# Patient Record
Sex: MALE | Race: WHITE | NOT HISPANIC OR LATINO | Employment: OTHER | URBAN - METROPOLITAN AREA
[De-identification: names, ages, dates, MRNs, and addresses within clinical notes are randomized per-mention and may not be internally consistent; named-entity substitution may affect disease eponyms.]

---

## 2017-01-01 ENCOUNTER — HOSPITAL ENCOUNTER (OUTPATIENT)
Dept: RADIOLOGY | Facility: HOSPITAL | Age: 72
Discharge: HOME | End: 2017-03-08
Attending: INTERNAL MEDICINE
Payer: MEDICARE

## 2017-01-01 ENCOUNTER — HOSPITAL ENCOUNTER (OUTPATIENT)
Facility: HOSPITAL | Age: 72
Discharge: HOME | End: 2017-10-16
Attending: EMERGENCY MEDICINE | Admitting: HOSPITALIST
Payer: MEDICARE

## 2017-01-01 ENCOUNTER — OUTPATIENT CASE MANAGEMENT (OUTPATIENT)
Dept: ADMINISTRATIVE | Facility: OTHER | Age: 72
End: 2017-01-01

## 2017-01-01 ENCOUNTER — OFFICE VISIT (OUTPATIENT)
Dept: HEMATOLOGY/ONCOLOGY | Facility: CLINIC | Age: 72
End: 2017-01-01
Payer: MEDICARE

## 2017-01-01 ENCOUNTER — TELEPHONE (OUTPATIENT)
Dept: INTERNAL MEDICINE | Facility: CLINIC | Age: 72
End: 2017-01-01

## 2017-01-01 ENCOUNTER — INFUSION (OUTPATIENT)
Dept: INFUSION THERAPY | Facility: HOSPITAL | Age: 72
End: 2017-01-01
Attending: INTERNAL MEDICINE
Payer: MEDICARE

## 2017-01-01 ENCOUNTER — TELEPHONE (OUTPATIENT)
Dept: PHARMACY | Facility: CLINIC | Age: 72
End: 2017-01-01

## 2017-01-01 ENCOUNTER — PATIENT MESSAGE (OUTPATIENT)
Dept: HEMATOLOGY/ONCOLOGY | Facility: CLINIC | Age: 72
End: 2017-01-01

## 2017-01-01 ENCOUNTER — HOSPITAL ENCOUNTER (INPATIENT)
Facility: HOSPITAL | Age: 72
LOS: 2 days | Discharge: HOME | DRG: 291 | End: 2017-10-04
Attending: EMERGENCY MEDICINE | Admitting: EMERGENCY MEDICINE
Payer: MEDICARE

## 2017-01-01 ENCOUNTER — OFFICE VISIT (OUTPATIENT)
Dept: CARDIOLOGY | Facility: CLINIC | Age: 72
End: 2017-01-01
Payer: MEDICARE

## 2017-01-01 ENCOUNTER — OFFICE VISIT (OUTPATIENT)
Dept: PRIMARY CARE CLINIC | Facility: CLINIC | Age: 72
End: 2017-01-01
Payer: MEDICARE

## 2017-01-01 ENCOUNTER — CLINICAL SUPPORT (OUTPATIENT)
Dept: CARDIOLOGY | Facility: CLINIC | Age: 72
End: 2017-01-01
Payer: MEDICARE

## 2017-01-01 ENCOUNTER — HOSPITAL ENCOUNTER (OUTPATIENT)
Dept: RADIATION THERAPY | Facility: HOSPITAL | Age: 72
Discharge: HOME | End: 2017-03-03
Attending: RADIOLOGY
Payer: MEDICARE

## 2017-01-01 ENCOUNTER — LAB VISIT (OUTPATIENT)
Dept: LAB | Facility: HOSPITAL | Age: 72
End: 2017-01-01
Attending: INTERNAL MEDICINE
Payer: MEDICARE

## 2017-01-01 ENCOUNTER — TELEPHONE (OUTPATIENT)
Dept: HEMATOLOGY/ONCOLOGY | Facility: CLINIC | Age: 72
End: 2017-01-01

## 2017-01-01 ENCOUNTER — HOSPITAL ENCOUNTER (INPATIENT)
Facility: HOSPITAL | Age: 72
LOS: 3 days | Discharge: HOME | DRG: 280 | End: 2017-04-11
Attending: EMERGENCY MEDICINE | Admitting: HOSPITALIST
Payer: MEDICARE

## 2017-01-01 ENCOUNTER — TELEPHONE (OUTPATIENT)
Dept: INFUSION THERAPY | Facility: HOSPITAL | Age: 72
End: 2017-01-01

## 2017-01-01 ENCOUNTER — OFFICE VISIT (OUTPATIENT)
Dept: INTERNAL MEDICINE | Facility: CLINIC | Age: 72
End: 2017-01-01
Payer: MEDICARE

## 2017-01-01 ENCOUNTER — PATIENT OUTREACH (OUTPATIENT)
Dept: ADMINISTRATIVE | Facility: CLINIC | Age: 72
End: 2017-01-01
Payer: MEDICARE

## 2017-01-01 ENCOUNTER — HOSPITAL ENCOUNTER (OUTPATIENT)
Dept: RADIATION THERAPY | Facility: HOSPITAL | Age: 72
Discharge: HOME | End: 2017-08-01
Attending: RADIOLOGY
Payer: MEDICARE

## 2017-01-01 ENCOUNTER — OFFICE VISIT (OUTPATIENT)
Dept: UROLOGY | Facility: CLINIC | Age: 72
End: 2017-01-01
Payer: MEDICARE

## 2017-01-01 ENCOUNTER — TELEPHONE (OUTPATIENT)
Dept: CARDIOLOGY | Facility: CLINIC | Age: 72
End: 2017-01-01

## 2017-01-01 ENCOUNTER — OFFICE VISIT (OUTPATIENT)
Dept: NEUROSURGERY | Facility: CLINIC | Age: 72
End: 2017-01-01
Payer: MEDICARE

## 2017-01-01 ENCOUNTER — HOSPITAL ENCOUNTER (OUTPATIENT)
Dept: RADIOLOGY | Facility: HOSPITAL | Age: 72
Discharge: HOME | End: 2017-12-15
Attending: INTERNAL MEDICINE
Payer: MEDICARE

## 2017-01-01 ENCOUNTER — HOSPITAL ENCOUNTER (INPATIENT)
Facility: HOSPITAL | Age: 72
LOS: 6 days | Discharge: HOME | DRG: 871 | End: 2017-04-04
Attending: EMERGENCY MEDICINE | Admitting: INTERNAL MEDICINE
Payer: MEDICARE

## 2017-01-01 ENCOUNTER — ANESTHESIA EVENT (OUTPATIENT)
Dept: MEDSURG UNIT | Facility: HOSPITAL | Age: 72
DRG: 308 | End: 2017-01-01
Payer: MEDICARE

## 2017-01-01 ENCOUNTER — TELEPHONE (OUTPATIENT)
Dept: RADIOLOGY | Facility: HOSPITAL | Age: 72
End: 2017-01-01

## 2017-01-01 ENCOUNTER — HOSPITAL ENCOUNTER (OUTPATIENT)
Dept: RADIOLOGY | Facility: HOSPITAL | Age: 72
Discharge: HOME | End: 2017-05-17
Attending: NEUROLOGICAL SURGERY
Payer: MEDICARE

## 2017-01-01 ENCOUNTER — INFUSION (OUTPATIENT)
Dept: INFUSION THERAPY | Facility: HOSPITAL | Age: 72
DRG: 872 | End: 2017-01-01
Attending: INTERNAL MEDICINE
Payer: MEDICARE

## 2017-01-01 ENCOUNTER — PATIENT MESSAGE (OUTPATIENT)
Dept: ADMINISTRATIVE | Facility: OTHER | Age: 72
End: 2017-01-01

## 2017-01-01 ENCOUNTER — TELEPHONE (OUTPATIENT)
Dept: OPTOMETRY | Facility: CLINIC | Age: 72
End: 2017-01-01

## 2017-01-01 ENCOUNTER — HOSPITAL ENCOUNTER (INPATIENT)
Facility: HOSPITAL | Age: 72
LOS: 1 days | Discharge: HOME | DRG: 872 | End: 2017-08-17
Attending: EMERGENCY MEDICINE | Admitting: INTERNAL MEDICINE
Payer: MEDICARE

## 2017-01-01 ENCOUNTER — HOSPITAL ENCOUNTER (OUTPATIENT)
Dept: RADIOLOGY | Facility: HOSPITAL | Age: 72
Discharge: HOME | End: 2017-09-19
Attending: PHYSICIAN ASSISTANT
Payer: MEDICARE

## 2017-01-01 ENCOUNTER — ANESTHESIA (OUTPATIENT)
Dept: MEDSURG UNIT | Facility: HOSPITAL | Age: 72
DRG: 308 | End: 2017-01-01
Payer: MEDICARE

## 2017-01-01 ENCOUNTER — NURSE TRIAGE (OUTPATIENT)
Dept: ADMINISTRATIVE | Facility: CLINIC | Age: 72
End: 2017-01-01

## 2017-01-01 ENCOUNTER — TELEPHONE (OUTPATIENT)
Dept: UROLOGY | Facility: CLINIC | Age: 72
End: 2017-01-01

## 2017-01-01 ENCOUNTER — OFFICE VISIT (OUTPATIENT)
Dept: CARDIOLOGY | Facility: CLINIC | Age: 72
DRG: 872 | End: 2017-01-01
Payer: MEDICARE

## 2017-01-01 ENCOUNTER — TELEPHONE (OUTPATIENT)
Dept: ADMINISTRATIVE | Facility: OTHER | Age: 72
End: 2017-01-01

## 2017-01-01 ENCOUNTER — DOCUMENTATION ONLY (OUTPATIENT)
Dept: RADIATION ONCOLOGY | Facility: CLINIC | Age: 72
End: 2017-01-01

## 2017-01-01 ENCOUNTER — INITIAL CONSULT (OUTPATIENT)
Dept: RADIATION ONCOLOGY | Facility: CLINIC | Age: 72
End: 2017-01-01
Attending: RADIOLOGY
Payer: MEDICARE

## 2017-01-01 ENCOUNTER — TELEPHONE (OUTPATIENT)
Dept: PEDIATRIC UROLOGY | Facility: CLINIC | Age: 72
End: 2017-01-01

## 2017-01-01 ENCOUNTER — HOSPITAL ENCOUNTER (OUTPATIENT)
Dept: RADIOLOGY | Facility: HOSPITAL | Age: 72
Discharge: HOME | End: 2017-02-17
Attending: INTERNAL MEDICINE
Payer: MEDICARE

## 2017-01-01 ENCOUNTER — OFFICE VISIT (OUTPATIENT)
Dept: HEMATOLOGY/ONCOLOGY | Facility: CLINIC | Age: 72
DRG: 872 | End: 2017-01-01
Payer: MEDICARE

## 2017-01-01 ENCOUNTER — HOSPITAL ENCOUNTER (EMERGENCY)
Facility: HOSPITAL | Age: 72
Discharge: HOME | End: 2017-02-13
Attending: EMERGENCY MEDICINE
Payer: MEDICARE

## 2017-01-01 ENCOUNTER — PROCEDURE VISIT (OUTPATIENT)
Dept: UROLOGY | Facility: CLINIC | Age: 72
End: 2017-01-01
Payer: MEDICARE

## 2017-01-01 ENCOUNTER — HOSPITAL ENCOUNTER (OUTPATIENT)
Dept: CARDIOLOGY | Facility: CLINIC | Age: 72
Discharge: HOME | End: 2017-09-21
Payer: MEDICARE

## 2017-01-01 ENCOUNTER — HOSPITAL ENCOUNTER (INPATIENT)
Facility: HOSPITAL | Age: 72
LOS: 6 days | DRG: 308 | End: 2017-12-25
Attending: EMERGENCY MEDICINE | Admitting: EMERGENCY MEDICINE
Payer: MEDICARE

## 2017-01-01 ENCOUNTER — HOSPITAL ENCOUNTER (OUTPATIENT)
Dept: RADIOLOGY | Facility: HOSPITAL | Age: 72
Discharge: HOME | End: 2017-07-07
Attending: INTERNAL MEDICINE
Payer: MEDICARE

## 2017-01-01 ENCOUNTER — TELEPHONE (OUTPATIENT)
Dept: RADIATION ONCOLOGY | Facility: CLINIC | Age: 72
End: 2017-01-01

## 2017-01-01 ENCOUNTER — HOSPITAL ENCOUNTER (OUTPATIENT)
Dept: RADIATION THERAPY | Facility: HOSPITAL | Age: 72
Discharge: HOME | End: 2017-07-21
Attending: RADIOLOGY
Payer: MEDICARE

## 2017-01-01 ENCOUNTER — TELEPHONE (OUTPATIENT)
Dept: NEUROSURGERY | Facility: CLINIC | Age: 72
End: 2017-01-01

## 2017-01-01 ENCOUNTER — HOSPITAL ENCOUNTER (OUTPATIENT)
Dept: RADIOLOGY | Facility: CLINIC | Age: 72
Discharge: HOME | End: 2017-03-16
Attending: INTERNAL MEDICINE
Payer: MEDICARE

## 2017-01-01 ENCOUNTER — OFFICE VISIT (OUTPATIENT)
Dept: RADIATION ONCOLOGY | Facility: CLINIC | Age: 72
End: 2017-01-01
Payer: MEDICARE

## 2017-01-01 ENCOUNTER — TELEPHONE (OUTPATIENT)
Dept: CARDIOLOGY | Facility: HOSPITAL | Age: 72
End: 2017-01-01

## 2017-01-01 VITALS
DIASTOLIC BLOOD PRESSURE: 60 MMHG | BODY MASS INDEX: 25.2 KG/M2 | SYSTOLIC BLOOD PRESSURE: 112 MMHG | HEIGHT: 70 IN | WEIGHT: 176 LBS

## 2017-01-01 VITALS
BODY MASS INDEX: 24.77 KG/M2 | HEIGHT: 70 IN | SYSTOLIC BLOOD PRESSURE: 98 MMHG | HEART RATE: 70 BPM | DIASTOLIC BLOOD PRESSURE: 62 MMHG | SYSTOLIC BLOOD PRESSURE: 112 MMHG | WEIGHT: 173 LBS | HEART RATE: 75 BPM | WEIGHT: 171.25 LBS | DIASTOLIC BLOOD PRESSURE: 62 MMHG | HEIGHT: 70 IN | RESPIRATION RATE: 16 BRPM | TEMPERATURE: 99 F | BODY MASS INDEX: 24.52 KG/M2

## 2017-01-01 VITALS
TEMPERATURE: 99 F | HEART RATE: 75 BPM | WEIGHT: 180.75 LBS | DIASTOLIC BLOOD PRESSURE: 70 MMHG | HEIGHT: 70 IN | BODY MASS INDEX: 25.88 KG/M2 | RESPIRATION RATE: 16 BRPM | SYSTOLIC BLOOD PRESSURE: 110 MMHG

## 2017-01-01 VITALS
RESPIRATION RATE: 16 BRPM | DIASTOLIC BLOOD PRESSURE: 62 MMHG | SYSTOLIC BLOOD PRESSURE: 98 MMHG | HEIGHT: 70 IN | HEART RATE: 78 BPM | WEIGHT: 166.25 LBS | TEMPERATURE: 98 F | BODY MASS INDEX: 23.8 KG/M2

## 2017-01-01 VITALS
SYSTOLIC BLOOD PRESSURE: 94 MMHG | BODY MASS INDEX: 23.69 KG/M2 | DIASTOLIC BLOOD PRESSURE: 52 MMHG | TEMPERATURE: 99 F | RESPIRATION RATE: 20 BRPM | WEIGHT: 165.13 LBS | HEART RATE: 71 BPM | OXYGEN SATURATION: 96 %

## 2017-01-01 VITALS
WEIGHT: 164.44 LBS | OXYGEN SATURATION: 98 % | HEART RATE: 73 BPM | TEMPERATURE: 98 F | DIASTOLIC BLOOD PRESSURE: 54 MMHG | SYSTOLIC BLOOD PRESSURE: 99 MMHG | RESPIRATION RATE: 20 BRPM | BODY MASS INDEX: 23.6 KG/M2

## 2017-01-01 VITALS
RESPIRATION RATE: 16 BRPM | DIASTOLIC BLOOD PRESSURE: 52 MMHG | SYSTOLIC BLOOD PRESSURE: 91 MMHG | HEART RATE: 79 BPM | TEMPERATURE: 98 F

## 2017-01-01 VITALS
HEART RATE: 102 BPM | WEIGHT: 154 LBS | RESPIRATION RATE: 17 BRPM | OXYGEN SATURATION: 99 % | BODY MASS INDEX: 22.81 KG/M2 | DIASTOLIC BLOOD PRESSURE: 64 MMHG | HEIGHT: 69 IN | SYSTOLIC BLOOD PRESSURE: 106 MMHG | TEMPERATURE: 98 F

## 2017-01-01 VITALS
WEIGHT: 167.56 LBS | BODY MASS INDEX: 24.04 KG/M2 | SYSTOLIC BLOOD PRESSURE: 187 MMHG | OXYGEN SATURATION: 95 % | DIASTOLIC BLOOD PRESSURE: 137 MMHG | TEMPERATURE: 98 F | HEART RATE: 98 BPM | RESPIRATION RATE: 20 BRPM

## 2017-01-01 VITALS
SYSTOLIC BLOOD PRESSURE: 93 MMHG | HEIGHT: 70 IN | TEMPERATURE: 98 F | RESPIRATION RATE: 18 BRPM | HEART RATE: 82 BPM | BODY MASS INDEX: 22.57 KG/M2 | DIASTOLIC BLOOD PRESSURE: 53 MMHG | WEIGHT: 157.63 LBS | OXYGEN SATURATION: 99 %

## 2017-01-01 VITALS
WEIGHT: 156 LBS | SYSTOLIC BLOOD PRESSURE: 85 MMHG | OXYGEN SATURATION: 97 % | DIASTOLIC BLOOD PRESSURE: 53 MMHG | TEMPERATURE: 98 F | HEART RATE: 89 BPM | RESPIRATION RATE: 17 BRPM | HEIGHT: 70 IN | BODY MASS INDEX: 22.33 KG/M2

## 2017-01-01 VITALS
WEIGHT: 180 LBS | HEART RATE: 76 BPM | BODY MASS INDEX: 25.77 KG/M2 | RESPIRATION RATE: 18 BRPM | DIASTOLIC BLOOD PRESSURE: 60 MMHG | TEMPERATURE: 98 F | SYSTOLIC BLOOD PRESSURE: 125 MMHG | OXYGEN SATURATION: 96 % | HEIGHT: 70 IN

## 2017-01-01 VITALS
OXYGEN SATURATION: 99 % | BODY MASS INDEX: 23.91 KG/M2 | TEMPERATURE: 99 F | HEART RATE: 68 BPM | RESPIRATION RATE: 20 BRPM | DIASTOLIC BLOOD PRESSURE: 54 MMHG | SYSTOLIC BLOOD PRESSURE: 99 MMHG | WEIGHT: 166.69 LBS

## 2017-01-01 VITALS
HEIGHT: 70 IN | BODY MASS INDEX: 24.34 KG/M2 | HEART RATE: 95 BPM | SYSTOLIC BLOOD PRESSURE: 102 MMHG | OXYGEN SATURATION: 98 % | DIASTOLIC BLOOD PRESSURE: 65 MMHG | WEIGHT: 170 LBS | RESPIRATION RATE: 16 BRPM | TEMPERATURE: 98 F

## 2017-01-01 VITALS
WEIGHT: 158.5 LBS | DIASTOLIC BLOOD PRESSURE: 51 MMHG | HEIGHT: 69 IN | BODY MASS INDEX: 23.47 KG/M2 | OXYGEN SATURATION: 99 % | SYSTOLIC BLOOD PRESSURE: 86 MMHG | HEART RATE: 75 BPM

## 2017-01-01 VITALS
DIASTOLIC BLOOD PRESSURE: 55 MMHG | SYSTOLIC BLOOD PRESSURE: 101 MMHG | OXYGEN SATURATION: 96 % | HEART RATE: 82 BPM | WEIGHT: 175.5 LBS | TEMPERATURE: 98 F | BODY MASS INDEX: 25.13 KG/M2 | RESPIRATION RATE: 10 BRPM | HEIGHT: 70 IN

## 2017-01-01 VITALS
HEIGHT: 70 IN | BODY MASS INDEX: 26.99 KG/M2 | WEIGHT: 171.5 LBS | DIASTOLIC BLOOD PRESSURE: 68 MMHG | SYSTOLIC BLOOD PRESSURE: 100 MMHG | HEART RATE: 113 BPM | WEIGHT: 188.5 LBS | RESPIRATION RATE: 20 BRPM | TEMPERATURE: 98 F | OXYGEN SATURATION: 76 % | SYSTOLIC BLOOD PRESSURE: 107 MMHG | RESPIRATION RATE: 15 BRPM | TEMPERATURE: 97 F | DIASTOLIC BLOOD PRESSURE: 59 MMHG | OXYGEN SATURATION: 96 % | BODY MASS INDEX: 24.61 KG/M2

## 2017-01-01 VITALS
TEMPERATURE: 97 F | HEART RATE: 83 BPM | WEIGHT: 157.19 LBS | HEIGHT: 70 IN | BODY MASS INDEX: 22.5 KG/M2 | DIASTOLIC BLOOD PRESSURE: 51 MMHG | SYSTOLIC BLOOD PRESSURE: 92 MMHG | RESPIRATION RATE: 16 BRPM

## 2017-01-01 VITALS
SYSTOLIC BLOOD PRESSURE: 112 MMHG | TEMPERATURE: 98 F | HEIGHT: 70 IN | DIASTOLIC BLOOD PRESSURE: 60 MMHG | WEIGHT: 175.94 LBS | RESPIRATION RATE: 16 BRPM | BODY MASS INDEX: 25.19 KG/M2 | HEART RATE: 65 BPM

## 2017-01-01 VITALS
OXYGEN SATURATION: 98 % | HEART RATE: 100 BPM | TEMPERATURE: 99 F | WEIGHT: 167 LBS | BODY MASS INDEX: 23.96 KG/M2 | SYSTOLIC BLOOD PRESSURE: 95 MMHG | RESPIRATION RATE: 18 BRPM | DIASTOLIC BLOOD PRESSURE: 54 MMHG

## 2017-01-01 VITALS
TEMPERATURE: 98 F | SYSTOLIC BLOOD PRESSURE: 83 MMHG | SYSTOLIC BLOOD PRESSURE: 90 MMHG | RESPIRATION RATE: 18 BRPM | RESPIRATION RATE: 16 BRPM | TEMPERATURE: 98 F | HEART RATE: 92 BPM | DIASTOLIC BLOOD PRESSURE: 53 MMHG | HEART RATE: 96 BPM | DIASTOLIC BLOOD PRESSURE: 58 MMHG

## 2017-01-01 VITALS
OXYGEN SATURATION: 96 % | DIASTOLIC BLOOD PRESSURE: 68 MMHG | TEMPERATURE: 96 F | BODY MASS INDEX: 22.57 KG/M2 | SYSTOLIC BLOOD PRESSURE: 102 MMHG | HEART RATE: 103 BPM | WEIGHT: 157.63 LBS | RESPIRATION RATE: 18 BRPM | HEIGHT: 70 IN

## 2017-01-01 VITALS
BODY MASS INDEX: 22.69 KG/M2 | WEIGHT: 158.5 LBS | HEART RATE: 71 BPM | SYSTOLIC BLOOD PRESSURE: 82 MMHG | DIASTOLIC BLOOD PRESSURE: 53 MMHG | SYSTOLIC BLOOD PRESSURE: 101 MMHG | DIASTOLIC BLOOD PRESSURE: 55 MMHG | RESPIRATION RATE: 18 BRPM | TEMPERATURE: 98 F | OXYGEN SATURATION: 97 % | TEMPERATURE: 99 F | HEART RATE: 63 BPM | HEIGHT: 70 IN

## 2017-01-01 VITALS
HEART RATE: 75 BPM | RESPIRATION RATE: 20 BRPM | TEMPERATURE: 99 F | SYSTOLIC BLOOD PRESSURE: 111 MMHG | OXYGEN SATURATION: 96 % | WEIGHT: 173.5 LBS | DIASTOLIC BLOOD PRESSURE: 59 MMHG | BODY MASS INDEX: 24.89 KG/M2

## 2017-01-01 VITALS
SYSTOLIC BLOOD PRESSURE: 70 MMHG | DIASTOLIC BLOOD PRESSURE: 51 MMHG | BODY MASS INDEX: 22.54 KG/M2 | HEART RATE: 92 BPM | WEIGHT: 157.44 LBS | HEIGHT: 70 IN

## 2017-01-01 VITALS
HEART RATE: 112 BPM | SYSTOLIC BLOOD PRESSURE: 118 MMHG | BODY MASS INDEX: 23.8 KG/M2 | OXYGEN SATURATION: 99 % | WEIGHT: 166.25 LBS | HEIGHT: 70 IN | DIASTOLIC BLOOD PRESSURE: 64 MMHG

## 2017-01-01 VITALS
BODY MASS INDEX: 22.94 KG/M2 | SYSTOLIC BLOOD PRESSURE: 94 MMHG | HEART RATE: 96 BPM | HEIGHT: 70 IN | TEMPERATURE: 98 F | RESPIRATION RATE: 15 BRPM | DIASTOLIC BLOOD PRESSURE: 72 MMHG | WEIGHT: 160.25 LBS | OXYGEN SATURATION: 98 %

## 2017-01-01 VITALS
TEMPERATURE: 98 F | SYSTOLIC BLOOD PRESSURE: 70 MMHG | HEIGHT: 70 IN | BODY MASS INDEX: 23.99 KG/M2 | HEART RATE: 47 BPM | WEIGHT: 167.56 LBS | DIASTOLIC BLOOD PRESSURE: 47 MMHG

## 2017-01-01 VITALS
BODY MASS INDEX: 24.2 KG/M2 | WEIGHT: 168.63 LBS | TEMPERATURE: 98 F | HEART RATE: 92 BPM | RESPIRATION RATE: 20 BRPM | DIASTOLIC BLOOD PRESSURE: 56 MMHG | OXYGEN SATURATION: 96 % | SYSTOLIC BLOOD PRESSURE: 93 MMHG

## 2017-01-01 VITALS
RESPIRATION RATE: 16 BRPM | HEART RATE: 94 BPM | SYSTOLIC BLOOD PRESSURE: 94 MMHG | TEMPERATURE: 98 F | DIASTOLIC BLOOD PRESSURE: 55 MMHG

## 2017-01-01 VITALS
SYSTOLIC BLOOD PRESSURE: 92 MMHG | HEIGHT: 70 IN | DIASTOLIC BLOOD PRESSURE: 59 MMHG | WEIGHT: 160.5 LBS | HEART RATE: 91 BPM | BODY MASS INDEX: 22.98 KG/M2

## 2017-01-01 VITALS
SYSTOLIC BLOOD PRESSURE: 129 MMHG | HEIGHT: 70 IN | BODY MASS INDEX: 25.44 KG/M2 | WEIGHT: 177.69 LBS | OXYGEN SATURATION: 96 % | DIASTOLIC BLOOD PRESSURE: 77 MMHG | HEART RATE: 85 BPM

## 2017-01-01 VITALS
HEIGHT: 70 IN | HEART RATE: 94 BPM | SYSTOLIC BLOOD PRESSURE: 89 MMHG | WEIGHT: 156.06 LBS | DIASTOLIC BLOOD PRESSURE: 55 MMHG | BODY MASS INDEX: 22.34 KG/M2

## 2017-01-01 VITALS
BODY MASS INDEX: 23.31 KG/M2 | HEART RATE: 95 BPM | RESPIRATION RATE: 20 BRPM | WEIGHT: 157.88 LBS | TEMPERATURE: 98 F | SYSTOLIC BLOOD PRESSURE: 89 MMHG | DIASTOLIC BLOOD PRESSURE: 58 MMHG

## 2017-01-01 VITALS
RESPIRATION RATE: 17 BRPM | DIASTOLIC BLOOD PRESSURE: 57 MMHG | TEMPERATURE: 98 F | SYSTOLIC BLOOD PRESSURE: 111 MMHG | HEART RATE: 71 BPM

## 2017-01-01 VITALS
BODY MASS INDEX: 24.91 KG/M2 | DIASTOLIC BLOOD PRESSURE: 63 MMHG | HEART RATE: 68 BPM | TEMPERATURE: 99 F | HEIGHT: 70 IN | WEIGHT: 174 LBS | SYSTOLIC BLOOD PRESSURE: 102 MMHG

## 2017-01-01 VITALS
HEART RATE: 105 BPM | DIASTOLIC BLOOD PRESSURE: 50 MMHG | OXYGEN SATURATION: 98 % | RESPIRATION RATE: 18 BRPM | TEMPERATURE: 98 F | BODY MASS INDEX: 23.82 KG/M2 | WEIGHT: 166 LBS | SYSTOLIC BLOOD PRESSURE: 95 MMHG

## 2017-01-01 VITALS
SYSTOLIC BLOOD PRESSURE: 102 MMHG | TEMPERATURE: 98 F | RESPIRATION RATE: 18 BRPM | DIASTOLIC BLOOD PRESSURE: 58 MMHG | HEART RATE: 75 BPM

## 2017-01-01 DIAGNOSIS — I50.42 CHRONIC COMBINED SYSTOLIC AND DIASTOLIC HEART FAILURE: Chronic | ICD-10-CM

## 2017-01-01 DIAGNOSIS — C79.51 METASTATIC RENAL CELL CARCINOMA TO BONE: Chronic | ICD-10-CM

## 2017-01-01 DIAGNOSIS — I25.10 CORONARY ARTERY DISEASE, ANGINA PRESENCE UNSPECIFIED, UNSPECIFIED VESSEL OR LESION TYPE, UNSPECIFIED WHETHER NATIVE OR TRANSPLANTED HEART: ICD-10-CM

## 2017-01-01 DIAGNOSIS — N39.0 ACUTE UTI: Primary | ICD-10-CM

## 2017-01-01 DIAGNOSIS — C64.9 RENAL CELL CARCINOMA, UNSPECIFIED LATERALITY: ICD-10-CM

## 2017-01-01 DIAGNOSIS — C79.31 BRAIN METASTASES: ICD-10-CM

## 2017-01-01 DIAGNOSIS — E11.9 TYPE 2 DIABETES MELLITUS WITHOUT COMPLICATION: ICD-10-CM

## 2017-01-01 DIAGNOSIS — R79.9 ABNORMAL FINDING OF BLOOD CHEMISTRY: ICD-10-CM

## 2017-01-01 DIAGNOSIS — C79.51 SPINE METASTASIS: ICD-10-CM

## 2017-01-01 DIAGNOSIS — R06.02 SOB (SHORTNESS OF BREATH): ICD-10-CM

## 2017-01-01 DIAGNOSIS — C64.1 RENAL CELL CARCINOMA OF RIGHT KIDNEY: ICD-10-CM

## 2017-01-01 DIAGNOSIS — E87.1 HYPONATREMIA: ICD-10-CM

## 2017-01-01 DIAGNOSIS — R00.0 TACHYCARDIA: ICD-10-CM

## 2017-01-01 DIAGNOSIS — Z12.5 PROSTATE CANCER SCREENING: ICD-10-CM

## 2017-01-01 DIAGNOSIS — I25.10 CORONARY ARTERY DISEASE INVOLVING NATIVE HEART WITHOUT ANGINA PECTORIS, UNSPECIFIED VESSEL OR LESION TYPE: ICD-10-CM

## 2017-01-01 DIAGNOSIS — N18.2 CHRONIC KIDNEY DISEASE, STAGE II (MILD): ICD-10-CM

## 2017-01-01 DIAGNOSIS — C79.51 METASTATIC RENAL CELL CARCINOMA TO BONE: ICD-10-CM

## 2017-01-01 DIAGNOSIS — C64.1 RENAL CELL CARCINOMA OF RIGHT KIDNEY: Primary | ICD-10-CM

## 2017-01-01 DIAGNOSIS — E83.51 HYPOCALCEMIA: ICD-10-CM

## 2017-01-01 DIAGNOSIS — I10 ESSENTIAL HYPERTENSION: Chronic | ICD-10-CM

## 2017-01-01 DIAGNOSIS — I35.0 AORTIC STENOSIS, SEVERE: Primary | ICD-10-CM

## 2017-01-01 DIAGNOSIS — E07.9 DISORDER OF THYROID: ICD-10-CM

## 2017-01-01 DIAGNOSIS — R33.9 URINARY RETENTION: Primary | ICD-10-CM

## 2017-01-01 DIAGNOSIS — C64.9 METASTATIC RENAL CELL CARCINOMA TO BONE: Chronic | ICD-10-CM

## 2017-01-01 DIAGNOSIS — R52 PAIN: ICD-10-CM

## 2017-01-01 DIAGNOSIS — E11.22 TYPE 2 DIABETES MELLITUS WITH STAGE 2 CHRONIC KIDNEY DISEASE, WITHOUT LONG-TERM CURRENT USE OF INSULIN: ICD-10-CM

## 2017-01-01 DIAGNOSIS — I10 ESSENTIAL HYPERTENSION: ICD-10-CM

## 2017-01-01 DIAGNOSIS — E11.42 TYPE 2 DIABETES MELLITUS WITH DIABETIC POLYNEUROPATHY, WITHOUT LONG-TERM CURRENT USE OF INSULIN: Chronic | ICD-10-CM

## 2017-01-01 DIAGNOSIS — C79.51 METASTATIC RENAL CELL CARCINOMA TO BONE: Primary | Chronic | ICD-10-CM

## 2017-01-01 DIAGNOSIS — M47.816 FACET ARTHRITIS OF LUMBAR REGION: ICD-10-CM

## 2017-01-01 DIAGNOSIS — C79.51 METASTATIC RENAL CELL CARCINOMA TO BONE: Primary | ICD-10-CM

## 2017-01-01 DIAGNOSIS — R11.2 NAUSEA AND VOMITING, INTRACTABILITY OF VOMITING NOT SPECIFIED, UNSPECIFIED VOMITING TYPE: ICD-10-CM

## 2017-01-01 DIAGNOSIS — C64.1 CLEAR CELL RENAL CELL CARCINOMA, RIGHT: ICD-10-CM

## 2017-01-01 DIAGNOSIS — I35.0 SEVERE AORTIC STENOSIS: Primary | ICD-10-CM

## 2017-01-01 DIAGNOSIS — I50.43 ACUTE ON CHRONIC COMBINED SYSTOLIC AND DIASTOLIC CHF, NYHA CLASS 4: ICD-10-CM

## 2017-01-01 DIAGNOSIS — G89.3 CANCER ASSOCIATED PAIN: ICD-10-CM

## 2017-01-01 DIAGNOSIS — J18.9 COMMUNITY ACQUIRED PNEUMONIA: Primary | ICD-10-CM

## 2017-01-01 DIAGNOSIS — I25.10 CORONARY ARTERY DISEASE INVOLVING NATIVE CORONARY ARTERY OF NATIVE HEART WITHOUT ANGINA PECTORIS: ICD-10-CM

## 2017-01-01 DIAGNOSIS — I70.0 AORTIC ATHEROSCLEROSIS: ICD-10-CM

## 2017-01-01 DIAGNOSIS — C64.9 METASTATIC RENAL CELL CARCINOMA TO BONE: Primary | ICD-10-CM

## 2017-01-01 DIAGNOSIS — C79.51 BONE METASTASES: ICD-10-CM

## 2017-01-01 DIAGNOSIS — I42.9 CARDIOMYOPATHY: ICD-10-CM

## 2017-01-01 DIAGNOSIS — C64.9 METASTATIC RENAL CELL CARCINOMA TO BONE: Primary | Chronic | ICD-10-CM

## 2017-01-01 DIAGNOSIS — N18.2 TYPE 2 DIABETES MELLITUS WITH STAGE 2 CHRONIC KIDNEY DISEASE, WITHOUT LONG-TERM CURRENT USE OF INSULIN: ICD-10-CM

## 2017-01-01 DIAGNOSIS — Z00.00 ANNUAL PHYSICAL EXAM: ICD-10-CM

## 2017-01-01 DIAGNOSIS — R06.01 ORTHOPNEA: ICD-10-CM

## 2017-01-01 DIAGNOSIS — R65.20 SEPSIS WITH ACUTE ORGAN DYSFUNCTION: ICD-10-CM

## 2017-01-01 DIAGNOSIS — I50.22 CHRONIC SYSTOLIC HEART FAILURE: ICD-10-CM

## 2017-01-01 DIAGNOSIS — I48.92 ATRIAL FLUTTER: ICD-10-CM

## 2017-01-01 DIAGNOSIS — I10 ESSENTIAL HYPERTENSION: Primary | ICD-10-CM

## 2017-01-01 DIAGNOSIS — I50.42 CHRONIC COMBINED SYSTOLIC AND DIASTOLIC HEART FAILURE: ICD-10-CM

## 2017-01-01 DIAGNOSIS — I50.43 ACUTE ON CHRONIC COMBINED SYSTOLIC AND DIASTOLIC CONGESTIVE HEART FAILURE, NYHA CLASS 4: Primary | ICD-10-CM

## 2017-01-01 DIAGNOSIS — I35.0 SEVERE AORTIC STENOSIS: ICD-10-CM

## 2017-01-01 DIAGNOSIS — C64.1 RENAL CARCINOMA, RIGHT: Primary | ICD-10-CM

## 2017-01-01 DIAGNOSIS — I50.32 CHRONIC DIASTOLIC HEART FAILURE: ICD-10-CM

## 2017-01-01 DIAGNOSIS — R90.89 OTHER ABNORMAL FINDINGS ON DIAGNOSTIC IMAGING OF CENTRAL NERVOUS SYSTEM: ICD-10-CM

## 2017-01-01 DIAGNOSIS — E09.9 DRUG-INDUCED DIABETES MELLITUS: Chronic | ICD-10-CM

## 2017-01-01 DIAGNOSIS — M54.16 LUMBAR RADICULOPATHY: ICD-10-CM

## 2017-01-01 DIAGNOSIS — Z46.6 ENCOUNTER FOR FOLEY CATHETER REMOVAL: ICD-10-CM

## 2017-01-01 DIAGNOSIS — I49.9 IRREGULAR HEART BEAT: ICD-10-CM

## 2017-01-01 DIAGNOSIS — E78.5 HYPERLIPIDEMIA: ICD-10-CM

## 2017-01-01 DIAGNOSIS — E11.42 TYPE 2 DIABETES MELLITUS WITH DIABETIC POLYNEUROPATHY, WITHOUT LONG-TERM CURRENT USE OF INSULIN: ICD-10-CM

## 2017-01-01 DIAGNOSIS — I25.5 CARDIOMYOPATHY, ISCHEMIC: ICD-10-CM

## 2017-01-01 DIAGNOSIS — C64.9 RENAL CELL CARCINOMA, UNSPECIFIED LATERALITY: Primary | ICD-10-CM

## 2017-01-01 DIAGNOSIS — M54.50 LUMBAR SPINE PAIN: ICD-10-CM

## 2017-01-01 DIAGNOSIS — I10 ESSENTIAL (PRIMARY) HYPERTENSION: ICD-10-CM

## 2017-01-01 DIAGNOSIS — C41.4: Primary | Chronic | ICD-10-CM

## 2017-01-01 DIAGNOSIS — A41.9 SEPSIS, DUE TO UNSPECIFIED ORGANISM: Primary | ICD-10-CM

## 2017-01-01 DIAGNOSIS — R79.89 ELEVATED BRAIN NATRIURETIC PEPTIDE (BNP) LEVEL: ICD-10-CM

## 2017-01-01 DIAGNOSIS — R33.9 URINARY RETENTION: ICD-10-CM

## 2017-01-01 DIAGNOSIS — E13.65 OTHER SPECIFIED DIABETES MELLITUS WITH HYPERGLYCEMIA: ICD-10-CM

## 2017-01-01 DIAGNOSIS — G62.9 NEUROPATHY: ICD-10-CM

## 2017-01-01 DIAGNOSIS — R09.02 HYPOXIA: Primary | ICD-10-CM

## 2017-01-01 DIAGNOSIS — N40.1 BENIGN NON-NODULAR PROSTATIC HYPERPLASIA WITH LOWER URINARY TRACT SYMPTOMS: ICD-10-CM

## 2017-01-01 DIAGNOSIS — Z91.09 ENVIRONMENTAL ALLERGIES: ICD-10-CM

## 2017-01-01 DIAGNOSIS — R00.2 PALPITATIONS: ICD-10-CM

## 2017-01-01 DIAGNOSIS — R05.9 COUGH: ICD-10-CM

## 2017-01-01 DIAGNOSIS — E03.9 HYPOTHYROIDISM, UNSPECIFIED TYPE: Primary | ICD-10-CM

## 2017-01-01 DIAGNOSIS — I35.0 AORTIC VALVE STENOSIS, ETIOLOGY OF CARDIAC VALVE DISEASE UNSPECIFIED: ICD-10-CM
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
## 2017-01-01 DIAGNOSIS — R79.89 ELEVATED TROPONIN I LEVEL: ICD-10-CM

## 2017-01-01 DIAGNOSIS — M54.9 BACK PAIN, UNSPECIFIED BACK LOCATION, UNSPECIFIED BACK PAIN LATERALITY, UNSPECIFIED CHRONICITY: ICD-10-CM

## 2017-01-01 DIAGNOSIS — E87.6 HYPOKALEMIA: ICD-10-CM

## 2017-01-01 DIAGNOSIS — R07.9 CHEST PAIN: ICD-10-CM

## 2017-01-01 DIAGNOSIS — I50.43 ACUTE ON CHRONIC COMBINED SYSTOLIC AND DIASTOLIC CONGESTIVE HEART FAILURE, NYHA CLASS 4: ICD-10-CM

## 2017-01-01 DIAGNOSIS — J90 PLEURAL EFFUSION: ICD-10-CM

## 2017-01-01 DIAGNOSIS — R06.02 SHORTNESS OF BREATH: ICD-10-CM

## 2017-01-01 DIAGNOSIS — R91.8 PULMONARY INFILTRATES ON CXR: ICD-10-CM

## 2017-01-01 DIAGNOSIS — I48.91 A-FIB: ICD-10-CM

## 2017-01-01 DIAGNOSIS — Z00.00 ANNUAL PHYSICAL EXAM: Primary | ICD-10-CM

## 2017-01-01 DIAGNOSIS — N39.0 ACUTE UTI: ICD-10-CM

## 2017-01-01 DIAGNOSIS — C64.9 METASTATIC RENAL CELL CARCINOMA TO BONE: ICD-10-CM

## 2017-01-01 DIAGNOSIS — R63.4 ABNORMAL WEIGHT LOSS: ICD-10-CM

## 2017-01-01 DIAGNOSIS — I48.91 NEW ONSET A-FIB: ICD-10-CM

## 2017-01-01 DIAGNOSIS — M54.30 SCIATIC LEG PAIN: Primary | ICD-10-CM

## 2017-01-01 DIAGNOSIS — Z97.8 FOLEY CATHETER IN PLACE: ICD-10-CM

## 2017-01-01 DIAGNOSIS — R50.9 FEVER: ICD-10-CM

## 2017-01-01 DIAGNOSIS — J96.01 ACUTE RESPIRATORY FAILURE WITH HYPOXIA: ICD-10-CM

## 2017-01-01 DIAGNOSIS — C64.9 SECONDARY RENAL CELL CARCINOMA OF BRAIN: ICD-10-CM

## 2017-01-01 DIAGNOSIS — E61.1 IRON DEFICIENCY: Primary | ICD-10-CM

## 2017-01-01 DIAGNOSIS — I48.92 ATRIAL FLUTTER WITH RAPID VENTRICULAR RESPONSE: Primary | ICD-10-CM

## 2017-01-01 DIAGNOSIS — I95.9 HYPOTENSION, UNSPECIFIED HYPOTENSION TYPE: ICD-10-CM

## 2017-01-01 DIAGNOSIS — M89.9 DISORDER OF BONE: ICD-10-CM

## 2017-01-01 DIAGNOSIS — A41.9 SEPSIS WITH ACUTE ORGAN DYSFUNCTION: ICD-10-CM

## 2017-01-01 DIAGNOSIS — I70.0 AORTIC ATHEROSCLEROSIS: Primary | ICD-10-CM

## 2017-01-01 DIAGNOSIS — R33.9 CHRONIC RETENTION OF URINE: ICD-10-CM

## 2017-01-01 DIAGNOSIS — G47.00 INSOMNIA, UNSPECIFIED TYPE: ICD-10-CM

## 2017-01-01 DIAGNOSIS — C41.4: Chronic | ICD-10-CM

## 2017-01-01 DIAGNOSIS — I50.43 ACUTE ON CHRONIC COMBINED SYSTOLIC AND DIASTOLIC CONGESTIVE HEART FAILURE: ICD-10-CM

## 2017-01-01 DIAGNOSIS — I50.43 ACUTE ON CHRONIC COMBINED SYSTOLIC AND DIASTOLIC CHF, NYHA CLASS 4: Primary | ICD-10-CM

## 2017-01-01 DIAGNOSIS — I35.0 NONRHEUMATIC AORTIC VALVE STENOSIS: ICD-10-CM

## 2017-01-01 DIAGNOSIS — I50.9 ACUTE ON CHRONIC CONGESTIVE HEART FAILURE, UNSPECIFIED CONGESTIVE HEART FAILURE TYPE: Primary | ICD-10-CM

## 2017-01-01 DIAGNOSIS — C79.31 SECONDARY RENAL CELL CARCINOMA OF BRAIN: ICD-10-CM

## 2017-01-01 DIAGNOSIS — D72.829 LEUKOCYTOSIS, UNSPECIFIED TYPE: ICD-10-CM

## 2017-01-01 DIAGNOSIS — E78.5 HYPERLIPIDEMIA, UNSPECIFIED HYPERLIPIDEMIA TYPE: ICD-10-CM

## 2017-01-01 DIAGNOSIS — M54.50 LUMBAR SPINE PAIN: Primary | ICD-10-CM

## 2017-01-01 DIAGNOSIS — J18.9 PNEUMONIA OF RIGHT LOWER LOBE DUE TO INFECTIOUS ORGANISM: ICD-10-CM

## 2017-01-01 DIAGNOSIS — R57.0 CARDIOGENIC SHOCK: ICD-10-CM

## 2017-01-01 DIAGNOSIS — M54.30 SCIATIC LEG PAIN: ICD-10-CM

## 2017-01-01 DIAGNOSIS — C64.9 MALIGNANT NEOPLASM OF KIDNEY, UNSPECIFIED LATERALITY: ICD-10-CM

## 2017-01-01 DIAGNOSIS — E07.9 DISORDER OF THYROID: Primary | ICD-10-CM

## 2017-01-01 DIAGNOSIS — R52 PAIN: Primary | ICD-10-CM

## 2017-01-01 DIAGNOSIS — I21.4 NSTEMI, INITIAL EPISODE OF CARE: ICD-10-CM

## 2017-01-01 DIAGNOSIS — J96.90 RESPIRATORY FAILURE: ICD-10-CM

## 2017-01-01 LAB
ALBUMIN SERPL BCP-MCNC: 2 G/DL
ALBUMIN SERPL BCP-MCNC: 2.1 G/DL
ALBUMIN SERPL BCP-MCNC: 2.1 G/DL
ALBUMIN SERPL BCP-MCNC: 2.2 G/DL
ALBUMIN SERPL BCP-MCNC: 2.2 G/DL
ALBUMIN SERPL BCP-MCNC: 2.3 G/DL
ALBUMIN SERPL BCP-MCNC: 2.4 G/DL
ALBUMIN SERPL BCP-MCNC: 2.5 G/DL
ALBUMIN SERPL BCP-MCNC: 2.6 G/DL
ALBUMIN SERPL BCP-MCNC: 2.7 G/DL
ALBUMIN SERPL BCP-MCNC: 2.9 G/DL
ALBUMIN SERPL BCP-MCNC: 3.1 G/DL
ALBUMIN SERPL BCP-MCNC: 3.2 G/DL
ALBUMIN SERPL BCP-MCNC: 3.6 G/DL
ALLENS TEST: ABNORMAL
ALP SERPL-CCNC: 100 U/L
ALP SERPL-CCNC: 103 U/L
ALP SERPL-CCNC: 103 U/L
ALP SERPL-CCNC: 106 U/L
ALP SERPL-CCNC: 110 U/L
ALP SERPL-CCNC: 113 U/L
ALP SERPL-CCNC: 117 U/L
ALP SERPL-CCNC: 118 U/L
ALP SERPL-CCNC: 119 U/L
ALP SERPL-CCNC: 120 U/L
ALP SERPL-CCNC: 120 U/L
ALP SERPL-CCNC: 123 U/L
ALP SERPL-CCNC: 126 U/L
ALP SERPL-CCNC: 156 U/L
ALP SERPL-CCNC: 67 U/L
ALP SERPL-CCNC: 69 U/L
ALP SERPL-CCNC: 72 U/L
ALP SERPL-CCNC: 73 U/L
ALP SERPL-CCNC: 74 U/L
ALP SERPL-CCNC: 76 U/L
ALP SERPL-CCNC: 77 U/L
ALP SERPL-CCNC: 83 U/L
ALP SERPL-CCNC: 86 U/L
ALP SERPL-CCNC: 87 U/L
ALP SERPL-CCNC: 88 U/L
ALP SERPL-CCNC: 89 U/L
ALP SERPL-CCNC: 95 U/L
ALP SERPL-CCNC: 95 U/L
ALP SERPL-CCNC: 96 U/L
ALT SERPL W/O P-5'-P-CCNC: 109 U/L
ALT SERPL W/O P-5'-P-CCNC: 11 U/L
ALT SERPL W/O P-5'-P-CCNC: 12 U/L
ALT SERPL W/O P-5'-P-CCNC: 141 U/L
ALT SERPL W/O P-5'-P-CCNC: 18 U/L
ALT SERPL W/O P-5'-P-CCNC: 19 U/L
ALT SERPL W/O P-5'-P-CCNC: 21 U/L
ALT SERPL W/O P-5'-P-CCNC: 21 U/L
ALT SERPL W/O P-5'-P-CCNC: 23 U/L
ALT SERPL W/O P-5'-P-CCNC: 24 U/L
ALT SERPL W/O P-5'-P-CCNC: 25 U/L
ALT SERPL W/O P-5'-P-CCNC: 26 U/L
ALT SERPL W/O P-5'-P-CCNC: 29 U/L
ALT SERPL W/O P-5'-P-CCNC: 307 U/L
ALT SERPL W/O P-5'-P-CCNC: 329 U/L
ALT SERPL W/O P-5'-P-CCNC: 329 U/L
ALT SERPL W/O P-5'-P-CCNC: 37 U/L
ALT SERPL W/O P-5'-P-CCNC: 408 U/L
ALT SERPL W/O P-5'-P-CCNC: 409 U/L
ALT SERPL W/O P-5'-P-CCNC: 424 U/L
ALT SERPL W/O P-5'-P-CCNC: 44 U/L
ALT SERPL W/O P-5'-P-CCNC: 443 U/L
ALT SERPL W/O P-5'-P-CCNC: 46 U/L
ALT SERPL W/O P-5'-P-CCNC: 462 U/L
ALT SERPL W/O P-5'-P-CCNC: 478 U/L
ALT SERPL W/O P-5'-P-CCNC: 479 U/L
ALT SERPL W/O P-5'-P-CCNC: 59 U/L
ALT SERPL W/O P-5'-P-CCNC: 70 U/L
ALT SERPL W/O P-5'-P-CCNC: 72 U/L
ALT SERPL W/O P-5'-P-CCNC: 72 U/L
ALT SERPL W/O P-5'-P-CCNC: 76 U/L
ALT SERPL W/O P-5'-P-CCNC: 94 U/L
ALT SERPL W/O P-5'-P-CCNC: 94 U/L
ANION GAP SERPL CALC-SCNC: 10 MMOL/L
ANION GAP SERPL CALC-SCNC: 11 MMOL/L
ANION GAP SERPL CALC-SCNC: 12 MMOL/L
ANION GAP SERPL CALC-SCNC: 13 MMOL/L
ANION GAP SERPL CALC-SCNC: 15 MMOL/L
ANION GAP SERPL CALC-SCNC: 16 MMOL/L
ANION GAP SERPL CALC-SCNC: 17 MMOL/L
ANION GAP SERPL CALC-SCNC: 17 MMOL/L
ANION GAP SERPL CALC-SCNC: 18 MMOL/L
ANION GAP SERPL CALC-SCNC: 19 MMOL/L
ANION GAP SERPL CALC-SCNC: 19 MMOL/L
ANION GAP SERPL CALC-SCNC: 20 MMOL/L
ANION GAP SERPL CALC-SCNC: 24 MMOL/L
ANION GAP SERPL CALC-SCNC: 24 MMOL/L
ANION GAP SERPL CALC-SCNC: 25 MMOL/L
ANION GAP SERPL CALC-SCNC: 25 MMOL/L
ANION GAP SERPL CALC-SCNC: 26 MMOL/L
ANION GAP SERPL CALC-SCNC: 4 MMOL/L
ANION GAP SERPL CALC-SCNC: 6 MMOL/L
ANION GAP SERPL CALC-SCNC: 7 MMOL/L
ANION GAP SERPL CALC-SCNC: 7 MMOL/L
ANION GAP SERPL CALC-SCNC: 8 MMOL/L
ANION GAP SERPL CALC-SCNC: 9 MMOL/L
ANISOCYTOSIS BLD QL SMEAR: ABNORMAL
ANISOCYTOSIS BLD QL SMEAR: SLIGHT
AORTIC VALVE REGURGITATION: ABNORMAL
AORTIC VALVE STENOSIS: ABNORMAL
APTT BLDCRRT: 49.5 SEC
AST SERPL-CCNC: 13 U/L
AST SERPL-CCNC: 17 U/L
AST SERPL-CCNC: 20 U/L
AST SERPL-CCNC: 21 U/L
AST SERPL-CCNC: 23 U/L
AST SERPL-CCNC: 23 U/L
AST SERPL-CCNC: 24 U/L
AST SERPL-CCNC: 242 U/L
AST SERPL-CCNC: 25 U/L
AST SERPL-CCNC: 28 U/L
AST SERPL-CCNC: 28 U/L
AST SERPL-CCNC: 36 U/L
AST SERPL-CCNC: 37 U/L
AST SERPL-CCNC: 38 U/L
AST SERPL-CCNC: 404 U/L
AST SERPL-CCNC: 416 U/L
AST SERPL-CCNC: 42 U/L
AST SERPL-CCNC: 425 U/L
AST SERPL-CCNC: 43 U/L
AST SERPL-CCNC: 48 U/L
AST SERPL-CCNC: 482 U/L
AST SERPL-CCNC: 51 U/L
AST SERPL-CCNC: 57 U/L
AST SERPL-CCNC: 58 U/L
AST SERPL-CCNC: 59 U/L
AST SERPL-CCNC: 591 U/L
AST SERPL-CCNC: 608 U/L
AST SERPL-CCNC: 611 U/L
AST SERPL-CCNC: 62 U/L
AST SERPL-CCNC: 649 U/L
AST SERPL-CCNC: 649 U/L
AST SERPL-CCNC: 798 U/L
AST SERPL-CCNC: 97 U/L
BACTERIA #/AREA URNS AUTO: ABNORMAL /HPF
BACTERIA #/AREA URNS AUTO: ABNORMAL /HPF
BACTERIA #/AREA URNS AUTO: NORMAL /HPF
BACTERIA BLD CULT: NORMAL
BACTERIA UR CULT: NO GROWTH
BACTERIA UR CULT: NORMAL
BASOPHILS # BLD AUTO: 0.01 K/UL
BASOPHILS # BLD AUTO: 0.02 K/UL
BASOPHILS # BLD AUTO: 0.03 K/UL
BASOPHILS # BLD AUTO: 0.04 K/UL
BASOPHILS # BLD AUTO: 0.04 K/UL
BASOPHILS NFR BLD: 0.1 %
BASOPHILS NFR BLD: 0.2 %
BASOPHILS NFR BLD: 0.3 %
BASOPHILS NFR BLD: 0.4 %
BASOPHILS NFR BLD: 0.5 %
BASOPHILS NFR BLD: 0.6 %
BILIRUB DIRECT SERPL-MCNC: 1.8 MG/DL
BILIRUB SERPL-MCNC: 0.4 MG/DL
BILIRUB SERPL-MCNC: 0.5 MG/DL
BILIRUB SERPL-MCNC: 0.6 MG/DL
BILIRUB SERPL-MCNC: 0.7 MG/DL
BILIRUB SERPL-MCNC: 0.7 MG/DL
BILIRUB SERPL-MCNC: 0.8 MG/DL
BILIRUB SERPL-MCNC: 0.8 MG/DL
BILIRUB SERPL-MCNC: 0.9 MG/DL
BILIRUB SERPL-MCNC: 0.9 MG/DL
BILIRUB SERPL-MCNC: 1 MG/DL
BILIRUB SERPL-MCNC: 1.1 MG/DL
BILIRUB SERPL-MCNC: 1.2 MG/DL
BILIRUB SERPL-MCNC: 1.5 MG/DL
BILIRUB SERPL-MCNC: 1.5 MG/DL
BILIRUB SERPL-MCNC: 1.7 MG/DL
BILIRUB SERPL-MCNC: 2.4 MG/DL
BILIRUB SERPL-MCNC: 2.4 MG/DL
BILIRUB SERPL-MCNC: 2.6 MG/DL
BILIRUB SERPL-MCNC: 2.7 MG/DL
BILIRUB SERPL-MCNC: 2.7 MG/DL
BILIRUB SERPL-MCNC: 3.2 MG/DL
BILIRUB SERPL-MCNC: 3.3 MG/DL
BILIRUB SERPL-MCNC: 4.5 MG/DL
BILIRUB SERPL-MCNC: 6.8 MG/DL
BILIRUB UR QL STRIP: NEGATIVE
BNP SERPL-MCNC: 1068 PG/ML
BNP SERPL-MCNC: 2382 PG/ML
BNP SERPL-MCNC: 2455 PG/ML
BNP SERPL-MCNC: 3504 PG/ML
BNP SERPL-MCNC: 474 PG/ML
BUN SERPL-MCNC: 13 MG/DL
BUN SERPL-MCNC: 14 MG/DL
BUN SERPL-MCNC: 14 MG/DL
BUN SERPL-MCNC: 15 MG/DL
BUN SERPL-MCNC: 16 MG/DL
BUN SERPL-MCNC: 17 MG/DL
BUN SERPL-MCNC: 17 MG/DL
BUN SERPL-MCNC: 19 MG/DL
BUN SERPL-MCNC: 20 MG/DL
BUN SERPL-MCNC: 21 MG/DL
BUN SERPL-MCNC: 21 MG/DL
BUN SERPL-MCNC: 22 MG/DL
BUN SERPL-MCNC: 23 MG/DL
BUN SERPL-MCNC: 26 MG/DL
BUN SERPL-MCNC: 32 MG/DL
BUN SERPL-MCNC: 32 MG/DL
BUN SERPL-MCNC: 36 MG/DL
BUN SERPL-MCNC: 41 MG/DL (ref 6–30)
BUN SERPL-MCNC: 48 MG/DL
BUN SERPL-MCNC: 50 MG/DL
BUN SERPL-MCNC: 55 MG/DL
BUN SERPL-MCNC: 59 MG/DL
BUN SERPL-MCNC: 68 MG/DL
BUN SERPL-MCNC: 73 MG/DL
BUN SERPL-MCNC: 74 MG/DL
BUN SERPL-MCNC: 77 MG/DL
BUN SERPL-MCNC: 78 MG/DL
BUN SERPL-MCNC: 81 MG/DL
BUN SERPL-MCNC: 82 MG/DL
BUN SERPL-MCNC: 83 MG/DL
BUN SERPL-MCNC: 83 MG/DL
BUN SERPL-MCNC: 86 MG/DL
BUN SERPL-MCNC: 86 MG/DL
BUN SERPL-MCNC: 87 MG/DL
BUN SERPL-MCNC: 97 MG/DL
BUN SERPL-MCNC: 97 MG/DL
BURR CELLS BLD QL SMEAR: ABNORMAL
BURR CELLS BLD QL SMEAR: ABNORMAL
C DIFF GDH STL QL: NEGATIVE
C DIFF TOX A+B STL QL IA: NEGATIVE
CALCIUM SERPL-MCNC: 10 MG/DL
CALCIUM SERPL-MCNC: 10.3 MG/DL
CALCIUM SERPL-MCNC: 11.1 MG/DL
CALCIUM SERPL-MCNC: 7 MG/DL
CALCIUM SERPL-MCNC: 7 MG/DL
CALCIUM SERPL-MCNC: 7.4 MG/DL
CALCIUM SERPL-MCNC: 7.5 MG/DL
CALCIUM SERPL-MCNC: 7.6 MG/DL
CALCIUM SERPL-MCNC: 7.8 MG/DL
CALCIUM SERPL-MCNC: 7.8 MG/DL
CALCIUM SERPL-MCNC: 7.9 MG/DL
CALCIUM SERPL-MCNC: 7.9 MG/DL
CALCIUM SERPL-MCNC: 8.1 MG/DL
CALCIUM SERPL-MCNC: 8.1 MG/DL
CALCIUM SERPL-MCNC: 8.2 MG/DL
CALCIUM SERPL-MCNC: 8.2 MG/DL
CALCIUM SERPL-MCNC: 8.3 MG/DL
CALCIUM SERPL-MCNC: 8.4 MG/DL
CALCIUM SERPL-MCNC: 8.5 MG/DL
CALCIUM SERPL-MCNC: 8.7 MG/DL
CALCIUM SERPL-MCNC: 8.8 MG/DL
CALCIUM SERPL-MCNC: 8.8 MG/DL
CALCIUM SERPL-MCNC: 8.9 MG/DL
CALCIUM SERPL-MCNC: 9 MG/DL
CALCIUM SERPL-MCNC: 9 MG/DL
CALCIUM SERPL-MCNC: 9.1 MG/DL
CALCIUM SERPL-MCNC: 9.3 MG/DL
CALCIUM SERPL-MCNC: 9.4 MG/DL
CALCIUM SERPL-MCNC: 9.5 MG/DL
CALCIUM SERPL-MCNC: 9.5 MG/DL
CALCIUM SERPL-MCNC: 9.7 MG/DL
CHLORIDE SERPL-SCNC: 100 MMOL/L
CHLORIDE SERPL-SCNC: 100 MMOL/L
CHLORIDE SERPL-SCNC: 101 MMOL/L
CHLORIDE SERPL-SCNC: 102 MMOL/L
CHLORIDE SERPL-SCNC: 103 MMOL/L
CHLORIDE SERPL-SCNC: 104 MMOL/L
CHLORIDE SERPL-SCNC: 104 MMOL/L
CHLORIDE SERPL-SCNC: 105 MMOL/L
CHLORIDE SERPL-SCNC: 107 MMOL/L
CHLORIDE SERPL-SCNC: 108 MMOL/L
CHLORIDE SERPL-SCNC: 110 MMOL/L
CHLORIDE SERPL-SCNC: 77 MMOL/L
CHLORIDE SERPL-SCNC: 77 MMOL/L
CHLORIDE SERPL-SCNC: 80 MMOL/L
CHLORIDE SERPL-SCNC: 80 MMOL/L
CHLORIDE SERPL-SCNC: 81 MMOL/L
CHLORIDE SERPL-SCNC: 82 MMOL/L
CHLORIDE SERPL-SCNC: 83 MMOL/L
CHLORIDE SERPL-SCNC: 85 MMOL/L
CHLORIDE SERPL-SCNC: 87 MMOL/L
CHLORIDE SERPL-SCNC: 87 MMOL/L
CHLORIDE SERPL-SCNC: 88 MMOL/L
CHLORIDE SERPL-SCNC: 89 MMOL/L
CHLORIDE SERPL-SCNC: 89 MMOL/L
CHLORIDE SERPL-SCNC: 90 MMOL/L
CHLORIDE SERPL-SCNC: 92 MMOL/L
CHLORIDE SERPL-SCNC: 94 MMOL/L
CHLORIDE SERPL-SCNC: 95 MMOL/L (ref 95–110)
CHLORIDE SERPL-SCNC: 96 MMOL/L
CHLORIDE SERPL-SCNC: 97 MMOL/L
CHLORIDE SERPL-SCNC: 97 MMOL/L
CHLORIDE SERPL-SCNC: 98 MMOL/L
CHLORIDE SERPL-SCNC: 99 MMOL/L
CHOLEST SERPL-MCNC: 103 MG/DL
CHOLEST/HDLC SERPL: 2.6 {RATIO}
CHOLEST/HDLC SERPL: 3.5 {RATIO}
CK MB SERPL-MCNC: 2.7 NG/ML
CK MB SERPL-RTO: 3.8 %
CK SERPL-CCNC: 71 U/L
CLARITY UR REFRACT.AUTO: ABNORMAL
CLARITY UR REFRACT.AUTO: CLEAR
CO2 SERPL-SCNC: 14 MMOL/L
CO2 SERPL-SCNC: 16 MMOL/L
CO2 SERPL-SCNC: 17 MMOL/L
CO2 SERPL-SCNC: 19 MMOL/L
CO2 SERPL-SCNC: 20 MMOL/L
CO2 SERPL-SCNC: 21 MMOL/L
CO2 SERPL-SCNC: 21 MMOL/L
CO2 SERPL-SCNC: 22 MMOL/L
CO2 SERPL-SCNC: 23 MMOL/L
CO2 SERPL-SCNC: 24 MMOL/L
CO2 SERPL-SCNC: 25 MMOL/L
CO2 SERPL-SCNC: 26 MMOL/L
CO2 SERPL-SCNC: 27 MMOL/L
CO2 SERPL-SCNC: 29 MMOL/L
CO2 SERPL-SCNC: 31 MMOL/L
CO2 SERPL-SCNC: 41 MMOL/L
CO2 SERPL-SCNC: 41 MMOL/L
COLOR UR AUTO: ABNORMAL
COLOR UR AUTO: ABNORMAL
COLOR UR AUTO: YELLOW
COMPLEXED PSA SERPL-MCNC: 2.5 NG/ML
CORONARY STENOSIS: ABNORMAL
CREAT SERPL-MCNC: 0.7 MG/DL
CREAT SERPL-MCNC: 0.8 MG/DL
CREAT SERPL-MCNC: 0.9 MG/DL
CREAT SERPL-MCNC: 1 MG/DL
CREAT SERPL-MCNC: 1.1 MG/DL
CREAT SERPL-MCNC: 1.2 MG/DL
CREAT SERPL-MCNC: 1.3 MG/DL
CREAT SERPL-MCNC: 1.4 MG/DL
CREAT SERPL-MCNC: 1.5 MG/DL
CREAT SERPL-MCNC: 1.6 MG/DL (ref 0.5–1.4)
CREAT SERPL-MCNC: 1.7 MG/DL
CREAT SERPL-MCNC: 2.9 MG/DL
CREAT SERPL-MCNC: 3 MG/DL
CREAT SERPL-MCNC: 3.1 MG/DL
CREAT SERPL-MCNC: 3.2 MG/DL
CREAT SERPL-MCNC: 3.2 MG/DL
CREAT SERPL-MCNC: 3.3 MG/DL
CREAT SERPL-MCNC: 3.4 MG/DL
CREAT SERPL-MCNC: 3.8 MG/DL
CREAT SERPL-MCNC: 3.8 MG/DL
CREAT SERPL-MCNC: 4.9 MG/DL
CREAT SERPL-MCNC: 4.9 MG/DL
CREAT UR-MCNC: 20 MG/DL
CREAT UR-MCNC: 82 MG/DL
DELSYS: ABNORMAL
DIASTOLIC DYSFUNCTION: YES
DIFFERENTIAL METHOD: ABNORMAL
ENTEROVIRUS: NOT DETECTED
EOSINOPHIL # BLD AUTO: 0 K/UL
EOSINOPHIL # BLD AUTO: 0.1 K/UL
EOSINOPHIL # BLD AUTO: 0.2 K/UL
EOSINOPHIL NFR BLD: 0 %
EOSINOPHIL NFR BLD: 0.1 %
EOSINOPHIL NFR BLD: 0.2 %
EOSINOPHIL NFR BLD: 0.3 %
EOSINOPHIL NFR BLD: 0.4 %
EOSINOPHIL NFR BLD: 0.5 %
EOSINOPHIL NFR BLD: 0.5 %
EOSINOPHIL NFR BLD: 0.6 %
EOSINOPHIL NFR BLD: 0.7 %
EOSINOPHIL NFR BLD: 0.8 %
EOSINOPHIL NFR BLD: 0.9 %
EOSINOPHIL NFR BLD: 0.9 %
EOSINOPHIL NFR BLD: 1 %
EOSINOPHIL NFR BLD: 1 %
EOSINOPHIL NFR BLD: 1.2 %
EOSINOPHIL NFR BLD: 1.3 %
EOSINOPHIL NFR BLD: 1.7 %
EOSINOPHIL NFR BLD: 1.8 %
EOSINOPHIL NFR BLD: 1.8 %
EOSINOPHIL NFR BLD: 2 %
EP: 5
ERYTHROCYTE [DISTWIDTH] IN BLOOD BY AUTOMATED COUNT: 13.8 %
ERYTHROCYTE [DISTWIDTH] IN BLOOD BY AUTOMATED COUNT: 15.9 %
ERYTHROCYTE [DISTWIDTH] IN BLOOD BY AUTOMATED COUNT: 16 %
ERYTHROCYTE [DISTWIDTH] IN BLOOD BY AUTOMATED COUNT: 16.1 %
ERYTHROCYTE [DISTWIDTH] IN BLOOD BY AUTOMATED COUNT: 16.2 %
ERYTHROCYTE [DISTWIDTH] IN BLOOD BY AUTOMATED COUNT: 16.3 %
ERYTHROCYTE [DISTWIDTH] IN BLOOD BY AUTOMATED COUNT: 16.5 %
ERYTHROCYTE [DISTWIDTH] IN BLOOD BY AUTOMATED COUNT: 16.6 %
ERYTHROCYTE [DISTWIDTH] IN BLOOD BY AUTOMATED COUNT: 19.5 %
ERYTHROCYTE [DISTWIDTH] IN BLOOD BY AUTOMATED COUNT: 20 %
ERYTHROCYTE [DISTWIDTH] IN BLOOD BY AUTOMATED COUNT: 20.1 %
ERYTHROCYTE [DISTWIDTH] IN BLOOD BY AUTOMATED COUNT: 20.4 %
ERYTHROCYTE [DISTWIDTH] IN BLOOD BY AUTOMATED COUNT: 20.5 %
ERYTHROCYTE [DISTWIDTH] IN BLOOD BY AUTOMATED COUNT: 20.6 %
ERYTHROCYTE [DISTWIDTH] IN BLOOD BY AUTOMATED COUNT: 20.6 %
ERYTHROCYTE [DISTWIDTH] IN BLOOD BY AUTOMATED COUNT: 21 %
ERYTHROCYTE [DISTWIDTH] IN BLOOD BY AUTOMATED COUNT: 21.2 %
ERYTHROCYTE [DISTWIDTH] IN BLOOD BY AUTOMATED COUNT: 21.7 %
ERYTHROCYTE [DISTWIDTH] IN BLOOD BY AUTOMATED COUNT: 21.8 %
ERYTHROCYTE [DISTWIDTH] IN BLOOD BY AUTOMATED COUNT: 21.9 %
ERYTHROCYTE [DISTWIDTH] IN BLOOD BY AUTOMATED COUNT: 22.2 %
ERYTHROCYTE [DISTWIDTH] IN BLOOD BY AUTOMATED COUNT: 22.3 %
ERYTHROCYTE [DISTWIDTH] IN BLOOD BY AUTOMATED COUNT: 23.7 %
ERYTHROCYTE [DISTWIDTH] IN BLOOD BY AUTOMATED COUNT: 23.9 %
ERYTHROCYTE [DISTWIDTH] IN BLOOD BY AUTOMATED COUNT: 24.1 %
ERYTHROCYTE [DISTWIDTH] IN BLOOD BY AUTOMATED COUNT: 25.1 %
ERYTHROCYTE [SEDIMENTATION RATE] IN BLOOD BY WESTERGREN METHOD: 24 MM/H
EST. GFR  (AFRICAN AMERICAN): 12.7 ML/MIN/1.73 M^2
EST. GFR  (AFRICAN AMERICAN): 12.7 ML/MIN/1.73 M^2
EST. GFR  (AFRICAN AMERICAN): 17.2 ML/MIN/1.73 M^2
EST. GFR  (AFRICAN AMERICAN): 17.2 ML/MIN/1.73 M^2
EST. GFR  (AFRICAN AMERICAN): 19.7 ML/MIN/1.73 M^2
EST. GFR  (AFRICAN AMERICAN): 20.4 ML/MIN/1.73 M^2
EST. GFR  (AFRICAN AMERICAN): 21.2 ML/MIN/1.73 M^2
EST. GFR  (AFRICAN AMERICAN): 21.2 ML/MIN/1.73 M^2
EST. GFR  (AFRICAN AMERICAN): 22 ML/MIN/1.73 M^2
EST. GFR  (AFRICAN AMERICAN): 22.9 ML/MIN/1.73 M^2
EST. GFR  (AFRICAN AMERICAN): 23.9 ML/MIN/1.73 M^2
EST. GFR  (AFRICAN AMERICAN): 45.6 ML/MIN/1.73 M^2
EST. GFR  (AFRICAN AMERICAN): 53 ML/MIN/1.73 M^2
EST. GFR  (AFRICAN AMERICAN): 57.6 ML/MIN/1.73 M^2
EST. GFR  (AFRICAN AMERICAN): >60 ML/MIN/1.73 M^2
EST. GFR  (NON AFRICAN AMERICAN): 11 ML/MIN/1.73 M^2
EST. GFR  (NON AFRICAN AMERICAN): 11 ML/MIN/1.73 M^2
EST. GFR  (NON AFRICAN AMERICAN): 14.9 ML/MIN/1.73 M^2
EST. GFR  (NON AFRICAN AMERICAN): 14.9 ML/MIN/1.73 M^2
EST. GFR  (NON AFRICAN AMERICAN): 17 ML/MIN/1.73 M^2
EST. GFR  (NON AFRICAN AMERICAN): 17.7 ML/MIN/1.73 M^2
EST. GFR  (NON AFRICAN AMERICAN): 18.3 ML/MIN/1.73 M^2
EST. GFR  (NON AFRICAN AMERICAN): 18.3 ML/MIN/1.73 M^2
EST. GFR  (NON AFRICAN AMERICAN): 19.1 ML/MIN/1.73 M^2
EST. GFR  (NON AFRICAN AMERICAN): 19.8 ML/MIN/1.73 M^2
EST. GFR  (NON AFRICAN AMERICAN): 20.7 ML/MIN/1.73 M^2
EST. GFR  (NON AFRICAN AMERICAN): 39.4 ML/MIN/1.73 M^2
EST. GFR  (NON AFRICAN AMERICAN): 45.9 ML/MIN/1.73 M^2
EST. GFR  (NON AFRICAN AMERICAN): 49.8 ML/MIN/1.73 M^2
EST. GFR  (NON AFRICAN AMERICAN): 54.5 ML/MIN/1.73 M^2
EST. GFR  (NON AFRICAN AMERICAN): >60 ML/MIN/1.73 M^2
ESTIMATED AVG GLUCOSE: 111 MG/DL
ESTIMATED AVG GLUCOSE: 137 MG/DL
ESTIMATED PA SYSTOLIC PRESSURE: 43.05
ESTIMATED PA SYSTOLIC PRESSURE: 60.13
ESTIMATED PA SYSTOLIC PRESSURE: 69.17
FACT X PPP CHRO-ACNC: 0.13 IU/ML
FACT X PPP CHRO-ACNC: 0.25 IU/ML
FACT X PPP CHRO-ACNC: 0.27 IU/ML
FACT X PPP CHRO-ACNC: 0.3 IU/ML
FACT X PPP CHRO-ACNC: 0.3 IU/ML
FACT X PPP CHRO-ACNC: 0.31 IU/ML
FACT X PPP CHRO-ACNC: 0.4 IU/ML
FACT X PPP CHRO-ACNC: 0.46 IU/ML
FACT X PPP CHRO-ACNC: 0.55 IU/ML
FACT X PPP CHRO-ACNC: 0.62 IU/ML
FACT X PPP CHRO-ACNC: 0.63 IU/ML
FACT X PPP CHRO-ACNC: 0.64 IU/ML
FACT X PPP CHRO-ACNC: 0.69 IU/ML
FACT X PPP CHRO-ACNC: 0.76 IU/ML
FACT X PPP CHRO-ACNC: 0.89 IU/ML
FACT X PPP CHRO-ACNC: 0.9 IU/ML
FACT X PPP CHRO-ACNC: 0.98 IU/ML
FACT X PPP CHRO-ACNC: <0.1 IU/ML
FIO2: 100
FLOW: 2
FLOW: 2
FLOW: 3
GLOBAL PERICARDIAL EFFUSION: ABNORMAL
GLUCOSE SERPL-MCNC: 101 MG/DL
GLUCOSE SERPL-MCNC: 102 MG/DL
GLUCOSE SERPL-MCNC: 103 MG/DL
GLUCOSE SERPL-MCNC: 105 MG/DL
GLUCOSE SERPL-MCNC: 106 MG/DL
GLUCOSE SERPL-MCNC: 107 MG/DL
GLUCOSE SERPL-MCNC: 113 MG/DL
GLUCOSE SERPL-MCNC: 117 MG/DL
GLUCOSE SERPL-MCNC: 121 MG/DL
GLUCOSE SERPL-MCNC: 122 MG/DL
GLUCOSE SERPL-MCNC: 135 MG/DL
GLUCOSE SERPL-MCNC: 138 MG/DL
GLUCOSE SERPL-MCNC: 150 MG/DL
GLUCOSE SERPL-MCNC: 150 MG/DL
GLUCOSE SERPL-MCNC: 156 MG/DL
GLUCOSE SERPL-MCNC: 165 MG/DL
GLUCOSE SERPL-MCNC: 168 MG/DL
GLUCOSE SERPL-MCNC: 178 MG/DL
GLUCOSE SERPL-MCNC: 183 MG/DL
GLUCOSE SERPL-MCNC: 183 MG/DL
GLUCOSE SERPL-MCNC: 203 MG/DL
GLUCOSE SERPL-MCNC: 203 MG/DL
GLUCOSE SERPL-MCNC: 205 MG/DL
GLUCOSE SERPL-MCNC: 212 MG/DL
GLUCOSE SERPL-MCNC: 221 MG/DL
GLUCOSE SERPL-MCNC: 221 MG/DL
GLUCOSE SERPL-MCNC: 322 MG/DL
GLUCOSE SERPL-MCNC: 51 MG/DL
GLUCOSE SERPL-MCNC: 78 MG/DL
GLUCOSE SERPL-MCNC: 81 MG/DL
GLUCOSE SERPL-MCNC: 82 MG/DL
GLUCOSE SERPL-MCNC: 83 MG/DL
GLUCOSE SERPL-MCNC: 85 MG/DL
GLUCOSE SERPL-MCNC: 85 MG/DL
GLUCOSE SERPL-MCNC: 86 MG/DL
GLUCOSE SERPL-MCNC: 89 MG/DL
GLUCOSE SERPL-MCNC: 90 MG/DL
GLUCOSE SERPL-MCNC: 93 MG/DL
GLUCOSE SERPL-MCNC: 94 MG/DL
GLUCOSE SERPL-MCNC: 94 MG/DL (ref 70–110)
GLUCOSE SERPL-MCNC: 98 MG/DL
GLUCOSE UR QL STRIP: NEGATIVE
HBA1C MFR BLD HPLC: 5.5 %
HBA1C MFR BLD HPLC: 6.4 %
HCO3 UR-SCNC: 17.1 MMOL/L (ref 24–28)
HCO3 UR-SCNC: 19.6 MMOL/L (ref 24–28)
HCO3 UR-SCNC: 20.3 MMOL/L (ref 24–28)
HCO3 UR-SCNC: 21.2 MMOL/L (ref 24–28)
HCO3 UR-SCNC: 23.4 MMOL/L (ref 24–28)
HCO3 UR-SCNC: 26.6 MMOL/L (ref 24–28)
HCO3 UR-SCNC: 26.6 MMOL/L (ref 24–28)
HCO3 UR-SCNC: 27.2 MMOL/L (ref 24–28)
HCO3 UR-SCNC: 30.4 MMOL/L (ref 24–28)
HCT VFR BLD AUTO: 24.2 %
HCT VFR BLD AUTO: 27.1 %
HCT VFR BLD AUTO: 29.6 %
HCT VFR BLD AUTO: 29.7 %
HCT VFR BLD AUTO: 29.9 %
HCT VFR BLD AUTO: 30.2 %
HCT VFR BLD AUTO: 30.3 %
HCT VFR BLD AUTO: 30.9 %
HCT VFR BLD AUTO: 31.1 %
HCT VFR BLD AUTO: 31.2 %
HCT VFR BLD AUTO: 31.3 %
HCT VFR BLD AUTO: 31.5 %
HCT VFR BLD AUTO: 31.7 %
HCT VFR BLD AUTO: 31.7 %
HCT VFR BLD AUTO: 31.9 %
HCT VFR BLD AUTO: 31.9 %
HCT VFR BLD AUTO: 32 %
HCT VFR BLD AUTO: 32.3 %
HCT VFR BLD AUTO: 32.8 %
HCT VFR BLD AUTO: 32.9 %
HCT VFR BLD AUTO: 33.5 %
HCT VFR BLD AUTO: 34.2 %
HCT VFR BLD AUTO: 34.5 %
HCT VFR BLD AUTO: 34.6 %
HCT VFR BLD AUTO: 35.3 %
HCT VFR BLD AUTO: 35.9 %
HCT VFR BLD AUTO: 36.4 %
HCT VFR BLD AUTO: 37.2 %
HCT VFR BLD AUTO: 37.6 %
HCT VFR BLD AUTO: 37.8 %
HCT VFR BLD AUTO: 38.5 %
HCT VFR BLD AUTO: 40.1 %
HCT VFR BLD AUTO: 41 %
HCT VFR BLD AUTO: 42.2 %
HCT VFR BLD CALC: 34 %PCV (ref 36–54)
HCT VFR BLD CALC: 35 %PCV (ref 36–54)
HCT VFR BLD CALC: 40 %PCV (ref 36–54)
HDL/CHOLESTEROL RATIO: 28.8 %
HDLC SERPL-MCNC: 125 MG/DL
HDLC SERPL-MCNC: 36 MG/DL
HDLC SERPL-MCNC: 40 MG/DL
HDLC SERPL: 38.8 %
HGB BLD-MCNC: 10 G/DL
HGB BLD-MCNC: 10.1 G/DL
HGB BLD-MCNC: 10.2 G/DL
HGB BLD-MCNC: 10.3 G/DL
HGB BLD-MCNC: 10.4 G/DL
HGB BLD-MCNC: 10.5 G/DL
HGB BLD-MCNC: 10.5 G/DL
HGB BLD-MCNC: 10.7 G/DL
HGB BLD-MCNC: 11 G/DL
HGB BLD-MCNC: 11.2 G/DL
HGB BLD-MCNC: 11.3 G/DL
HGB BLD-MCNC: 11.3 G/DL
HGB BLD-MCNC: 11.5 G/DL
HGB BLD-MCNC: 11.7 G/DL
HGB BLD-MCNC: 11.7 G/DL
HGB BLD-MCNC: 11.8 G/DL
HGB BLD-MCNC: 11.8 G/DL
HGB BLD-MCNC: 12 G/DL
HGB BLD-MCNC: 12.1 G/DL
HGB BLD-MCNC: 12.7 G/DL
HGB BLD-MCNC: 12.7 G/DL
HGB BLD-MCNC: 13.1 G/DL
HGB BLD-MCNC: 13.2 G/DL
HGB BLD-MCNC: 13.6 G/DL
HGB BLD-MCNC: 13.8 G/DL
HGB BLD-MCNC: 7.6 G/DL
HGB BLD-MCNC: 8.2 G/DL
HGB BLD-MCNC: 9.4 G/DL
HGB BLD-MCNC: 9.6 G/DL
HGB BLD-MCNC: 9.6 G/DL
HGB BLD-MCNC: 9.7 G/DL
HGB BLD-MCNC: 9.7 G/DL
HGB BLD-MCNC: 9.8 G/DL
HGB UR QL STRIP: ABNORMAL
HGB UR QL STRIP: NEGATIVE
HUMAN BOCAVIRUS: NOT DETECTED
HUMAN CORONAVIRUS, COMMON COLD VIRUS: NOT DETECTED
HYALINE CASTS UR QL AUTO: 0 /LPF
HYALINE CASTS UR QL AUTO: 20 /LPF
HYALINE CASTS UR QL AUTO: 4 /LPF
HYPOCHROMIA BLD QL SMEAR: ABNORMAL
HYPOCHROMIA BLD QL SMEAR: ABNORMAL
IMM GRANULOCYTES # BLD AUTO: 0.02 K/UL
IMM GRANULOCYTES # BLD AUTO: 0.04 K/UL
IMM GRANULOCYTES # BLD AUTO: 0.05 K/UL
IMM GRANULOCYTES # BLD AUTO: 0.05 K/UL
IMM GRANULOCYTES # BLD AUTO: 0.06 K/UL
IMM GRANULOCYTES # BLD AUTO: 0.06 K/UL
IMM GRANULOCYTES # BLD AUTO: 0.07 K/UL
IMM GRANULOCYTES # BLD AUTO: 0.07 K/UL
IMM GRANULOCYTES # BLD AUTO: 0.08 K/UL
IMM GRANULOCYTES # BLD AUTO: 0.11 K/UL
IMM GRANULOCYTES # BLD AUTO: 0.11 K/UL
IMM GRANULOCYTES # BLD AUTO: 0.16 K/UL
IMM GRANULOCYTES NFR BLD AUTO: 0.3 %
IMM GRANULOCYTES NFR BLD AUTO: 0.4 %
IMM GRANULOCYTES NFR BLD AUTO: 0.5 %
IMM GRANULOCYTES NFR BLD AUTO: 0.6 %
IMM GRANULOCYTES NFR BLD AUTO: 0.7 %
IMM GRANULOCYTES NFR BLD AUTO: 0.8 %
INFLUENZA A - H1N1-09: NOT DETECTED
INR PPP: 1.2
INR PPP: 1.3
INR PPP: 1.4
IP: 10
KETONES UR QL STRIP: NEGATIVE
LACTATE SERPL-SCNC: 1.2 MMOL/L
LACTATE SERPL-SCNC: 1.4 MMOL/L
LACTATE SERPL-SCNC: 1.5 MMOL/L
LACTATE SERPL-SCNC: 2.4 MMOL/L
LACTATE SERPL-SCNC: 2.5 MMOL/L
LACTATE SERPL-SCNC: 3.8 MMOL/L
LACTATE SERPL-SCNC: 4.7 MMOL/L
LACTATE SERPL-SCNC: 4.7 MMOL/L
LACTATE SERPL-SCNC: 8 MMOL/L
LACTATE SERPL-SCNC: 9.8 MMOL/L
LACTATE SERPL-SCNC: 9.8 MMOL/L
LACTATE SERPL-SCNC: 9.9 MMOL/L
LDLC SERPL CALC-MCNC: 51.2 MG/DL
LDLC SERPL CALC-MCNC: 69.8 MG/DL
LEUKOCYTE ESTERASE UR QL STRIP: NEGATIVE
LYMPHOCYTES # BLD AUTO: 0.3 K/UL
LYMPHOCYTES # BLD AUTO: 0.3 K/UL
LYMPHOCYTES # BLD AUTO: 0.4 K/UL
LYMPHOCYTES # BLD AUTO: 0.5 K/UL
LYMPHOCYTES # BLD AUTO: 0.6 K/UL
LYMPHOCYTES # BLD AUTO: 0.7 K/UL
LYMPHOCYTES # BLD AUTO: 0.8 K/UL
LYMPHOCYTES # BLD AUTO: 0.8 K/UL
LYMPHOCYTES # BLD AUTO: 0.9 K/UL
LYMPHOCYTES # BLD AUTO: 0.9 K/UL
LYMPHOCYTES # BLD AUTO: 1 K/UL
LYMPHOCYTES # BLD AUTO: 1 K/UL
LYMPHOCYTES # BLD AUTO: 1.1 K/UL
LYMPHOCYTES # BLD AUTO: 1.2 K/UL
LYMPHOCYTES # BLD AUTO: 1.3 K/UL
LYMPHOCYTES # BLD AUTO: 1.5 K/UL
LYMPHOCYTES NFR BLD: 1.4 %
LYMPHOCYTES NFR BLD: 10.4 %
LYMPHOCYTES NFR BLD: 10.7 %
LYMPHOCYTES NFR BLD: 10.9 %
LYMPHOCYTES NFR BLD: 11.6 %
LYMPHOCYTES NFR BLD: 12 %
LYMPHOCYTES NFR BLD: 12.3 %
LYMPHOCYTES NFR BLD: 12.7 %
LYMPHOCYTES NFR BLD: 13.4 %
LYMPHOCYTES NFR BLD: 13.8 %
LYMPHOCYTES NFR BLD: 13.8 %
LYMPHOCYTES NFR BLD: 14 %
LYMPHOCYTES NFR BLD: 15.3 %
LYMPHOCYTES NFR BLD: 16.2 %
LYMPHOCYTES NFR BLD: 18.9 %
LYMPHOCYTES NFR BLD: 2.1 %
LYMPHOCYTES NFR BLD: 2.2 %
LYMPHOCYTES NFR BLD: 2.6 %
LYMPHOCYTES NFR BLD: 20.1 %
LYMPHOCYTES NFR BLD: 3 %
LYMPHOCYTES NFR BLD: 3.2 %
LYMPHOCYTES NFR BLD: 3.2 %
LYMPHOCYTES NFR BLD: 3.4 %
LYMPHOCYTES NFR BLD: 3.4 %
LYMPHOCYTES NFR BLD: 3.8 %
LYMPHOCYTES NFR BLD: 4.3 %
LYMPHOCYTES NFR BLD: 4.5 %
LYMPHOCYTES NFR BLD: 4.6 %
LYMPHOCYTES NFR BLD: 4.8 %
LYMPHOCYTES NFR BLD: 5.5 %
LYMPHOCYTES NFR BLD: 5.5 %
LYMPHOCYTES NFR BLD: 6.8 %
LYMPHOCYTES NFR BLD: 8.1 %
LYMPHOCYTES NFR BLD: 8.3 %
LYMPHOCYTES NFR BLD: 9.1 %
MAGNESIUM SERPL-MCNC: 1.6 MG/DL
MAGNESIUM SERPL-MCNC: 1.9 MG/DL
MAGNESIUM SERPL-MCNC: 1.9 MG/DL
MAGNESIUM SERPL-MCNC: 2 MG/DL
MAGNESIUM SERPL-MCNC: 2 MG/DL
MAGNESIUM SERPL-MCNC: 2.1 MG/DL
MAGNESIUM SERPL-MCNC: 2.2 MG/DL
MAGNESIUM SERPL-MCNC: 2.3 MG/DL
MAGNESIUM SERPL-MCNC: 2.5 MG/DL
MAGNESIUM SERPL-MCNC: 2.7 MG/DL
MAGNESIUM SERPL-MCNC: 2.7 MG/DL
MCH RBC QN AUTO: 24.8 PG
MCH RBC QN AUTO: 24.9 PG
MCH RBC QN AUTO: 25.7 PG
MCH RBC QN AUTO: 27.8 PG
MCH RBC QN AUTO: 28.3 PG
MCH RBC QN AUTO: 28.4 PG
MCH RBC QN AUTO: 28.5 PG
MCH RBC QN AUTO: 28.8 PG
MCH RBC QN AUTO: 29 PG
MCH RBC QN AUTO: 29.1 PG
MCH RBC QN AUTO: 29.3 PG
MCH RBC QN AUTO: 29.4 PG
MCH RBC QN AUTO: 29.5 PG
MCH RBC QN AUTO: 29.5 PG
MCH RBC QN AUTO: 29.7 PG
MCH RBC QN AUTO: 30 PG
MCH RBC QN AUTO: 30 PG
MCH RBC QN AUTO: 30.2 PG
MCH RBC QN AUTO: 30.3 PG
MCH RBC QN AUTO: 30.3 PG
MCH RBC QN AUTO: 30.5 PG
MCH RBC QN AUTO: 30.6 PG
MCH RBC QN AUTO: 30.7 PG
MCH RBC QN AUTO: 30.8 PG
MCH RBC QN AUTO: 31 PG
MCH RBC QN AUTO: 31.1 PG
MCH RBC QN AUTO: 31.1 PG
MCH RBC QN AUTO: 32.2 PG
MCHC RBC AUTO-ENTMCNC: 30.3 G/DL
MCHC RBC AUTO-ENTMCNC: 30.5 %
MCHC RBC AUTO-ENTMCNC: 31 G/DL
MCHC RBC AUTO-ENTMCNC: 31 G/DL
MCHC RBC AUTO-ENTMCNC: 31.3 %
MCHC RBC AUTO-ENTMCNC: 31.4 G/DL
MCHC RBC AUTO-ENTMCNC: 31.4 G/DL
MCHC RBC AUTO-ENTMCNC: 31.5 G/DL
MCHC RBC AUTO-ENTMCNC: 31.7 %
MCHC RBC AUTO-ENTMCNC: 31.7 G/DL
MCHC RBC AUTO-ENTMCNC: 32 %
MCHC RBC AUTO-ENTMCNC: 32.1 G/DL
MCHC RBC AUTO-ENTMCNC: 32.2 %
MCHC RBC AUTO-ENTMCNC: 32.2 G/DL
MCHC RBC AUTO-ENTMCNC: 32.3 %
MCHC RBC AUTO-ENTMCNC: 32.4 %
MCHC RBC AUTO-ENTMCNC: 32.6 %
MCHC RBC AUTO-ENTMCNC: 32.8 %
MCHC RBC AUTO-ENTMCNC: 32.9 G/DL
MCHC RBC AUTO-ENTMCNC: 33.1 %
MCHC RBC AUTO-ENTMCNC: 33.1 %
MCHC RBC AUTO-ENTMCNC: 33.3 %
MCHC RBC AUTO-ENTMCNC: 33.4 %
MCHC RBC AUTO-ENTMCNC: 33.4 G/DL
MCHC RBC AUTO-ENTMCNC: 33.7 %
MCHC RBC AUTO-ENTMCNC: 33.7 %
MCHC RBC AUTO-ENTMCNC: 34 G/DL
MCHC RBC AUTO-ENTMCNC: 34.1 G/DL
MCHC RBC AUTO-ENTMCNC: 34.8 %
MCHC RBC AUTO-ENTMCNC: 35 G/DL
MCHC RBC AUTO-ENTMCNC: 35.1 G/DL
MCHC RBC AUTO-ENTMCNC: 35.3 G/DL
MCHC RBC AUTO-ENTMCNC: 35.4 G/DL
MCV RBC AUTO: 79 FL
MCV RBC AUTO: 80 FL
MCV RBC AUTO: 82 FL
MCV RBC AUTO: 82 FL
MCV RBC AUTO: 83 FL
MCV RBC AUTO: 86 FL
MCV RBC AUTO: 87 FL
MCV RBC AUTO: 88 FL
MCV RBC AUTO: 89 FL
MCV RBC AUTO: 90 FL
MCV RBC AUTO: 90 FL
MCV RBC AUTO: 91 FL
MCV RBC AUTO: 92 FL
MCV RBC AUTO: 93 FL
MCV RBC AUTO: 93 FL
MCV RBC AUTO: 94 FL
MCV RBC AUTO: 96 FL
MICROALBUMIN UR DL<=1MG/L-MCNC: 48 UG/ML
MICROALBUMIN/CREATININE RATIO: 58.5 UG/MG
MICROSCOPIC COMMENT: ABNORMAL
MICROSCOPIC COMMENT: ABNORMAL
MICROSCOPIC COMMENT: NORMAL
MICROSCOPIC COMMENT: NORMAL
MIN VOL: 12
MITRAL VALVE MOBILITY: NORMAL
MITRAL VALVE REGURGITATION: ABNORMAL
MODE: ABNORMAL
MONOCYTES # BLD AUTO: 0.5 K/UL
MONOCYTES # BLD AUTO: 0.6 K/UL
MONOCYTES # BLD AUTO: 0.6 K/UL
MONOCYTES # BLD AUTO: 0.7 K/UL
MONOCYTES # BLD AUTO: 0.8 K/UL
MONOCYTES # BLD AUTO: 0.9 K/UL
MONOCYTES # BLD AUTO: 1 K/UL
MONOCYTES # BLD AUTO: 1.1 K/UL
MONOCYTES # BLD AUTO: 1.1 K/UL
MONOCYTES # BLD AUTO: 1.2 K/UL
MONOCYTES # BLD AUTO: 1.3 K/UL
MONOCYTES NFR BLD: 10.1 %
MONOCYTES NFR BLD: 10.3 %
MONOCYTES NFR BLD: 10.4 %
MONOCYTES NFR BLD: 10.6 %
MONOCYTES NFR BLD: 11.4 %
MONOCYTES NFR BLD: 11.6 %
MONOCYTES NFR BLD: 11.6 %
MONOCYTES NFR BLD: 12.1 %
MONOCYTES NFR BLD: 12.3 %
MONOCYTES NFR BLD: 12.3 %
MONOCYTES NFR BLD: 13.2 %
MONOCYTES NFR BLD: 4.3 %
MONOCYTES NFR BLD: 4.9 %
MONOCYTES NFR BLD: 5.2 %
MONOCYTES NFR BLD: 5.3 %
MONOCYTES NFR BLD: 5.5 %
MONOCYTES NFR BLD: 5.9 %
MONOCYTES NFR BLD: 6 %
MONOCYTES NFR BLD: 6.4 %
MONOCYTES NFR BLD: 6.5 %
MONOCYTES NFR BLD: 6.5 %
MONOCYTES NFR BLD: 6.7 %
MONOCYTES NFR BLD: 6.8 %
MONOCYTES NFR BLD: 7.7 %
MONOCYTES NFR BLD: 7.8 %
MONOCYTES NFR BLD: 7.9 %
MONOCYTES NFR BLD: 8.1 %
MONOCYTES NFR BLD: 8.4 %
MONOCYTES NFR BLD: 8.5 %
MONOCYTES NFR BLD: 8.8 %
MONOCYTES NFR BLD: 8.9 %
MONOCYTES NFR BLD: 9.2 %
MONOCYTES NFR BLD: 9.4 %
MONOCYTES NFR BLD: 9.7 %
MONOCYTES NFR BLD: 9.7 %
NEUTROPHILS # BLD AUTO: 10 K/UL
NEUTROPHILS # BLD AUTO: 10.3 K/UL
NEUTROPHILS # BLD AUTO: 10.3 K/UL
NEUTROPHILS # BLD AUTO: 10.8 K/UL
NEUTROPHILS # BLD AUTO: 11.4 K/UL
NEUTROPHILS # BLD AUTO: 11.7 K/UL
NEUTROPHILS # BLD AUTO: 12.1 K/UL
NEUTROPHILS # BLD AUTO: 12.1 K/UL
NEUTROPHILS # BLD AUTO: 12.5 K/UL
NEUTROPHILS # BLD AUTO: 12.6 K/UL
NEUTROPHILS # BLD AUTO: 13.3 K/UL
NEUTROPHILS # BLD AUTO: 13.7 K/UL
NEUTROPHILS # BLD AUTO: 14 K/UL
NEUTROPHILS # BLD AUTO: 16.5 K/UL
NEUTROPHILS # BLD AUTO: 22.8 K/UL
NEUTROPHILS # BLD AUTO: 3.6 K/UL
NEUTROPHILS # BLD AUTO: 4.2 K/UL
NEUTROPHILS # BLD AUTO: 4.4 K/UL
NEUTROPHILS # BLD AUTO: 4.7 K/UL
NEUTROPHILS # BLD AUTO: 4.9 K/UL
NEUTROPHILS # BLD AUTO: 4.9 K/UL
NEUTROPHILS # BLD AUTO: 5.1 K/UL
NEUTROPHILS # BLD AUTO: 5.1 K/UL
NEUTROPHILS # BLD AUTO: 5.2 K/UL
NEUTROPHILS # BLD AUTO: 5.6 K/UL
NEUTROPHILS # BLD AUTO: 5.6 K/UL
NEUTROPHILS # BLD AUTO: 5.8 K/UL
NEUTROPHILS # BLD AUTO: 6.3 K/UL
NEUTROPHILS # BLD AUTO: 6.3 K/UL
NEUTROPHILS # BLD AUTO: 6.9 K/UL
NEUTROPHILS # BLD AUTO: 6.9 K/UL
NEUTROPHILS # BLD AUTO: 7 K/UL
NEUTROPHILS # BLD AUTO: 7.3 K/UL
NEUTROPHILS # BLD AUTO: 8.1 K/UL
NEUTROPHILS # BLD AUTO: 8.4 K/UL
NEUTROPHILS # BLD AUTO: 9.4 K/UL
NEUTROPHILS NFR BLD: 66.8 %
NEUTROPHILS NFR BLD: 68.8 %
NEUTROPHILS NFR BLD: 70.7 %
NEUTROPHILS NFR BLD: 72.3 %
NEUTROPHILS NFR BLD: 72.4 %
NEUTROPHILS NFR BLD: 73.7 %
NEUTROPHILS NFR BLD: 73.9 %
NEUTROPHILS NFR BLD: 74.2 %
NEUTROPHILS NFR BLD: 74.5 %
NEUTROPHILS NFR BLD: 75.9 %
NEUTROPHILS NFR BLD: 76.4 %
NEUTROPHILS NFR BLD: 78.4 %
NEUTROPHILS NFR BLD: 78.6 %
NEUTROPHILS NFR BLD: 78.7 %
NEUTROPHILS NFR BLD: 79 %
NEUTROPHILS NFR BLD: 79.8 %
NEUTROPHILS NFR BLD: 80.7 %
NEUTROPHILS NFR BLD: 84 %
NEUTROPHILS NFR BLD: 84.3 %
NEUTROPHILS NFR BLD: 84.3 %
NEUTROPHILS NFR BLD: 84.5 %
NEUTROPHILS NFR BLD: 84.6 %
NEUTROPHILS NFR BLD: 86.4 %
NEUTROPHILS NFR BLD: 87.4 %
NEUTROPHILS NFR BLD: 87.5 %
NEUTROPHILS NFR BLD: 88.5 %
NEUTROPHILS NFR BLD: 88.6 %
NEUTROPHILS NFR BLD: 89.5 %
NEUTROPHILS NFR BLD: 89.6 %
NEUTROPHILS NFR BLD: 89.7 %
NEUTROPHILS NFR BLD: 90.6 %
NEUTROPHILS NFR BLD: 91.7 %
NEUTROPHILS NFR BLD: 91.9 %
NEUTROPHILS NFR BLD: 92.3 %
NEUTROPHILS NFR BLD: 93.5 %
NITRITE UR QL STRIP: NEGATIVE
NONHDLC SERPL-MCNC: 63 MG/DL
NONHDLC SERPL-MCNC: 89 MG/DL
NRBC BLD-RTO: 0 /100 WBC
OVALOCYTES BLD QL SMEAR: ABNORMAL
PARAINFLUENZA: NOT DETECTED
PCO2 BLDA: 29.8 MMHG (ref 35–45)
PCO2 BLDA: 31.2 MMHG (ref 35–45)
PCO2 BLDA: 37.4 MMHG (ref 35–45)
PCO2 BLDA: 40.8 MMHG (ref 35–45)
PCO2 BLDA: 41.8 MMHG (ref 35–45)
PCO2 BLDA: 43.6 MMHG (ref 35–45)
PCO2 BLDA: 44.1 MMHG (ref 35–45)
PCO2 BLDA: 45.7 MMHG (ref 35–45)
PCO2 BLDA: 50.3 MMHG (ref 35–45)
PH SMN: 7.29 [PH] (ref 7.35–7.45)
PH SMN: 7.31 [PH] (ref 7.35–7.45)
PH SMN: 7.34 [PH] (ref 7.35–7.45)
PH SMN: 7.35 [PH] (ref 7.35–7.45)
PH SMN: 7.37 [PH] (ref 7.35–7.45)
PH SMN: 7.39 [PH] (ref 7.35–7.45)
PH SMN: 7.4 [PH] (ref 7.35–7.45)
PH SMN: 7.44 [PH] (ref 7.35–7.45)
PH SMN: 7.45 [PH] (ref 7.35–7.45)
PH UR STRIP: 5 [PH] (ref 5–8)
PH UR STRIP: 5 [PH] (ref 5–8)
PH UR STRIP: 6 [PH] (ref 5–8)
PHOSPHATE SERPL-MCNC: 2.2 MG/DL
PHOSPHATE SERPL-MCNC: 2.4 MG/DL
PHOSPHATE SERPL-MCNC: 2.7 MG/DL
PHOSPHATE SERPL-MCNC: 2.9 MG/DL
PHOSPHATE SERPL-MCNC: 3.3 MG/DL
PHOSPHATE SERPL-MCNC: 3.6 MG/DL
PHOSPHATE SERPL-MCNC: 3.7 MG/DL
PHOSPHATE SERPL-MCNC: 3.8 MG/DL
PHOSPHATE SERPL-MCNC: 3.9 MG/DL
PHOSPHATE SERPL-MCNC: 4 MG/DL
PHOSPHATE SERPL-MCNC: 4.2 MG/DL
PHOSPHATE SERPL-MCNC: 4.3 MG/DL
PHOSPHATE SERPL-MCNC: 5.1 MG/DL
PHOSPHATE SERPL-MCNC: 6 MG/DL
PHOSPHATE SERPL-MCNC: 6.2 MG/DL
PHOSPHATE SERPL-MCNC: 6.9 MG/DL
PHOSPHATE SERPL-MCNC: 7 MG/DL
PHOSPHATE SERPL-MCNC: 7.5 MG/DL
PHOSPHATE SERPL-MCNC: 7.9 MG/DL
PLATELET # BLD AUTO: 114 K/UL
PLATELET # BLD AUTO: 126 K/UL
PLATELET # BLD AUTO: 133 K/UL
PLATELET # BLD AUTO: 136 K/UL
PLATELET # BLD AUTO: 140 K/UL
PLATELET # BLD AUTO: 145 K/UL
PLATELET # BLD AUTO: 149 K/UL
PLATELET # BLD AUTO: 155 K/UL
PLATELET # BLD AUTO: 161 K/UL
PLATELET # BLD AUTO: 162 K/UL
PLATELET # BLD AUTO: 165 K/UL
PLATELET # BLD AUTO: 173 K/UL
PLATELET # BLD AUTO: 178 K/UL
PLATELET # BLD AUTO: 183 K/UL
PLATELET # BLD AUTO: 190 K/UL
PLATELET # BLD AUTO: 191 K/UL
PLATELET # BLD AUTO: 191 K/UL
PLATELET # BLD AUTO: 204 K/UL
PLATELET # BLD AUTO: 214 K/UL
PLATELET # BLD AUTO: 218 K/UL
PLATELET # BLD AUTO: 224 K/UL
PLATELET # BLD AUTO: 227 K/UL
PLATELET # BLD AUTO: 227 K/UL
PLATELET # BLD AUTO: 231 K/UL
PLATELET # BLD AUTO: 240 K/UL
PLATELET # BLD AUTO: 241 K/UL
PLATELET # BLD AUTO: 255 K/UL
PLATELET # BLD AUTO: 257 K/UL
PLATELET # BLD AUTO: 261 K/UL
PLATELET # BLD AUTO: 272 K/UL
PLATELET # BLD AUTO: 298 K/UL
PLATELET # BLD AUTO: 305 K/UL
PLATELET # BLD AUTO: 313 K/UL
PLATELET # BLD AUTO: 333 K/UL
PLATELET BLD QL SMEAR: ABNORMAL
PLATELET BLD QL SMEAR: ABNORMAL
PMV BLD AUTO: 10 FL
PMV BLD AUTO: 10 FL
PMV BLD AUTO: 10.1 FL
PMV BLD AUTO: 10.2 FL
PMV BLD AUTO: 10.2 FL
PMV BLD AUTO: 10.3 FL
PMV BLD AUTO: 10.4 FL
PMV BLD AUTO: 10.6 FL
PMV BLD AUTO: 10.6 FL
PMV BLD AUTO: 10.7 FL
PMV BLD AUTO: 10.8 FL
PMV BLD AUTO: 11.1 FL
PMV BLD AUTO: 11.1 FL
PMV BLD AUTO: 11.6 FL
PMV BLD AUTO: 8.9 FL
PMV BLD AUTO: 9.2 FL
PMV BLD AUTO: 9.3 FL
PMV BLD AUTO: 9.4 FL
PMV BLD AUTO: 9.4 FL
PMV BLD AUTO: 9.5 FL
PMV BLD AUTO: 9.5 FL
PMV BLD AUTO: 9.6 FL
PMV BLD AUTO: 9.7 FL
PMV BLD AUTO: 9.8 FL
PMV BLD AUTO: 9.9 FL
PO2 BLDA: 129 MMHG (ref 80–100)
PO2 BLDA: 134 MMHG (ref 80–100)
PO2 BLDA: 22 MMHG (ref 40–60)
PO2 BLDA: 25 MMHG (ref 40–60)
PO2 BLDA: 28 MMHG (ref 40–60)
PO2 BLDA: 34 MMHG (ref 40–60)
PO2 BLDA: 37 MMHG (ref 40–60)
PO2 BLDA: 38 MMHG (ref 40–60)
PO2 BLDA: 95 MMHG (ref 80–100)
POC ACTIVATED CLOTTING TIME K: 162 SEC (ref 74–137)
POC BE: -1 MMOL/L
POC BE: -4 MMOL/L
POC BE: -5 MMOL/L
POC BE: -7 MMOL/L
POC BE: -9 MMOL/L
POC BE: 1 MMOL/L
POC BE: 1 MMOL/L
POC BE: 2 MMOL/L
POC BE: 6 MMOL/L
POC IONIZED CALCIUM: 0.98 MMOL/L (ref 1.06–1.42)
POC IONIZED CALCIUM: 1.06 MMOL/L (ref 1.06–1.42)
POC IONIZED CALCIUM: 1.09 MMOL/L (ref 1.06–1.42)
POC SATURATED O2: 36 % (ref 95–100)
POC SATURATED O2: 47 % (ref 95–100)
POC SATURATED O2: 48 % (ref 95–100)
POC SATURATED O2: 60 % (ref 95–100)
POC SATURATED O2: 66 % (ref 95–100)
POC SATURATED O2: 70 % (ref 95–100)
POC SATURATED O2: 97 % (ref 95–100)
POC SATURATED O2: 99 % (ref 95–100)
POC SATURATED O2: 99 % (ref 95–100)
POC TCO2 (MEASURED): 27 MMOL/L (ref 23–29)
POC TCO2: 18 MMOL/L (ref 23–27)
POC TCO2: 21 MMOL/L (ref 23–27)
POC TCO2: 21 MMOL/L (ref 24–29)
POC TCO2: 22 MMOL/L (ref 24–29)
POC TCO2: 25 MMOL/L (ref 23–27)
POC TCO2: 28 MMOL/L (ref 24–29)
POC TCO2: 28 MMOL/L (ref 24–29)
POC TCO2: 29 MMOL/L (ref 24–29)
POC TCO2: 32 MMOL/L (ref 24–29)
POCT GLUCOSE: 100 MG/DL (ref 70–110)
POCT GLUCOSE: 101 MG/DL (ref 70–110)
POCT GLUCOSE: 103 MG/DL (ref 70–110)
POCT GLUCOSE: 107 MG/DL (ref 70–110)
POCT GLUCOSE: 107 MG/DL (ref 70–110)
POCT GLUCOSE: 108 MG/DL (ref 70–110)
POCT GLUCOSE: 108 MG/DL (ref 70–110)
POCT GLUCOSE: 111 MG/DL (ref 70–110)
POCT GLUCOSE: 114 MG/DL (ref 70–110)
POCT GLUCOSE: 115 MG/DL (ref 70–110)
POCT GLUCOSE: 116 MG/DL (ref 70–110)
POCT GLUCOSE: 116 MG/DL (ref 70–110)
POCT GLUCOSE: 117 MG/DL (ref 70–110)
POCT GLUCOSE: 119 MG/DL (ref 70–110)
POCT GLUCOSE: 123 MG/DL (ref 70–110)
POCT GLUCOSE: 129 MG/DL (ref 70–110)
POCT GLUCOSE: 132 MG/DL (ref 70–110)
POCT GLUCOSE: 141 MG/DL (ref 70–110)
POCT GLUCOSE: 141 MG/DL (ref 70–110)
POCT GLUCOSE: 146 MG/DL (ref 70–110)
POCT GLUCOSE: 161 MG/DL (ref 70–110)
POCT GLUCOSE: 161 MG/DL (ref 70–110)
POCT GLUCOSE: 168 MG/DL (ref 70–110)
POCT GLUCOSE: 173 MG/DL (ref 70–110)
POCT GLUCOSE: 180 MG/DL (ref 70–110)
POCT GLUCOSE: 183 MG/DL (ref 70–110)
POCT GLUCOSE: 185 MG/DL (ref 70–110)
POCT GLUCOSE: 187 MG/DL (ref 70–110)
POCT GLUCOSE: 193 MG/DL (ref 70–110)
POCT GLUCOSE: 198 MG/DL (ref 70–110)
POCT GLUCOSE: 203 MG/DL (ref 70–110)
POCT GLUCOSE: 208 MG/DL (ref 70–110)
POCT GLUCOSE: 214 MG/DL (ref 70–110)
POCT GLUCOSE: 218 MG/DL (ref 70–110)
POCT GLUCOSE: 236 MG/DL (ref 70–110)
POCT GLUCOSE: 42 MG/DL (ref 70–110)
POCT GLUCOSE: 77 MG/DL (ref 70–110)
POCT GLUCOSE: 78 MG/DL (ref 70–110)
POCT GLUCOSE: 79 MG/DL (ref 70–110)
POCT GLUCOSE: 86 MG/DL (ref 70–110)
POCT GLUCOSE: 92 MG/DL (ref 70–110)
POCT GLUCOSE: 94 MG/DL (ref 70–110)
POCT GLUCOSE: 94 MG/DL (ref 70–110)
POCT GLUCOSE: 95 MG/DL (ref 70–110)
POCT GLUCOSE: 98 MG/DL (ref 70–110)
POCT GLUCOSE: 99 MG/DL (ref 70–110)
POCT GLUCOSE: 99 MG/DL (ref 70–110)
POIKILOCYTOSIS BLD QL SMEAR: SLIGHT
POIKILOCYTOSIS BLD QL SMEAR: SLIGHT
POLYCHROMASIA BLD QL SMEAR: ABNORMAL
POTASSIUM BLD-SCNC: 2 MMOL/L (ref 3.5–5.1)
POTASSIUM BLD-SCNC: 4.1 MMOL/L (ref 3.5–5.1)
POTASSIUM BLD-SCNC: 4.8 MMOL/L (ref 3.5–5.1)
POTASSIUM SERPL-SCNC: 2.2 MMOL/L
POTASSIUM SERPL-SCNC: 2.3 MMOL/L
POTASSIUM SERPL-SCNC: 2.8 MMOL/L
POTASSIUM SERPL-SCNC: 2.9 MMOL/L
POTASSIUM SERPL-SCNC: 3.1 MMOL/L
POTASSIUM SERPL-SCNC: 3.1 MMOL/L
POTASSIUM SERPL-SCNC: 3.4 MMOL/L
POTASSIUM SERPL-SCNC: 3.4 MMOL/L
POTASSIUM SERPL-SCNC: 3.5 MMOL/L
POTASSIUM SERPL-SCNC: 3.7 MMOL/L
POTASSIUM SERPL-SCNC: 3.8 MMOL/L
POTASSIUM SERPL-SCNC: 3.9 MMOL/L
POTASSIUM SERPL-SCNC: 4 MMOL/L
POTASSIUM SERPL-SCNC: 4 MMOL/L
POTASSIUM SERPL-SCNC: 4.1 MMOL/L
POTASSIUM SERPL-SCNC: 4.2 MMOL/L
POTASSIUM SERPL-SCNC: 4.3 MMOL/L
POTASSIUM SERPL-SCNC: 4.4 MMOL/L
POTASSIUM SERPL-SCNC: 4.5 MMOL/L
POTASSIUM SERPL-SCNC: 4.5 MMOL/L
POTASSIUM SERPL-SCNC: 4.6 MMOL/L
POTASSIUM SERPL-SCNC: 4.8 MMOL/L
POTASSIUM SERPL-SCNC: 5 MMOL/L
POTASSIUM SERPL-SCNC: 5 MMOL/L
POTASSIUM SERPL-SCNC: 5.1 MMOL/L
POTASSIUM SERPL-SCNC: 5.2 MMOL/L
POTASSIUM SERPL-SCNC: 5.4 MMOL/L
POTASSIUM SERPL-SCNC: 5.5 MMOL/L
POTASSIUM SERPL-SCNC: 5.5 MMOL/L
POTASSIUM SERPL-SCNC: 5.7 MMOL/L
PROCALCITONIN SERPL IA-MCNC: 0.06 NG/ML
PROCALCITONIN SERPL IA-MCNC: 0.39 NG/ML
PROT SERPL-MCNC: 5.3 G/DL
PROT SERPL-MCNC: 5.6 G/DL
PROT SERPL-MCNC: 5.7 G/DL
PROT SERPL-MCNC: 5.8 G/DL
PROT SERPL-MCNC: 5.9 G/DL
PROT SERPL-MCNC: 6 G/DL
PROT SERPL-MCNC: 6.1 G/DL
PROT SERPL-MCNC: 6.1 G/DL
PROT SERPL-MCNC: 6.2 G/DL
PROT SERPL-MCNC: 6.2 G/DL
PROT SERPL-MCNC: 6.5 G/DL
PROT SERPL-MCNC: 6.7 G/DL
PROT SERPL-MCNC: 6.9 G/DL
PROT SERPL-MCNC: 7 G/DL
PROT SERPL-MCNC: 7.1 G/DL
PROT SERPL-MCNC: 7.2 G/DL
PROT SERPL-MCNC: 7.4 G/DL
PROT SERPL-MCNC: 7.6 G/DL
PROT SERPL-MCNC: 7.7 G/DL
PROT SERPL-MCNC: 8 G/DL
PROT UR QL STRIP: ABNORMAL
PROT UR QL STRIP: ABNORMAL
PROT UR QL STRIP: NEGATIVE
PROTHROMBIN TIME: 12.4 SEC
PROTHROMBIN TIME: 13.1 SEC
PROTHROMBIN TIME: 14.5 SEC
RBC # BLD AUTO: 3.05 M/UL
RBC # BLD AUTO: 3.26 M/UL
RBC # BLD AUTO: 3.3 M/UL
RBC # BLD AUTO: 3.3 M/UL
RBC # BLD AUTO: 3.33 M/UL
RBC # BLD AUTO: 3.34 M/UL
RBC # BLD AUTO: 3.42 M/UL
RBC # BLD AUTO: 3.43 M/UL
RBC # BLD AUTO: 3.49 M/UL
RBC # BLD AUTO: 3.5 M/UL
RBC # BLD AUTO: 3.59 M/UL
RBC # BLD AUTO: 3.63 M/UL
RBC # BLD AUTO: 3.64 M/UL
RBC # BLD AUTO: 3.65 M/UL
RBC # BLD AUTO: 3.65 M/UL
RBC # BLD AUTO: 3.67 M/UL
RBC # BLD AUTO: 3.7 M/UL
RBC # BLD AUTO: 3.71 M/UL
RBC # BLD AUTO: 3.75 M/UL
RBC # BLD AUTO: 3.78 M/UL
RBC # BLD AUTO: 3.8 M/UL
RBC # BLD AUTO: 3.86 M/UL
RBC # BLD AUTO: 3.89 M/UL
RBC # BLD AUTO: 3.9 M/UL
RBC # BLD AUTO: 3.91 M/UL
RBC # BLD AUTO: 4.07 M/UL
RBC # BLD AUTO: 4.14 M/UL
RBC # BLD AUTO: 4.17 M/UL
RBC # BLD AUTO: 4.2 M/UL
RBC # BLD AUTO: 4.21 M/UL
RBC # BLD AUTO: 4.23 M/UL
RBC # BLD AUTO: 4.28 M/UL
RBC # BLD AUTO: 4.31 M/UL
RBC # BLD AUTO: 4.5 M/UL
RBC #/AREA URNS AUTO: 0 /HPF (ref 0–4)
RBC #/AREA URNS AUTO: 0 /HPF (ref 0–4)
RBC #/AREA URNS AUTO: 1 /HPF (ref 0–4)
RBC #/AREA URNS AUTO: 17 /HPF (ref 0–4)
RETIRED EF AND QEF - SEE NOTES: 15 (ref 55–65)
RETIRED EF AND QEF - SEE NOTES: 20 (ref 55–65)
RETIRED EF AND QEF - SEE NOTES: 40 (ref 55–65)
RETIRED EF AND QEF - SEE NOTES: 40 (ref 55–65)
RVP - ADENOVIRUS: NOT DETECTED
RVP - HUMAN METAPNEUMOVIRUS (HMPV): NOT DETECTED
RVP - INFLUENZA A: NOT DETECTED
RVP - INFLUENZA B: NOT DETECTED
RVP - RESPIRATORY SYNCTIAL VIRUS (RSV) A: NOT DETECTED
RVP - RESPIRATORY VIRAL PANEL, SOURCE: NORMAL
RVP - RHINOVIRUS: NOT DETECTED
SAMPLE: ABNORMAL
SITE: ABNORMAL
SODIUM BLD-SCNC: 128 MMOL/L (ref 136–145)
SODIUM BLD-SCNC: 131 MMOL/L (ref 136–145)
SODIUM BLD-SCNC: 138 MMOL/L (ref 136–145)
SODIUM SERPL-SCNC: 115 MMOL/L
SODIUM SERPL-SCNC: 115 MMOL/L
SODIUM SERPL-SCNC: 119 MMOL/L
SODIUM SERPL-SCNC: 119 MMOL/L
SODIUM SERPL-SCNC: 121 MMOL/L
SODIUM SERPL-SCNC: 122 MMOL/L
SODIUM SERPL-SCNC: 125 MMOL/L
SODIUM SERPL-SCNC: 128 MMOL/L
SODIUM SERPL-SCNC: 130 MMOL/L
SODIUM SERPL-SCNC: 131 MMOL/L
SODIUM SERPL-SCNC: 132 MMOL/L
SODIUM SERPL-SCNC: 133 MMOL/L
SODIUM SERPL-SCNC: 133 MMOL/L
SODIUM SERPL-SCNC: 134 MMOL/L
SODIUM SERPL-SCNC: 135 MMOL/L
SODIUM SERPL-SCNC: 136 MMOL/L
SODIUM SERPL-SCNC: 137 MMOL/L
SODIUM SERPL-SCNC: 138 MMOL/L
SODIUM SERPL-SCNC: 140 MMOL/L
SODIUM SERPL-SCNC: 141 MMOL/L
SODIUM SERPL-SCNC: 141 MMOL/L
SODIUM UR-SCNC: 59 MMOL/L
SP GR UR STRIP: 1 (ref 1–1.03)
SP GR UR STRIP: 1 (ref 1–1.03)
SP GR UR STRIP: 1.01 (ref 1–1.03)
SP GR UR STRIP: 1.02 (ref 1–1.03)
SP02: 100
SPONT RATE: 24
SQUAMOUS #/AREA URNS AUTO: 0 /HPF
SQUAMOUS #/AREA URNS AUTO: 0 /HPF
T4 FREE SERPL-MCNC: 0.8 NG/DL
T4 FREE SERPL-MCNC: 1.14 NG/DL
TRICUSPID VALVE REGURGITATION: ABNORMAL
TRIGL SERPL-MCNC: 59 MG/DL
TRIGL SERPL-MCNC: 96 MG/DL
TROPONIN I SERPL DL<=0.01 NG/ML-MCNC: 0.04 NG/ML
TROPONIN I SERPL DL<=0.01 NG/ML-MCNC: 0.05 NG/ML
TROPONIN I SERPL DL<=0.01 NG/ML-MCNC: 0.06 NG/ML
TROPONIN I SERPL DL<=0.01 NG/ML-MCNC: 0.07 NG/ML
TROPONIN I SERPL DL<=0.01 NG/ML-MCNC: 0.07 NG/ML
TROPONIN I SERPL DL<=0.01 NG/ML-MCNC: 0.13 NG/ML
TROPONIN I SERPL DL<=0.01 NG/ML-MCNC: 0.32 NG/ML
TROPONIN I SERPL DL<=0.01 NG/ML-MCNC: 0.35 NG/ML
TROPONIN I SERPL DL<=0.01 NG/ML-MCNC: 0.35 NG/ML
TROPONIN I SERPL DL<=0.01 NG/ML-MCNC: 0.37 NG/ML
TROPONIN I SERPL DL<=0.01 NG/ML-MCNC: 0.37 NG/ML
TROPONIN I SERPL DL<=0.01 NG/ML-MCNC: 0.39 NG/ML
TROPONIN I SERPL DL<=0.01 NG/ML-MCNC: 0.4 NG/ML
TROPONIN I SERPL DL<=0.01 NG/ML-MCNC: 0.48 NG/ML
TROPONIN I SERPL DL<=0.01 NG/ML-MCNC: 0.51 NG/ML
TROPONIN I SERPL DL<=0.01 NG/ML-MCNC: 0.53 NG/ML
TROPONIN I SERPL DL<=0.01 NG/ML-MCNC: 0.54 NG/ML
TROPONIN I SERPL DL<=0.01 NG/ML-MCNC: 0.61 NG/ML
TROPONIN I SERPL DL<=0.01 NG/ML-MCNC: 0.64 NG/ML
TROPONIN I SERPL DL<=0.01 NG/ML-MCNC: 1.25 NG/ML
TROPONIN I SERPL DL<=0.01 NG/ML-MCNC: 1.42 NG/ML
TROPONIN I SERPL DL<=0.01 NG/ML-MCNC: 1.49 NG/ML
TROPONIN I SERPL DL<=0.01 NG/ML-MCNC: 1.5 NG/ML
TROPONIN I SERPL DL<=0.01 NG/ML-MCNC: 1.62 NG/ML
TROPONIN I SERPL DL<=0.01 NG/ML-MCNC: 1.65 NG/ML
TROPONIN I SERPL DL<=0.01 NG/ML-MCNC: 1.66 NG/ML
TROPONIN I SERPL DL<=0.01 NG/ML-MCNC: 1.71 NG/ML
TROPONIN I SERPL DL<=0.01 NG/ML-MCNC: 2.42 NG/ML
TSH SERPL DL<=0.005 MIU/L-ACNC: 4.46 UIU/ML
TSH SERPL DL<=0.005 MIU/L-ACNC: 4.62 UIU/ML
URN SPEC COLLECT METH UR: ABNORMAL
URN SPEC COLLECT METH UR: NORMAL
URN SPEC COLLECT METH UR: NORMAL
UROBILINOGEN UR STRIP-ACNC: 2 EU/DL
UROBILINOGEN UR STRIP-ACNC: NEGATIVE EU/DL
UUN UR-MCNC: 218 MG/DL
WBC # BLD AUTO: 10.35 K/UL
WBC # BLD AUTO: 10.79 K/UL
WBC # BLD AUTO: 11.85 K/UL
WBC # BLD AUTO: 12.26 K/UL
WBC # BLD AUTO: 12.26 K/UL
WBC # BLD AUTO: 12.68 K/UL
WBC # BLD AUTO: 12.78 K/UL
WBC # BLD AUTO: 13.19 K/UL
WBC # BLD AUTO: 13.36 K/UL
WBC # BLD AUTO: 13.66 K/UL
WBC # BLD AUTO: 13.89 K/UL
WBC # BLD AUTO: 14.12 K/UL
WBC # BLD AUTO: 14.81 K/UL
WBC # BLD AUTO: 15.2 K/UL
WBC # BLD AUTO: 15.25 K/UL
WBC # BLD AUTO: 17.98 K/UL
WBC # BLD AUTO: 24.31 K/UL
WBC # BLD AUTO: 5.33 K/UL
WBC # BLD AUTO: 5.88 K/UL
WBC # BLD AUTO: 6.07 K/UL
WBC # BLD AUTO: 6.28 K/UL
WBC # BLD AUTO: 6.33 K/UL
WBC # BLD AUTO: 6.7 K/UL
WBC # BLD AUTO: 6.72 K/UL
WBC # BLD AUTO: 6.9 K/UL
WBC # BLD AUTO: 7.14 K/UL
WBC # BLD AUTO: 7.41 K/UL
WBC # BLD AUTO: 7.49 K/UL
WBC # BLD AUTO: 7.54 K/UL
WBC # BLD AUTO: 7.84 K/UL
WBC # BLD AUTO: 7.94 K/UL
WBC # BLD AUTO: 8.86 K/UL
WBC # BLD AUTO: 9 K/UL
WBC # BLD AUTO: 9.28 K/UL
WBC # BLD AUTO: 9.41 K/UL
WBC # BLD AUTO: 9.67 K/UL
WBC #/AREA URNS AUTO: 1 /HPF (ref 0–5)
WBC #/AREA URNS AUTO: 10 /HPF (ref 0–5)
WBC #/AREA URNS AUTO: 2 /HPF (ref 0–5)
WBC #/AREA URNS AUTO: 2 /HPF (ref 0–5)

## 2017-01-01 PROCEDURE — 93005 ELECTROCARDIOGRAM TRACING: CPT

## 2017-01-01 PROCEDURE — 99284 EMERGENCY DEPT VISIT MOD MDM: CPT | Mod: 25

## 2017-01-01 PROCEDURE — 36415 COLL VENOUS BLD VENIPUNCTURE: CPT

## 2017-01-01 PROCEDURE — 99999 PR PBB SHADOW E&M-EST. PATIENT-LVL III: CPT | Mod: PBBFAC,,, | Performed by: NEUROLOGICAL SURGERY

## 2017-01-01 PROCEDURE — 25500020 PHARM REV CODE 255: Performed by: EMERGENCY MEDICINE

## 2017-01-01 PROCEDURE — 80048 BASIC METABOLIC PNL TOTAL CA: CPT

## 2017-01-01 PROCEDURE — G6002 STEREOSCOPIC X-RAY GUIDANCE: HCPCS | Mod: 26,,, | Performed by: RADIOLOGY

## 2017-01-01 PROCEDURE — 82803 BLOOD GASES ANY COMBINATION: CPT

## 2017-01-01 PROCEDURE — 63600175 PHARM REV CODE 636 W HCPCS: Performed by: STUDENT IN AN ORGANIZED HEALTH CARE EDUCATION/TRAINING PROGRAM

## 2017-01-01 PROCEDURE — 96366 THER/PROPH/DIAG IV INF ADDON: CPT

## 2017-01-01 PROCEDURE — 77386 HC IMRT, COMPLEX: CPT | Performed by: RADIOLOGY

## 2017-01-01 PROCEDURE — 85520 HEPARIN ASSAY: CPT

## 2017-01-01 PROCEDURE — 27000221 HC OXYGEN, UP TO 24 HOURS

## 2017-01-01 PROCEDURE — 96365 THER/PROPH/DIAG IV INF INIT: CPT

## 2017-01-01 PROCEDURE — 77338 DESIGN MLC DEVICE FOR IMRT: CPT | Mod: TC | Performed by: RADIOLOGY

## 2017-01-01 PROCEDURE — 25000003 PHARM REV CODE 250: Performed by: INTERNAL MEDICINE

## 2017-01-01 PROCEDURE — 82553 CREATINE MB FRACTION: CPT

## 2017-01-01 PROCEDURE — 1160F RVW MEDS BY RX/DR IN RCRD: CPT | Mod: S$GLB,,, | Performed by: RADIOLOGY

## 2017-01-01 PROCEDURE — G0378 HOSPITAL OBSERVATION PER HR: HCPCS

## 2017-01-01 PROCEDURE — 99214 OFFICE O/P EST MOD 30 MIN: CPT | Mod: S$GLB,,, | Performed by: PHYSICIAN ASSISTANT

## 2017-01-01 PROCEDURE — 51702 INSERT TEMP BLADDER CATH: CPT

## 2017-01-01 PROCEDURE — 63600175 PHARM REV CODE 636 W HCPCS: Performed by: INTERNAL MEDICINE

## 2017-01-01 PROCEDURE — 77372 SRS LINEAR BASED: CPT | Performed by: RADIOLOGY

## 2017-01-01 PROCEDURE — 1157F ADVNC CARE PLAN IN RCRD: CPT | Mod: S$GLB,,, | Performed by: RADIOLOGY

## 2017-01-01 PROCEDURE — 3078F DIAST BP <80 MM HG: CPT | Mod: S$GLB,,, | Performed by: INTERNAL MEDICINE

## 2017-01-01 PROCEDURE — 25000003 PHARM REV CODE 250: Performed by: STUDENT IN AN ORGANIZED HEALTH CARE EDUCATION/TRAINING PROGRAM

## 2017-01-01 PROCEDURE — 27100171 HC OXYGEN HIGH FLOW UP TO 24 HOURS

## 2017-01-01 PROCEDURE — 84484 ASSAY OF TROPONIN QUANT: CPT | Mod: 91

## 2017-01-01 PROCEDURE — 1126F AMNT PAIN NOTED NONE PRSNT: CPT | Mod: S$GLB,,, | Performed by: PHYSICIAN ASSISTANT

## 2017-01-01 PROCEDURE — 84100 ASSAY OF PHOSPHORUS: CPT

## 2017-01-01 PROCEDURE — 3078F DIAST BP <80 MM HG: CPT | Mod: S$GLB,,, | Performed by: NURSE PRACTITIONER

## 2017-01-01 PROCEDURE — 80053 COMPREHEN METABOLIC PANEL: CPT

## 2017-01-01 PROCEDURE — 90471 IMMUNIZATION ADMIN: CPT | Performed by: INTERNAL MEDICINE

## 2017-01-01 PROCEDURE — 25000003 PHARM REV CODE 250: Performed by: EMERGENCY MEDICINE

## 2017-01-01 PROCEDURE — 3074F SYST BP LT 130 MM HG: CPT | Mod: S$GLB,,, | Performed by: INTERNAL MEDICINE

## 2017-01-01 PROCEDURE — 3078F DIAST BP <80 MM HG: CPT | Mod: S$GLB,,, | Performed by: NEUROLOGICAL SURGERY

## 2017-01-01 PROCEDURE — 99291 CRITICAL CARE FIRST HOUR: CPT | Mod: GC,,, | Performed by: INTERNAL MEDICINE

## 2017-01-01 PROCEDURE — 36600 WITHDRAWAL OF ARTERIAL BLOOD: CPT

## 2017-01-01 PROCEDURE — 37000008 HC ANESTHESIA 1ST 15 MINUTES: Performed by: INTERNAL MEDICINE

## 2017-01-01 PROCEDURE — 84300 ASSAY OF URINE SODIUM: CPT

## 2017-01-01 PROCEDURE — 99231 SBSQ HOSP IP/OBS SF/LOW 25: CPT | Mod: ,,, | Performed by: INTERNAL MEDICINE

## 2017-01-01 PROCEDURE — 94761 N-INVAS EAR/PLS OXIMETRY MLT: CPT

## 2017-01-01 PROCEDURE — 85025 COMPLETE CBC W/AUTO DIFF WBC: CPT

## 2017-01-01 PROCEDURE — 99499 UNLISTED E&M SERVICE: CPT | Mod: S$PBB,,, | Performed by: INTERNAL MEDICINE

## 2017-01-01 PROCEDURE — 83605 ASSAY OF LACTIC ACID: CPT | Mod: 91

## 2017-01-01 PROCEDURE — 80053 COMPREHEN METABOLIC PANEL: CPT | Mod: 91

## 2017-01-01 PROCEDURE — 20600001 HC STEP DOWN PRIVATE ROOM

## 2017-01-01 PROCEDURE — 87186 SC STD MICRODIL/AGAR DIL: CPT

## 2017-01-01 PROCEDURE — 1159F MED LIST DOCD IN RCRD: CPT | Mod: S$GLB,,, | Performed by: INTERNAL MEDICINE

## 2017-01-01 PROCEDURE — 77338 DESIGN MLC DEVICE FOR IMRT: CPT | Mod: 26,,, | Performed by: RADIOLOGY

## 2017-01-01 PROCEDURE — 99999 PR PBB SHADOW E&M-EST. PATIENT-LVL I: CPT | Mod: PBBFAC,,, | Performed by: NEUROLOGICAL SURGERY

## 2017-01-01 PROCEDURE — 77300 RADIATION THERAPY DOSE PLAN: CPT | Mod: 26,,, | Performed by: RADIOLOGY

## 2017-01-01 PROCEDURE — 25000003 PHARM REV CODE 250: Performed by: HOSPITALIST

## 2017-01-01 PROCEDURE — 99214 OFFICE O/P EST MOD 30 MIN: CPT | Mod: S$GLB,,, | Performed by: INTERNAL MEDICINE

## 2017-01-01 PROCEDURE — 77370 RADIATION PHYSICS CONSULT: CPT

## 2017-01-01 PROCEDURE — 83880 ASSAY OF NATRIURETIC PEPTIDE: CPT

## 2017-01-01 PROCEDURE — 83735 ASSAY OF MAGNESIUM: CPT

## 2017-01-01 PROCEDURE — 99285 EMERGENCY DEPT VISIT HI MDM: CPT | Mod: ,,,

## 2017-01-01 PROCEDURE — 93306 TTE W/DOPPLER COMPLETE: CPT | Mod: 26,,, | Performed by: INTERNAL MEDICINE

## 2017-01-01 PROCEDURE — 99499 UNLISTED E&M SERVICE: CPT | Mod: S$GLB,,, | Performed by: INTERNAL MEDICINE

## 2017-01-01 PROCEDURE — 25500020 PHARM REV CODE 255: Performed by: INTERNAL MEDICINE

## 2017-01-01 PROCEDURE — 99222 1ST HOSP IP/OBS MODERATE 55: CPT | Mod: GC,,, | Performed by: INTERNAL MEDICINE

## 2017-01-01 PROCEDURE — 97165 OT EVAL LOW COMPLEX 30 MIN: CPT

## 2017-01-01 PROCEDURE — 63600175 PHARM REV CODE 636 W HCPCS: Performed by: HOSPITALIST

## 2017-01-01 PROCEDURE — 85730 THROMBOPLASTIN TIME PARTIAL: CPT

## 2017-01-01 PROCEDURE — 74177 CT ABD & PELVIS W/CONTRAST: CPT | Mod: 26,,, | Performed by: RADIOLOGY

## 2017-01-01 PROCEDURE — 71260 CT THORAX DX C+: CPT | Mod: 26,,, | Performed by: RADIOLOGY

## 2017-01-01 PROCEDURE — 25000003 PHARM REV CODE 250

## 2017-01-01 PROCEDURE — 27000190 HC CPAP FULL FACE MASK W/VALVE

## 2017-01-01 PROCEDURE — 1160F RVW MEDS BY RX/DR IN RCRD: CPT | Mod: S$GLB,,, | Performed by: INTERNAL MEDICINE

## 2017-01-01 PROCEDURE — 97161 PT EVAL LOW COMPLEX 20 MIN: CPT

## 2017-01-01 PROCEDURE — 51700 IRRIGATION OF BLADDER: CPT | Mod: S$GLB,,, | Performed by: NURSE PRACTITIONER

## 2017-01-01 PROCEDURE — 84439 ASSAY OF FREE THYROXINE: CPT

## 2017-01-01 PROCEDURE — 96367 TX/PROPH/DG ADDL SEQ IV INF: CPT

## 2017-01-01 PROCEDURE — 84540 ASSAY OF URINE/UREA-N: CPT

## 2017-01-01 PROCEDURE — 99152 MOD SED SAME PHYS/QHP 5/>YRS: CPT | Mod: ,,, | Performed by: INTERNAL MEDICINE

## 2017-01-01 PROCEDURE — 1159F MED LIST DOCD IN RCRD: CPT | Mod: S$GLB,,, | Performed by: RADIOLOGY

## 2017-01-01 PROCEDURE — 99282 EMERGENCY DEPT VISIT SF MDM: CPT | Mod: ,,, | Performed by: INTERNAL MEDICINE

## 2017-01-01 PROCEDURE — 99233 SBSQ HOSP IP/OBS HIGH 50: CPT | Mod: GC,,, | Performed by: INTERNAL MEDICINE

## 2017-01-01 PROCEDURE — 99999 PR PBB SHADOW E&M-EST. PATIENT-LVL III: CPT | Mod: PBBFAC,,, | Performed by: INTERNAL MEDICINE

## 2017-01-01 PROCEDURE — 82962 GLUCOSE BLOOD TEST: CPT

## 2017-01-01 PROCEDURE — 81001 URINALYSIS AUTO W/SCOPE: CPT

## 2017-01-01 PROCEDURE — 93010 ELECTROCARDIOGRAM REPORT: CPT | Mod: ,,, | Performed by: INTERNAL MEDICINE

## 2017-01-01 PROCEDURE — 99999 PR PBB SHADOW E&M-EST. PATIENT-LVL III: CPT | Mod: PBBFAC,,,

## 2017-01-01 PROCEDURE — 99223 1ST HOSP IP/OBS HIGH 75: CPT | Mod: ,,, | Performed by: INTERNAL MEDICINE

## 2017-01-01 PROCEDURE — 51741 ELECTRO-UROFLOWMETRY FIRST: CPT | Mod: 51,S$GLB,, | Performed by: UROLOGY

## 2017-01-01 PROCEDURE — 1157F ADVNC CARE PLAN IN RCRD: CPT | Mod: S$GLB,,, | Performed by: NURSE PRACTITIONER

## 2017-01-01 PROCEDURE — 11000001 HC ACUTE MED/SURG PRIVATE ROOM

## 2017-01-01 PROCEDURE — 92960 CARDIOVERSION ELECTRIC EXT: CPT | Mod: ,,, | Performed by: INTERNAL MEDICINE

## 2017-01-01 PROCEDURE — 99239 HOSP IP/OBS DSCHRG MGMT >30: CPT | Mod: ,,, | Performed by: HOSPITALIST

## 2017-01-01 PROCEDURE — 87086 URINE CULTURE/COLONY COUNT: CPT

## 2017-01-01 PROCEDURE — 25000003 PHARM REV CODE 250: Performed by: NURSE ANESTHETIST, CERTIFIED REGISTERED

## 2017-01-01 PROCEDURE — 77263 THER RADIOLOGY TX PLNG CPLX: CPT | Mod: ,,, | Performed by: RADIOLOGY

## 2017-01-01 PROCEDURE — 63600175 PHARM REV CODE 636 W HCPCS

## 2017-01-01 PROCEDURE — 3008F BODY MASS INDEX DOCD: CPT | Mod: S$GLB,,, | Performed by: PHYSICIAN ASSISTANT

## 2017-01-01 PROCEDURE — 99285 EMERGENCY DEPT VISIT HI MDM: CPT | Mod: 25

## 2017-01-01 PROCEDURE — 85520 HEPARIN ASSAY: CPT | Mod: 91

## 2017-01-01 PROCEDURE — 84145 PROCALCITONIN (PCT): CPT

## 2017-01-01 PROCEDURE — 77336 RADIATION PHYSICS CONSULT: CPT | Performed by: RADIOLOGY

## 2017-01-01 PROCEDURE — 77290 THER RAD SIMULAJ FIELD CPLX: CPT | Mod: 26,,, | Performed by: RADIOLOGY

## 2017-01-01 PROCEDURE — 77387 GUIDANCE FOR RADJ TX DLVR: CPT | Mod: TC | Performed by: RADIOLOGY

## 2017-01-01 PROCEDURE — 1159F MED LIST DOCD IN RCRD: CPT | Mod: S$GLB,,, | Performed by: PHYSICIAN ASSISTANT

## 2017-01-01 PROCEDURE — 82330 ASSAY OF CALCIUM: CPT

## 2017-01-01 PROCEDURE — 99223 1ST HOSP IP/OBS HIGH 75: CPT | Mod: ,,, | Performed by: HOSPITALIST

## 2017-01-01 PROCEDURE — 99499 UNLISTED E&M SERVICE: CPT | Mod: S$GLB,,, | Performed by: RADIOLOGY

## 2017-01-01 PROCEDURE — 77301 RADIOTHERAPY DOSE PLAN IMRT: CPT | Mod: TC | Performed by: RADIOLOGY

## 2017-01-01 PROCEDURE — 99232 SBSQ HOSP IP/OBS MODERATE 35: CPT | Mod: GC,,, | Performed by: INTERNAL MEDICINE

## 2017-01-01 PROCEDURE — 3074F SYST BP LT 130 MM HG: CPT | Mod: S$GLB,,, | Performed by: PHYSICIAN ASSISTANT

## 2017-01-01 PROCEDURE — 99499 UNLISTED E&M SERVICE: CPT | Mod: S$GLB,,, | Performed by: PHYSICIAN ASSISTANT

## 2017-01-01 PROCEDURE — 1125F AMNT PAIN NOTED PAIN PRSNT: CPT | Mod: S$GLB,,, | Performed by: INTERNAL MEDICINE

## 2017-01-01 PROCEDURE — 99999 PR PBB SHADOW E&M-EST. PATIENT-LVL V: CPT | Mod: PBBFAC,,, | Performed by: INTERNAL MEDICINE

## 2017-01-01 PROCEDURE — 81003 URINALYSIS AUTO W/O SCOPE: CPT

## 2017-01-01 PROCEDURE — 77402 RADIATION TX DELIVERY LVL 1: CPT | Performed by: RADIOLOGY

## 2017-01-01 PROCEDURE — 99204 OFFICE O/P NEW MOD 45 MIN: CPT | Mod: S$GLB,,, | Performed by: NEUROLOGICAL SURGERY

## 2017-01-01 PROCEDURE — 1125F AMNT PAIN NOTED PAIN PRSNT: CPT | Mod: S$GLB,,, | Performed by: NEUROLOGICAL SURGERY

## 2017-01-01 PROCEDURE — 99232 SBSQ HOSP IP/OBS MODERATE 35: CPT | Mod: ,,, | Performed by: HOSPITALIST

## 2017-01-01 PROCEDURE — 4A023N7 MEASUREMENT OF CARDIAC SAMPLING AND PRESSURE, LEFT HEART, PERCUTANEOUS APPROACH: ICD-10-PCS | Performed by: INTERNAL MEDICINE

## 2017-01-01 PROCEDURE — 99999 PR PBB SHADOW E&M-EST. PATIENT-LVL IV: CPT | Mod: PBBFAC,,, | Performed by: PHYSICIAN ASSISTANT

## 2017-01-01 PROCEDURE — 99214 OFFICE O/P EST MOD 30 MIN: CPT | Mod: 25,S$GLB,, | Performed by: INTERNAL MEDICINE

## 2017-01-01 PROCEDURE — 99223 1ST HOSP IP/OBS HIGH 75: CPT | Mod: AI,GC,, | Performed by: HOSPITALIST

## 2017-01-01 PROCEDURE — 99999 PR PBB SHADOW E&M-EST. PATIENT-LVL IV: CPT | Mod: PBBFAC,,, | Performed by: INTERNAL MEDICINE

## 2017-01-01 PROCEDURE — 3074F SYST BP LT 130 MM HG: CPT | Mod: S$GLB,,, | Performed by: NEUROLOGICAL SURGERY

## 2017-01-01 PROCEDURE — 77301 RADIOTHERAPY DOSE PLAN IMRT: CPT | Mod: 26,,, | Performed by: RADIOLOGY

## 2017-01-01 PROCEDURE — C1894 INTRO/SHEATH, NON-LASER: HCPCS

## 2017-01-01 PROCEDURE — S0028 INJECTION, FAMOTIDINE, 20 MG: HCPCS | Performed by: STUDENT IN AN ORGANIZED HEALTH CARE EDUCATION/TRAINING PROGRAM

## 2017-01-01 PROCEDURE — 20000000 HC ICU ROOM

## 2017-01-01 PROCEDURE — 3074F SYST BP LT 130 MM HG: CPT | Mod: GC,S$GLB,, | Performed by: STUDENT IN AN ORGANIZED HEALTH CARE EDUCATION/TRAINING PROGRAM

## 2017-01-01 PROCEDURE — 63600175 PHARM REV CODE 636 W HCPCS: Performed by: PHYSICIAN ASSISTANT

## 2017-01-01 PROCEDURE — 1159F MED LIST DOCD IN RCRD: CPT | Mod: GC,S$GLB,, | Performed by: STUDENT IN AN ORGANIZED HEALTH CARE EDUCATION/TRAINING PROGRAM

## 2017-01-01 PROCEDURE — B2111ZZ FLUOROSCOPY OF MULTIPLE CORONARY ARTERIES USING LOW OSMOLAR CONTRAST: ICD-10-PCS | Performed by: INTERNAL MEDICINE

## 2017-01-01 PROCEDURE — 93306 TTE W/DOPPLER COMPLETE: CPT

## 2017-01-01 PROCEDURE — 3044F HG A1C LEVEL LT 7.0%: CPT | Mod: S$GLB,,, | Performed by: INTERNAL MEDICINE

## 2017-01-01 PROCEDURE — 97166 OT EVAL MOD COMPLEX 45 MIN: CPT

## 2017-01-01 PROCEDURE — 77295 3-D RADIOTHERAPY PLAN: CPT | Mod: TC | Performed by: RADIOLOGY

## 2017-01-01 PROCEDURE — 85610 PROTHROMBIN TIME: CPT

## 2017-01-01 PROCEDURE — 84295 ASSAY OF SERUM SODIUM: CPT

## 2017-01-01 PROCEDURE — D9220A PRA ANESTHESIA: Mod: ANES,,, | Performed by: ANESTHESIOLOGY

## 2017-01-01 PROCEDURE — 1157F ADVNC CARE PLAN IN RCRD: CPT | Mod: GC,S$GLB,, | Performed by: INTERNAL MEDICINE

## 2017-01-01 PROCEDURE — 1126F AMNT PAIN NOTED NONE PRSNT: CPT | Mod: S$GLB,,, | Performed by: INTERNAL MEDICINE

## 2017-01-01 PROCEDURE — 99284 EMERGENCY DEPT VISIT MOD MDM: CPT | Mod: ,,, | Performed by: EMERGENCY MEDICINE

## 2017-01-01 PROCEDURE — 99215 OFFICE O/P EST HI 40 MIN: CPT | Mod: S$GLB,,, | Performed by: INTERNAL MEDICINE

## 2017-01-01 PROCEDURE — 93010 ELECTROCARDIOGRAM REPORT: CPT | Mod: 76,,, | Performed by: INTERNAL MEDICINE

## 2017-01-01 PROCEDURE — 99213 OFFICE O/P EST LOW 20 MIN: CPT | Mod: S$GLB,,, | Performed by: PHYSICIAN ASSISTANT

## 2017-01-01 PROCEDURE — 1157F ADVNC CARE PLAN IN RCRD: CPT | Mod: S$GLB,,, | Performed by: NEUROLOGICAL SURGERY

## 2017-01-01 PROCEDURE — 96374 THER/PROPH/DIAG INJ IV PUSH: CPT

## 2017-01-01 PROCEDURE — 99499 UNLISTED E&M SERVICE: CPT | Mod: S$PBB,,, | Performed by: NEUROLOGICAL SURGERY

## 2017-01-01 PROCEDURE — 99233 SBSQ HOSP IP/OBS HIGH 50: CPT | Mod: ,,, | Performed by: HOSPITALIST

## 2017-01-01 PROCEDURE — 51784 ANAL/URINARY MUSCLE STUDY: CPT | Mod: 51,S$GLB,, | Performed by: UROLOGY

## 2017-01-01 PROCEDURE — 77014 HC CT GUIDANCE RADIATION THERAPY FLDS PLACEMENT: CPT | Mod: TC | Performed by: RADIOLOGY

## 2017-01-01 PROCEDURE — 77300 RADIATION THERAPY DOSE PLAN: CPT | Mod: TC | Performed by: RADIOLOGY

## 2017-01-01 PROCEDURE — G8978 MOBILITY CURRENT STATUS: HCPCS | Mod: CH

## 2017-01-01 PROCEDURE — G8979 MOBILITY GOAL STATUS: HCPCS | Mod: CH

## 2017-01-01 PROCEDURE — 99999 PR PBB SHADOW E&M-EST. PATIENT-LVL III: CPT | Mod: PBBFAC,,, | Performed by: RADIOLOGY

## 2017-01-01 PROCEDURE — 1126F AMNT PAIN NOTED NONE PRSNT: CPT | Mod: GC,S$GLB,, | Performed by: STUDENT IN AN ORGANIZED HEALTH CARE EDUCATION/TRAINING PROGRAM

## 2017-01-01 PROCEDURE — 77290 THER RAD SIMULAJ FIELD CPLX: CPT | Mod: TC | Performed by: RADIOLOGY

## 2017-01-01 PROCEDURE — 84132 ASSAY OF SERUM POTASSIUM: CPT

## 2017-01-01 PROCEDURE — 3008F BODY MASS INDEX DOCD: CPT | Mod: S$GLB,,, | Performed by: INTERNAL MEDICINE

## 2017-01-01 PROCEDURE — 99397 PER PM REEVAL EST PAT 65+ YR: CPT | Mod: 25,S$GLB,, | Performed by: INTERNAL MEDICINE

## 2017-01-01 PROCEDURE — G8989 SELF CARE D/C STATUS: HCPCS | Mod: CH

## 2017-01-01 PROCEDURE — 99285 EMERGENCY DEPT VISIT HI MDM: CPT | Mod: ,,, | Performed by: EMERGENCY MEDICINE

## 2017-01-01 PROCEDURE — 27100006 CARDIOVERSION (DCCV)

## 2017-01-01 PROCEDURE — 84484 ASSAY OF TROPONIN QUANT: CPT

## 2017-01-01 PROCEDURE — 77417 THER RADIOLOGY PORT IMAGE(S): CPT | Performed by: RADIOLOGY

## 2017-01-01 PROCEDURE — 99499 UNLISTED E&M SERVICE: CPT | Mod: S$GLB,,, | Performed by: NURSE PRACTITIONER

## 2017-01-01 PROCEDURE — 87040 BLOOD CULTURE FOR BACTERIA: CPT | Mod: 59

## 2017-01-01 PROCEDURE — S0030 INJECTION, METRONIDAZOLE: HCPCS | Performed by: STUDENT IN AN ORGANIZED HEALTH CARE EDUCATION/TRAINING PROGRAM

## 2017-01-01 PROCEDURE — 99223 1ST HOSP IP/OBS HIGH 75: CPT | Mod: GC,,, | Performed by: INTERNAL MEDICINE

## 2017-01-01 PROCEDURE — 02HV33Z INSERTION OF INFUSION DEVICE INTO SUPERIOR VENA CAVA, PERCUTANEOUS APPROACH: ICD-10-PCS | Performed by: INTERNAL MEDICINE

## 2017-01-01 PROCEDURE — 99291 CRITICAL CARE FIRST HOUR: CPT | Mod: ,,, | Performed by: EMERGENCY MEDICINE

## 2017-01-01 PROCEDURE — G0008 ADMIN INFLUENZA VIRUS VAC: HCPCS | Performed by: INTERNAL MEDICINE

## 2017-01-01 PROCEDURE — 99999 PR PBB SHADOW E&M-EST. PATIENT-LVL V: CPT | Mod: PBBFAC,GC,, | Performed by: STUDENT IN AN ORGANIZED HEALTH CARE EDUCATION/TRAINING PROGRAM

## 2017-01-01 PROCEDURE — 93454 CORONARY ARTERY ANGIO S&I: CPT | Mod: 26,,, | Performed by: INTERNAL MEDICINE

## 2017-01-01 PROCEDURE — 99217 PR OBSERVATION CARE DISCHARGE: CPT | Mod: GC,,, | Performed by: INTERNAL MEDICINE

## 2017-01-01 PROCEDURE — 93224 XTRNL ECG REC UP TO 48 HRS: CPT | Mod: S$GLB,,, | Performed by: INTERNAL MEDICINE

## 2017-01-01 PROCEDURE — 99900035 HC TECH TIME PER 15 MIN (STAT)

## 2017-01-01 PROCEDURE — 99233 SBSQ HOSP IP/OBS HIGH 50: CPT | Mod: ,,, | Performed by: INTERNAL MEDICINE

## 2017-01-01 PROCEDURE — 77334 RADIATION TREATMENT AID(S): CPT | Mod: 26,,, | Performed by: RADIOLOGY

## 2017-01-01 PROCEDURE — 63600175 PHARM REV CODE 636 W HCPCS: Performed by: EMERGENCY MEDICINE

## 2017-01-01 PROCEDURE — G8988 SELF CARE GOAL STATUS: HCPCS | Mod: CH

## 2017-01-01 PROCEDURE — 99285 EMERGENCY DEPT VISIT HI MDM: CPT

## 2017-01-01 PROCEDURE — 1126F AMNT PAIN NOTED NONE PRSNT: CPT | Mod: S$GLB,,, | Performed by: NURSE PRACTITIONER

## 2017-01-01 PROCEDURE — 80061 LIPID PANEL: CPT

## 2017-01-01 PROCEDURE — 3078F DIAST BP <80 MM HG: CPT | Mod: S$GLB,,, | Performed by: PHYSICIAN ASSISTANT

## 2017-01-01 PROCEDURE — 1160F RVW MEDS BY RX/DR IN RCRD: CPT | Mod: GC,S$GLB,, | Performed by: INTERNAL MEDICINE

## 2017-01-01 PROCEDURE — 90714 TD VACC NO PRESV 7 YRS+ IM: CPT | Mod: S$GLB,,, | Performed by: INTERNAL MEDICINE

## 2017-01-01 PROCEDURE — 74178 CT ABD&PLV WO CNTR FLWD CNTR: CPT | Mod: TC

## 2017-01-01 PROCEDURE — 4010F ACE/ARB THERAPY RXD/TAKEN: CPT | Mod: S$GLB,,, | Performed by: INTERNAL MEDICINE

## 2017-01-01 PROCEDURE — 82570 ASSAY OF URINE CREATININE: CPT

## 2017-01-01 PROCEDURE — 71035 XR CHEST LATERAL DECUBITUS BILAT: CPT | Mod: 50,TC

## 2017-01-01 PROCEDURE — 96413 CHEMO IV INFUSION 1 HR: CPT

## 2017-01-01 PROCEDURE — A9552 F18 FDG: HCPCS

## 2017-01-01 PROCEDURE — 51728 CYSTOMETROGRAM W/VP: CPT | Mod: S$GLB,,, | Performed by: UROLOGY

## 2017-01-01 PROCEDURE — 1157F ADVNC CARE PLAN IN RCRD: CPT | Mod: S$GLB,,, | Performed by: INTERNAL MEDICINE

## 2017-01-01 PROCEDURE — 1159F MED LIST DOCD IN RCRD: CPT | Mod: GC,S$GLB,, | Performed by: INTERNAL MEDICINE

## 2017-01-01 PROCEDURE — 84443 ASSAY THYROID STIM HORMONE: CPT

## 2017-01-01 PROCEDURE — G8980 MOBILITY D/C STATUS: HCPCS | Mod: CH

## 2017-01-01 PROCEDURE — A4216 STERILE WATER/SALINE, 10 ML: HCPCS | Performed by: INTERNAL MEDICINE

## 2017-01-01 PROCEDURE — 51701 INSERT BLADDER CATHETER: CPT

## 2017-01-01 PROCEDURE — 85025 COMPLETE CBC W/AUTO DIFF WBC: CPT | Mod: 91

## 2017-01-01 PROCEDURE — 97116 GAIT TRAINING THERAPY: CPT

## 2017-01-01 PROCEDURE — 77334 RADIATION TREATMENT AID(S): CPT | Mod: TC | Performed by: RADIOLOGY

## 2017-01-01 PROCEDURE — A9585 GADOBUTROL INJECTION: HCPCS | Performed by: INTERNAL MEDICINE

## 2017-01-01 PROCEDURE — 83605 ASSAY OF LACTIC ACID: CPT

## 2017-01-01 PROCEDURE — 85014 HEMATOCRIT: CPT

## 2017-01-01 PROCEDURE — 87088 URINE BACTERIA CULTURE: CPT

## 2017-01-01 PROCEDURE — 77334 RADIATION TREATMENT AID(S): CPT | Mod: 26,59,, | Performed by: RADIOLOGY

## 2017-01-01 PROCEDURE — 1159F MED LIST DOCD IN RCRD: CPT | Mod: S$GLB,,, | Performed by: NURSE PRACTITIONER

## 2017-01-01 PROCEDURE — 1125F AMNT PAIN NOTED PAIN PRSNT: CPT | Mod: S$GLB,,, | Performed by: RADIOLOGY

## 2017-01-01 PROCEDURE — 99213 OFFICE O/P EST LOW 20 MIN: CPT | Mod: S$GLB,,, | Performed by: INTERNAL MEDICINE

## 2017-01-01 PROCEDURE — 94660 CPAP INITIATION&MGMT: CPT

## 2017-01-01 PROCEDURE — 87449 NOS EACH ORGANISM AG IA: CPT

## 2017-01-01 PROCEDURE — 3074F SYST BP LT 130 MM HG: CPT | Mod: GC,S$GLB,, | Performed by: INTERNAL MEDICINE

## 2017-01-01 PROCEDURE — 83036 HEMOGLOBIN GLYCOSYLATED A1C: CPT

## 2017-01-01 PROCEDURE — 78815 PET IMAGE W/CT SKULL-THIGH: CPT | Mod: 26,PI,, | Performed by: RADIOLOGY

## 2017-01-01 PROCEDURE — 3078F DIAST BP <80 MM HG: CPT | Mod: GC,S$GLB,, | Performed by: INTERNAL MEDICINE

## 2017-01-01 PROCEDURE — G8979 MOBILITY GOAL STATUS: HCPCS | Mod: CJ

## 2017-01-01 PROCEDURE — 84100 ASSAY OF PHOSPHORUS: CPT | Mod: 91

## 2017-01-01 PROCEDURE — 99213 OFFICE O/P EST LOW 20 MIN: CPT | Mod: GC,S$GLB,, | Performed by: INTERNAL MEDICINE

## 2017-01-01 PROCEDURE — 94760 N-INVAS EAR/PLS OXIMETRY 1: CPT

## 2017-01-01 PROCEDURE — 51797 INTRAABDOMINAL PRESSURE TEST: CPT | Mod: S$GLB,,, | Performed by: UROLOGY

## 2017-01-01 PROCEDURE — 96375 TX/PRO/DX INJ NEW DRUG ADDON: CPT

## 2017-01-01 PROCEDURE — 70553 MRI BRAIN STEM W/O & W/DYE: CPT | Mod: 26,,, | Performed by: RADIOLOGY

## 2017-01-01 PROCEDURE — 37799 UNLISTED PX VASCULAR SURGERY: CPT

## 2017-01-01 PROCEDURE — 71260 CT THORAX DX C+: CPT | Mod: TC

## 2017-01-01 PROCEDURE — C1760 CLOSURE DEV, VASC: HCPCS

## 2017-01-01 PROCEDURE — 99499 UNLISTED E&M SERVICE: CPT | Mod: S$GLB,,, | Performed by: NEUROLOGICAL SURGERY

## 2017-01-01 PROCEDURE — A9585 GADOBUTROL INJECTION: HCPCS | Performed by: EMERGENCY MEDICINE

## 2017-01-01 PROCEDURE — 25000003 PHARM REV CODE 250: Performed by: PHYSICIAN ASSISTANT

## 2017-01-01 PROCEDURE — 99999 PR PBB SHADOW E&M-EST. PATIENT-LVL IV: CPT | Mod: PBBFAC,,, | Performed by: NURSE PRACTITIONER

## 2017-01-01 PROCEDURE — 87632 RESP VIRUS 6-11 TARGETS: CPT

## 2017-01-01 PROCEDURE — G8978 MOBILITY CURRENT STATUS: HCPCS | Mod: CN

## 2017-01-01 PROCEDURE — 93306 TTE W/DOPPLER COMPLETE: CPT | Mod: S$GLB,,, | Performed by: INTERNAL MEDICINE

## 2017-01-01 PROCEDURE — 5A2204Z RESTORATION OF CARDIAC RHYTHM, SINGLE: ICD-10-PCS | Performed by: INTERNAL MEDICINE

## 2017-01-01 PROCEDURE — 99213 OFFICE O/P EST LOW 20 MIN: CPT | Mod: 25,S$GLB,, | Performed by: NURSE PRACTITIONER

## 2017-01-01 PROCEDURE — 3008F BODY MASS INDEX DOCD: CPT | Mod: S$GLB,,, | Performed by: NURSE PRACTITIONER

## 2017-01-01 PROCEDURE — 37000009 HC ANESTHESIA EA ADD 15 MINS: Performed by: INTERNAL MEDICINE

## 2017-01-01 PROCEDURE — 1159F MED LIST DOCD IN RCRD: CPT | Mod: S$GLB,,, | Performed by: NEUROLOGICAL SURGERY

## 2017-01-01 PROCEDURE — G8987 SELF CARE CURRENT STATUS: HCPCS | Mod: CH

## 2017-01-01 PROCEDURE — 77080 DXA BONE DENSITY AXIAL: CPT | Mod: 26,,, | Performed by: INTERNAL MEDICINE

## 2017-01-01 PROCEDURE — 3008F BODY MASS INDEX DOCD: CPT | Mod: GC,S$GLB,, | Performed by: STUDENT IN AN ORGANIZED HEALTH CARE EDUCATION/TRAINING PROGRAM

## 2017-01-01 PROCEDURE — 71035 XR CHEST LATERAL DECUBITUS BILAT: CPT | Mod: 26,,, | Performed by: RADIOLOGY

## 2017-01-01 PROCEDURE — 83735 ASSAY OF MAGNESIUM: CPT | Mod: 91

## 2017-01-01 PROCEDURE — 77295 3-D RADIOTHERAPY PLAN: CPT | Mod: 26,,, | Performed by: RADIOLOGY

## 2017-01-01 PROCEDURE — 3078F DIAST BP <80 MM HG: CPT | Mod: GC,S$GLB,, | Performed by: STUDENT IN AN ORGANIZED HEALTH CARE EDUCATION/TRAINING PROGRAM

## 2017-01-01 PROCEDURE — 1160F RVW MEDS BY RX/DR IN RCRD: CPT | Mod: S$GLB,,, | Performed by: NEUROLOGICAL SURGERY

## 2017-01-01 PROCEDURE — 99213 OFFICE O/P EST LOW 20 MIN: CPT | Mod: S$GLB,,, | Performed by: NURSE PRACTITIONER

## 2017-01-01 PROCEDURE — 99232 SBSQ HOSP IP/OBS MODERATE 35: CPT | Mod: ,,, | Performed by: INTERNAL MEDICINE

## 2017-01-01 PROCEDURE — P9612 CATHETERIZE FOR URINE SPEC: HCPCS

## 2017-01-01 PROCEDURE — 74178 CT ABD&PLV WO CNTR FLWD CNTR: CPT | Mod: 26,,, | Performed by: RADIOLOGY

## 2017-01-01 PROCEDURE — 70553 MRI BRAIN STEM W/O & W/DYE: CPT | Mod: TC

## 2017-01-01 PROCEDURE — 99214 OFFICE O/P EST MOD 30 MIN: CPT | Mod: GC,S$GLB,, | Performed by: STUDENT IN AN ORGANIZED HEALTH CARE EDUCATION/TRAINING PROGRAM

## 2017-01-01 PROCEDURE — 80076 HEPATIC FUNCTION PANEL: CPT

## 2017-01-01 PROCEDURE — S0030 INJECTION, METRONIDAZOLE: HCPCS | Performed by: EMERGENCY MEDICINE

## 2017-01-01 PROCEDURE — 99291 CRITICAL CARE FIRST HOUR: CPT

## 2017-01-01 PROCEDURE — 3074F SYST BP LT 130 MM HG: CPT | Mod: S$GLB,,, | Performed by: RADIOLOGY

## 2017-01-01 PROCEDURE — 87077 CULTURE AEROBIC IDENTIFY: CPT

## 2017-01-01 PROCEDURE — 52000 CYSTOURETHROSCOPY: CPT | Mod: 59,S$GLB,, | Performed by: UROLOGY

## 2017-01-01 PROCEDURE — 90662 IIV NO PRSV INCREASED AG IM: CPT | Performed by: INTERNAL MEDICINE

## 2017-01-01 PROCEDURE — D9220A PRA ANESTHESIA: Mod: CRNA,,, | Performed by: NURSE ANESTHETIST, CERTIFIED REGISTERED

## 2017-01-01 PROCEDURE — 72197 MRI PELVIS W/O & W/DYE: CPT | Mod: TC

## 2017-01-01 PROCEDURE — 3074F SYST BP LT 130 MM HG: CPT | Mod: S$GLB,,, | Performed by: NURSE PRACTITIONER

## 2017-01-01 PROCEDURE — 1157F ADVNC CARE PLAN IN RCRD: CPT | Mod: 8P,S$GLB,, | Performed by: INTERNAL MEDICINE

## 2017-01-01 PROCEDURE — 99222 1ST HOSP IP/OBS MODERATE 55: CPT | Mod: ,,, | Performed by: HOSPITALIST

## 2017-01-01 PROCEDURE — 99214 OFFICE O/P EST MOD 30 MIN: CPT | Mod: S$GLB,,, | Performed by: NEUROLOGICAL SURGERY

## 2017-01-01 PROCEDURE — 3044F HG A1C LEVEL LT 7.0%: CPT | Mod: S$GLB,,, | Performed by: NURSE PRACTITIONER

## 2017-01-01 PROCEDURE — 3078F DIAST BP <80 MM HG: CPT | Mod: S$GLB,,, | Performed by: RADIOLOGY

## 2017-01-01 PROCEDURE — 3060F POS MICROALBUMINURIA REV: CPT | Mod: S$GLB,,, | Performed by: INTERNAL MEDICINE

## 2017-01-01 PROCEDURE — 99999 PR PBB SHADOW E&M-EST. PATIENT-LVL IV: CPT | Mod: PBBFAC,GC,,

## 2017-01-01 PROCEDURE — 72197 MRI PELVIS W/O & W/DYE: CPT | Mod: 26,,, | Performed by: RADIOLOGY

## 2017-01-01 PROCEDURE — 1126F AMNT PAIN NOTED NONE PRSNT: CPT | Mod: GC,S$GLB,, | Performed by: INTERNAL MEDICINE

## 2017-01-01 PROCEDURE — 99999 PR PBB SHADOW E&M-EST. PATIENT-LVL III: CPT | Mod: PBBFAC,,, | Performed by: PHYSICIAN ASSISTANT

## 2017-01-01 PROCEDURE — 63600175 PHARM REV CODE 636 W HCPCS: Performed by: NURSE ANESTHETIST, CERTIFIED REGISTERED

## 2017-01-01 PROCEDURE — 99220 PR INITIAL OBSERVATION CARE,LEVL III: CPT | Mod: GC,,, | Performed by: INTERNAL MEDICINE

## 2017-01-01 PROCEDURE — 99499 UNLISTED E&M SERVICE: CPT | Mod: S$GLB,,, | Performed by: UROLOGY

## 2017-01-01 PROCEDURE — 2022F DILAT RTA XM EVC RTNOPTHY: CPT | Mod: S$GLB,,, | Performed by: INTERNAL MEDICINE

## 2017-01-01 PROCEDURE — 90471 IMMUNIZATION ADMIN: CPT | Mod: S$GLB,,, | Performed by: INTERNAL MEDICINE

## 2017-01-01 PROCEDURE — 80048 BASIC METABOLIC PNL TOTAL CA: CPT | Mod: 91

## 2017-01-01 PROCEDURE — 96360 HYDRATION IV INFUSION INIT: CPT

## 2017-01-01 PROCEDURE — 99213 OFFICE O/P EST LOW 20 MIN: CPT | Mod: S$GLB,,, | Performed by: RADIOLOGY

## 2017-01-01 PROCEDURE — 99999 PR PBB SHADOW E&M-EST. PATIENT-LVL V: CPT | Mod: PBBFAC,,, | Performed by: NURSE PRACTITIONER

## 2017-01-01 PROCEDURE — 85027 COMPLETE CBC AUTOMATED: CPT

## 2017-01-01 PROCEDURE — 36415 COLL VENOUS BLD VENIPUNCTURE: CPT | Mod: PO

## 2017-01-01 PROCEDURE — 36620 INSERTION CATHETER ARTERY: CPT

## 2017-01-01 RX ORDER — GADOBUTROL 604.72 MG/ML
8 INJECTION INTRAVENOUS
Status: COMPLETED | OUTPATIENT
Start: 2017-01-01 | End: 2017-01-01

## 2017-01-01 RX ORDER — AZITHROMYCIN 250 MG/1
500 TABLET, FILM COATED ORAL DAILY
Status: COMPLETED | OUTPATIENT
Start: 2017-01-01 | End: 2017-01-01

## 2017-01-01 RX ORDER — METOPROLOL TARTRATE 25 MG/1
25 TABLET, FILM COATED ORAL 2 TIMES DAILY
Status: DISCONTINUED | OUTPATIENT
Start: 2017-01-01 | End: 2017-01-01

## 2017-01-01 RX ORDER — GABAPENTIN 300 MG/1
CAPSULE ORAL
Qty: 180 CAPSULE | Refills: 2 | Status: SHIPPED | OUTPATIENT
Start: 2017-01-01 | End: 2017-01-01

## 2017-01-01 RX ORDER — ONDANSETRON 2 MG/ML
4 INJECTION INTRAMUSCULAR; INTRAVENOUS ONCE
Status: COMPLETED | OUTPATIENT
Start: 2017-01-01 | End: 2017-01-01

## 2017-01-01 RX ORDER — POTASSIUM CHLORIDE 20 MEQ/1
40 TABLET, EXTENDED RELEASE ORAL ONCE
Status: COMPLETED | OUTPATIENT
Start: 2017-01-01 | End: 2017-01-01

## 2017-01-01 RX ORDER — TRAMADOL HYDROCHLORIDE 50 MG/1
100 TABLET ORAL EVERY 6 HOURS PRN
Status: DISCONTINUED | OUTPATIENT
Start: 2017-01-01 | End: 2017-01-01 | Stop reason: HOSPADM

## 2017-01-01 RX ORDER — LISINOPRIL 2.5 MG/1
2.5 TABLET ORAL DAILY
Status: DISCONTINUED | OUTPATIENT
Start: 2017-01-01 | End: 2017-01-01 | Stop reason: HOSPADM

## 2017-01-01 RX ORDER — NITROGLYCERIN 0.4 MG/1
TABLET SUBLINGUAL
Status: COMPLETED
Start: 2017-01-01 | End: 2017-01-01

## 2017-01-01 RX ORDER — GLUCAGON 1 MG
1 KIT INJECTION
Status: DISCONTINUED | OUTPATIENT
Start: 2017-01-01 | End: 2017-01-01 | Stop reason: HOSPADM

## 2017-01-01 RX ORDER — INSULIN ASPART 100 [IU]/ML
0-5 INJECTION, SOLUTION INTRAVENOUS; SUBCUTANEOUS
Status: DISCONTINUED | OUTPATIENT
Start: 2017-01-01 | End: 2017-01-01 | Stop reason: HOSPADM

## 2017-01-01 RX ORDER — TAMSULOSIN HYDROCHLORIDE 0.4 MG/1
CAPSULE ORAL
COMMUNITY
Start: 2017-01-01 | End: 2017-01-01 | Stop reason: CLARIF

## 2017-01-01 RX ORDER — SODIUM CHLORIDE 9 MG/ML
500 INJECTION, SOLUTION INTRAVENOUS
Status: COMPLETED | OUTPATIENT
Start: 2017-01-01 | End: 2017-01-01

## 2017-01-01 RX ORDER — MORPHINE SULFATE 15 MG/1
15 TABLET, FILM COATED, EXTENDED RELEASE ORAL EVERY 12 HOURS
Status: DISCONTINUED | OUTPATIENT
Start: 2017-01-01 | End: 2017-01-01 | Stop reason: HOSPADM

## 2017-01-01 RX ORDER — CLOPIDOGREL BISULFATE 75 MG/1
75 TABLET ORAL DAILY
Status: DISCONTINUED | OUTPATIENT
Start: 2017-01-01 | End: 2017-01-01 | Stop reason: HOSPADM

## 2017-01-01 RX ORDER — DULOXETIN HYDROCHLORIDE 30 MG/1
30 CAPSULE, DELAYED RELEASE ORAL DAILY
Qty: 30 CAPSULE | Refills: 0 | Status: ON HOLD | OUTPATIENT
Start: 2017-01-01 | End: 2017-01-01 | Stop reason: HOSPADM

## 2017-01-01 RX ORDER — AMOXICILLIN AND CLAVULANATE POTASSIUM 875; 125 MG/1; MG/1
1 TABLET, FILM COATED ORAL EVERY 12 HOURS
Status: DISCONTINUED | OUTPATIENT
Start: 2017-01-01 | End: 2017-01-01 | Stop reason: HOSPADM

## 2017-01-01 RX ORDER — MORPHINE SULFATE 15 MG/1
15 TABLET, FILM COATED, EXTENDED RELEASE ORAL EVERY 8 HOURS PRN
Status: DISCONTINUED | OUTPATIENT
Start: 2017-01-01 | End: 2017-01-01

## 2017-01-01 RX ORDER — NITROGLYCERIN 0.4 MG/1
0.4 TABLET SUBLINGUAL EVERY 5 MIN PRN
Status: DISCONTINUED | OUTPATIENT
Start: 2017-01-01 | End: 2017-01-01 | Stop reason: HOSPADM

## 2017-01-01 RX ORDER — HEPARIN 100 UNIT/ML
500 SYRINGE INTRAVENOUS
Status: CANCELLED | OUTPATIENT
Start: 2017-01-01

## 2017-01-01 RX ORDER — ZOLPIDEM TARTRATE 5 MG/1
5 TABLET ORAL NIGHTLY PRN
Status: DISCONTINUED | OUTPATIENT
Start: 2017-01-01 | End: 2017-01-01 | Stop reason: HOSPADM

## 2017-01-01 RX ORDER — AZITHROMYCIN 250 MG/1
500 TABLET, FILM COATED ORAL DAILY
Status: DISCONTINUED | OUTPATIENT
Start: 2017-01-01 | End: 2017-01-01

## 2017-01-01 RX ORDER — POTASSIUM CHLORIDE 20 MEQ/1
20 TABLET, EXTENDED RELEASE ORAL ONCE
Status: COMPLETED | OUTPATIENT
Start: 2017-01-01 | End: 2017-01-01

## 2017-01-01 RX ORDER — IBUPROFEN 200 MG
16 TABLET ORAL
Status: DISCONTINUED | OUTPATIENT
Start: 2017-01-01 | End: 2017-01-01 | Stop reason: HOSPADM

## 2017-01-01 RX ORDER — METOPROLOL SUCCINATE 25 MG/1
25 TABLET, EXTENDED RELEASE ORAL DAILY
Status: DISCONTINUED | OUTPATIENT
Start: 2017-01-01 | End: 2017-01-01

## 2017-01-01 RX ORDER — ONDANSETRON 8 MG/1
8 TABLET, ORALLY DISINTEGRATING ORAL EVERY 8 HOURS PRN
Status: DISCONTINUED | OUTPATIENT
Start: 2017-01-01 | End: 2017-01-01 | Stop reason: HOSPADM

## 2017-01-01 RX ORDER — METOPROLOL SUCCINATE 50 MG/1
50 TABLET, EXTENDED RELEASE ORAL DAILY
Status: DISCONTINUED | OUTPATIENT
Start: 2017-01-01 | End: 2017-01-01 | Stop reason: HOSPADM

## 2017-01-01 RX ORDER — ETOMIDATE 2 MG/ML
INJECTION INTRAVENOUS
Status: DISCONTINUED | OUTPATIENT
Start: 2017-01-01 | End: 2017-01-01

## 2017-01-01 RX ORDER — ACETAMINOPHEN 325 MG/1
650 TABLET ORAL EVERY 4 HOURS PRN
Status: DISCONTINUED | OUTPATIENT
Start: 2017-01-01 | End: 2017-01-01 | Stop reason: HOSPADM

## 2017-01-01 RX ORDER — SODIUM CHLORIDE 0.9 % (FLUSH) 0.9 %
3 SYRINGE (ML) INJECTION EVERY 8 HOURS
Status: DISCONTINUED | OUTPATIENT
Start: 2017-01-01 | End: 2017-01-01 | Stop reason: HOSPADM

## 2017-01-01 RX ORDER — SODIUM CHLORIDE 0.9 % (FLUSH) 0.9 %
10 SYRINGE (ML) INJECTION
Status: CANCELLED | OUTPATIENT
Start: 2017-01-01

## 2017-01-01 RX ORDER — TAMSULOSIN HYDROCHLORIDE 0.4 MG/1
0.4 CAPSULE ORAL DAILY
Qty: 30 CAPSULE | Refills: 11 | Status: SHIPPED | OUTPATIENT
Start: 2017-01-01 | End: 2017-01-01

## 2017-01-01 RX ORDER — ASPIRIN 81 MG/1
81 TABLET ORAL DAILY
Status: DISCONTINUED | OUTPATIENT
Start: 2017-01-01 | End: 2017-01-01 | Stop reason: HOSPADM

## 2017-01-01 RX ORDER — SODIUM CHLORIDE 0.9 % (FLUSH) 0.9 %
10 SYRINGE (ML) INJECTION
Status: DISCONTINUED | OUTPATIENT
Start: 2017-01-01 | End: 2017-01-01 | Stop reason: HOSPADM

## 2017-01-01 RX ORDER — FUROSEMIDE 10 MG/ML
80 INJECTION INTRAMUSCULAR; INTRAVENOUS
Status: COMPLETED | OUTPATIENT
Start: 2017-01-01 | End: 2017-01-01

## 2017-01-01 RX ORDER — GABAPENTIN 300 MG/1
600 CAPSULE ORAL 2 TIMES DAILY
Status: DISCONTINUED | OUTPATIENT
Start: 2017-01-01 | End: 2017-01-01

## 2017-01-01 RX ORDER — MORPHINE SULFATE 2 MG/ML
1 INJECTION, SOLUTION INTRAMUSCULAR; INTRAVENOUS ONCE
Status: COMPLETED | OUTPATIENT
Start: 2017-01-01 | End: 2017-01-01

## 2017-01-01 RX ORDER — POTASSIUM CHLORIDE 20 MEQ/1
40 TABLET, EXTENDED RELEASE ORAL EVERY 4 HOURS
Status: COMPLETED | OUTPATIENT
Start: 2017-01-01 | End: 2017-01-01

## 2017-01-01 RX ORDER — LISINOPRIL 5 MG/1
5 TABLET ORAL DAILY
Status: DISCONTINUED | OUTPATIENT
Start: 2017-01-01 | End: 2017-01-01 | Stop reason: HOSPADM

## 2017-01-01 RX ORDER — METOLAZONE 5 MG/1
5 TABLET ORAL DAILY
Qty: 90 TABLET | Refills: 2 | Status: SHIPPED | OUTPATIENT
Start: 2017-01-01 | End: 2017-01-01 | Stop reason: SDUPTHER

## 2017-01-01 RX ORDER — HYDROCODONE BITARTRATE AND ACETAMINOPHEN 10; 325 MG/1; MG/1
1 TABLET ORAL
Status: COMPLETED | OUTPATIENT
Start: 2017-01-01 | End: 2017-01-01

## 2017-01-01 RX ORDER — GABAPENTIN 300 MG/1
300 CAPSULE ORAL 3 TIMES DAILY
Status: DISCONTINUED | OUTPATIENT
Start: 2017-01-01 | End: 2017-01-01 | Stop reason: HOSPADM

## 2017-01-01 RX ORDER — FUROSEMIDE 10 MG/ML
80 INJECTION INTRAMUSCULAR; INTRAVENOUS ONCE
Status: COMPLETED | OUTPATIENT
Start: 2017-01-01 | End: 2017-01-01

## 2017-01-01 RX ORDER — AMOXICILLIN AND CLAVULANATE POTASSIUM 250; 62.5 MG/5ML; MG/5ML
500 POWDER, FOR SUSPENSION ORAL EVERY 8 HOURS
Status: DISCONTINUED | OUTPATIENT
Start: 2017-01-01 | End: 2017-01-01

## 2017-01-01 RX ORDER — CEFEPIME HYDROCHLORIDE 2 G/50ML
2 INJECTION, SOLUTION INTRAVENOUS
Status: DISCONTINUED | OUTPATIENT
Start: 2017-01-01 | End: 2017-01-01

## 2017-01-01 RX ORDER — FUROSEMIDE 40 MG/1
40 TABLET ORAL DAILY
COMMUNITY
End: 2017-01-01 | Stop reason: SDUPTHER

## 2017-01-01 RX ORDER — ONDANSETRON 4 MG/1
4 TABLET, ORALLY DISINTEGRATING ORAL
Qty: 30 TABLET | Refills: 1 | Status: ON HOLD | OUTPATIENT
Start: 2017-01-01 | End: 2017-01-01 | Stop reason: HOSPADM

## 2017-01-01 RX ORDER — LISINOPRIL 2.5 MG/1
2.5 TABLET ORAL NIGHTLY
Qty: 90 TABLET | Refills: 3 | Status: SHIPPED | OUTPATIENT
Start: 2017-01-01 | End: 2017-01-01

## 2017-01-01 RX ORDER — FUROSEMIDE 80 MG/1
80 TABLET ORAL DAILY
Qty: 30 TABLET | Refills: 0 | Status: SHIPPED | OUTPATIENT
Start: 2017-01-01 | End: 2017-01-01

## 2017-01-01 RX ORDER — ONDANSETRON 2 MG/ML
4 INJECTION INTRAMUSCULAR; INTRAVENOUS EVERY 8 HOURS PRN
Status: DISCONTINUED | OUTPATIENT
Start: 2017-01-01 | End: 2017-01-01 | Stop reason: HOSPADM

## 2017-01-01 RX ORDER — LIDOCAINE HYDROCHLORIDE 20 MG/ML
INJECTION, SOLUTION INFILTRATION; PERINEURAL
Status: COMPLETED
Start: 2017-01-01 | End: 2017-01-01

## 2017-01-01 RX ORDER — SODIUM CHLORIDE 9 MG/ML
1000 INJECTION, SOLUTION INTRAVENOUS
Status: COMPLETED | OUTPATIENT
Start: 2017-01-01 | End: 2017-01-01

## 2017-01-01 RX ORDER — LISINOPRIL 5 MG/1
5 TABLET ORAL DAILY
Qty: 90 TABLET | Refills: 3 | Status: SHIPPED | OUTPATIENT
Start: 2017-01-01 | End: 2017-01-01

## 2017-01-01 RX ORDER — PHENYLEPHRINE HYDROCHLORIDE 10 MG/ML
INJECTION INTRAVENOUS
Status: DISCONTINUED | OUTPATIENT
Start: 2017-01-01 | End: 2017-01-01

## 2017-01-01 RX ORDER — BETHANECHOL CHLORIDE 25 MG/1
50 TABLET ORAL 3 TIMES DAILY
Status: DISCONTINUED | OUTPATIENT
Start: 2017-01-01 | End: 2017-01-01 | Stop reason: HOSPADM

## 2017-01-01 RX ORDER — METHOCARBAMOL 500 MG/1
500 TABLET, FILM COATED ORAL NIGHTLY PRN
Qty: 30 TABLET | Refills: 0 | Status: SHIPPED | OUTPATIENT
Start: 2017-01-01 | End: 2017-01-01

## 2017-01-01 RX ORDER — METRONIDAZOLE 500 MG/100ML
500 INJECTION, SOLUTION INTRAVENOUS
Status: DISCONTINUED | OUTPATIENT
Start: 2017-01-01 | End: 2017-01-01

## 2017-01-01 RX ORDER — POTASSIUM CHLORIDE 29.8 MG/ML
40 INJECTION INTRAVENOUS EVERY 4 HOURS
Status: COMPLETED | OUTPATIENT
Start: 2017-01-01 | End: 2017-01-01

## 2017-01-01 RX ORDER — FUROSEMIDE 10 MG/ML
40 INJECTION INTRAMUSCULAR; INTRAVENOUS ONCE
Status: CANCELLED | OUTPATIENT
Start: 2017-01-01 | End: 2017-01-01

## 2017-01-01 RX ORDER — LORAZEPAM 1 MG/1
1 TABLET ORAL EVERY 30 MIN PRN
Status: DISCONTINUED | OUTPATIENT
Start: 2017-01-01 | End: 2017-01-01 | Stop reason: HOSPADM

## 2017-01-01 RX ORDER — METOPROLOL SUCCINATE 25 MG/1
25 TABLET, EXTENDED RELEASE ORAL DAILY
Status: DISCONTINUED | OUTPATIENT
Start: 2017-01-01 | End: 2017-01-01 | Stop reason: HOSPADM

## 2017-01-01 RX ORDER — BETHANECHOL CHLORIDE 50 MG/1
50 TABLET ORAL 3 TIMES DAILY
Qty: 90 TABLET | Refills: 11 | Status: SHIPPED | OUTPATIENT
Start: 2017-01-01 | End: 2017-01-01

## 2017-01-01 RX ORDER — ATORVASTATIN CALCIUM 40 MG/1
TABLET, FILM COATED ORAL
Qty: 90 TABLET | Refills: 3 | Status: ON HOLD | OUTPATIENT
Start: 2017-01-01 | End: 2017-01-01 | Stop reason: HOSPADM

## 2017-01-01 RX ORDER — AMOXICILLIN AND CLAVULANATE POTASSIUM 875; 125 MG/1; MG/1
1 TABLET, FILM COATED ORAL EVERY 12 HOURS
Qty: 14 TABLET | Refills: 0 | Status: SHIPPED | OUTPATIENT
Start: 2017-01-01 | End: 2017-01-01

## 2017-01-01 RX ORDER — LISINOPRIL 2.5 MG/1
2.5 TABLET ORAL DAILY
Status: DISCONTINUED | OUTPATIENT
Start: 2017-01-01 | End: 2017-01-01

## 2017-01-01 RX ORDER — NOREPINEPHRINE BITARTRATE/D5W 4MG/250ML
PLASTIC BAG, INJECTION (ML) INTRAVENOUS
Status: DISPENSED
Start: 2017-01-01 | End: 2017-01-01

## 2017-01-01 RX ORDER — ASPIRIN 325 MG
325 TABLET ORAL
Status: COMPLETED | OUTPATIENT
Start: 2017-01-01 | End: 2017-01-01

## 2017-01-01 RX ORDER — ATORVASTATIN CALCIUM 20 MG/1
80 TABLET, FILM COATED ORAL DAILY
Status: DISCONTINUED | OUTPATIENT
Start: 2017-01-01 | End: 2017-01-01 | Stop reason: HOSPADM

## 2017-01-01 RX ORDER — METOLAZONE 5 MG/1
5 TABLET ORAL DAILY
Qty: 90 TABLET | Refills: 2 | Status: SHIPPED | OUTPATIENT
Start: 2017-01-01 | End: 2017-01-01 | Stop reason: SINTOL

## 2017-01-01 RX ORDER — PREDNISONE 5 MG/1
5 TABLET ORAL DAILY
Qty: 10 TABLET | Refills: 0 | Status: SHIPPED | OUTPATIENT
Start: 2017-01-01 | End: 2017-01-01

## 2017-01-01 RX ORDER — RAMELTEON 8 MG/1
8 TABLET ORAL ONCE
Status: COMPLETED | OUTPATIENT
Start: 2017-01-01 | End: 2017-01-01

## 2017-01-01 RX ORDER — PREDNISONE 5 MG/1
5 TABLET ORAL DAILY
Qty: 10 TABLET | Refills: 0 | Status: SHIPPED | OUTPATIENT
Start: 2017-01-01 | End: 2017-01-01 | Stop reason: SDUPTHER

## 2017-01-01 RX ORDER — HEPARIN 100 UNIT/ML
500 SYRINGE INTRAVENOUS
Status: DISCONTINUED | OUTPATIENT
Start: 2017-01-01 | End: 2017-01-01 | Stop reason: HOSPADM

## 2017-01-01 RX ORDER — ONDANSETRON 4 MG/1
4 TABLET, ORALLY DISINTEGRATING ORAL EVERY 6 HOURS PRN
Status: DISCONTINUED | OUTPATIENT
Start: 2017-01-01 | End: 2017-01-01 | Stop reason: HOSPADM

## 2017-01-01 RX ORDER — MORPHINE SULFATE 15 MG/1
15 TABLET, FILM COATED, EXTENDED RELEASE ORAL EVERY 8 HOURS PRN
Qty: 90 TABLET | Refills: 0 | Status: SHIPPED | OUTPATIENT
Start: 2017-01-01 | End: 2017-01-01

## 2017-01-01 RX ORDER — DULOXETIN HYDROCHLORIDE 30 MG/1
30 CAPSULE, DELAYED RELEASE ORAL DAILY
Status: DISCONTINUED | OUTPATIENT
Start: 2017-01-01 | End: 2017-01-01 | Stop reason: HOSPADM

## 2017-01-01 RX ORDER — POTASSIUM CHLORIDE 20 MEQ/1
40 TABLET, EXTENDED RELEASE ORAL 2 TIMES DAILY
Status: DISCONTINUED | OUTPATIENT
Start: 2017-01-01 | End: 2017-01-01

## 2017-01-01 RX ORDER — HYDROCODONE BITARTRATE AND ACETAMINOPHEN 5; 325 MG/1; MG/1
1 TABLET ORAL EVERY 4 HOURS PRN
Status: DISCONTINUED | OUTPATIENT
Start: 2017-01-01 | End: 2017-01-01

## 2017-01-01 RX ORDER — TAMSULOSIN HYDROCHLORIDE 0.4 MG/1
0.4 CAPSULE ORAL DAILY
Status: DISCONTINUED | OUTPATIENT
Start: 2017-01-01 | End: 2017-01-01 | Stop reason: HOSPADM

## 2017-01-01 RX ORDER — POLYETHYLENE GLYCOL 3350 17 G/17G
17 POWDER, FOR SOLUTION ORAL DAILY
Status: DISCONTINUED | OUTPATIENT
Start: 2017-01-01 | End: 2017-01-01 | Stop reason: HOSPADM

## 2017-01-01 RX ORDER — NITROGLYCERIN 0.4 MG/1
0.4 TABLET SUBLINGUAL EVERY 5 MIN PRN
Qty: 25 TABLET | Refills: 4 | Status: SHIPPED | OUTPATIENT
Start: 2017-01-01 | End: 2017-01-01 | Stop reason: SDUPTHER

## 2017-01-01 RX ORDER — TORSEMIDE 20 MG/1
40 TABLET ORAL 2 TIMES DAILY
Status: DISCONTINUED | OUTPATIENT
Start: 2017-01-01 | End: 2017-01-01 | Stop reason: HOSPADM

## 2017-01-01 RX ORDER — FAMOTIDINE 10 MG/ML
20 INJECTION INTRAVENOUS DAILY
Status: DISCONTINUED | OUTPATIENT
Start: 2017-01-01 | End: 2017-01-01 | Stop reason: HOSPADM

## 2017-01-01 RX ORDER — ONDANSETRON 4 MG/1
4 TABLET, ORALLY DISINTEGRATING ORAL EVERY 8 HOURS PRN
Status: DISCONTINUED | OUTPATIENT
Start: 2017-01-01 | End: 2017-01-01 | Stop reason: HOSPADM

## 2017-01-01 RX ORDER — FUROSEMIDE 10 MG/ML
80 INJECTION INTRAMUSCULAR; INTRAVENOUS 2 TIMES DAILY
Status: DISCONTINUED | OUTPATIENT
Start: 2017-01-01 | End: 2017-01-01 | Stop reason: HOSPADM

## 2017-01-01 RX ORDER — NITROGLYCERIN 0.4 MG/1
0.4 TABLET SUBLINGUAL EVERY 5 MIN PRN
Qty: 25 TABLET | Refills: 4 | Status: ON HOLD | OUTPATIENT
Start: 2017-01-01 | End: 2017-01-01 | Stop reason: HOSPADM

## 2017-01-01 RX ORDER — ENOXAPARIN SODIUM 100 MG/ML
80 INJECTION SUBCUTANEOUS
Status: COMPLETED | OUTPATIENT
Start: 2017-01-01 | End: 2017-01-01

## 2017-01-01 RX ORDER — GADOBUTROL 604.72 MG/ML
9 INJECTION INTRAVENOUS
Status: COMPLETED | OUTPATIENT
Start: 2017-01-01 | End: 2017-01-01

## 2017-01-01 RX ORDER — FUROSEMIDE 10 MG/ML
80 INJECTION INTRAMUSCULAR; INTRAVENOUS 2 TIMES DAILY
Status: DISCONTINUED | OUTPATIENT
Start: 2017-01-01 | End: 2017-01-01

## 2017-01-01 RX ORDER — ATORVASTATIN CALCIUM 20 MG/1
40 TABLET, FILM COATED ORAL DAILY
Status: DISCONTINUED | OUTPATIENT
Start: 2017-01-01 | End: 2017-01-01

## 2017-01-01 RX ORDER — SODIUM CHLORIDE 9 MG/ML
INJECTION, SOLUTION INTRAVENOUS CONTINUOUS
Status: DISCONTINUED | OUTPATIENT
Start: 2017-01-01 | End: 2017-01-01 | Stop reason: HOSPADM

## 2017-01-01 RX ORDER — AMOXICILLIN AND CLAVULANATE POTASSIUM 875; 125 MG/1; MG/1
1 TABLET, FILM COATED ORAL EVERY 24 HOURS
Status: DISCONTINUED | OUTPATIENT
Start: 2017-01-01 | End: 2017-01-01

## 2017-01-01 RX ORDER — METOPROLOL SUCCINATE 25 MG/1
25 TABLET, EXTENDED RELEASE ORAL DAILY
Qty: 90 TABLET | Refills: 3 | Status: ON HOLD | OUTPATIENT
Start: 2017-01-01 | End: 2017-01-01 | Stop reason: HOSPADM

## 2017-01-01 RX ORDER — OXYCODONE AND ACETAMINOPHEN 10; 325 MG/1; MG/1
1 TABLET ORAL EVERY 4 HOURS PRN
Qty: 40 TABLET | Refills: 0 | Status: SHIPPED | OUTPATIENT
Start: 2017-01-01 | End: 2017-01-01

## 2017-01-01 RX ORDER — CEFEPIME HYDROCHLORIDE 2 G/50ML
2 INJECTION, SOLUTION INTRAVENOUS
Status: COMPLETED | OUTPATIENT
Start: 2017-01-01 | End: 2017-01-01

## 2017-01-01 RX ORDER — DOBUTAMINE HYDROCHLORIDE 400 MG/100ML
5 INJECTION INTRAVENOUS CONTINUOUS
Status: DISCONTINUED | OUTPATIENT
Start: 2017-01-01 | End: 2017-01-01 | Stop reason: HOSPADM

## 2017-01-01 RX ORDER — FUROSEMIDE 80 MG/1
80 TABLET ORAL DAILY
Qty: 90 TABLET | Refills: 0 | Status: ON HOLD | OUTPATIENT
Start: 2017-01-01 | End: 2017-01-01 | Stop reason: HOSPADM

## 2017-01-01 RX ORDER — METFORMIN HYDROCHLORIDE 500 MG/1
500 TABLET ORAL DAILY PRN
Qty: 90 TABLET | Refills: 5 | Status: SHIPPED | OUTPATIENT
Start: 2017-01-01 | End: 2017-01-01

## 2017-01-01 RX ORDER — FUROSEMIDE 10 MG/ML
40 INJECTION INTRAMUSCULAR; INTRAVENOUS ONCE
Status: COMPLETED | OUTPATIENT
Start: 2017-01-01 | End: 2017-01-01

## 2017-01-01 RX ORDER — METRONIDAZOLE 500 MG/100ML
500 INJECTION, SOLUTION INTRAVENOUS
Status: COMPLETED | OUTPATIENT
Start: 2017-01-01 | End: 2017-01-01

## 2017-01-01 RX ORDER — POTASSIUM CHLORIDE 20 MEQ/1
20 TABLET, EXTENDED RELEASE ORAL DAILY
Qty: 90 TABLET | Refills: 4 | Status: SHIPPED | OUTPATIENT
Start: 2017-01-01 | End: 2017-01-01 | Stop reason: SDUPTHER

## 2017-01-01 RX ORDER — BENZONATATE 100 MG/1
100 CAPSULE ORAL 3 TIMES DAILY PRN
Qty: 30 CAPSULE | Refills: 0 | Status: SHIPPED | OUTPATIENT
Start: 2017-01-01 | End: 2017-01-01 | Stop reason: SDUPTHER

## 2017-01-01 RX ORDER — AZELASTINE 1 MG/ML
1 SPRAY, METERED NASAL 2 TIMES DAILY
Qty: 30 ML | Refills: 0 | Status: SHIPPED | OUTPATIENT
Start: 2017-01-01 | End: 2017-01-01

## 2017-01-01 RX ORDER — ONDANSETRON 4 MG/1
4 TABLET, ORALLY DISINTEGRATING ORAL
Qty: 30 TABLET | Refills: 1 | Status: SHIPPED | OUTPATIENT
Start: 2017-01-01 | End: 2017-01-01 | Stop reason: SDUPTHER

## 2017-01-01 RX ORDER — AMOXICILLIN AND CLAVULANATE POTASSIUM 500; 125 MG/1; MG/1
1 TABLET, FILM COATED ORAL 3 TIMES DAILY
Status: DISCONTINUED | OUTPATIENT
Start: 2017-01-01 | End: 2017-01-01

## 2017-01-01 RX ORDER — HEPARIN SODIUM 5000 [USP'U]/ML
5000 INJECTION, SOLUTION INTRAVENOUS; SUBCUTANEOUS EVERY 8 HOURS
Status: DISCONTINUED | OUTPATIENT
Start: 2017-01-01 | End: 2017-01-01

## 2017-01-01 RX ORDER — LIDOCAINE HYDROCHLORIDE AND EPINEPHRINE 20; 10 MG/ML; UG/ML
1 INJECTION, SOLUTION INFILTRATION; PERINEURAL ONCE
Status: DISCONTINUED | OUTPATIENT
Start: 2017-01-01 | End: 2017-01-01 | Stop reason: HOSPADM

## 2017-01-01 RX ORDER — DIGOXIN 125 MCG
0.5 TABLET ORAL ONCE
Status: COMPLETED | OUTPATIENT
Start: 2017-01-01 | End: 2017-01-01

## 2017-01-01 RX ORDER — ENOXAPARIN SODIUM 100 MG/ML
40 INJECTION SUBCUTANEOUS EVERY 24 HOURS
Status: DISCONTINUED | OUTPATIENT
Start: 2017-01-01 | End: 2017-01-01

## 2017-01-01 RX ORDER — FUROSEMIDE 10 MG/ML
40 INJECTION INTRAMUSCULAR; INTRAVENOUS
Status: DISCONTINUED | OUTPATIENT
Start: 2017-01-01 | End: 2017-01-01

## 2017-01-01 RX ORDER — ATORVASTATIN CALCIUM 20 MG/1
40 TABLET, FILM COATED ORAL DAILY
Status: DISCONTINUED | OUTPATIENT
Start: 2017-01-01 | End: 2017-01-01 | Stop reason: HOSPADM

## 2017-01-01 RX ORDER — POTASSIUM CHLORIDE 7.45 MG/ML
10 INJECTION INTRAVENOUS
Status: COMPLETED | OUTPATIENT
Start: 2017-01-01 | End: 2017-01-01

## 2017-01-01 RX ORDER — IBUPROFEN 200 MG
24 TABLET ORAL
Status: DISCONTINUED | OUTPATIENT
Start: 2017-01-01 | End: 2017-01-01 | Stop reason: HOSPADM

## 2017-01-01 RX ORDER — MIDAZOLAM HYDROCHLORIDE 1 MG/ML
INJECTION, SOLUTION INTRAMUSCULAR; INTRAVENOUS
Status: DISCONTINUED | OUTPATIENT
Start: 2017-01-01 | End: 2017-01-01

## 2017-01-01 RX ORDER — CLOPIDOGREL BISULFATE 75 MG/1
TABLET ORAL
Qty: 90 TABLET | Refills: 3 | Status: ON HOLD | OUTPATIENT
Start: 2017-01-01 | End: 2017-01-01 | Stop reason: HOSPADM

## 2017-01-01 RX ORDER — LIDOCAINE HYDROCHLORIDE 20 MG/ML
INJECTION, SOLUTION INFILTRATION; PERINEURAL
Status: DISPENSED
Start: 2017-01-01 | End: 2017-01-01

## 2017-01-01 RX ORDER — CEFTRIAXONE 1 G/1
1 INJECTION, POWDER, FOR SOLUTION INTRAMUSCULAR; INTRAVENOUS
Status: DISCONTINUED | OUTPATIENT
Start: 2017-01-01 | End: 2017-01-01

## 2017-01-01 RX ORDER — POTASSIUM CHLORIDE 20 MEQ/1
20 TABLET, EXTENDED RELEASE ORAL 2 TIMES DAILY
Qty: 120 TABLET | Refills: 4 | Status: SHIPPED | OUTPATIENT
Start: 2017-01-01 | End: 2017-01-01 | Stop reason: SDUPTHER

## 2017-01-01 RX ORDER — METOPROLOL TARTRATE 1 MG/ML
5 INJECTION, SOLUTION INTRAVENOUS ONCE
Status: COMPLETED | OUTPATIENT
Start: 2017-01-01 | End: 2017-01-01

## 2017-01-01 RX ORDER — CIPROFLOXACIN 500 MG/1
500 TABLET ORAL 2 TIMES DAILY
Qty: 14 TABLET | Refills: 0 | Status: ON HOLD | OUTPATIENT
Start: 2017-01-01 | End: 2017-01-01

## 2017-01-01 RX ORDER — OXYCODONE HYDROCHLORIDE 5 MG/1
10 TABLET ORAL EVERY 4 HOURS PRN
Status: DISCONTINUED | OUTPATIENT
Start: 2017-01-01 | End: 2017-01-01 | Stop reason: HOSPADM

## 2017-01-01 RX ORDER — TAMSULOSIN HYDROCHLORIDE 0.4 MG/1
0.4 CAPSULE ORAL DAILY
Status: DISCONTINUED | OUTPATIENT
Start: 2017-01-01 | End: 2017-01-01

## 2017-01-01 RX ORDER — GABAPENTIN 300 MG/1
600 CAPSULE ORAL 2 TIMES DAILY
Status: ON HOLD | COMMUNITY
Start: 2017-01-01 | End: 2017-01-01 | Stop reason: HOSPADM

## 2017-01-01 RX ORDER — RAMELTEON 8 MG/1
8 TABLET ORAL NIGHTLY PRN
Status: DISCONTINUED | OUTPATIENT
Start: 2017-01-01 | End: 2017-01-01 | Stop reason: HOSPADM

## 2017-01-01 RX ORDER — TORSEMIDE 20 MG/1
40 TABLET ORAL 2 TIMES DAILY
Qty: 120 TABLET | Refills: 1 | Status: SHIPPED | OUTPATIENT
Start: 2017-01-01 | End: 2017-01-01 | Stop reason: SDUPTHER

## 2017-01-01 RX ORDER — ENOXAPARIN SODIUM 100 MG/ML
40 INJECTION SUBCUTANEOUS EVERY 24 HOURS
Status: DISCONTINUED | OUTPATIENT
Start: 2017-01-01 | End: 2017-01-01 | Stop reason: HOSPADM

## 2017-01-01 RX ORDER — LIDOCAINE HYDROCHLORIDE 20 MG/ML
JELLY TOPICAL ONCE
Status: CANCELLED | OUTPATIENT
Start: 2017-01-01 | End: 2017-01-01

## 2017-01-01 RX ORDER — CETIRIZINE HYDROCHLORIDE 10 MG/1
10 TABLET ORAL NIGHTLY
Status: DISCONTINUED | OUTPATIENT
Start: 2017-01-01 | End: 2017-01-01 | Stop reason: HOSPADM

## 2017-01-01 RX ORDER — FUROSEMIDE 40 MG/1
40 TABLET ORAL DAILY
Qty: 90 TABLET | Refills: 3 | Status: ON HOLD | OUTPATIENT
Start: 2017-01-01 | End: 2017-01-01

## 2017-01-01 RX ORDER — GABAPENTIN 300 MG/1
600 CAPSULE ORAL 3 TIMES DAILY
Qty: 540 CAPSULE | Refills: 3 | Status: SHIPPED | OUTPATIENT
Start: 2017-01-01 | End: 2017-01-01

## 2017-01-01 RX ORDER — TORSEMIDE 20 MG/1
40 TABLET ORAL 2 TIMES DAILY
Qty: 360 TABLET | Refills: 3 | Status: ON HOLD | OUTPATIENT
Start: 2017-01-01 | End: 2017-01-01 | Stop reason: HOSPADM

## 2017-01-01 RX ORDER — METOPROLOL SUCCINATE 50 MG/1
50 TABLET, EXTENDED RELEASE ORAL DAILY
Qty: 90 TABLET | Refills: 3 | Status: SHIPPED | OUTPATIENT
Start: 2017-01-01 | End: 2017-01-01 | Stop reason: SDUPTHER

## 2017-01-01 RX ORDER — VANCOMYCIN HCL IN 5 % DEXTROSE 1G/250ML
15 PLASTIC BAG, INJECTION (ML) INTRAVENOUS
Status: DISCONTINUED | OUTPATIENT
Start: 2017-01-01 | End: 2017-01-01

## 2017-01-01 RX ORDER — MORPHINE SULFATE 4 MG/ML
4 INJECTION, SOLUTION INTRAMUSCULAR; INTRAVENOUS EVERY 4 HOURS PRN
Status: DISCONTINUED | OUTPATIENT
Start: 2017-01-01 | End: 2017-01-01

## 2017-01-01 RX ORDER — HEPARIN SODIUM,PORCINE/D5W 25000/250
17 INTRAVENOUS SOLUTION INTRAVENOUS CONTINUOUS
Status: DISCONTINUED | OUTPATIENT
Start: 2017-01-01 | End: 2017-01-01 | Stop reason: HOSPADM

## 2017-01-01 RX ORDER — MORPHINE SULFATE 2 MG/ML
2 INJECTION, SOLUTION INTRAMUSCULAR; INTRAVENOUS ONCE
Status: DISCONTINUED | OUTPATIENT
Start: 2017-01-01 | End: 2017-01-01

## 2017-01-01 RX ORDER — BETHANECHOL CHLORIDE 25 MG/1
25 TABLET ORAL 3 TIMES DAILY
Qty: 100 TABLET | Refills: 6 | Status: ON HOLD | OUTPATIENT
Start: 2017-01-01 | End: 2017-01-01

## 2017-01-01 RX ORDER — MORPHINE SULFATE 2 MG/ML
2 INJECTION, SOLUTION INTRAMUSCULAR; INTRAVENOUS EVERY 4 HOURS PRN
Status: DISCONTINUED | OUTPATIENT
Start: 2017-01-01 | End: 2017-01-01 | Stop reason: HOSPADM

## 2017-01-01 RX ORDER — OXYCODONE HYDROCHLORIDE 5 MG/1
5 TABLET ORAL EVERY 4 HOURS PRN
Status: DISCONTINUED | OUTPATIENT
Start: 2017-01-01 | End: 2017-01-01 | Stop reason: HOSPADM

## 2017-01-01 RX ORDER — LEVOFLOXACIN 500 MG/1
500 TABLET, FILM COATED ORAL DAILY
Refills: 0 | Status: ON HOLD | COMMUNITY
Start: 2016-12-05 | End: 2017-01-01

## 2017-01-01 RX ORDER — DIGOXIN 125 MCG
0.25 TABLET ORAL EVERY 8 HOURS
Status: COMPLETED | OUTPATIENT
Start: 2017-01-01 | End: 2017-01-01

## 2017-01-01 RX ORDER — PANTOPRAZOLE SODIUM 40 MG/1
40 TABLET, DELAYED RELEASE ORAL DAILY
Status: DISCONTINUED | OUTPATIENT
Start: 2017-01-01 | End: 2017-01-01

## 2017-01-01 RX ORDER — ALBUTEROL SULFATE 0.83 MG/ML
10 SOLUTION RESPIRATORY (INHALATION) ONCE
Status: DISCONTINUED | OUTPATIENT
Start: 2017-01-01 | End: 2017-01-01

## 2017-01-01 RX ORDER — ZOLPIDEM TARTRATE 5 MG/1
TABLET ORAL
Qty: 30 TABLET | Refills: 3 | Status: ON HOLD | OUTPATIENT
Start: 2017-01-01 | End: 2017-01-01 | Stop reason: HOSPADM

## 2017-01-01 RX ORDER — METOLAZONE 5 MG/1
5 TABLET ORAL
Qty: 12 TABLET | Refills: 2 | Status: SHIPPED | OUTPATIENT
Start: 2017-01-01 | End: 2017-01-01 | Stop reason: SDUPTHER

## 2017-01-01 RX ORDER — GABAPENTIN 300 MG/1
600 CAPSULE ORAL 3 TIMES DAILY
Status: DISCONTINUED | OUTPATIENT
Start: 2017-01-01 | End: 2017-01-01 | Stop reason: HOSPADM

## 2017-01-01 RX ORDER — FUROSEMIDE 40 MG/1
40 TABLET ORAL DAILY
Status: DISCONTINUED | OUTPATIENT
Start: 2017-01-01 | End: 2017-01-01 | Stop reason: HOSPADM

## 2017-01-01 RX ORDER — METOPROLOL SUCCINATE 25 MG/1
25 TABLET, EXTENDED RELEASE ORAL DAILY
Qty: 30 TABLET | Refills: 1 | Status: ON HOLD | OUTPATIENT
Start: 2017-01-01 | End: 2017-01-01 | Stop reason: HOSPADM

## 2017-01-01 RX ORDER — METOLAZONE 5 MG/1
5 TABLET ORAL
Status: DISCONTINUED | OUTPATIENT
Start: 2017-01-01 | End: 2017-01-01 | Stop reason: HOSPADM

## 2017-01-01 RX ORDER — TRAMADOL HYDROCHLORIDE 50 MG/1
100 TABLET ORAL EVERY 6 HOURS PRN
Qty: 60 TABLET | Refills: 1 | Status: SHIPPED | OUTPATIENT
Start: 2017-01-01 | End: 2017-01-01 | Stop reason: HOSPADM

## 2017-01-01 RX ORDER — CEFUROXIME AXETIL 250 MG/1
250 TABLET ORAL 2 TIMES DAILY WITH MEALS
Refills: 0 | Status: ON HOLD | COMMUNITY
Start: 2017-01-01 | End: 2017-01-01 | Stop reason: HOSPADM

## 2017-01-01 RX ORDER — POTASSIUM CHLORIDE 20 MEQ/1
TABLET, EXTENDED RELEASE ORAL
Qty: 120 TABLET | Refills: 4 | Status: ON HOLD | OUTPATIENT
Start: 2017-01-01 | End: 2017-01-01 | Stop reason: HOSPADM

## 2017-01-01 RX ORDER — POTASSIUM CHLORIDE 20 MEQ/1
60 TABLET, EXTENDED RELEASE ORAL ONCE
Status: COMPLETED | OUTPATIENT
Start: 2017-01-01 | End: 2017-01-01

## 2017-01-01 RX ORDER — FUROSEMIDE 10 MG/ML
40 INJECTION INTRAMUSCULAR; INTRAVENOUS
Status: COMPLETED | OUTPATIENT
Start: 2017-01-01 | End: 2017-01-01

## 2017-01-01 RX ORDER — SODIUM CHLORIDE 9 MG/ML
1.5 INJECTION, SOLUTION INTRAVENOUS CONTINUOUS
Status: ACTIVE | OUTPATIENT
Start: 2017-01-01 | End: 2017-01-01

## 2017-01-01 RX ORDER — AMOXICILLIN 250 MG
1 CAPSULE ORAL DAILY PRN
Status: DISCONTINUED | OUTPATIENT
Start: 2017-01-01 | End: 2017-01-01 | Stop reason: HOSPADM

## 2017-01-01 RX ORDER — LEVOFLOXACIN 750 MG/1
750 TABLET ORAL DAILY
Qty: 5 TABLET | Refills: 0 | Status: ON HOLD | OUTPATIENT
Start: 2017-01-01 | End: 2017-01-01 | Stop reason: HOSPADM

## 2017-01-01 RX ORDER — PROMETHAZINE HYDROCHLORIDE AND CODEINE PHOSPHATE 6.25; 1 MG/5ML; MG/5ML
5 SOLUTION ORAL EVERY 4 HOURS PRN
Qty: 473 ML | Refills: 3 | Status: SHIPPED | OUTPATIENT
Start: 2017-01-01 | End: 2017-01-01

## 2017-01-01 RX ORDER — POTASSIUM CHLORIDE 7.45 MG/ML
10 INJECTION INTRAVENOUS
Status: DISPENSED | OUTPATIENT
Start: 2017-01-01 | End: 2017-01-01

## 2017-01-01 RX ORDER — LISINOPRIL 10 MG/1
10 TABLET ORAL DAILY
Status: DISCONTINUED | OUTPATIENT
Start: 2017-01-01 | End: 2017-01-01

## 2017-01-01 RX ORDER — AZELASTINE 1 MG/ML
1 SPRAY, METERED NASAL 2 TIMES DAILY
Status: DISCONTINUED | OUTPATIENT
Start: 2017-01-01 | End: 2017-01-01 | Stop reason: HOSPADM

## 2017-01-01 RX ORDER — DULOXETIN HYDROCHLORIDE 30 MG/1
30 CAPSULE, DELAYED RELEASE ORAL DAILY
Qty: 90 CAPSULE | Refills: 3 | Status: SHIPPED | OUTPATIENT
Start: 2017-01-01 | End: 2017-01-01 | Stop reason: SDUPTHER

## 2017-01-01 RX ORDER — POTASSIUM CHLORIDE 14.9 MG/ML
20 INJECTION INTRAVENOUS ONCE
Status: COMPLETED | OUTPATIENT
Start: 2017-01-01 | End: 2017-01-01

## 2017-01-01 RX ORDER — HEPARIN SODIUM,PORCINE/D5W 25000/250
17 INTRAVENOUS SOLUTION INTRAVENOUS CONTINUOUS
Status: DISCONTINUED | OUTPATIENT
Start: 2017-01-01 | End: 2017-01-01

## 2017-01-01 RX ORDER — CETIRIZINE HYDROCHLORIDE 5 MG/1
10 TABLET ORAL NIGHTLY
Status: DISCONTINUED | OUTPATIENT
Start: 2017-01-01 | End: 2017-01-01 | Stop reason: HOSPADM

## 2017-01-01 RX ORDER — CEFEPIME HYDROCHLORIDE 1 G/50ML
1 INJECTION, SOLUTION INTRAVENOUS
Status: DISCONTINUED | OUTPATIENT
Start: 2017-01-01 | End: 2017-01-01

## 2017-01-01 RX ORDER — LISINOPRIL 2.5 MG/1
2.5 TABLET ORAL DAILY
Qty: 30 TABLET | Refills: 0 | Status: SHIPPED | OUTPATIENT
Start: 2017-01-01 | End: 2017-01-01

## 2017-01-01 RX ORDER — FUROSEMIDE 10 MG/ML
20 INJECTION INTRAMUSCULAR; INTRAVENOUS
Status: COMPLETED | OUTPATIENT
Start: 2017-01-01 | End: 2017-01-01

## 2017-01-01 RX ORDER — GABAPENTIN 100 MG/1
200 CAPSULE ORAL 2 TIMES DAILY
Status: DISCONTINUED | OUTPATIENT
Start: 2017-01-01 | End: 2017-01-01 | Stop reason: HOSPADM

## 2017-01-01 RX ORDER — AMOXICILLIN 250 MG
1 CAPSULE ORAL 2 TIMES DAILY PRN
Status: DISCONTINUED | OUTPATIENT
Start: 2017-01-01 | End: 2017-01-01 | Stop reason: HOSPADM

## 2017-01-01 RX ORDER — SULFAMETHOXAZOLE AND TRIMETHOPRIM 800; 160 MG/1; MG/1
1 TABLET ORAL 2 TIMES DAILY
Qty: 14 TABLET | Refills: 0 | Status: ON HOLD | OUTPATIENT
Start: 2017-01-01 | End: 2017-01-01 | Stop reason: HOSPADM

## 2017-01-01 RX ORDER — METFORMIN HYDROCHLORIDE 500 MG/1
TABLET ORAL
Qty: 60 TABLET | Refills: 5 | Status: SHIPPED | OUTPATIENT
Start: 2017-01-01 | End: 2017-01-01 | Stop reason: SDUPTHER

## 2017-01-01 RX ORDER — ATORVASTATIN CALCIUM 20 MG/1
40 TABLET, FILM COATED ORAL ONCE
Status: COMPLETED | OUTPATIENT
Start: 2017-01-01 | End: 2017-01-01

## 2017-01-01 RX ORDER — PREDNISONE 50 MG/1
50 TABLET ORAL DAILY
Qty: 5 TABLET | Refills: 0 | Status: SHIPPED | OUTPATIENT
Start: 2017-01-01 | End: 2017-01-01

## 2017-01-01 RX ORDER — BENZONATATE 100 MG/1
100 CAPSULE ORAL 3 TIMES DAILY PRN
Qty: 30 CAPSULE | Refills: 0 | Status: SHIPPED | OUTPATIENT
Start: 2017-01-01 | End: 2017-01-01

## 2017-01-01 RX ORDER — ATORVASTATIN CALCIUM 20 MG/1
40 TABLET, FILM COATED ORAL NIGHTLY
Status: DISCONTINUED | OUTPATIENT
Start: 2017-01-01 | End: 2017-01-01 | Stop reason: HOSPADM

## 2017-01-01 RX ORDER — VASOPRESSIN 20 [USP'U]/ML
INJECTION, SOLUTION INTRAMUSCULAR; SUBCUTANEOUS
Status: COMPLETED
Start: 2017-01-01 | End: 2017-01-01

## 2017-01-01 RX ORDER — POTASSIUM CHLORIDE 20 MEQ/1
40 TABLET, EXTENDED RELEASE ORAL
Status: COMPLETED | OUTPATIENT
Start: 2017-01-01 | End: 2017-01-01

## 2017-01-01 RX ORDER — BROMPHENIRAMINE MALEATE, PSEUDOEPHEDRINE HYDROCHLORIDE, AND DEXTROMETHORPHAN HYDROBROMIDE 2; 30; 10 MG/5ML; MG/5ML; MG/5ML
SYRUP ORAL
Status: ON HOLD | COMMUNITY
Start: 2017-01-01 | End: 2017-01-01

## 2017-01-01 RX ORDER — CIPROFLOXACIN 250 MG/1
500 TABLET, FILM COATED ORAL ONCE
Status: CANCELLED | OUTPATIENT
Start: 2017-01-01 | End: 2017-01-01

## 2017-01-01 RX ORDER — MORPHINE SULFATE 2 MG/ML
2 INJECTION, SOLUTION INTRAMUSCULAR; INTRAVENOUS 2 TIMES DAILY PRN
Status: DISCONTINUED | OUTPATIENT
Start: 2017-01-01 | End: 2017-01-01

## 2017-01-01 RX ORDER — GLYCOPYRROLATE 1 MG/5ML
1 SOLUTION ORAL 3 TIMES DAILY PRN
Status: DISCONTINUED | OUTPATIENT
Start: 2017-01-01 | End: 2017-01-01 | Stop reason: HOSPADM

## 2017-01-01 RX ORDER — ONDANSETRON 2 MG/ML
4 INJECTION INTRAMUSCULAR; INTRAVENOUS EVERY 12 HOURS PRN
Status: DISCONTINUED | OUTPATIENT
Start: 2017-01-01 | End: 2017-01-01 | Stop reason: HOSPADM

## 2017-01-01 RX ORDER — MINERAL OIL
ENEMA (ML) RECTAL
Qty: 90 TABLET | Refills: 3 | Status: ON HOLD | OUTPATIENT
Start: 2017-01-01 | End: 2017-01-01 | Stop reason: HOSPADM

## 2017-01-01 RX ORDER — NOREPINEPHRINE BITARTRATE/D5W 4MG/250ML
0.02 PLASTIC BAG, INJECTION (ML) INTRAVENOUS CONTINUOUS
Status: DISCONTINUED | OUTPATIENT
Start: 2017-01-01 | End: 2017-01-01

## 2017-01-01 RX ADMIN — VANCOMYCIN HYDROCHLORIDE 1250 MG: 1 INJECTION, POWDER, LYOPHILIZED, FOR SOLUTION INTRAVENOUS at 03:03

## 2017-01-01 RX ADMIN — INSULIN ASPART 2 UNITS: 100 INJECTION, SOLUTION INTRAVENOUS; SUBCUTANEOUS at 05:12

## 2017-01-01 RX ADMIN — SODIUM CHLORIDE, PRESERVATIVE FREE 3 ML: 5 INJECTION INTRAVENOUS at 10:03

## 2017-01-01 RX ADMIN — AMIODARONE HYDROCHLORIDE 0.5 MG/MIN: 1.8 INJECTION, SOLUTION INTRAVENOUS at 10:12

## 2017-01-01 RX ADMIN — METOPROLOL SUCCINATE 25 MG: 25 TABLET, EXTENDED RELEASE ORAL at 09:04

## 2017-01-01 RX ADMIN — AMOXICILLIN AND CLAVULANATE POTASSIUM 500 MG: 500; 125 TABLET, FILM COATED ORAL at 02:04

## 2017-01-01 RX ADMIN — IOHEXOL 75 ML: 350 INJECTION, SOLUTION INTRAVENOUS at 11:05

## 2017-01-01 RX ADMIN — BETHANECHOL CHLORIDE 50 MG: 25 TABLET ORAL at 02:10

## 2017-01-01 RX ADMIN — FUROSEMIDE 80 MG: 10 INJECTION, SOLUTION INTRAVENOUS at 03:10

## 2017-01-01 RX ADMIN — GABAPENTIN 600 MG: 300 CAPSULE ORAL at 11:04

## 2017-01-01 RX ADMIN — CLOPIDOGREL 75 MG: 75 TABLET, FILM COATED ORAL at 09:03

## 2017-01-01 RX ADMIN — INSULIN ASPART 2 UNITS: 100 INJECTION, SOLUTION INTRAVENOUS; SUBCUTANEOUS at 07:12

## 2017-01-01 RX ADMIN — HEPARIN SODIUM 5000 UNITS: 5000 INJECTION, SOLUTION INTRAVENOUS; SUBCUTANEOUS at 06:03

## 2017-01-01 RX ADMIN — INSULIN ASPART 1 UNITS: 100 INJECTION, SOLUTION INTRAVENOUS; SUBCUTANEOUS at 11:12

## 2017-01-01 RX ADMIN — ATORVASTATIN CALCIUM 80 MG: 20 TABLET, FILM COATED ORAL at 09:04

## 2017-01-01 RX ADMIN — OXYCODONE HYDROCHLORIDE 10 MG: 5 TABLET ORAL at 12:04

## 2017-01-01 RX ADMIN — MORPHINE SULFATE 2 MG: 2 INJECTION, SOLUTION INTRAMUSCULAR; INTRAVENOUS at 04:12

## 2017-01-01 RX ADMIN — FUROSEMIDE 80 MG: 10 INJECTION, SOLUTION INTRAVENOUS at 11:10

## 2017-01-01 RX ADMIN — INSULIN ASPART 2 UNITS: 100 INJECTION, SOLUTION INTRAVENOUS; SUBCUTANEOUS at 11:12

## 2017-01-01 RX ADMIN — VANCOMYCIN HYDROCHLORIDE 1000 MG: 1 INJECTION, POWDER, LYOPHILIZED, FOR SOLUTION INTRAVENOUS at 05:08

## 2017-01-01 RX ADMIN — GABAPENTIN 300 MG: 300 CAPSULE ORAL at 09:10

## 2017-01-01 RX ADMIN — CEFTRIAXONE SODIUM 1 G: 1 INJECTION, POWDER, FOR SOLUTION INTRAMUSCULAR; INTRAVENOUS at 07:12

## 2017-01-01 RX ADMIN — VASOPRESSIN 0.04 UNITS/MIN: 20 INJECTION INTRAVENOUS at 09:12

## 2017-01-01 RX ADMIN — HEPARIN SODIUM AND DEXTROSE 224.87 UNITS/KG/HR: 10000; 5 INJECTION INTRAVENOUS at 06:04

## 2017-01-01 RX ADMIN — POTASSIUM CHLORIDE 40 MEQ: 1500 TABLET, EXTENDED RELEASE ORAL at 09:10

## 2017-01-01 RX ADMIN — NITROGLYCERIN 0.4 MG: 0.4 TABLET SUBLINGUAL at 11:12

## 2017-01-01 RX ADMIN — GABAPENTIN 600 MG: 300 CAPSULE ORAL at 02:03

## 2017-01-01 RX ADMIN — CLOPIDOGREL 75 MG: 75 TABLET, FILM COATED ORAL at 08:10

## 2017-01-01 RX ADMIN — GABAPENTIN 600 MG: 300 CAPSULE ORAL at 09:03

## 2017-01-01 RX ADMIN — SODIUM CHLORIDE: 0.9 INJECTION, SOLUTION INTRAVENOUS at 01:08

## 2017-01-01 RX ADMIN — SODIUM CHLORIDE 4 MG: 9 INJECTION, SOLUTION INTRAVENOUS at 02:04

## 2017-01-01 RX ADMIN — ASPIRIN 81 MG: 81 TABLET, COATED ORAL at 08:04

## 2017-01-01 RX ADMIN — TAMSULOSIN HYDROCHLORIDE 0.4 MG: 0.4 CAPSULE ORAL at 09:04

## 2017-01-01 RX ADMIN — MIDAZOLAM 2 MG: 1 INJECTION INTRAMUSCULAR; INTRAVENOUS at 03:12

## 2017-01-01 RX ADMIN — SODIUM CHLORIDE: 9 INJECTION, SOLUTION INTRAVENOUS at 03:09

## 2017-01-01 RX ADMIN — METOPROLOL SUCCINATE 25 MG: 25 TABLET, EXTENDED RELEASE ORAL at 08:04

## 2017-01-01 RX ADMIN — INFLUENZA A VIRUSA/MICHIGAN/45/2015 X-275 (H1N1) ANTIGEN (FORMALDEHYDE INACTIVATED), INFLUENZA A VIRUS A/HONG KONG/4801/2014 X-263B (H3N2) ANTIGEN (FORMALDEHYDE INACTIVATED), AND INFLUENZA B VIRUS B/BRISBANE/60/2008 ANTIGEN (FORMALDEHYDE INACTIVATED) 0.5 ML: 60; 60; 60 INJECTION, SUSPENSION INTRAMUSCULAR at 04:10

## 2017-01-01 RX ADMIN — LISINOPRIL 2.5 MG: 2.5 TABLET ORAL at 09:10

## 2017-01-01 RX ADMIN — VASOPRESSIN 0.04 UNITS/MIN: 20 INJECTION INTRAVENOUS at 08:12

## 2017-01-01 RX ADMIN — CEFTRIAXONE SODIUM 1 G: 1 INJECTION, POWDER, FOR SOLUTION INTRAMUSCULAR; INTRAVENOUS at 05:12

## 2017-01-01 RX ADMIN — SODIUM CHLORIDE, PRESERVATIVE FREE 3 ML: 5 INJECTION INTRAVENOUS at 09:04

## 2017-01-01 RX ADMIN — METOPROLOL SUCCINATE 25 MG: 25 TABLET, EXTENDED RELEASE ORAL at 03:12

## 2017-01-01 RX ADMIN — MORPHINE SULFATE 15 MG: 15 TABLET, EXTENDED RELEASE ORAL at 08:04

## 2017-01-01 RX ADMIN — HYDROCODONE BITARTRATE AND ACETAMINOPHEN 1 TABLET: 10; 325 TABLET ORAL at 01:02

## 2017-01-01 RX ADMIN — CLOPIDOGREL 75 MG: 75 TABLET, FILM COATED ORAL at 03:04

## 2017-01-01 RX ADMIN — SODIUM CHLORIDE 240 MG: 9 INJECTION, SOLUTION INTRAVENOUS at 04:09

## 2017-01-01 RX ADMIN — FUROSEMIDE 80 MG: 10 INJECTION, SOLUTION INTRAVENOUS at 06:10

## 2017-01-01 RX ADMIN — HEPARIN SODIUM AND DEXTROSE 17 UNITS/KG/HR: 10000; 5 INJECTION INTRAVENOUS at 12:12

## 2017-01-01 RX ADMIN — AZITHROMYCIN MONOHYDRATE 500 MG: 500 INJECTION, POWDER, LYOPHILIZED, FOR SOLUTION INTRAVENOUS at 03:03

## 2017-01-01 RX ADMIN — Medication 3 ML: at 11:04

## 2017-01-01 RX ADMIN — AMOXICILLIN AND CLAVULANATE POTASSIUM 500 MG: 500; 125 TABLET, FILM COATED ORAL at 05:04

## 2017-01-01 RX ADMIN — AMOXICILLIN AND CLAVULANATE POTASSIUM 1 TABLET: 875; 125 TABLET, FILM COATED ORAL at 11:08

## 2017-01-01 RX ADMIN — EPINEPHRINE 0.04 MCG/KG/MIN: 1 INJECTION PARENTERAL at 09:12

## 2017-01-01 RX ADMIN — ZOLPIDEM TARTRATE 5 MG: 5 TABLET, FILM COATED ORAL at 12:04

## 2017-01-01 RX ADMIN — ONDANSETRON 4 MG: 2 INJECTION INTRAMUSCULAR; INTRAVENOUS at 09:12

## 2017-01-01 RX ADMIN — AMIODARONE HYDROCHLORIDE 150 MG: 1.5 INJECTION, SOLUTION INTRAVENOUS at 12:12

## 2017-01-01 RX ADMIN — OXYCODONE HYDROCHLORIDE 10 MG: 5 TABLET ORAL at 02:04

## 2017-01-01 RX ADMIN — CEFEPIME HYDROCHLORIDE 2 G: 2 INJECTION, SOLUTION INTRAVENOUS at 02:08

## 2017-01-01 RX ADMIN — ASPIRIN 81 MG: 81 TABLET, COATED ORAL at 06:12

## 2017-01-01 RX ADMIN — OXYCODONE HYDROCHLORIDE 5 MG: 5 TABLET ORAL at 05:04

## 2017-01-01 RX ADMIN — ASPIRIN 81 MG: 81 TABLET, COATED ORAL at 09:10

## 2017-01-01 RX ADMIN — Medication 0.04 MCG/KG/MIN: at 11:12

## 2017-01-01 RX ADMIN — ONDANSETRON 4 MG: 2 INJECTION INTRAMUSCULAR; INTRAVENOUS at 11:12

## 2017-01-01 RX ADMIN — DOBUTAMINE IN DEXTROSE 5 MCG/KG/MIN: 400 INJECTION, SOLUTION INTRAVENOUS at 06:12

## 2017-01-01 RX ADMIN — ACETAMINOPHEN 650 MG: 325 TABLET ORAL at 05:04

## 2017-01-01 RX ADMIN — GABAPENTIN 600 MG: 300 CAPSULE ORAL at 06:03

## 2017-01-01 RX ADMIN — METOPROLOL SUCCINATE 25 MG: 25 TABLET, EXTENDED RELEASE ORAL at 09:12

## 2017-01-01 RX ADMIN — METOROPROLOL TARTRATE 5 MG: 5 INJECTION, SOLUTION INTRAVENOUS at 01:12

## 2017-01-01 RX ADMIN — AZELASTINE HYDROCHLORIDE 137 MCG: 137 SPRAY, METERED NASAL at 08:10

## 2017-01-01 RX ADMIN — GABAPENTIN 600 MG: 300 CAPSULE ORAL at 06:04

## 2017-01-01 RX ADMIN — AMIODARONE HYDROCHLORIDE 0.5 MG/MIN: 1.8 INJECTION, SOLUTION INTRAVENOUS at 12:12

## 2017-01-01 RX ADMIN — SODIUM CHLORIDE, PRESERVATIVE FREE 3 ML: 5 INJECTION INTRAVENOUS at 02:03

## 2017-01-01 RX ADMIN — GABAPENTIN 600 MG: 300 CAPSULE ORAL at 02:04

## 2017-01-01 RX ADMIN — NOREPINEPHRINE BITARTRATE 0.5 MCG/KG/MIN: 1 INJECTION, SOLUTION, CONCENTRATE INTRAVENOUS at 02:12

## 2017-01-01 RX ADMIN — AMOXICILLIN AND CLAVULANATE POTASSIUM 500 MG: 500; 125 TABLET, FILM COATED ORAL at 11:03

## 2017-01-01 RX ADMIN — NOREPINEPHRINE BITARTRATE 0.62 MCG/KG/MIN: 1 INJECTION, SOLUTION, CONCENTRATE INTRAVENOUS at 12:12

## 2017-01-01 RX ADMIN — DOBUTAMINE IN DEXTROSE 5 MCG/KG/MIN: 400 INJECTION, SOLUTION INTRAVENOUS at 03:12

## 2017-01-01 RX ADMIN — SODIUM CHLORIDE: 9 INJECTION, SOLUTION INTRAVENOUS at 02:04

## 2017-01-01 RX ADMIN — NITROGLYCERIN 0.4 MG: 0.4 TABLET SUBLINGUAL at 12:12

## 2017-01-01 RX ADMIN — OXYCODONE HYDROCHLORIDE 10 MG: 5 TABLET ORAL at 11:04

## 2017-01-01 RX ADMIN — METOPROLOL SUCCINATE 12.5 MG: 25 TABLET, EXTENDED RELEASE ORAL at 09:03

## 2017-01-01 RX ADMIN — SODIUM CHLORIDE, PRESERVATIVE FREE 3 ML: 5 INJECTION INTRAVENOUS at 09:10

## 2017-01-01 RX ADMIN — Medication 3 ML: at 02:04

## 2017-01-01 RX ADMIN — CEFEPIME HYDROCHLORIDE 2 G: 2 INJECTION, SOLUTION INTRAVENOUS at 09:08

## 2017-01-01 RX ADMIN — EPINEPHRINE 0.04 MCG/KG/MIN: 1 INJECTION PARENTERAL at 03:12

## 2017-01-01 RX ADMIN — MORPHINE SULFATE 15 MG: 15 TABLET, EXTENDED RELEASE ORAL at 12:04

## 2017-01-01 RX ADMIN — METRONIDAZOLE 500 MG: 500 SOLUTION INTRAVENOUS at 03:12

## 2017-01-01 RX ADMIN — STANDARDIZED SENNA CONCENTRATE AND DOCUSATE SODIUM 1 TABLET: 8.6; 5 TABLET, FILM COATED ORAL at 10:04

## 2017-01-01 RX ADMIN — BETHANECHOL CHLORIDE 50 MG: 25 TABLET ORAL at 10:08

## 2017-01-01 RX ADMIN — TAMSULOSIN HYDROCHLORIDE 0.4 MG: 0.4 CAPSULE ORAL at 08:04

## 2017-01-01 RX ADMIN — VANCOMYCIN HYDROCHLORIDE 1000 MG: 1 INJECTION, POWDER, LYOPHILIZED, FOR SOLUTION INTRAVENOUS at 07:12

## 2017-01-01 RX ADMIN — POTASSIUM CHLORIDE 40 MEQ: 1500 TABLET, EXTENDED RELEASE ORAL at 08:10

## 2017-01-01 RX ADMIN — SODIUM CHLORIDE, PRESERVATIVE FREE 3 ML: 5 INJECTION INTRAVENOUS at 07:10

## 2017-01-01 RX ADMIN — POTASSIUM CHLORIDE 60 MEQ: 1500 TABLET, EXTENDED RELEASE ORAL at 09:10

## 2017-01-01 RX ADMIN — SODIUM CHLORIDE: 0.9 INJECTION, SOLUTION INTRAVENOUS at 10:04

## 2017-01-01 RX ADMIN — AZITHROMYCIN MONOHYDRATE 500 MG: 500 INJECTION, POWDER, LYOPHILIZED, FOR SOLUTION INTRAVENOUS at 09:03

## 2017-01-01 RX ADMIN — POTASSIUM CHLORIDE 40 MEQ: 400 INJECTION, SOLUTION INTRAVENOUS at 06:12

## 2017-01-01 RX ADMIN — ATORVASTATIN CALCIUM 40 MG: 20 TABLET, FILM COATED ORAL at 09:03

## 2017-01-01 RX ADMIN — TORSEMIDE 40 MG: 20 TABLET ORAL at 05:10

## 2017-01-01 RX ADMIN — FUROSEMIDE 40 MG: 10 INJECTION, SOLUTION INTRAMUSCULAR; INTRAVENOUS at 08:04

## 2017-01-01 RX ADMIN — CEFEPIME HYDROCHLORIDE 1 G: 1 INJECTION, POWDER, FOR SOLUTION INTRAMUSCULAR; INTRAVENOUS at 07:03

## 2017-01-01 RX ADMIN — AMOXICILLIN AND CLAVULANATE POTASSIUM 500 MG: 500; 125 TABLET, FILM COATED ORAL at 10:04

## 2017-01-01 RX ADMIN — CLOPIDOGREL 75 MG: 75 TABLET, FILM COATED ORAL at 08:08

## 2017-01-01 RX ADMIN — POTASSIUM CHLORIDE 10 MEQ: 10 INJECTION, SOLUTION INTRAVENOUS at 03:12

## 2017-01-01 RX ADMIN — GABAPENTIN 300 MG: 300 CAPSULE ORAL at 01:10

## 2017-01-01 RX ADMIN — AMIODARONE HYDROCHLORIDE 1 MG/MIN: 1.8 INJECTION, SOLUTION INTRAVENOUS at 05:12

## 2017-01-01 RX ADMIN — CETIRIZINE HYDROCHLORIDE 10 MG: 10 TABLET, FILM COATED ORAL at 10:04

## 2017-01-01 RX ADMIN — IOHEXOL 15 ML: 350 INJECTION, SOLUTION INTRAVENOUS at 09:05

## 2017-01-01 RX ADMIN — DEXTROSE MONOHYDRATE 25 G: 25 INJECTION, SOLUTION INTRAVENOUS at 08:12

## 2017-01-01 RX ADMIN — SODIUM CHLORIDE, PRESERVATIVE FREE 3 ML: 5 INJECTION INTRAVENOUS at 06:10

## 2017-01-01 RX ADMIN — METOPROLOL SUCCINATE 25 MG: 25 TABLET, EXTENDED RELEASE ORAL at 08:10

## 2017-01-01 RX ADMIN — EPINEPHRINE 0.04 MCG/KG/MIN: 1 INJECTION PARENTERAL at 05:12

## 2017-01-01 RX ADMIN — POLYETHYLENE GLYCOL 3350 17 G: 17 POWDER, FOR SOLUTION ORAL at 08:04

## 2017-01-01 RX ADMIN — CEFEPIME HYDROCHLORIDE 2 G: 2 INJECTION, SOLUTION INTRAVENOUS at 06:08

## 2017-01-01 RX ADMIN — ATORVASTATIN CALCIUM 40 MG: 20 TABLET, FILM COATED ORAL at 09:10

## 2017-01-01 RX ADMIN — CLOPIDOGREL 75 MG: 75 TABLET, FILM COATED ORAL at 08:04

## 2017-01-01 RX ADMIN — OXYCODONE HYDROCHLORIDE 10 MG: 5 TABLET ORAL at 10:04

## 2017-01-01 RX ADMIN — FUROSEMIDE 20 MG/HR: 10 INJECTION, SOLUTION INTRAVENOUS at 11:12

## 2017-01-01 RX ADMIN — CETIRIZINE HYDROCHLORIDE 10 MG: 5 TABLET, FILM COATED ORAL at 10:04

## 2017-01-01 RX ADMIN — METOPROLOL SUCCINATE 12.5 MG: 25 TABLET, EXTENDED RELEASE ORAL at 08:03

## 2017-01-01 RX ADMIN — BETHANECHOL CHLORIDE 50 MG: 25 TABLET ORAL at 08:08

## 2017-01-01 RX ADMIN — CLOPIDOGREL 75 MG: 75 TABLET, FILM COATED ORAL at 09:10

## 2017-01-01 RX ADMIN — DULOXETINE 30 MG: 30 CAPSULE, DELAYED RELEASE ORAL at 09:03

## 2017-01-01 RX ADMIN — FUROSEMIDE 20 MG/HR: 10 INJECTION, SOLUTION INTRAVENOUS at 12:12

## 2017-01-01 RX ADMIN — SODIUM CHLORIDE, PRESERVATIVE FREE 3 ML: 5 INJECTION INTRAVENOUS at 10:04

## 2017-01-01 RX ADMIN — OXYCODONE HYDROCHLORIDE 10 MG: 5 TABLET ORAL at 07:04

## 2017-01-01 RX ADMIN — EPINEPHRINE 0.04 MCG/KG/MIN: 1 INJECTION PARENTERAL at 08:12

## 2017-01-01 RX ADMIN — ZOLPIDEM TARTRATE 5 MG: 5 TABLET, FILM COATED ORAL at 10:04

## 2017-01-01 RX ADMIN — AZITHROMYCIN 500 MG: 250 TABLET, FILM COATED ORAL at 08:04

## 2017-01-01 RX ADMIN — CEFEPIME HYDROCHLORIDE 2 G: 2 INJECTION, SOLUTION INTRAVENOUS at 10:08

## 2017-01-01 RX ADMIN — GABAPENTIN 600 MG: 300 CAPSULE ORAL at 04:04

## 2017-01-01 RX ADMIN — DULOXETINE 30 MG: 30 CAPSULE, DELAYED RELEASE ORAL at 09:04

## 2017-01-01 RX ADMIN — PROMETHAZINE HYDROCHLORIDE 12.5 MG: 25 INJECTION INTRAMUSCULAR; INTRAVENOUS at 05:12

## 2017-01-01 RX ADMIN — ASPIRIN 325 MG ORAL TABLET 325 MG: 325 PILL ORAL at 05:04

## 2017-01-01 RX ADMIN — GABAPENTIN 300 MG: 300 CAPSULE ORAL at 06:10

## 2017-01-01 RX ADMIN — HEPARIN SODIUM AND DEXTROSE 10 UNITS/KG/HR: 10000; 5 INJECTION INTRAVENOUS at 01:12

## 2017-01-01 RX ADMIN — HEPARIN SODIUM AND DEXTROSE 18.97 UNITS/KG/HR: 10000; 5 INJECTION INTRAVENOUS at 11:04

## 2017-01-01 RX ADMIN — SODIUM CHLORIDE 4 MG: 9 INJECTION, SOLUTION INTRAVENOUS at 08:03

## 2017-01-01 RX ADMIN — CLOPIDOGREL BISULFATE 525 MG: 300 TABLET, FILM COATED ORAL at 09:03

## 2017-01-01 RX ADMIN — NOREPINEPHRINE BITARTRATE 0.4 MCG/KG/MIN: 1 INJECTION, SOLUTION, CONCENTRATE INTRAVENOUS at 12:12

## 2017-01-01 RX ADMIN — CLOPIDOGREL 75 MG: 75 TABLET, FILM COATED ORAL at 09:04

## 2017-01-01 RX ADMIN — ASPIRIN 81 MG: 81 TABLET, COATED ORAL at 09:12

## 2017-01-01 RX ADMIN — METRONIDAZOLE 500 MG: 500 SOLUTION INTRAVENOUS at 09:12

## 2017-01-01 RX ADMIN — SODIUM CHLORIDE, PRESERVATIVE FREE 3 ML: 5 INJECTION INTRAVENOUS at 05:10

## 2017-01-01 RX ADMIN — METRONIDAZOLE 500 MG: 500 SOLUTION INTRAVENOUS at 05:12

## 2017-01-01 RX ADMIN — BETHANECHOL CHLORIDE 50 MG: 25 TABLET ORAL at 05:08

## 2017-01-01 RX ADMIN — SODIUM CHLORIDE 1000 ML: 0.9 INJECTION, SOLUTION INTRAVENOUS at 01:08

## 2017-01-01 RX ADMIN — ATORVASTATIN CALCIUM 80 MG: 20 TABLET, FILM COATED ORAL at 08:04

## 2017-01-01 RX ADMIN — ATORVASTATIN CALCIUM 40 MG: 20 TABLET, FILM COATED ORAL at 08:10

## 2017-01-01 RX ADMIN — CETIRIZINE HYDROCHLORIDE 10 MG: 10 TABLET, FILM COATED ORAL at 09:04

## 2017-01-01 RX ADMIN — POTASSIUM CHLORIDE 40 MEQ: 1500 TABLET, EXTENDED RELEASE ORAL at 05:12

## 2017-01-01 RX ADMIN — ONDANSETRON 4 MG: 2 INJECTION INTRAMUSCULAR; INTRAVENOUS at 07:12

## 2017-01-01 RX ADMIN — HEPARIN SODIUM 5000 UNITS: 5000 INJECTION, SOLUTION INTRAVENOUS; SUBCUTANEOUS at 10:03

## 2017-01-01 RX ADMIN — ENOXAPARIN SODIUM 40 MG: 100 INJECTION SUBCUTANEOUS at 05:10

## 2017-01-01 RX ADMIN — DULOXETINE 30 MG: 30 CAPSULE, DELAYED RELEASE ORAL at 11:04

## 2017-01-01 RX ADMIN — ALUMINUM HYDROXIDE, MAGNESIUM HYDROXIDE, AND SIMETHICONE 50 ML: 200; 200; 20 SUSPENSION ORAL at 10:12

## 2017-01-01 RX ADMIN — RAMELTEON 8 MG: 8 TABLET, FILM COATED ORAL at 12:08

## 2017-01-01 RX ADMIN — TRAMADOL HYDROCHLORIDE 100 MG: 50 TABLET, FILM COATED ORAL at 06:10

## 2017-01-01 RX ADMIN — FUROSEMIDE 20 MG/HR: 10 INJECTION, SOLUTION INTRAVENOUS at 05:12

## 2017-01-01 RX ADMIN — CHLOROTHIAZIDE SODIUM 250 MG: 500 INJECTION, POWDER, LYOPHILIZED, FOR SOLUTION INTRAVENOUS at 09:12

## 2017-01-01 RX ADMIN — METRONIDAZOLE 500 MG: 500 INJECTION, SOLUTION INTRAVENOUS at 01:08

## 2017-01-01 RX ADMIN — OXYCODONE HYDROCHLORIDE 10 MG: 5 TABLET ORAL at 05:04

## 2017-01-01 RX ADMIN — DULOXETINE 30 MG: 30 CAPSULE, DELAYED RELEASE ORAL at 08:03

## 2017-01-01 RX ADMIN — DULOXETINE 30 MG: 30 CAPSULE, DELAYED RELEASE ORAL at 01:08

## 2017-01-01 RX ADMIN — GABAPENTIN 600 MG: 300 CAPSULE ORAL at 05:04

## 2017-01-01 RX ADMIN — DULOXETINE 30 MG: 30 CAPSULE, DELAYED RELEASE ORAL at 08:04

## 2017-01-01 RX ADMIN — HEPARIN SODIUM AND DEXTROSE 17 UNITS/KG/HR: 10000; 5 INJECTION INTRAVENOUS at 10:12

## 2017-01-01 RX ADMIN — Medication 0.28 MCG/KG/MIN: at 09:12

## 2017-01-01 RX ADMIN — TAMSULOSIN HYDROCHLORIDE 0.4 MG: 0.4 CAPSULE ORAL at 09:10

## 2017-01-01 RX ADMIN — DIGOXIN 0.25 MG: 0.12 TABLET ORAL at 05:12

## 2017-01-01 RX ADMIN — SODIUM CHLORIDE 240 MG: 9 INJECTION, SOLUTION INTRAVENOUS at 03:09

## 2017-01-01 RX ADMIN — TAMSULOSIN HYDROCHLORIDE 0.4 MG: 0.4 CAPSULE ORAL at 08:10

## 2017-01-01 RX ADMIN — FUROSEMIDE 80 MG: 10 INJECTION, SOLUTION INTRAMUSCULAR; INTRAVENOUS at 12:03

## 2017-01-01 RX ADMIN — CETIRIZINE HYDROCHLORIDE 10 MG: 5 TABLET, FILM COATED ORAL at 11:04

## 2017-01-01 RX ADMIN — NOREPINEPHRINE BITARTRATE 0.6 MCG/KG/MIN: 1 INJECTION, SOLUTION, CONCENTRATE INTRAVENOUS at 07:12

## 2017-01-01 RX ADMIN — FUROSEMIDE 80 MG: 10 INJECTION, SOLUTION INTRAVENOUS at 08:10

## 2017-01-01 RX ADMIN — NOREPINEPHRINE BITARTRATE 0.44 MCG/KG/MIN: 1 INJECTION, SOLUTION, CONCENTRATE INTRAVENOUS at 01:12

## 2017-01-01 RX ADMIN — BETHANECHOL CHLORIDE 50 MG: 25 TABLET ORAL at 01:10

## 2017-01-01 RX ADMIN — SODIUM CHLORIDE: 9 INJECTION, SOLUTION INTRAVENOUS at 04:09

## 2017-01-01 RX ADMIN — SODIUM CHLORIDE: 0.9 INJECTION, SOLUTION INTRAVENOUS at 05:08

## 2017-01-01 RX ADMIN — ENOXAPARIN SODIUM 40 MG: 100 INJECTION SUBCUTANEOUS at 05:03

## 2017-01-01 RX ADMIN — GABAPENTIN 600 MG: 300 CAPSULE ORAL at 01:04

## 2017-01-01 RX ADMIN — POTASSIUM CHLORIDE 20 MEQ: 200 INJECTION, SOLUTION INTRAVENOUS at 05:12

## 2017-01-01 RX ADMIN — ASPIRIN 325 MG ORAL TABLET 325 MG: 325 PILL ORAL at 12:03

## 2017-01-01 RX ADMIN — MORPHINE SULFATE 15 MG: 15 TABLET, EXTENDED RELEASE ORAL at 09:04

## 2017-01-01 RX ADMIN — HEPARIN SODIUM 5000 UNITS: 5000 INJECTION, SOLUTION INTRAVENOUS; SUBCUTANEOUS at 05:03

## 2017-01-01 RX ADMIN — ASPIRIN 81 MG: 81 TABLET, COATED ORAL at 08:12

## 2017-01-01 RX ADMIN — FUROSEMIDE 20 MG: 10 INJECTION, SOLUTION INTRAMUSCULAR; INTRAVENOUS at 10:04

## 2017-01-01 RX ADMIN — NITROGLYCERIN 0.4 MG: 0.4 TABLET SUBLINGUAL at 09:12

## 2017-01-01 RX ADMIN — NOREPINEPHRINE BITARTRATE 0.62 MCG/KG/MIN: 1 INJECTION, SOLUTION, CONCENTRATE INTRAVENOUS at 05:12

## 2017-01-01 RX ADMIN — POTASSIUM CHLORIDE 40 MEQ: 1500 TABLET, EXTENDED RELEASE ORAL at 01:10

## 2017-01-01 RX ADMIN — METOPROLOL SUCCINATE 25 MG: 25 TABLET, EXTENDED RELEASE ORAL at 01:10

## 2017-01-01 RX ADMIN — DULOXETINE 30 MG: 30 CAPSULE, DELAYED RELEASE ORAL at 08:10

## 2017-01-01 RX ADMIN — SODIUM CHLORIDE, SODIUM GLUCONATE, SODIUM ACETATE, POTASSIUM CHLORIDE, MAGNESIUM CHLORIDE, SODIUM PHOSPHATE, DIBASIC, AND POTASSIUM PHOSPHATE: .53; .5; .37; .037; .03; .012; .00082 INJECTION, SOLUTION INTRAVENOUS at 02:12

## 2017-01-01 RX ADMIN — SODIUM CHLORIDE, PRESERVATIVE FREE 3 ML: 5 INJECTION INTRAVENOUS at 05:04

## 2017-01-01 RX ADMIN — OXYCODONE HYDROCHLORIDE 10 MG: 5 TABLET ORAL at 09:04

## 2017-01-01 RX ADMIN — GABAPENTIN 600 MG: 300 CAPSULE ORAL at 10:04

## 2017-01-01 RX ADMIN — METOPROLOL SUCCINATE 25 MG: 25 TABLET, EXTENDED RELEASE ORAL at 09:10

## 2017-01-01 RX ADMIN — MORPHINE SULFATE 4 MG: 4 INJECTION INTRAVENOUS at 11:12

## 2017-01-01 RX ADMIN — POTASSIUM CHLORIDE 40 MEQ: 1500 TABLET, EXTENDED RELEASE ORAL at 10:12

## 2017-01-01 RX ADMIN — POTASSIUM CHLORIDE 20 MEQ: 1500 TABLET, EXTENDED RELEASE ORAL at 11:12

## 2017-01-01 RX ADMIN — Medication 3 ML: at 06:04

## 2017-01-01 RX ADMIN — IOHEXOL 15 ML: 350 INJECTION, SOLUTION INTRAVENOUS at 10:03

## 2017-01-01 RX ADMIN — AMIODARONE HYDROCHLORIDE 1 MG/MIN: 1.8 INJECTION, SOLUTION INTRAVENOUS at 10:12

## 2017-01-01 RX ADMIN — ENOXAPARIN SODIUM 40 MG: 100 INJECTION SUBCUTANEOUS at 06:10

## 2017-01-01 RX ADMIN — HEPARIN SODIUM AND DEXTROSE 17 UNITS/KG/HR: 10000; 5 INJECTION INTRAVENOUS at 06:04

## 2017-01-01 RX ADMIN — SODIUM CHLORIDE: 9 INJECTION, SOLUTION INTRAVENOUS at 08:03

## 2017-01-01 RX ADMIN — GABAPENTIN 600 MG: 300 CAPSULE ORAL at 07:03

## 2017-01-01 RX ADMIN — MORPHINE SULFATE 2 MG: 2 INJECTION, SOLUTION INTRAMUSCULAR; INTRAVENOUS at 11:12

## 2017-01-01 RX ADMIN — HYDROCODONE BITARTRATE AND ACETAMINOPHEN 1 TABLET: 5; 325 TABLET ORAL at 02:12

## 2017-01-01 RX ADMIN — AZELASTINE HYDROCHLORIDE 137 MCG: 137 SPRAY, METERED NASAL at 09:10

## 2017-01-01 RX ADMIN — Medication 3 ML: at 01:04

## 2017-01-01 RX ADMIN — ACETAMINOPHEN 650 MG: 325 TABLET ORAL at 11:04

## 2017-01-01 RX ADMIN — VASOPRESSIN 20 UNITS: 20 INJECTION INTRAVENOUS at 12:12

## 2017-01-01 RX ADMIN — SODIUM CHLORIDE 240 MG: 9 INJECTION, SOLUTION INTRAVENOUS at 03:08

## 2017-01-01 RX ADMIN — BETHANECHOL CHLORIDE 50 MG: 25 TABLET ORAL at 07:10

## 2017-01-01 RX ADMIN — GABAPENTIN 600 MG: 300 CAPSULE ORAL at 03:03

## 2017-01-01 RX ADMIN — VASOPRESSIN 0.02 UNITS/MIN: 20 INJECTION INTRAVENOUS at 12:12

## 2017-01-01 RX ADMIN — NITROGLYCERIN 0.4 MG: 0.4 TABLET SUBLINGUAL at 06:03

## 2017-01-01 RX ADMIN — FUROSEMIDE 80 MG: 10 INJECTION, SOLUTION INTRAVENOUS at 10:10

## 2017-01-01 RX ADMIN — OXYCODONE HYDROCHLORIDE 10 MG: 5 TABLET ORAL at 06:04

## 2017-01-01 RX ADMIN — CEFEPIME HYDROCHLORIDE 1 G: 1 INJECTION, POWDER, FOR SOLUTION INTRAMUSCULAR; INTRAVENOUS at 04:03

## 2017-01-01 RX ADMIN — HYDROCODONE BITARTRATE AND ACETAMINOPHEN 1 TABLET: 5; 325 TABLET ORAL at 10:12

## 2017-01-01 RX ADMIN — POTASSIUM CHLORIDE 40 MEQ: 400 INJECTION, SOLUTION INTRAVENOUS at 09:12

## 2017-01-01 RX ADMIN — CETIRIZINE HYDROCHLORIDE 10 MG: 10 TABLET, FILM COATED ORAL at 10:03

## 2017-01-01 RX ADMIN — PROMETHAZINE HYDROCHLORIDE 12.5 MG: 25 INJECTION INTRAMUSCULAR; INTRAVENOUS at 06:12

## 2017-01-01 RX ADMIN — DEXTROSE MONOHYDRATE 25 G: 25 INJECTION, SOLUTION INTRAVENOUS at 04:12

## 2017-01-01 RX ADMIN — FUROSEMIDE 80 MG: 10 INJECTION, SOLUTION INTRAMUSCULAR; INTRAVENOUS at 12:12

## 2017-01-01 RX ADMIN — GABAPENTIN 600 MG: 300 CAPSULE ORAL at 05:03

## 2017-01-01 RX ADMIN — METRONIDAZOLE 500 MG: 500 SOLUTION INTRAVENOUS at 02:12

## 2017-01-01 RX ADMIN — GABAPENTIN 600 MG: 300 CAPSULE ORAL at 08:12

## 2017-01-01 RX ADMIN — NITROGLYCERIN 0.4 MG: 0.4 TABLET SUBLINGUAL at 10:12

## 2017-01-01 RX ADMIN — BETHANECHOL CHLORIDE 50 MG: 25 TABLET ORAL at 01:08

## 2017-01-01 RX ADMIN — MORPHINE SULFATE 2 MG: 2 INJECTION, SOLUTION INTRAMUSCULAR; INTRAVENOUS at 08:12

## 2017-01-01 RX ADMIN — ASPIRIN 81 MG: 81 TABLET, COATED ORAL at 08:10

## 2017-01-01 RX ADMIN — SODIUM CHLORIDE, PRESERVATIVE FREE 3 ML: 5 INJECTION INTRAVENOUS at 09:03

## 2017-01-01 RX ADMIN — IOHEXOL 75 ML: 350 INJECTION, SOLUTION INTRAVENOUS at 01:03

## 2017-01-01 RX ADMIN — ATORVASTATIN CALCIUM 40 MG: 20 TABLET, FILM COATED ORAL at 08:08

## 2017-01-01 RX ADMIN — AMIODARONE HYDROCHLORIDE 1 MG/MIN: 1.8 INJECTION, SOLUTION INTRAVENOUS at 09:12

## 2017-01-01 RX ADMIN — CHLOROTHIAZIDE SODIUM 250 MG: 500 INJECTION, POWDER, LYOPHILIZED, FOR SOLUTION INTRAVENOUS at 01:10

## 2017-01-01 RX ADMIN — LIDOCAINE HYDROCHLORIDE: 20 INJECTION, SOLUTION INFILTRATION; PERINEURAL at 04:12

## 2017-01-01 RX ADMIN — POTASSIUM CHLORIDE 10 MEQ: 10 INJECTION, SOLUTION INTRAVENOUS at 01:12

## 2017-01-01 RX ADMIN — POLYETHYLENE GLYCOL 3350 17 G: 17 POWDER, FOR SOLUTION ORAL at 08:03

## 2017-01-01 RX ADMIN — Medication 3 ML: at 04:04

## 2017-01-01 RX ADMIN — AZITHROMYCIN 500 MG: 250 TABLET, FILM COATED ORAL at 08:03

## 2017-01-01 RX ADMIN — METOPROLOL SUCCINATE 12.5 MG: 25 TABLET, EXTENDED RELEASE ORAL at 09:04

## 2017-01-01 RX ADMIN — IOHEXOL 15 ML: 350 INJECTION, SOLUTION INTRAVENOUS at 08:05

## 2017-01-01 RX ADMIN — HEPARIN SODIUM AND DEXTROSE 17 UNITS/KG/HR: 10000; 5 INJECTION INTRAVENOUS at 06:03

## 2017-01-01 RX ADMIN — SODIUM CHLORIDE 1000 ML: 0.9 INJECTION, SOLUTION INTRAVENOUS at 11:08

## 2017-01-01 RX ADMIN — STANDARDIZED SENNA CONCENTRATE AND DOCUSATE SODIUM 1 TABLET: 8.6; 5 TABLET, FILM COATED ORAL at 08:04

## 2017-01-01 RX ADMIN — SODIUM CHLORIDE 1000 ML: 0.9 INJECTION, SOLUTION INTRAVENOUS at 03:05

## 2017-01-01 RX ADMIN — NOREPINEPHRINE BITARTRATE 0.48 MCG/KG/MIN: 1 INJECTION, SOLUTION, CONCENTRATE INTRAVENOUS at 11:12

## 2017-01-01 RX ADMIN — FUROSEMIDE 80 MG: 10 INJECTION, SOLUTION INTRAVENOUS at 09:12

## 2017-01-01 RX ADMIN — POLYETHYLENE GLYCOL 3350 17 G: 17 POWDER, FOR SOLUTION ORAL at 09:04

## 2017-01-01 RX ADMIN — HYDROCODONE BITARTRATE AND ACETAMINOPHEN 1 TABLET: 5; 325 TABLET ORAL at 11:12

## 2017-01-01 RX ADMIN — ENOXAPARIN SODIUM 80 MG: 100 INJECTION SUBCUTANEOUS at 12:04

## 2017-01-01 RX ADMIN — GABAPENTIN 600 MG: 300 CAPSULE ORAL at 09:04

## 2017-01-01 RX ADMIN — AMOXICILLIN AND CLAVULANATE POTASSIUM 500 MG: 500; 125 TABLET, FILM COATED ORAL at 09:03

## 2017-01-01 RX ADMIN — SODIUM CHLORIDE, PRESERVATIVE FREE 3 ML: 5 INJECTION INTRAVENOUS at 06:03

## 2017-01-01 RX ADMIN — HEPARIN SODIUM 5000 UNITS: 5000 INJECTION, SOLUTION INTRAVENOUS; SUBCUTANEOUS at 02:03

## 2017-01-01 RX ADMIN — TAMSULOSIN HYDROCHLORIDE 0.4 MG: 0.4 CAPSULE ORAL at 08:03

## 2017-01-01 RX ADMIN — TAMSULOSIN HYDROCHLORIDE 0.4 MG: 0.4 CAPSULE ORAL at 09:03

## 2017-01-01 RX ADMIN — MORPHINE SULFATE 15 MG: 15 TABLET, EXTENDED RELEASE ORAL at 10:04

## 2017-01-01 RX ADMIN — HEPARIN SODIUM AND DEXTROSE 17 UNITS/KG/HR: 10000; 5 INJECTION INTRAVENOUS at 06:12

## 2017-01-01 RX ADMIN — VASOPRESSIN 0.04 UNITS/MIN: 20 INJECTION INTRAVENOUS at 04:12

## 2017-01-01 RX ADMIN — SODIUM CHLORIDE: 0.9 INJECTION, SOLUTION INTRAVENOUS at 03:08

## 2017-01-01 RX ADMIN — PHENYLEPHRINE HYDROCHLORIDE 200 MCG: 10 INJECTION INTRAVENOUS at 03:12

## 2017-01-01 RX ADMIN — FUROSEMIDE 80 MG: 10 INJECTION, SOLUTION INTRAMUSCULAR; INTRAVENOUS at 07:12

## 2017-01-01 RX ADMIN — DOBUTAMINE IN DEXTROSE 5 MCG/KG/MIN: 400 INJECTION, SOLUTION INTRAVENOUS at 10:12

## 2017-01-01 RX ADMIN — GADOBUTROL 8 ML: 604.72 INJECTION INTRAVENOUS at 12:07

## 2017-01-01 RX ADMIN — BETHANECHOL CHLORIDE 50 MG: 25 TABLET ORAL at 06:10

## 2017-01-01 RX ADMIN — ATORVASTATIN CALCIUM 40 MG: 20 TABLET, FILM COATED ORAL at 08:12

## 2017-01-01 RX ADMIN — GABAPENTIN 300 MG: 300 CAPSULE ORAL at 02:10

## 2017-01-01 RX ADMIN — HEPARIN SODIUM AND DEXTROSE 15 UNITS/KG/HR: 10000; 5 INJECTION INTRAVENOUS at 06:04

## 2017-01-01 RX ADMIN — ASPIRIN 81 MG: 81 TABLET, COATED ORAL at 09:03

## 2017-01-01 RX ADMIN — GADOBUTROL 9 ML: 604.72 INJECTION INTRAVENOUS at 03:02

## 2017-01-01 RX ADMIN — DIGOXIN 0.5 MG: 0.12 TABLET ORAL at 08:12

## 2017-01-01 RX ADMIN — ASPIRIN 81 MG: 81 TABLET, COATED ORAL at 09:04

## 2017-01-01 RX ADMIN — CHLOROTHIAZIDE SODIUM 250 MG: 500 INJECTION, POWDER, LYOPHILIZED, FOR SOLUTION INTRAVENOUS at 05:10

## 2017-01-01 RX ADMIN — ASPIRIN 81 MG: 81 TABLET, COATED ORAL at 03:04

## 2017-01-01 RX ADMIN — STANDARDIZED SENNA CONCENTRATE AND DOCUSATE SODIUM 1 TABLET: 8.6; 5 TABLET, FILM COATED ORAL at 09:04

## 2017-01-01 RX ADMIN — SODIUM CHLORIDE 240 MG: 9 INJECTION, SOLUTION INTRAVENOUS at 03:06

## 2017-01-01 RX ADMIN — ONDANSETRON 4 MG: 2 INJECTION INTRAMUSCULAR; INTRAVENOUS at 03:12

## 2017-01-01 RX ADMIN — HYDROCODONE BITARTRATE AND ACETAMINOPHEN 1 TABLET: 5; 325 TABLET ORAL at 09:12

## 2017-01-01 RX ADMIN — CLOPIDOGREL 75 MG: 75 TABLET, FILM COATED ORAL at 08:12

## 2017-01-01 RX ADMIN — POTASSIUM CHLORIDE 40 MEQ: 1500 TABLET, EXTENDED RELEASE ORAL at 11:10

## 2017-01-01 RX ADMIN — CALCIUM GLUCONATE 1 G: 94 INJECTION, SOLUTION INTRAVENOUS at 12:12

## 2017-01-01 RX ADMIN — CLOPIDOGREL 75 MG: 75 TABLET, FILM COATED ORAL at 09:12

## 2017-01-01 RX ADMIN — ONDANSETRON 4 MG: 2 INJECTION INTRAMUSCULAR; INTRAVENOUS at 08:12

## 2017-01-01 RX ADMIN — SODIUM CHLORIDE, PRESERVATIVE FREE 3 ML: 5 INJECTION INTRAVENOUS at 02:04

## 2017-01-01 RX ADMIN — SODIUM CHLORIDE, PRESERVATIVE FREE 3 ML: 5 INJECTION INTRAVENOUS at 07:03

## 2017-01-01 RX ADMIN — ONDANSETRON 4 MG: 2 INJECTION INTRAMUSCULAR; INTRAVENOUS at 04:12

## 2017-01-01 RX ADMIN — AZITHROMYCIN 500 MG: 250 TABLET, FILM COATED ORAL at 09:04

## 2017-01-01 RX ADMIN — VASOPRESSIN 0.04 UNITS/MIN: 20 INJECTION INTRAVENOUS at 11:12

## 2017-01-01 RX ADMIN — SODIUM CHLORIDE: 9 INJECTION, SOLUTION INTRAVENOUS at 02:06

## 2017-01-01 RX ADMIN — HEPARIN SODIUM AND DEXTROSE 19 UNITS/KG/HR: 10000; 5 INJECTION INTRAVENOUS at 07:04

## 2017-01-01 RX ADMIN — AMOXICILLIN AND CLAVULANATE POTASSIUM 500 MG: 500; 125 TABLET, FILM COATED ORAL at 03:04

## 2017-01-01 RX ADMIN — LISINOPRIL 2.5 MG: 2.5 TABLET ORAL at 08:10

## 2017-01-01 RX ADMIN — ATORVASTATIN CALCIUM 40 MG: 20 TABLET, FILM COATED ORAL at 06:03

## 2017-01-01 RX ADMIN — INSULIN HUMAN 7.4 UNITS: 100 INJECTION, SOLUTION PARENTERAL at 04:12

## 2017-01-01 RX ADMIN — ENOXAPARIN SODIUM 40 MG: 100 INJECTION SUBCUTANEOUS at 04:10

## 2017-01-01 RX ADMIN — RAMELTEON 8 MG: 8 TABLET, FILM COATED ORAL at 11:10

## 2017-01-01 RX ADMIN — PHENYLEPHRINE HYDROCHLORIDE 100 MCG: 10 INJECTION INTRAVENOUS at 03:12

## 2017-01-01 RX ADMIN — GADOBUTROL 8 ML: 604.72 INJECTION INTRAVENOUS at 07:02

## 2017-01-01 RX ADMIN — FAMOTIDINE 20 MG: 10 INJECTION, SOLUTION INTRAVENOUS at 09:12

## 2017-01-01 RX ADMIN — CLOPIDOGREL 75 MG: 75 TABLET, FILM COATED ORAL at 08:03

## 2017-01-01 RX ADMIN — VASOPRESSIN 0.04 UNITS/MIN: 20 INJECTION INTRAVENOUS at 03:12

## 2017-01-01 RX ADMIN — MORPHINE SULFATE 1 MG: 2 INJECTION, SOLUTION INTRAMUSCULAR; INTRAVENOUS at 08:12

## 2017-01-01 RX ADMIN — EPINEPHRINE 0.04 MCG/KG/MIN: 1 INJECTION PARENTERAL at 11:12

## 2017-01-01 RX ADMIN — LISINOPRIL 2.5 MG: 2.5 TABLET ORAL at 07:10

## 2017-01-01 RX ADMIN — VANCOMYCIN HYDROCHLORIDE 1250 MG: 1 INJECTION, POWDER, LYOPHILIZED, FOR SOLUTION INTRAVENOUS at 05:03

## 2017-01-01 RX ADMIN — HEPARIN SODIUM 5000 UNITS: 5000 INJECTION, SOLUTION INTRAVENOUS; SUBCUTANEOUS at 03:03

## 2017-01-01 RX ADMIN — AMOXICILLIN AND CLAVULANATE POTASSIUM 500 MG: 500; 125 TABLET, FILM COATED ORAL at 09:04

## 2017-01-01 RX ADMIN — SODIUM CHLORIDE: 9 INJECTION, SOLUTION INTRAVENOUS at 03:08

## 2017-01-01 RX ADMIN — CLOPIDOGREL 75 MG: 75 TABLET, FILM COATED ORAL at 06:12

## 2017-01-01 RX ADMIN — GABAPENTIN 200 MG: 100 CAPSULE ORAL at 09:12

## 2017-01-01 RX ADMIN — CETIRIZINE HYDROCHLORIDE 10 MG: 5 TABLET, FILM COATED ORAL at 09:04

## 2017-01-01 RX ADMIN — ATORVASTATIN CALCIUM 40 MG: 20 TABLET, FILM COATED ORAL at 09:12

## 2017-01-01 RX ADMIN — AMIODARONE HYDROCHLORIDE 0.5 MG/MIN: 1.8 INJECTION, SOLUTION INTRAVENOUS at 11:12

## 2017-01-01 RX ADMIN — IOHEXOL 15 ML: 350 INJECTION, SOLUTION INTRAVENOUS at 11:03

## 2017-01-01 RX ADMIN — OXYCODONE HYDROCHLORIDE 5 MG: 5 TABLET ORAL at 10:03

## 2017-01-01 RX ADMIN — FAMOTIDINE 20 MG: 10 INJECTION, SOLUTION INTRAVENOUS at 08:12

## 2017-01-01 RX ADMIN — HEPARIN SODIUM 5000 UNITS: 5000 INJECTION, SOLUTION INTRAVENOUS; SUBCUTANEOUS at 07:03

## 2017-01-01 RX ADMIN — AMIODARONE HYDROCHLORIDE 1 MG/MIN: 1.8 INJECTION, SOLUTION INTRAVENOUS at 03:12

## 2017-01-01 RX ADMIN — HYDROCODONE BITARTRATE AND ACETAMINOPHEN 1 TABLET: 5; 325 TABLET ORAL at 01:12

## 2017-01-01 RX ADMIN — FUROSEMIDE 80 MG: 10 INJECTION, SOLUTION INTRAVENOUS at 02:10

## 2017-01-01 RX ADMIN — NITROGLYCERIN 0.4 MG: 0.4 TABLET SUBLINGUAL at 06:12

## 2017-01-01 RX ADMIN — CEFEPIME HYDROCHLORIDE 1 G: 1 INJECTION, POWDER, FOR SOLUTION INTRAMUSCULAR; INTRAVENOUS at 03:03

## 2017-01-01 RX ADMIN — CETIRIZINE HYDROCHLORIDE 10 MG: 10 TABLET, FILM COATED ORAL at 09:03

## 2017-01-01 RX ADMIN — POTASSIUM CHLORIDE 40 MEQ: 1500 TABLET, EXTENDED RELEASE ORAL at 03:10

## 2017-01-01 RX ADMIN — AMIODARONE HYDROCHLORIDE 1 MG/MIN: 1.8 INJECTION, SOLUTION INTRAVENOUS at 02:12

## 2017-01-01 RX ADMIN — BETHANECHOL CHLORIDE 50 MG: 25 TABLET ORAL at 09:10

## 2017-01-01 RX ADMIN — ASPIRIN 81 MG: 81 TABLET, COATED ORAL at 08:03

## 2017-01-01 RX ADMIN — ATORVASTATIN CALCIUM 40 MG: 20 TABLET, FILM COATED ORAL at 08:03

## 2017-01-01 RX ADMIN — HEPARIN SODIUM AND DEXTROSE 19 UNITS/KG/HR: 10000; 5 INJECTION INTRAVENOUS at 04:04

## 2017-01-01 RX ADMIN — FUROSEMIDE 80 MG: 10 INJECTION, SOLUTION INTRAVENOUS at 09:10

## 2017-01-01 RX ADMIN — HEPARIN SODIUM AND DEXTROSE 17 UNITS/KG/HR: 10000; 5 INJECTION INTRAVENOUS at 05:12

## 2017-01-01 RX ADMIN — VANCOMYCIN HYDROCHLORIDE 1250 MG: 1 INJECTION, POWDER, LYOPHILIZED, FOR SOLUTION INTRAVENOUS at 04:03

## 2017-01-01 RX ADMIN — VANCOMYCIN HYDROCHLORIDE 1500 MG: 100 INJECTION, POWDER, LYOPHILIZED, FOR SOLUTION INTRAVENOUS at 02:08

## 2017-01-01 RX ADMIN — CEFEPIME HYDROCHLORIDE 2 G: 2 INJECTION, SOLUTION INTRAVENOUS at 12:08

## 2017-01-01 RX ADMIN — GABAPENTIN 600 MG: 300 CAPSULE ORAL at 10:03

## 2017-01-01 RX ADMIN — POLYETHYLENE GLYCOL 3350 17 G: 17 POWDER, FOR SOLUTION ORAL at 09:03

## 2017-01-01 RX ADMIN — ZOLPIDEM TARTRATE 5 MG: 5 TABLET, FILM COATED ORAL at 09:03

## 2017-01-01 RX ADMIN — ACETAMINOPHEN 650 MG: 325 TABLET ORAL at 10:04

## 2017-01-01 RX ADMIN — MORPHINE SULFATE 4 MG: 4 INJECTION INTRAVENOUS at 09:12

## 2017-01-01 RX ADMIN — METRONIDAZOLE 500 MG: 500 SOLUTION INTRAVENOUS at 06:12

## 2017-01-01 RX ADMIN — SODIUM CHLORIDE, PRESERVATIVE FREE 3 ML: 5 INJECTION INTRAVENOUS at 02:10

## 2017-01-01 RX ADMIN — FUROSEMIDE 40 MG: 10 INJECTION, SOLUTION INTRAMUSCULAR; INTRAVENOUS at 11:04

## 2017-01-01 RX ADMIN — POTASSIUM CHLORIDE 10 MEQ: 10 INJECTION, SOLUTION INTRAVENOUS at 11:12

## 2017-01-01 RX ADMIN — IOHEXOL 75 ML: 350 INJECTION, SOLUTION INTRAVENOUS at 12:03

## 2017-01-01 RX ADMIN — Medication 0.16 MCG/KG/MIN: at 06:12

## 2017-01-01 RX ADMIN — Medication 3 ML: at 10:04

## 2017-01-01 RX ADMIN — FUROSEMIDE 80 MG: 10 INJECTION, SOLUTION INTRAVENOUS at 05:10

## 2017-01-01 RX ADMIN — SODIUM CHLORIDE, PRESERVATIVE FREE 3 ML: 5 INJECTION INTRAVENOUS at 03:04

## 2017-01-01 RX ADMIN — SODIUM CHLORIDE 1000 ML: 0.9 INJECTION, SOLUTION INTRAVENOUS at 12:08

## 2017-01-01 RX ADMIN — ONDANSETRON 4 MG: 4 TABLET, ORALLY DISINTEGRATING ORAL at 09:10

## 2017-01-01 RX ADMIN — ETOMIDATE 6 MG: 2 INJECTION, SOLUTION INTRAVENOUS at 03:12

## 2017-01-01 RX ADMIN — SODIUM CHLORIDE 250 ML: 0.9 INJECTION, SOLUTION INTRAVENOUS at 02:08

## 2017-01-01 RX ADMIN — POTASSIUM CHLORIDE 40 MEQ: 1500 TABLET, EXTENDED RELEASE ORAL at 06:12

## 2017-01-01 RX ADMIN — AMIODARONE HYDROCHLORIDE 1 MG/MIN: 1.8 INJECTION, SOLUTION INTRAVENOUS at 12:12

## 2017-02-10 NOTE — MR AVS SNAPSHOT
[place] [facility]  [address] [facility]  [place] [address]  Phone: [phone]  Fax: [phone]                  Lex Billy   2/10/2017 10:40 AM   Office Visit    Description:  Male : 1945   Provider:  Mariana Freitas MD   Department:  [place] [facility]           Reason for Visit     Back Pain           Diagnoses this Visit        Comments    Sciatic leg pain    -  Primary            To Do List           Future Appointments        Provider Department Dept Phone    2/15/2017 9:20 AM Tien Palmer MD [facility] Cardiology [phone]    2017 8:20 AM Everette Rowan MD [facility] Internal Medicine [phone]    3/8/2017 10:30 AM LAB, APPOINTMENT [place] [facility] [phone]    3/8/2017 1:00 PM [facility] CT1 64- LIMIT 450 LBS [facility] [phone]    3/8/2017 1:20 PM [facility] CT6 MOBILE LIMIT 450 LBS [facility] [phone]      Goals (5 Years of Data)     None       These Medications        Disp Refills Start End    methocarbamol (ROBAXIN) 500 MG Tab 30 tablet 0 2/10/2017 2017    Take 1 tablet (500 mg total) by mouth nightly as needed (sciatic nerve pain). - Oral    Pharmacy: [facility]/pharmacy #[id number] - [place] - [address] Ph #: [phone]       oxycodone-acetaminophen (PERCOCET)  mg per tablet 40 tablet 0 2/10/2017     Take 1 tablet by mouth every 4 (four) hours as needed for Pain. - Oral    Pharmacy: [facility]/pharmacy #[id number] - [place] - [address] Ph #: [phone]         [facility]     [facility] Care [facility] -  Assistance  Registered nurses in the [facility] provide clinical advisement, health education, appointment booking, and other advisory services.  Call for this free service at [phone].             Medications           Message regarding Medications     Verify the changes and/or additions to your medication regime  listed below are the same as discussed with your clinician today.  If any of these changes or additions are incorrect, please notify your healthcare provider.        START taking these NEW medications        Refills    methocarbamol (ROBAXIN) 500 MG Tab 0    Sig: Take 1 tablet (500 mg total) by mouth nightly as needed (sciatic nerve pain).    Class: Normal    Route: Oral    oxycodone-acetaminophen (PERCOCET)  mg per tablet 0    Sig: Take 1 tablet by mouth every 4 (four) hours as needed for Pain.    Class: Print    Route: Oral           Verify that the below list of medications is an accurate representation of the medications you are currently taking.  If none reported, the list may be blank. If incorrect, please contact your healthcare provider. Carry this list with you in case of emergency.           Current Medications     aspirin (ECOTRIN) 81 MG EC tablet Take 81 mg by mouth once daily.    atorvastatin (LIPITOR) 40 MG tablet Take 1 tablet (40 mg total) by mouth once daily.    axitinib (INLYTA) 5 mg Tab Take 1 tablet by mouth 2 (two) times daily.    blood sugar diagnostic (TRUE METRIX GLUCOSE TEST STRIP) Strp Test three times daily    blood sugar diagnostic Strp 1 each by Misc.(Non-Drug; Combo Route) route 3 (three) times daily. Free style freedom lite test strips and lancets    blood-glucose meter (TRUE METRIX AIR GLUCOSE METER) Misc Use as directed    brimonidine 0.1% (ALPHAGAN P) 0.1 % Drop Place 1 drop into both eyes 3 (three) times daily.    CALCIUM CARBONATE-VITAMIN D3 ORAL Take 1 tablet by mouth every morning. Calcium carbonate 1000mg vitamin d3 1600 units per patient    clopidogrel (PLAVIX) 75 mg tablet Take 1 tablet (75 mg total) by mouth once daily.    fexofenadine (ALLEGRA) 180 MG tablet TAKE 1 TABLET ONE TIME DAILY    furosemide (LASIX) 40 MG tablet Take 1 tablet (40 mg total) by mouth once daily.    gabapentin (NEURONTIN) 300 MG capsule TAKE 1 CAPSULE TWICE DAILY    lancets 28 gauge Misc 1  "lancet by Misc.(Non-Drug; Combo Route) route 3 (three) times daily.    metformin (GLUCOPHAGE) 500 MG tablet TAKE 1 TABLET BY MOUTH TWO TIMES A DAY WITH MEALS    metoprolol succinate (TOPROL-XL) 25 MG 24 hr tablet Take 0.5 tablets (12.5 mg total) by mouth once daily.    potassium chloride SA (K-DUR,KLOR-CON) 20 MEQ tablet Take 1 tablet (20 mEq total) by mouth once daily.    verapamil (CALAN-SR) 240 MG CR tablet Take 1 tablet (240 mg total) by mouth once daily.    zolpidem (AMBIEN) 5 MG Tab Take 1 tablet (5 mg total) by mouth nightly as needed.    methocarbamol (ROBAXIN) 500 MG Tab Take 1 tablet (500 mg total) by mouth nightly as needed (sciatic nerve pain).    nitroGLYCERIN (NITROSTAT) 0.4 MG SL tablet Place 1 tablet (0.4 mg total) under the tongue every 5 (five) minutes as needed for Chest pain (If more than three nitro's are given in a row and patient is still having CP, please call MD).    oxycodone-acetaminophen (PERCOCET)  mg per tablet Take 1 tablet by mouth every 4 (four) hours as needed for Pain.           Clinical Reference Information           Your Vitals Were     BP Pulse Temp Resp Height Weight    110/70 (BP Location: Right arm, Patient Position: Sitting, BP Method: Manual) 75 98.5 °F (36.9 °C) (Oral) 16 5' 10" (1.778 m) 82 kg (180 lb 12.4 oz)    BMI                25.94 kg/m2          Blood Pressure          Most Recent Value    BP  110/70      Allergies as of 2/10/2017     No Known Drug Allergies      Immunizations Administered on Date of Encounter - 2/10/2017     None      Language Assistance Services     ATTENTION: Language assistance services are available, free of charge. Please call [phone].      ATENCIÓN: Si sonali ponce, tiene a johnston disposición servicios gratuitos de asistencia lingüística. Llame al [phone].     CHÚ Ý: N?u b?n nói Ti?ng Vi?t, có các d?ch v? h? tr? ngôn ng? mi?n phí dành cho b?n. G?i s? [phone].         [place] [facility] complies with " applicable Federal civil rights laws and does not discriminate on the basis of race, color, national origin, age, disability, or sex.

## 2017-02-10 NOTE — TELEPHONE ENCOUNTER
----- Message from Mariana Freitas MD sent at 2/10/2017 11:00 AM CST -----  Please let patient know that i have spoken to his oncologist who agrees that imaging needs to be done. We need to schedule MRI pelvis for him. Order is in, please help him schedule.  thanks

## 2017-02-10 NOTE — PROGRESS NOTES
Subjective:       Patient ID: Lex Billy is a 72 y.o. male.    Chief Complaint: Back Pain    HPI     Patient is a 72-year-old male with metastatic renal cell carcinoma, currently on Inlyta.    CT C/A/P from 10/14/16:  1.  Slightly decreased size of cystic renal neoplasm the superior pole of the right kidney, now measuring 2.8 x 2.8 cm, previously 3.8 x 3.3 cm and the cystic component.  The solid component is not well defined on the current examination.  2.  Resolution of previously enlarged enhancing left axillary lymph node.  3.  Marked decrease in the size of the left 12th rib lesion, with essential complete resolution of the expansile soft tissue component.  3.  Unchanged size of right sacral lesion.  4.  Unchanged nonspecific index right lower lobe pulmonary micronodule.  5.  Coronary atherosclerosis and abundant aortic valve calcification.     He reports 3 weeks of right buttock pain, progressive. Says he thinks its his sciatica as it is sharp shooting burning pain down the lower back/buttock into the hamstring. Not improving with OTC nsaids and tylenol. No numbness down the remainder of the LE. No urine incontinence. No stool incontinence of retention.       Review of Systems   Constitutional: Negative for chills, fatigue and fever.   HENT: Negative for congestion, ear pain, postnasal drip, rhinorrhea, sinus pressure and sore throat.    Eyes: Negative for itching and visual disturbance.   Respiratory: Negative for cough, shortness of breath and wheezing.    Cardiovascular: Negative for chest pain, palpitations and leg swelling.   Gastrointestinal: Negative for abdominal pain and nausea.   Genitourinary: Negative for dysuria.   Musculoskeletal: Negative for arthralgias and myalgias.        See HPI   Skin: Negative for rash.   Neurological: Negative for weakness, light-headedness and headaches.       Objective:      Physical Exam   Constitutional: He is oriented to person, place, and time. He appears  well-developed and well-nourished. No distress.   HENT:   Head: Normocephalic and atraumatic.   Musculoskeletal:   No TTP to buttock or lower spine or paraspinal muscle in lumbar region  Negative SLR on R   Neurological: He is alert and oriented to person, place, and time.   Skin: Skin is warm and dry. He is not diaphoretic.   Nursing note and vitals reviewed.      Assessment:       1. Sciatic leg pain        Plan:       Physical exam and sx suggestive of sciatic nerve impingement on the R. My concern with his metastatic renal cell ca and also history of sacral lesion on R is that this may have progressed, this could be met. He would not like to repeat the CT pelvis today, he prefers I speak to his oncology MD first. Will treat for sciatic nerve impingement with Robaxin 500 mg nightly prn and also Percocet every 4 hours prn breakthrough pain.   I will speak to his oncology MD to see her thoughts on speeding up the imaging for him.    MRI pelvis ordered

## 2017-02-13 NOTE — ED AVS SNAPSHOT
[facility]  [address] [facility] [address]               Lex Billy   2017 11:21 AM   ED    Description:  Male : 1945   Department:  [facility]           Your Care was Coordinated By:     Provider Role From To    Vini Bae MD Attending Provider 17 1208 --      Reason for Visit     Back Pain           Diagnoses this Visit        Comments    Urinary retention    -  Primary     Cancer associated pain         Pain         Back pain, unspecified back location, unspecified back pain laterality, unspecified chronicity         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           ED Disposition     None           To Do List           Follow-up Information     Follow up with [facility] - Urology 4th Floor. Schedule an appointment as soon as possible for a visit in 2 days.    Specialty:  Urology    Contact information:    [address] [facility] [id number] [phone]    Additional information:    [facility] - 4th Floor        [facility] with [facility]    Specialty:  Emergency Medicine    Why:  If symptoms worsen    Contact information:    [address] [facility] [id number] [phone]        Follow up with Janine Valdez MD. Schedule an appointment as soon as possible for a visit in 2 days.    Specialties:  Hematology and Oncology, Hematology    Contact information:    [address] [facility] [address]  [phone]        [facility] On Call     [facility] Care [facility] - / Assistance  Registered nurses in the [facility] provide clinical advisement, health education, appointment booking, and other advisory services.  Call for this free service at [phone].             Medications           Message regarding Medications     Verify the changes and/or additions to your medication regime listed below are the same as discussed with your clinician  today.  If any of these changes or additions are incorrect, please notify your healthcare provider.        These medications were administered today        Dose Freq    hydrocodone-acetaminophen 10-325mg per tablet 1 tablet 1 tablet ED 1 Time    Sig: Take 1 tablet by mouth ED 1 Time.    Class: Normal    Route: Oral    gadobutrol 9 mL 9 mL IMG once as needed    Sig: Inject 9 mLs into the vein ONCE PRN for contrast.    Class: Normal    Route: Intravenous           Verify that the below list of medications is an accurate representation of the medications you are currently taking.  If none reported, the list may be blank. If incorrect, please contact your healthcare provider. Carry this list with you in case of emergency.           Current Medications     aspirin (ECOTRIN) 81 MG EC tablet Take 81 mg by mouth once daily.    atorvastatin (LIPITOR) 40 MG tablet Take 1 tablet (40 mg total) by mouth once daily.    axitinib (INLYTA) 5 mg Tab Take 1 tablet by mouth 2 (two) times daily.    blood sugar diagnostic (TRUE METRIX GLUCOSE TEST STRIP) Strp Test three times daily    blood sugar diagnostic Strp 1 each by Misc.(Non-Drug; Combo Route) route 3 (three) times daily. Free style freedom lite test strips and lancets    blood-glucose meter (TRUE METRIX AIR GLUCOSE METER) Misc Use as directed    brimonidine 0.1% (ALPHAGAN P) 0.1 % Drop Place 1 drop into both eyes 3 (three) times daily.    CALCIUM CARBONATE-VITAMIN D3 ORAL Take 1 tablet by mouth every morning. Calcium carbonate 1000mg vitamin d3 1600 units per patient    clopidogrel (PLAVIX) 75 mg tablet Take 1 tablet (75 mg total) by mouth once daily.    fexofenadine (ALLEGRA) 180 MG tablet TAKE 1 TABLET ONE TIME DAILY    furosemide (LASIX) 40 MG tablet Take 1 tablet (40 mg total) by mouth once daily.    gabapentin (NEURONTIN) 300 MG capsule TAKE 1 CAPSULE TWICE DAILY    lancets 28 gauge Misc 1 lancet by Misc.(Non-Drug; Combo Route) route 3 (three) times daily.    metformin  "(GLUCOPHAGE) 500 MG tablet TAKE 1 TABLET BY MOUTH TWO TIMES A DAY WITH MEALS    methocarbamol (ROBAXIN) 500 MG Tab Take 1 tablet (500 mg total) by mouth nightly as needed (sciatic nerve pain).    metoprolol succinate (TOPROL-XL) 25 MG 24 hr tablet Take 0.5 tablets (12.5 mg total) by mouth once daily.    nitroGLYCERIN (NITROSTAT) 0.4 MG SL tablet Place 1 tablet (0.4 mg total) under the tongue every 5 (five) minutes as needed for Chest pain (If more than three nitro's are given in a row and patient is still having CP, please call MD).    oxycodone-acetaminophen (PERCOCET)  mg per tablet Take 1 tablet by mouth every 4 (four) hours as needed for Pain.    potassium chloride SA (K-DUR,KLOR-CON) 20 MEQ tablet Take 1 tablet (20 mEq total) by mouth once daily.    verapamil (CALAN-SR) 240 MG CR tablet Take 1 tablet (240 mg total) by mouth once daily.    zolpidem (AMBIEN) 5 MG Tab Take 1 tablet (5 mg total) by mouth nightly as needed.           Clinical Reference Information           Your Vitals Were     BP Pulse Temp Resp Height Weight    109/59 68 98.2 °F (36.8 °C) (Oral) 18 5' 10" (1.778 m) 81.6 kg (180 lb)    SpO2 BMI             98% 25.83 kg/m2         Allergies as of 2/13/2017        Reactions    No Known Drug Allergies       Immunizations Administered on Date of Encounter - 2/13/2017     None      ED Micro, Lab, POCT     Start Ordered       Status Ordering Provider    02/13/17 1214 02/13/17 1213  Urinalysis  STAT      Final result     02/13/17 1214 02/13/17 1213  Urine culture **CANNOT BE ORDERED STAT**  Once      In process     02/13/17 1214 02/13/17 1213  Magnesium  STAT      Final result     02/13/17 1214 02/13/17 1213  CBC auto differential  STAT      Final result     02/13/17 1214 02/13/17 1213  Comprehensive metabolic panel  STAT      Final result     02/13/17 1214 02/13/17 1213  Phosphorus  STAT      Final result       ED Imaging Orders     Start Ordered       Status Ordering Provider    02/13/17 1420 " 02/13/17 1217  MRI Lumbar Spine W WO Cont  1 time imaging      Final result     02/13/17 1420 02/13/17 1217  MRI Thoracic Spine W WO Cont  1 time imaging      Final result     02/13/17 1217 02/13/17 1217    1 time imaging,   Status:  Canceled      Canceled     02/13/17 1217 02/13/17 1217    1 time imaging,   Status:  Canceled      Canceled     02/13/17 1217 02/13/17 1217    1 time imaging,   Status:  Canceled      Canceled         Discharge Instructions       RETURN IMMEDIATELY FOR WORSENING PAIN, WEAKNESS, NUMBNESS, INABILITY TO HAVE A BOWEL MOVEMENT OR OTHER CONCERNING SYMPTOMS.    FOLLOW UP THIS WEEK WITH UROLOGY AND ONCOLOGY.  CALL IN THE MORNING FOR APPOINTMENTS.     Discharge References/Attachments     URINARY RETENTION, MALE (ENGLISH)    CORLEY CATHETER, CARE (ENGLISH)      Your Scheduled Appointments     Feb 15, 2017  9:20 AM CST   Established Patient Visit with Tien Palmer MD   [facility]    [address] [place] [address] [phone]            Feb 17, 2017  6:00 AM CST   Mri Pelvis Non Cont with [facility] MRI4   [facility] ([facility] )    [address] [place] [address] [phone]            Feb 20, 2017  8:20 AM CST   Physical with Everette Rowan MD   [place] [facility]    [address] [place] [address]  [place] [address]   [phone]            Mar 08, 2017  1:00 PM CST   CT Chest with [facility] CT1 64- LIMIT 450 LBS   [facility] ([facility] )    [address] [place] [address] [phone]            Mar 08, 2017  1:20 PM CST   Ct Abd Pel W Contrast with [facility] CT6 MOBILE LIMIT 450 LBS   [facility] ([facility] )    [address] [place] [address] [phone]               [facility] complies with applicable Federal civil rights laws and does not discriminate on the basis of race, color, national  origin, age, disability, or sex.        Language Assistance Services     ATTENTION: Language assistance services are available, free of charge. Please call [phone].      ATENCIÓN: Si habronaldo ponce, tiene a johnston disposición servicios gratuitos de asistencia lingüística. Llame al [phone].     CHÚ Ý: N?u b?n nói Ti?ng Vi?t, có các d?ch v? h? tr? ngôn ng? mi?n phí dành cho b?n. G?i s? [phone].

## 2017-02-13 NOTE — TELEPHONE ENCOUNTER
Returned call to patient.   Patient stated he saw Dr. Freitas last week for sciatic pain---right back/butt area.   Patient stated that it started radiating to his right testicle and it would hurt to bend over last night.   Patient stated he has also been having issues urinating and that he has been using a catheter since Friday (Friday he got 600 mL out).   This morning he noticed, he had gained 3 lbs overnight.   Patient stated he has an MRI Friday, he just wanted to let Dr. Valdez know of this.   I informed him I would fwd message to Dr. Valdez and get her recommendations if she had any and get back with him.   Patient verbalized understanding.     Message routed to Dr. Valdez.     ----- Message from Tim Gonsales sent at 2/13/2017 10:07 AM CST -----  Contact: self  Pt states he's having very difficult problem urinating.  Contact number [phone]

## 2017-02-13 NOTE — ED PROVIDER NOTES
Encounter Date: 2/13/2017    SCRIBE #1 NOTE: I, Marlon Greene, am scribing for, and in the presence of, Vini Bae MD.       History     Chief Complaint   Patient presents with    Back Pain     have sacrum cancer stage 4 , on oral chemo, can't urinate, mask applied     Review of patient's allergies indicates:   Allergen Reactions    No known drug allergies      HPI Comments: Time seen by provider: 12:09 PM    This is a 72 y.o. male with history of metastatic renal cancer on oral chemotherapy, DM2, HLD, HTN, who presents with complaint of difficulty urinating and right-sided groin pain. He states that he was able to urinate while here in the ED, though isn't certain he was able to fully void.  He has no dysuria.  He states that he needed to self-cath yesterday, with output of about 600 mL. Pt endorses sharp right buttocks pain, as well as bowel difficulty and chronic back pain (without recent change). Pt denies hematuria, dysuria, weakness/numbness, cough or cold symptoms, vomiting/diarrhia, blood in stool.  His last bm last night was normal.       The history is provided by the patient and medical records.     Past Medical History   Diagnosis Date    Acute on chronic congestive heart failure     Arthritis     Colon polyp     Coronary artery disease involving native coronary artery with unstable angina pectoris 10/7/2015    Diabetes mellitus     Heart disease, unspecified     Hyperlipidemia     Hypertension     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 7/15/2014    Pneumonia 02/2016    Stroke      right eye    Type 2 diabetes mellitus      Past Medical History Pertinent Negatives   Diagnosis Date Noted    *Atrial fibrillation 2/18/2013    *Atrial flutter 8/6/2012    AAA (abdominal aortic aneurysm) 8/6/2012    Acute coronary syndrome 8/6/2012    Anticoagulant long-term use 10/7/2015    Asthma 2/18/2013    Asthma 8/18/2014    Atrial fibrillation 8/18/2014    Atrial flutter 8/18/2014     Cardiomyopathy 8/6/2012    Cardiomyopathy 8/18/2014    Carotid artery occlusion 8/6/2012    Clotting disorder 2/18/2013    COPD (chronic obstructive pulmonary disease) 10/7/2015    Encounter for blood transfusion 10/7/2015    Glaucoma 10/20/2016    Heart block 8/6/2012    Heart murmur 2/18/2013    Seizures 10/7/2015    Sleep apnea 8/6/2012    Stenosis 8/6/2012    Stenosis and insufficiency of lacrimal passages 8/18/2014    Supraventricular tachycardia 8/6/2012    Syncope and collapse 8/6/2012    Thyroid disease 2/18/2013    Valvular regurgitation 8/6/2012    Ventricular tachycardia 8/6/2012     Past Surgical History   Procedure Laterality Date    Total knee arthroplasty       left side    Rotator cuff repair       right side    Vasectomy      Joint replacement      Carpal tunnel release       left    Torn ligament       repair. left shoulder    Orthoscopic surgery on knee      Back surgery       cervical disc replacement    Tonsillectomy      Eye surgery       laser surgery in right eye    Coronary angioplasty with stent placement  10/2015     4 stents    Sinus surgery       Family History   Problem Relation Age of Onset    Heart disease Father     ALS Father     Cancer Brother      esophageal    Rheum arthritis Mother     Migraines Daughter     Asthma Son     Diabetes Neg Hx     Colon polyps Neg Hx      Social History   Substance Use Topics    Smoking status: Never Smoker    Smokeless tobacco: Never Used    Alcohol use No      Comment: Heavy in past, quit about 20 years ago     Review of Systems   Constitutional: Negative for fever.   HENT: Negative for sore throat.    Respiratory: Negative for shortness of breath.    Cardiovascular: Negative for chest pain.   Gastrointestinal: Negative for abdominal pain, blood in stool, diarrhea, nausea and vomiting.   Genitourinary: Positive for difficulty urinating and testicular pain. Negative for dysuria and hematuria.    Musculoskeletal: Positive for back pain (chronic, not worse today) and myalgias (right buttocks).   Skin: Negative for rash.   Neurological: Negative for weakness and numbness.   Hematological: Does not bruise/bleed easily.       Physical Exam   Initial Vitals   BP Pulse Resp Temp SpO2   02/13/17 1119 02/13/17 1119 02/13/17 1119 02/13/17 1119 02/13/17 1119   109/59 68 18 98.2 °F (36.8 °C) 98 %     Physical Exam    Constitutional: He appears well-developed and well-nourished. He is not diaphoretic. No distress.   HENT:   Head: Normocephalic and atraumatic.   Right Ear: External ear normal.   Left Ear: External ear normal.   Mouth/Throat: Oropharynx is clear and moist. No oropharyngeal exudate.   Eyes: Conjunctivae are normal. Pupils are equal, round, and reactive to light. Right eye exhibits no discharge. Left eye exhibits no discharge. No scleral icterus.   Neck: Normal range of motion. Neck supple. No JVD present.   Cardiovascular: Normal rate, regular rhythm and intact distal pulses. Exam reveals no gallop.    Pulmonary/Chest: Breath sounds normal. No stridor. No respiratory distress. He has no wheezes. He has no rhonchi. He has no rales.   Abdominal: Bowel sounds are normal. There is tenderness. There is no rebound and no guarding.   Mild suprapubic fullness/tenderness   Genitourinary: Penis normal. No discharge found.   Genitourinary Comments: Nl external genitalia  Nl testes, nl lie, no mass, no tenderness  2+ femoral bilat  No LAD  Perineal sensation intact   Musculoskeletal: Normal range of motion. He exhibits no edema or tenderness.   No spinal tenderness   Neurological: He is alert and oriented to person, place, and time. He has normal strength. No sensory deficit.   5/5 strength and intact sensation ble   Skin: Skin is warm and dry. No rash noted. No erythema.   Psychiatric: He has a normal mood and affect.         ED Course   Procedures  Labs Reviewed   CBC W/ AUTO DIFFERENTIAL - Abnormal; Notable for  the following:        Result Value    RBC 4.28 (*)     Hemoglobin 13.8 (*)     MCH 32.2 (*)     Gran% 73.9 (*)     All other components within normal limits   CULTURE, URINE   URINALYSIS   MAGNESIUM   COMPREHENSIVE METABOLIC PANEL   PHOSPHORUS             Medical Decision Making:   History:   Old Medical Records: I decided to obtain old medical records.  Initial Assessment:   Imp:  Urinary retention in pt w/ back pain and hx metastatic renal cell ca.  R/o cord compression.  Only symptom at this time appears to be urinary - the pain is chronic, both in the low back as well as the sciatica sxs.  Will check renal function, blood counts, ua and obtain MRI of spine to r/o cord compression.      5:18 PM MRI w/o any cord lesions, known prior disease in bones.  Pt states feels much better since jauregui placement.  All labs normal or at pt baseline.  ua is clean, no infection.  Case reviewed with onc fellow, Michael - concurs with my plan to d/c w/ jauregui for outpt urology f/u and keep appt for mri pelvis (radiology requested that only the emergent study be done today due to time considerations).  Have advised on home care, f/u, reasons to return to ED and need to touch base with Dr Valdez as well as urology.  He indicates understanding and is agreeable to dc/.   Clinical Tests:   Lab Tests: Reviewed and Ordered  Radiological Study: Reviewed and Ordered            Scribe Attestation:   Scribe #1: I performed the above scribed service and the documentation accurately describes the services I performed. I attest to the accuracy of the note.    Attending Attestation:           Physician Attestation for Scribe:  Physician Attestation Statement for Scribe #1: I, Vini Bae MD, reviewed documentation, as scribed by Marlon Greene in my presence, and it is both accurate and complete.                 ED Course     Clinical Impression:   The primary encounter diagnosis was Urinary retention. Diagnoses of Cancer associated pain, Pain,  Back pain, unspecified back location, unspecified back pain laterality, unspecified chronicity, and 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 were also pertinent to this visit.          Vini Bae MD  02/13/17 [id number]

## 2017-02-13 NOTE — DISCHARGE INSTRUCTIONS
RETURN IMMEDIATELY FOR WORSENING PAIN, WEAKNESS, NUMBNESS, INABILITY TO HAVE A BOWEL MOVEMENT OR OTHER CONCERNING SYMPTOMS.    FOLLOW UP THIS WEEK WITH UROLOGY AND ONCOLOGY.  CALL IN THE MORNING FOR APPOINTMENTS.

## 2017-02-14 NOTE — TELEPHONE ENCOUNTER
Patient wanting to know if he still needs the MRI that is scheduled for Friday.  He had MRI'S done yesterday at the hospital.

## 2017-02-14 NOTE — TELEPHONE ENCOUNTER
----- Message from Amy Yang MA sent at 2/14/2017 11:48 AM CST -----  Contact: self - [phone]  Patient is requesting to speak with Dr Freitas regarding CTs that are scheduled on 2/17/17. Had a couple of CTs done at the hospital yesterday. Please advise if they are still needed. Please call. Thanks!

## 2017-02-15 NOTE — TELEPHONE ENCOUNTER
"Returned call to patient.   Spoke with patient on 2/13 and had advised him to go to ER as he was experiencing back pain and difficulty urinating.   Patient went to the ER and they placed catheter (which still remains and explains no difficulty expelling now) and did MRIs of the spine---they had advised him to follow up with Dr. Valdez as pain is around tumor site on sacrum.  Patient states that he is still experiencing right buttock/ right testicle pain and states frustration "I can't get any relief---I have tried muscle relaxers, oxycontin (only lasts for an hour), ice, heat, and bath soaks and nothing works."   I informed him I would fwd message to Dr. Valdez and see if she had any additional recommendations such as scans and get back with him.   Patient verbalized understanding.     Message routed to Dr. Valdez.           ----- Message from Tim Gonsales sent at 2/15/2017 10:57 AM CST -----  Contact: self  Pt states he has on the right side of buttocks, pain medication is not helping.  Contact number [phone]    "

## 2017-02-15 NOTE — MR AVS SNAPSHOT
[facility]  [address]  [facility] [address]  Phone: [phone]                  Lex Billy   2/15/2017 9:20 AM   Office Visit    Description:  Male : 1945   Provider:  Tien Palmer MD   Department:  [facility]           Reason for Visit     Coronary Artery Disease     Shortness of Breath                To Do List           Future Appointments        Provider Department Dept Phone    2017 6:00 AM [facility] MRI4 [facility] [phone]    2017 8:20 AM MD Carrie D[facility]az - Internal Medicine [phone]    2017 1:40 PM Marga Cazares NP [facility] Urology [place] [phone]    3/8/2017 10:30 AM LAB, APPOINTMENT [place] [facility] [phone]    3/8/2017 1:00 PM [facility] CT1 64- LIMIT 450 LBS [facility] [phone]      Goals (5 Years of Data)     None      Follow-Up and Disposition     Return in about 6 months (around 8/15/2017), or if symptoms worsen or fail to improve.       These Medications        Disp Refills Start End    nitroGLYCERIN (NITROSTAT) 0.4 MG SL tablet 25 tablet 4 2/15/2017 2/15/2018    Place 1 tablet (0.4 mg total) under the tongue every 5 (five) minutes as needed for Chest pain (If more than three nitro's are given in a row and patient is still having CP, please call MD). - Sublingual    Pharmacy: [facility] - [facility] [address] Ph #: [phone]         [facility]     [facility] -  Assistance  Registered nurses in the [facility] provide clinical advisement, health education, appointment booking, and other advisory services.  Call for this free service at [phone].             Medications           Message regarding Medications     Verify the changes and/or additions to your medication regime listed below are the same as discussed with your clinician today.  If  any of these changes or additions are incorrect, please notify your healthcare provider.             Verify that the below list of medications is an accurate representation of the medications you are currently taking.  If none reported, the list may be blank. If incorrect, please contact your healthcare provider. Carry this list with you in case of emergency.           Current Medications     aspirin (ECOTRIN) 81 MG EC tablet Take 81 mg by mouth once daily.    atorvastatin (LIPITOR) 40 MG tablet Take 1 tablet (40 mg total) by mouth once daily.    axitinib (INLYTA) 5 mg Tab Take 1 tablet by mouth 2 (two) times daily.    blood sugar diagnostic (TRUE METRIX GLUCOSE TEST STRIP) Strp Test three times daily    blood sugar diagnostic Strp 1 each by Misc.(Non-Drug; Combo Route) route 3 (three) times daily. Free style freedom lite test strips and lancets    blood-glucose meter (TRUE METRIX AIR GLUCOSE METER) Misc Use as directed    brimonidine 0.1% (ALPHAGAN P) 0.1 % Drop Place 1 drop into both eyes 3 (three) times daily.    CALCIUM CARBONATE-VITAMIN D3 ORAL Take 1 tablet by mouth every morning. Calcium carbonate 1000mg vitamin d3 1600 units per patient    clopidogrel (PLAVIX) 75 mg tablet Take 1 tablet (75 mg total) by mouth once daily.    fexofenadine (ALLEGRA) 180 MG tablet TAKE 1 TABLET ONE TIME DAILY    gabapentin (NEURONTIN) 300 MG capsule TAKE 1 CAPSULE TWICE DAILY    lancets 28 gauge Misc 1 lancet by Misc.(Non-Drug; Combo Route) route 3 (three) times daily.    metformin (GLUCOPHAGE) 500 MG tablet TAKE 1 TABLET BY MOUTH TWO TIMES A DAY WITH MEALS    methocarbamol (ROBAXIN) 500 MG Tab Take 1 tablet (500 mg total) by mouth nightly as needed (sciatic nerve pain).    metoprolol succinate (TOPROL-XL) 25 MG 24 hr tablet Take 0.5 tablets (12.5 mg total) by mouth once daily.    oxycodone-acetaminophen (PERCOCET)  mg per tablet Take 1 tablet by mouth every 4 (four) hours as needed for Pain.    potassium chloride SA  "(K-DUR,KLOR-CON) 20 MEQ tablet Take 1 tablet (20 mEq total) by mouth once daily.    verapamil (CALAN-SR) 240 MG CR tablet Take 1 tablet (240 mg total) by mouth once daily.    zolpidem (AMBIEN) 5 MG Tab Take 1 tablet (5 mg total) by mouth nightly as needed.    furosemide (LASIX) 40 MG tablet Take 1 tablet (40 mg total) by mouth once daily.    nitroGLYCERIN (NITROSTAT) 0.4 MG SL tablet Place 1 tablet (0.4 mg total) under the tongue every 5 (five) minutes as needed for Chest pain (If more than three nitro's are given in a row and patient is still having CP, please call MD).           Clinical Reference Information           Your Vitals Were     BP Pulse Height Weight SpO2 BMI    129/77 (BP Location: Left arm, Patient Position: Sitting, BP Method: Automatic) 85 5' 10" (1.778 m) 80.6 kg (177 lb 11.1 oz) 96% 25.5 kg/m2      Blood Pressure          Most Recent Value    Right Arm BP - Sitting  130/70    Left Arm BP - Sitting  129/77    BP  129/77      Allergies as of 2/15/2017     No Known Drug Allergies      Immunizations Administered on Date of Encounter - 2/15/2017     None      Language Assistance Services     ATTENTION: Language assistance services are available, free of charge. Please call [phone].      ATENCIÓN: Si habla rosario, tiene a johnston disposición servicios gratuitos de asistencia lingüística. Llame al [phone].     CHÚ Ý: N?u b?n nói Ti?ng Vi?t, có các d?ch v? h? tr? ngôn ng? mi?n phí dành cho b?n. G?i s? [phone].         [facility] complies with applicable Federal civil rights laws and does not discriminate on the basis of race, color, national origin, age, disability, or sex.        "

## 2017-02-15 NOTE — PROGRESS NOTES
Cardiology Clinic Note  Reason for Visit: Coronary Artery Disease (6 month f/u )    HPI:   I had the pleasure to meet Mr. Billy today who came in for f/u care for severe AS and CAD.    Mr. Billy is a 72 y.o. gentleman with history of PMH of DM- diet controlled, HTN, HLD, severe AS (NAMAN 0.8, PV 4, MG 44- not a TAVR candidate per Dr Ramirez), metastatic R renal cell carcinoma who presents to the cardiology clinic today for follow up from a hospitalization for an NSTEMI (10/9-10/9/15) s/p LM BMS x 1, mRCA BMS x 2, and dRCA BMS x 1. He denies any chest pain, SOB, exertional limitations, orthopnea, syncope, dizziness/lightheadedness. He is able to work outside in his yard without any symptoms. He is currently undergoing chemotherapy for his metastatic renal cell carcinoma.    ROS:    Constitution: negative for - fever, chills, weight loss or weight gain  HENT: negative for - sore throat, rhinorrhea, or headache  Eyes: negative for - blurred or double vision  Cardiovascular: negative  Pulmonary: negative  Gastrointestinal: negative for - abdominal pain, nausea, vomiting, or diarrhea  : negative for - dysuria  Neurological: negative for - focal weakness or sensory changes  PMH:     Past Medical History   Diagnosis Date    Acute on chronic congestive heart failure     Arthritis     Colon polyp     Coronary artery disease involving native coronary artery with unstable angina pectoris 10/7/2015    Diabetes mellitus     Heart disease, unspecified     Hyperlipidemia     Hypertension     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 7/15/2014    Pneumonia 02/2016    Stroke      right eye    Type 2 diabetes mellitus      Past Surgical History   Procedure Laterality Date    Total knee arthroplasty       left side    Rotator cuff repair       right side    Vasectomy      Joint replacement      Carpal tunnel release       left    Torn ligament       repair. left shoulder    Orthoscopic surgery on knee       Back surgery       cervical disc replacement    Tonsillectomy      Eye surgery       laser surgery in right eye    Coronary angioplasty with stent placement  10/2015     4 stents    Sinus surgery       Allergies:     Review of patient's allergies indicates:   Allergen Reactions    No known drug allergies      Medications:     Current Outpatient Prescriptions on File Prior to Visit   Medication Sig Dispense Refill    aspirin (ECOTRIN) 81 MG EC tablet Take 81 mg by mouth once daily.      atorvastatin (LIPITOR) 40 MG tablet Take 1 tablet (40 mg total) by mouth once daily. 90 tablet 3    axitinib (INLYTA) 5 mg Tab Take 1 tablet by mouth 2 (two) times daily. 60 tablet 3    blood sugar diagnostic (TRUE METRIX GLUCOSE TEST STRIP) Strp Test three times daily 300 strip 3    blood sugar diagnostic Strp 1 each by Misc.(Non-Drug; Combo Route) route 3 (three) times daily. Free style freedom lite test strips and lancets 90 each 3    blood-glucose meter (TRUE METRIX AIR GLUCOSE METER) Misc Use as directed 1 each 0    brimonidine 0.1% (ALPHAGAN P) 0.1 % Drop Place 1 drop into both eyes 3 (three) times daily.      CALCIUM CARBONATE-VITAMIN D3 ORAL Take 1 tablet by mouth every morning. Calcium carbonate 1000mg vitamin d3 1600 units per patient      clopidogrel (PLAVIX) 75 mg tablet Take 1 tablet (75 mg total) by mouth once daily. 90 tablet 3    fexofenadine (ALLEGRA) 180 MG tablet TAKE 1 TABLET ONE TIME DAILY 90 tablet 3    gabapentin (NEURONTIN) 300 MG capsule TAKE 1 CAPSULE TWICE DAILY 180 capsule 2    lancets 28 gauge Misc 1 lancet by Misc.(Non-Drug; Combo Route) route 3 (three) times daily. 300 each 3    metformin (GLUCOPHAGE) 500 MG tablet TAKE 1 TABLET BY MOUTH TWO TIMES A DAY WITH MEALS 60 tablet 5    methocarbamol (ROBAXIN) 500 MG Tab Take 1 tablet (500 mg total) by mouth nightly as needed (sciatic nerve pain). 30 tablet 0    metoprolol succinate (TOPROL-XL) 25 MG 24 hr tablet Take 0.5 tablets (12.5 mg  "total) by mouth once daily. 45 tablet 6    oxycodone-acetaminophen (PERCOCET)  mg per tablet Take 1 tablet by mouth every 4 (four) hours as needed for Pain. 40 tablet 0    potassium chloride SA (K-DUR,KLOR-CON) 20 MEQ tablet Take 1 tablet (20 mEq total) by mouth once daily. 90 tablet 4    verapamil (CALAN-SR) 240 MG CR tablet Take 1 tablet (240 mg total) by mouth once daily. 90 tablet 3    zolpidem (AMBIEN) 5 MG Tab Take 1 tablet (5 mg total) by mouth nightly as needed. 30 tablet 3    furosemide (LASIX) 40 MG tablet Take 1 tablet (40 mg total) by mouth once daily. 90 tablet 4    [DISCONTINUED] nitroGLYCERIN (NITROSTAT) 0.4 MG SL tablet Place 1 tablet (0.4 mg total) under the tongue every 5 (five) minutes as needed for Chest pain (If more than three nitro's are given in a row and patient is still having CP, please call MD). 25 tablet 4     No current facility-administered medications on file prior to visit.      Social History:     Social History   Substance Use Topics    Smoking status: Never Smoker    Smokeless tobacco: Never Used    Alcohol use No      Comment: Heavy in past, quit about 20 years ago     Family History:     Family History   Problem Relation Age of Onset    Heart disease Father     ALS Father     Cancer Brother      esophageal    Rheum arthritis Mother     Migraines Daughter     Asthma Son     Diabetes Neg Hx     Colon polyps Neg Hx      Physical Exam:     Visit Vitals    /77 (BP Location: Left arm, Patient Position: Sitting, BP Method: Automatic)    Pulse 85    Ht 5' 10" (1.778 m)    Wt 80.6 kg (177 lb 11.1 oz)    SpO2 96%    BMI 25.5 kg/m2        Constitutional: NAD, conversant  HEENT: Sclera anicteric, PERRLA, EOMI  Neck: No JVD, no carotid bruits  CV: RRR, 3/6 late peaking systolic ejection murmur with bilateral carotid radiation, normal S1/S2  Pulm: CTAB, no wheezes, rales, or ronchi  GI: Abdomen soft, NTND, +BS  Extremities: No LE edema, warm and well " perfused  Skin: No ecchymosis, erythema, or ulcers  Psych: AOx3, appropriate affect  Neuro: CNII-XII intact, no focal deficits    Labs:     Lab Results   Component Value Date     02/13/2017    K 4.1 02/13/2017     02/13/2017    CO2 27 02/13/2017    BUN 13 02/13/2017    CREATININE 1.0 02/13/2017    ANIONGAP 9 02/13/2017     Lab Results   Component Value Date    HGBA1C 5.7 10/20/2016     Lab Results   Component Value Date     (H) 07/23/2016     (H) 02/11/2016     (H) 01/31/2016    Lab Results   Component Value Date    WBC 7.84 02/13/2017    HGB 13.8 (L) 02/13/2017    HCT 41.0 02/13/2017    HCT 28 (L) 02/11/2016     02/13/2017    GRAN 5.8 02/13/2017    GRAN 73.9 (H) 02/13/2017     Lab Results   Component Value Date    CHOL 96 (L) 06/06/2016    HDL 33 (L) 06/06/2016    LDLCALC 46.6 (L) 06/06/2016    TRIG 82 06/06/2016          Imaging:   TTE: 2/2016  CONCLUSIONS     1 - Low normal to mildly depressed left ventricular systolic function (EF 50-55%).     2 - Normal right ventricular systolic function .     3 - Left ventricular diastolic dysfunction.     4 - Mild mitral regurgitation.     5 - Severe aortic stenosis, NAMAN = 0.8 cm2, peak velocity = 4.0 m/s, mean gradient = 44 mmHg.     EF   Date Value Ref Range Status   02/12/2016 50 55 - 65    10/08/2015 63 55 - 65    02/09/2015 65 55 - 65      Assessment:   Mr. Billy is a 72 y.o. gentleman with history of PMH of DM- diet controlled, HTN, HLD, severe AS (NAMAN 0.8, PV 4, MG 44- not a TAVR candidate per Dr Ramirez), metastatic R renal cell carcinoma who presents to the cardiology clinic today for follow up from a hospitalization for an NSTEMI (10/9-10/9/15) s/p LM BMS x 1, mRCA BMS x 2, and dRCA BMS x 1.  Plan:   CAD:  - asymptomatic at this point  - cont current mgmt with ASA, plavix, atorvastatin     Severe AS:  - continues to remain asymptomatic  - not at candidate for TAVR given metastatic renal cell CA; if patient becomes  symptomatic, may consider BAV depending on life expectancy    Discussed with Dr. Gill. RTC in 6 months.     Signed:  Tien Palmer MD  Cardiology Fellow, PGY-6  Pager: [phone]  2/15/2017 12:51 PM

## 2017-02-16 NOTE — TELEPHONE ENCOUNTER
Returned call to patient.   Advised him this MRI is needed because they did a MRI of the spine in ER and not pelvis.   Rescheduled MRI to later appt per patient request.   Patient mentioned phone call from yesterday and I assured him I sent message to Dr. Valdez and have not gotten an answer yet but would let him know when I did.   Patient verbalized understanding.       ----- Message from Angela Parrish sent at 2/16/2017  9:17 AM CST -----  Patient would like the nurse to give him a call back about his MRI appointment. He can be reached at [phone].

## 2017-02-17 NOTE — TELEPHONE ENCOUNTER
Explained if he could not urinate once the jauregui was removed then he would have to have jauregui reinserted at the ER. He stated pain was tolerable and the leaking was not too much. He states he had a lot of urine draining into the bag.  He stated he would wait until Monday for appt for catheter to be removed. Explained to call or go to ER this weekend if the catheter stopped draining, f/c, or abdominal pain. Verbalized understanding.       ----- Message from Carmelita Madison MA sent at 2/17/2017  8:21 AM CST -----  Contact: [phone]  Pt has a cath in place and its leaking a bit and causing discomfort, he wants to know if he can take it out himself.

## 2017-02-17 NOTE — TELEPHONE ENCOUNTER
Returned call to patient after speaking with Dr. Valdez.   Informed patient to double up on pain medication (MS Contin) and see how that works.   Informed him if not effective to call nurse on call hotline over the weekend and if unrelieved ER advisable.   Patient verbalized understanding.       ----- Message from Angela Parrish sent at 2/17/2017  1:31 PM CST -----  Patient would like the nurse to give him a call back. Says the pain medication is not working. He can be reached at [phone].

## 2017-02-20 PROBLEM — M47.816 FACET ARTHRITIS OF LUMBAR REGION: Status: ACTIVE | Noted: 2017-01-01

## 2017-02-20 NOTE — PROGRESS NOTES
Subjective:       Patient ID: Lex Billy is a 72 y.o. male.    Chief Complaint: Annual Exam    HPI     72 y.o. male here for annual exam.     Cholesterol: needs  Vaccines: Influenza - done; Tetanus - not sure; Pneumovax - 2012; Prevnar - 2015; Zoster - not done  Sexual Screening: not active  STD screening: ; not active  Eye exam: wears glasses; done 6 months ago  Prostate: needs  Colonoscopy: done 2010, due 2017  DEXA: needs to be done    Exercise:  Before this started bothering him, he was hunting, cutting grass, cleaning gutters  Diet:  Seldom eats fast food.  Diet is pretty good, but there are times he does not feel like eating.    Past Medical History   Diagnosis Date    Acute on chronic congestive heart failure     Arthritis     Colon polyp     Coronary artery disease involving native coronary artery with unstable angina pectoris 10/7/2015    Diabetes mellitus     Heart disease, unspecified     Hyperlipidemia     Hypertension     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 7/15/2014    Pneumonia 02/2016    Stroke      right eye    Type 2 diabetes mellitus      Past Surgical History   Procedure Laterality Date    Total knee arthroplasty       left side    Rotator cuff repair       right side    Vasectomy      Joint replacement      Carpal tunnel release       left    Torn ligament       repair. left shoulder    Orthoscopic surgery on knee      Back surgery       cervical disc replacement    Tonsillectomy      Eye surgery       laser surgery in right eye    Coronary angioplasty with stent placement  10/2015     4 stents    Sinus surgery       Social History     Social History    Marital status:      Spouse name: Belkys    Number of children: N/A    Years of education: N/A     Occupational History    Retired Not [employer]     Social History Main Topics    Smoking status: Never Smoker    Smokeless tobacco: Never Used    Alcohol use No      Comment:  Heavy in past, quit about 20 years ago    Drug use: No    Sexual activity: Not Currently     Partners: Female     Other Topics Concern    Not on file     Social History Narrative    , previous longshoreman.    Wife OK has dm2.    2 kids, 1 son RT, 1 dtr .    Has not been able to exercise     Review of patient's allergies indicates:   Allergen Reactions    No known drug allergies      Mr. Billy had no medications administered during this visit.    Review of Systems   Constitutional: Negative for chills, fever and unexpected weight change.   HENT: Negative for congestion, postnasal drip and sore throat.    Eyes: Negative for redness and visual disturbance.   Respiratory: Positive for shortness of breath (not when he is active.  Only when he is inactive.). Negative for cough.    Cardiovascular: Negative for chest pain and palpitations.   Gastrointestinal: Positive for constipation (on occasion). Negative for abdominal pain, diarrhea, nausea and vomiting.   Genitourinary: Negative for dysuria, frequency and hematuria.   Musculoskeletal: Positive for back pain. Negative for arthralgias and myalgias.   Skin: Negative for color change and rash.   Neurological: Negative for dizziness and headaches.       Objective:      Physical Exam   Constitutional: He is oriented to person, place, and time. He appears well-developed and well-nourished.   HENT:   Head: Normocephalic and atraumatic.   Mouth/Throat: No oropharyngeal exudate.   Eyes: EOM are normal. Pupils are equal, round, and reactive to light. Right eye exhibits no discharge. Left eye exhibits no discharge. No scleral icterus.   Neck: Normal range of motion. Neck supple. No tracheal deviation present. No thyromegaly present.   Cardiovascular: Normal rate, regular rhythm and normal heart sounds.  Exam reveals no gallop and no friction rub.    No murmur heard.  Pulmonary/Chest: Effort normal and breath sounds normal. No respiratory distress. He  has no wheezes. He has no rales. He exhibits no tenderness.   Abdominal: Soft. Bowel sounds are normal. He exhibits no distension and no mass. There is no tenderness. There is no rebound and no guarding.   Musculoskeletal: Normal range of motion. He exhibits no edema or tenderness.   Neurological: He is alert and oriented to person, place, and time.   Skin: Skin is warm and dry. No rash noted. No erythema. No pallor.   Psychiatric: He has a normal mood and affect. His behavior is normal.   Vitals reviewed.      Assessment:       1. Annual physical exam    2. Drug-induced diabetes mellitus    3. Type 2 diabetes mellitus with diabetic polyneuropathy, without long-term current use of insulin    4. Sacral germ cell tumor    5. Aortic atherosclerosis    6. Essential hypertension    7. Renal cell carcinoma of right kidney    8. Prostate cancer screening    9. Disorder of bone    10. Facet arthritis of lumbar region    11. Lumbar radiculopathy    12. 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        Plan:       1.  Reviewed CBC and CMP with patient.  Check A1c, TSH, lipids, urine microalbumin.  Tetanus vaccine given today.  Discussed exercise with patient.  Colonoscopy due this year, but patient into much pain from other issues to get colonoscopy.  Plan to readdress when pain is better controlled.  2/3.  Continue with metformin.  4/7.  Followed by oncology.  5.  Continue with aspirin 81 mg, Lipitor 40 mg.  6.  Continue with Toprol-XL 25 mg, verapamil 240 mg  8.  Check PSA.  9.  DEXA scan.  10/11/12.  See below.    Separately identifiable other problems:  He has a pain that goes down the right side of his back into his butt.  Pain medication is not helping.  He is taking morphine.  This came on gradually.  He takes a muscle relaxer at night for sciatic nerve pain.  Seems like nothing is doing any good.  Lumbar spine - mild spinal stenosis L2-3 and severe right neuroforaminal stenosis narrowing.  He has had back injections  before.  He has MRI of pelvis showing involvement of the right S3 and S4 nerve roots.    ROS: Constitutional-no fevers, chills.  HEENT-no congestion, postnasal drip.  Eyes-no eye redness or visual disturbance.  Respiratory-no cough, + shortness of breath.  Cardiovascular-no chest pain, palpitations.  GI-no abdominal pain, nausea, vomiting.  -no dysuria, frequency.  Musculoskeletal-no arthralgias or myalgias, + back pain.  Skin-no changes in color or rashes.  Neurological-no dizziness or headaches.  PE:  Constitutional - well-developed, well nourished; HENT normocephalic, atraumatic; Eyes - PERRL, EOM normal; Neck - ROM normal, supple; CV - RRR, no m/r/g; Pulmonary - normal effort, normal breath sounds; Abd - soft, non-tender/non-distended; MSK - ROM normal, no tenderness; Neuro - A&O x 3; Skin - warm and dry    10.  Facet arthritis of lumbar region  11.  Lumbar radiculopathy  12.  Metastatic renal cell carcinoma to the bone     10/11/12.  Given recent MRI findings, refer to neurosurgery.  Increase gabapentin to 600 mg 3 times a day with up titration spelled out on visit summary: Take gabapentin 300mg three times a day for a week. Then, take 300 mg am, 300 mg noon, 600 mg pm for a week.  Then, take 600 mg am, 300 mg noon, 600 mg pm for a week.  Then, take 600 mg am, 600 mg noon, 600 mg pm for a week.  Start Cymbalta 30 mrem daily.    Return to clinic in 1 month or sooner if needed.

## 2017-02-20 NOTE — PATIENT INSTRUCTIONS
Take gabapentin 300mg three times a day for a week. Then, take 300 mg am, 300 mg noon, 600 mg pm for a week.  Then, take 600 mg am, 300 mg noon, 600 mg pm for a week.  Then, take 600 mg am, 600 mg noon, 600 mg pm for a week.

## 2017-02-20 NOTE — PATIENT INSTRUCTIONS
Urinary Retention (Male)  Urinary retention is the medical term for difficulty or inability to pass urine, even though your bladder is full.  Causes  The most common cause of urinary retention in men is the bladder outlet being blocked. This can be due to an enlarged prostate gland or a bladder infection. Certain medicines can also cause this problem. This condition is more likely to occur as men get older.    This condition is treated by insertion of a catheter into the bladder to drain the urine. This provides immediate relief. The catheter may need to remain in place for a few days to prevent a recurrence. The catheter has a balloon on the tip which was inflated after insertion. This prevents the catheter from falling out.  Symptoms  Common symptoms of urinary retention include:  · Pain (not experienced by everyone)  · Frequent urination  · Feeling that the bladder is still full after urinating  · Incontinence (not being able to control the release of urine)  · Swollen abdomen  Treatment  This condition is treated by inserting a tube (catheter) into the bladder to drain the urine. This provides immediate relief. The catheter may need to stay in place for a few days. The catheter has a balloon on the tip, which is inflated after insertion. This prevents the catheter from falling out.  Home care  · If you were given antibiotics, take them until they are used up, or your healthcare provider tells you to stop. It is important to finish the antibiotics even though you feel better. This is to make sure your infection has cleared.  · If a catheter was left in place, it is important to keep bacteria from getting into the collection bag. Do not disconnect the catheter from the collection bag.  · Use a leg band to secure the drainage tube, so it does not pull on the catheter. Drain the collection bag when it becomes full using the drain spout at the bottom of the bag.  · Do not pull on or try to remove your catheter.  This will injure your urethra. The catheter must be removed by a healthcare provider.  Follow-up care  Follow up with your healthcare provider, or as advised.  If a catheter was left in place, it can usually be removed within 3 to 7 days. Some conditions require the catheter to stay in longer. Your healthcare provider will tell you when to return to have the catheter removed.  When to seek medical advice  Call your healthcare provider right away if any of these occur:  · Fever of 101.4ºF (38ºC) or higher, or as directed by your healthcare provider  · Bladder or lower-abdominal pain or fullness  · Abdominal swelling, nausea, vomiting, or back pain  · Blood or urine leakage around the catheter  · Bloody urine coming from the catheter (if a new symptom)  · Weakness, dizziness, or fainting  · Confusion or change in usual level of alertness  · If a catheter was left in place, return if:  ¨ Catheter falls out  ¨ Catheter stops draining for 6 hours  Date Last Reviewed: 7/26/2015  © 8208-8282 [employer]. [address] [place] [address], [place] [address]. All rights reserved. This information is not intended as a substitute for professional medical care. Always follow your healthcare professional's instructions.

## 2017-02-20 NOTE — MR AVS SNAPSHOT
[facility]  [address]  [facility] [address]  Phone: [phone]                  Lex Billy   2017 1:40 PM   Office Visit    Description:  Male : 1945   Provider:  Marga Cazares NP   Department:  [facility]           Reason for Visit     Urinary Retention           Diagnoses this Visit        Comments    Urinary retention    -  Primary            To Do List           Future Appointments        Provider Department Dept Phone    3/8/2017 10:30 AM LAB, APPOINTMENT [place] [facility] [phone]    3/8/2017 1:00 PM [facility] CT1 64- LIMIT 450 LBS [facility] [phone]    3/8/2017 1:20 PM [facility] CT6 MOBILE LIMIT 450 LBS [facility] [phone]    3/9/2017 4:30 PM Janine Valdez MD [facility] - Hematology Oncology [phone]    3/15/2017 10:00 AM Christian Monreal MD [facility] - Neurosurgery [facility] [phone]      Goals (5 Years of Data)     None       These Medications        Disp Refills Start End    tamsulosin (FLOMAX) 0.4 mg Cp24 30 capsule 11 2017    Take 1 capsule (0.4 mg total) by mouth once daily. - Oral    Pharmacy: [facility] #[id number] - [place] - [address]  #: [phone]         [facility] On Call     [facility] Nurse Care Line -  Assistance  Registered nurses in the [facility] Center provide clinical advisement, health education, appointment booking, and other advisory services.  Call for this free service at [phone].             Medications           Message regarding Medications     Verify the changes and/or additions to your medication regime listed below are the same as discussed with your clinician today.  If any of these changes or additions are incorrect, please notify your healthcare provider.        START taking these NEW medications        Refills    tamsulosin (FLOMAX) 0.4 mg Cp24 11    Sig: Take 1 capsule  (0.4 mg total) by mouth once daily.    Class: Normal    Route: Oral           Verify that the below list of medications is an accurate representation of the medications you are currently taking.  If none reported, the list may be blank. If incorrect, please contact your healthcare provider. Carry this list with you in case of emergency.           Current Medications     aspirin (ECOTRIN) 81 MG EC tablet Take 81 mg by mouth once daily.    atorvastatin (LIPITOR) 40 MG tablet Take 1 tablet (40 mg total) by mouth once daily.    axitinib (INLYTA) 5 mg Tab Take 1 tablet by mouth 2 (two) times daily.    blood sugar diagnostic (TRUE METRIX GLUCOSE TEST STRIP) Strp Test three times daily    blood sugar diagnostic Strp 1 each by Misc.(Non-Drug; Combo Route) route 3 (three) times daily. Free style freedom lite test strips and lancets    blood-glucose meter (TRUE METRIX AIR GLUCOSE METER) Misc Use as directed    brimonidine 0.1% (ALPHAGAN P) 0.1 % Drop Place 1 drop into both eyes 3 (three) times daily.    CALCIUM CARBONATE-VITAMIN D3 ORAL Take 1 tablet by mouth every morning. Calcium carbonate 1000mg vitamin d3 1600 units per patient    clopidogrel (PLAVIX) 75 mg tablet Take 1 tablet (75 mg total) by mouth once daily.    duloxetine (CYMBALTA) 30 MG capsule Take 1 capsule (30 mg total) by mouth once daily.    duloxetine (CYMBALTA) 30 MG capsule Take 1 capsule (30 mg total) by mouth once daily.    fexofenadine (ALLEGRA) 180 MG tablet TAKE 1 TABLET ONE TIME DAILY    furosemide (LASIX) 40 MG tablet Take 1 tablet (40 mg total) by mouth once daily.    gabapentin (NEURONTIN) 300 MG capsule Take 2 capsules (600 mg total) by mouth 3 (three) times daily.    lancets 28 gauge Misc 1 lancet by Misc.(Non-Drug; Combo Route) route 3 (three) times daily.    metformin (GLUCOPHAGE) 500 MG tablet TAKE 1 TABLET BY MOUTH TWO TIMES A DAY WITH MEALS    methocarbamol (ROBAXIN) 500 MG Tab Take 1 tablet (500 mg total) by mouth nightly as needed (sciatic  "nerve pain).    metoprolol succinate (TOPROL-XL) 25 MG 24 hr tablet Take 0.5 tablets (12.5 mg total) by mouth once daily.    morphine (MS CONTIN) 15 MG 12 hr tablet Take 1 tablet (15 mg total) by mouth every 8 (eight) hours as needed for Pain.    nitroGLYCERIN (NITROSTAT) 0.4 MG SL tablet Place 1 tablet (0.4 mg total) under the tongue every 5 (five) minutes as needed for Chest pain.    potassium chloride SA (K-DUR,KLOR-CON) 20 MEQ tablet Take 1 tablet (20 mEq total) by mouth once daily.    predniSONE (DELTASONE) 50 MG Tab Take 1 tablet (50 mg total) by mouth once daily.    tamsulosin (FLOMAX) 0.4 mg Cp24 Take 1 capsule (0.4 mg total) by mouth once daily.    verapamil (CALAN-SR) 240 MG CR tablet Take 1 tablet (240 mg total) by mouth once daily.    zolpidem (AMBIEN) 5 MG Tab Take 1 tablet (5 mg total) by mouth nightly as needed.           Clinical Reference Information           Your Vitals Were     BP Height Weight BMI       112/60 5' 10" (1.778 m) 79.8 kg (176 lb) 25.25 kg/m2       Blood Pressure          Most Recent Value    BP  112/60      Allergies as of 2/20/2017     No Known Drug Allergies      Immunizations Administered on Date of Encounter - 2/20/2017     Name Date Dose VIS Date Route    TD 2/20/2017 0.5 mL 2/24/2015 Intramuscular      Instructions      Urinary Retention (Male)  Urinary retention is the medical term for difficulty or inability to pass urine, even though your bladder is full.  Causes  The most common cause of urinary retention in men is the bladder outlet being blocked. This can be due to an enlarged prostate gland or a bladder infection. Certain medicines can also cause this problem. This condition is more likely to occur as men get older.    This condition is treated by insertion of a catheter into the bladder to drain the urine. This provides immediate relief. The catheter may need to remain in place for a few days to prevent a recurrence. The catheter has a balloon on the tip which was " inflated after insertion. This prevents the catheter from falling out.  Symptoms  Common symptoms of urinary retention include:  · Pain (not experienced by everyone)  · Frequent urination  · Feeling that the bladder is still full after urinating  · Incontinence (not being able to control the release of urine)  · Swollen abdomen  Treatment  This condition is treated by inserting a tube (catheter) into the bladder to drain the urine. This provides immediate relief. The catheter may need to stay in place for a few days. The catheter has a balloon on the tip, which is inflated after insertion. This prevents the catheter from falling out.  Home care  · If you were given antibiotics, take them until they are used up, or your healthcare provider tells you to stop. It is important to finish the antibiotics even though you feel better. This is to make sure your infection has cleared.  · If a catheter was left in place, it is important to keep bacteria from getting into the collection bag. Do not disconnect the catheter from the collection bag.  · Use a leg band to secure the drainage tube, so it does not pull on the catheter. Drain the collection bag when it becomes full using the drain spout at the bottom of the bag.  · Do not pull on or try to remove your catheter. This will injure your urethra. The catheter must be removed by a healthcare provider.  Follow-up care  Follow up with your healthcare provider, or as advised.  If a catheter was left in place, it can usually be removed within 3 to 7 days. Some conditions require the catheter to stay in longer. Your healthcare provider will tell you when to return to have the catheter removed.  When to seek medical advice  Call your healthcare provider right away if any of these occur:  · Fever of 101.4ºF (38ºC) or higher, or as directed by your healthcare provider  · Bladder or lower-abdominal pain or fullness  · Abdominal swelling, nausea, vomiting, or back pain  · Blood or  urine leakage around the catheter  · Bloody urine coming from the catheter (if a new symptom)  · Weakness, dizziness, or fainting  · Confusion or change in usual level of alertness  · If a catheter was left in place, return if:  ¨ Catheter falls out  ¨ Catheter stops draining for 6 hours  Date Last Reviewed: 7/26/2015  © 2802-9409 [employer]. [address] [place] [address], [place] [address]. All rights reserved. This information is not intended as a substitute for professional medical care. Always follow your healthcare professional's instructions.             Language Assistance Services     ATTENTION: Language assistance services are available, free of charge. Please call [phone].      ATENCIÓN: Si lowellla rosario, tiene a johnston disposición servicios gratuitos de asistencia lingüística. Llame al [phone].     CHÚ Ý: N?u b?n nói Ti?ng Vi?t, có các d?ch v? h? tr? ngôn ng? mi?n phí dành cho b?n. G?i s? [phone].         [facility] complies with applicable Federal civil rights laws and does not discriminate on the basis of race, color, national origin, age, disability, or sex.

## 2017-02-20 NOTE — PROGRESS NOTES
Subjective:       Patient ID: Lex Billy is a 72 y.o. male.    Chief Complaint: Urinary Retention (voiding trial)    HPI Comments: Lex Billy is a 70 y.o. male with a history of right renal cancer. Presented in 7/2014 with right tumor with metastasis to sacrum and ribs. 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 Started on sutent but did not tolerate. Has now had 8 cycles of Nexavar. Is also on Zometa. Recent CT scan and bone scan performed on 9/16/2015. Both imaging studies suggest disease progression, both right renal masses have gotten bigger during treatment and rib lesion is larger at 3.4x3.9 cm. Bone scan suggests sacral lesion has progressed also since June.  Last seen with Dr. Bolden on 10/2015.    Recently had 4 coronary stents placed and has severe AS.    He presented to the ED on 2/13/17 w/ the complaint of difficulty urinating and right-sided groin pain. He states that he was able to urinate while here in the ED, though isn't certain he was able to fully void.  He has no dysuria.  He states that he needed to self-cath yesterday, with output of about 600 mL. Pt endorses sharp right buttocks pain, as well as bowel difficulty and chronic back pain (without recent change). Pt denies hematuria, dysuria, weakness/numbness, cough or cold symptoms, vomiting/diarrhia, blood in stool.  His last bm last night was normal.    Today, he reports no problems with the catheter except for slight urinary leakage. Denies abdominal pain, flank pain, f/c, and n/v.  He reports he was urinating with no difficulties in the last few years.  He has a hx of doing CIC but he is not sure why he did it.  He takes morphine for chronic low back pain and states he does have constipation.       Review of Systems   Constitutional: Negative for appetite change, chills, fatigue, fever and unexpected weight change.   HENT: Negative for nosebleeds.    Respiratory: Negative for shortness of breath and wheezing.     Cardiovascular: Negative for chest pain, palpitations and leg swelling.   Gastrointestinal: Negative for abdominal distention, abdominal pain, constipation, diarrhea, nausea and vomiting.   Genitourinary: Negative for difficulty urinating, dysuria and hematuria.        Clear yellow urine draining in the jauregui catheter bag.    Musculoskeletal: Negative for arthralgias and back pain.   Skin: Negative for pallor.   Neurological: Negative for dizziness, seizures and syncope.   Hematological: Negative for adenopathy.   Psychiatric/Behavioral: Negative for dysphoric mood.       Objective:      Physical Exam   Nursing note and vitals reviewed.  Constitutional: He is oriented to person, place, and time. He appears well-developed and well-nourished.   HENT:   Head: Normocephalic.   Eyes: Pupils are equal, round, and reactive to light.   Neck: Normal range of motion. Neck supple.   Cardiovascular: Normal rate.    Pulmonary/Chest: Effort normal.   Abdominal: Soft. He exhibits no distension and no mass. There is no tenderness. There is no rebound and no guarding.   Genitourinary: Penis normal.   Genitourinary Comments: On WEI prostate enlarged, boggy and nontender.    Musculoskeletal: Normal range of motion.   Neurological: He is alert and oriented to person, place, and time.   Skin: Skin is warm and dry.     Psychiatric: He has a normal mood and affect. His behavior is normal. Judgment and thought content normal.       Assessment:       1. Urinary retention    2. 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 Jauregui catheter in place    4. Encounter for Jauregui catheter removal        Plan:     Voiding trial performed by Nurse Turk.  180 ml of sterile water was instilled into bladder.  Jauregui catheter was removed. Patient urinated 0 ml without difficulty.  cc.   Voiding trial failed. Pt instructed to use CIC as he has in the past. Discussed CIC. Catheter samples given.  Patient was instructed to drink plenty of fluids  today.  Instructed patient to call tomorrow morning to give an update on urine output.  Informed patient to return to emergency department (if after clinic hours) to have jauregui catheter put back in if unable to do CIC or starts to experience bladder pressure/pain, decrease flow, straining/difficulty urinating.    Discussed side effects, indications, and MOA for flomax. Prescription sent to the pharmacy. Pt verbalized understanding.  Patient voiced understanding  RTC in 6 weeks with PVR if able to urinate with no difficulties.         2/21/17, pt called this morning and states he is only able to urinate a small amount at a time. He reports he woke up at 2 AM saturated in urine. He performed an in and out. Then at 1100, he had to repeat in and out cath. We discussed the need for SUDS and cysto. Explained to continue CIC 4-5x daily until SUDS and cysto. Explained to call if problems. Verbalized understanding.

## 2017-02-20 NOTE — MR AVS SNAPSHOT
[facility]   [facility]  [place] [address]  Phone: [phone]  Fax: [phone]                  Lex Billy   2017 8:20 AM   Office Visit    Description:  Male : 1945   Provider:  Everette Rowan MD   Department:  [facility]           Reason for Visit     Annual Exam           Diagnoses this Visit        Comments    Annual physical exam    -  Primary     Drug-induced diabetes mellitus         Type 2 diabetes mellitus with diabetic polyneuropathy, without long-term current use of insulin         Sacral germ cell tumor         Aortic atherosclerosis         Essential hypertension         Renal cell carcinoma of right kidney         Prostate cancer screening         Disorder of bone         Facet arthritis of lumbar region         Lumbar radiculopathy         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                To Do List           Future Appointments        Provider Department Dept Phone    2017 9:15 AM LAB, [facility] [phone]    2017 9:30 AM SPECIMEN, [facility] Specimen [facility] [phone]    2017 1:40 PM Marga Cazares NP [facility] [place] [phone]    3/8/2017 10:30 AM LAB, APPOINTMENT [place] [facility] [phone]    3/8/2017 1:00 PM [facility] CT1 64- LIMIT 450 LBS [facility] [phone]      Goals (5 Years of Data)     None       These Medications        Disp Refills Start End    duloxetine (CYMBALTA) 30 MG capsule 90 capsule 3 2017     Take 1 capsule (30 mg total) by mouth once daily. - Oral    Pharmacy: [facility] - [facility] [address] Ph #: [phone]       gabapentin (NEURONTIN) 300 MG capsule 540 capsule 3 2017    Take 2 capsules (600 mg total) by mouth 3 (three) times daily. - Oral    Pharmacy: [facility] - [facility] [address]  [place]  Ph #: [phone]       predniSONE (DELTASONE) 50 MG Tab 5 tablet 0 2/20/2017 2/25/2017    Take 1 tablet (50 mg total) by mouth once daily. - Oral    Pharmacy: [facility]/pharmacy #[id number] [address] Ph #: [phone]       duloxetine (CYMBALTA) 30 MG capsule 30 capsule 0 2/20/2017     Take 1 capsule (30 mg total) by mouth once daily. - Oral    Pharmacy: [facility]/pharmacy #[id number] [address] Ph #: [phone]         [facility]     [facility] On Call Nurse Care Line - 24/7 Assistance  Registered nurses in the [facility] provide clinical advisement, health education, appointment booking, and other advisory services.  Call for this free service at [phone].             Medications           Message regarding Medications     Verify the changes and/or additions to your medication regime listed below are the same as discussed with your clinician today.  If any of these changes or additions are incorrect, please notify your healthcare provider.        START taking these NEW medications        Refills    duloxetine (CYMBALTA) 30 MG capsule 3    Sig: Take 1 capsule (30 mg total) by mouth once daily.    Class: Normal    Route: Oral    gabapentin (NEURONTIN) 300 MG capsule 3    Sig: Take 2 capsules (600 mg total) by mouth 3 (three) times daily.    Class: Normal    Route: Oral    predniSONE (DELTASONE) 50 MG Tab 0    Sig: Take 1 tablet (50 mg total) by mouth once daily.    Class: Normal    Route: Oral    duloxetine (CYMBALTA) 30 MG capsule 0    Sig: Take 1 capsule (30 mg total) by mouth once daily.    Class: Normal    Route: Oral      STOP taking these medications     oxycodone-acetaminophen (PERCOCET)  mg per tablet Take 1 tablet by mouth every 4 (four) hours as needed for Pain.           Verify that the below list of medications is an accurate representation of the medications you are currently taking.  If none reported, the list may be blank. If incorrect,  please contact your healthcare provider. Carry this list with you in case of emergency.           Current Medications     aspirin (ECOTRIN) 81 MG EC tablet Take 81 mg by mouth once daily.    atorvastatin (LIPITOR) 40 MG tablet Take 1 tablet (40 mg total) by mouth once daily.    axitinib (INLYTA) 5 mg Tab Take 1 tablet by mouth 2 (two) times daily.    blood sugar diagnostic (TRUE METRIX GLUCOSE TEST STRIP) Strp Test three times daily    blood sugar diagnostic Strp 1 each by Misc.(Non-Drug; Combo Route) route 3 (three) times daily. Free style freedom lite test strips and lancets    blood-glucose meter (TRUE METRIX AIR GLUCOSE METER) Misc Use as directed    brimonidine 0.1% (ALPHAGAN P) 0.1 % Drop Place 1 drop into both eyes 3 (three) times daily.    CALCIUM CARBONATE-VITAMIN D3 ORAL Take 1 tablet by mouth every morning. Calcium carbonate 1000mg vitamin d3 1600 units per patient    clopidogrel (PLAVIX) 75 mg tablet Take 1 tablet (75 mg total) by mouth once daily.    duloxetine (CYMBALTA) 30 MG capsule Take 1 capsule (30 mg total) by mouth once daily.    duloxetine (CYMBALTA) 30 MG capsule Take 1 capsule (30 mg total) by mouth once daily.    fexofenadine (ALLEGRA) 180 MG tablet TAKE 1 TABLET ONE TIME DAILY    furosemide (LASIX) 40 MG tablet Take 1 tablet (40 mg total) by mouth once daily.    gabapentin (NEURONTIN) 300 MG capsule Take 2 capsules (600 mg total) by mouth 3 (three) times daily.    lancets 28 gauge Misc 1 lancet by Misc.(Non-Drug; Combo Route) route 3 (three) times daily.    metformin (GLUCOPHAGE) 500 MG tablet TAKE 1 TABLET BY MOUTH TWO TIMES A DAY WITH MEALS    methocarbamol (ROBAXIN) 500 MG Tab Take 1 tablet (500 mg total) by mouth nightly as needed (sciatic nerve pain).    metoprolol succinate (TOPROL-XL) 25 MG 24 hr tablet Take 0.5 tablets (12.5 mg total) by mouth once daily.    morphine (MS CONTIN) 15 MG 12 hr tablet Take 1 tablet (15 mg total) by mouth every 8 (eight) hours as needed for Pain.     "nitroGLYCERIN (NITROSTAT) 0.4 MG SL tablet Place 1 tablet (0.4 mg total) under the tongue every 5 (five) minutes as needed for Chest pain.    potassium chloride SA (K-DUR,KLOR-CON) 20 MEQ tablet Take 1 tablet (20 mEq total) by mouth once daily.    predniSONE (DELTASONE) 50 MG Tab Take 1 tablet (50 mg total) by mouth once daily.    verapamil (CALAN-SR) 240 MG CR tablet Take 1 tablet (240 mg total) by mouth once daily.    zolpidem (AMBIEN) 5 MG Tab Take 1 tablet (5 mg total) by mouth nightly as needed.           Clinical Reference Information           Your Vitals Were     BP Pulse Temp Resp    112/60 (BP Location: Left arm, Patient Position: Sitting, BP Method: Automatic) 65 97.5 °F (36.4 °C) (Oral) 16    Height Weight BMI    5' 10" (1.778 m) 79.8 kg (175 lb 14.8 oz) 25.24 kg/m2      Blood Pressure          Most Recent Value    BP  112/60      Allergies as of 2/20/2017     No Known Drug Allergies      Immunizations Administered on Date of Encounter - 2/20/2017     Name Date Dose VIS Date Route    TD 2/20/2017 0.5 mL 2/24/2015 Intramuscular      Orders Placed During Today's Visit      Normal Orders This Visit    Ambulatory consult to Neurosurgery     Td Vaccine- Preservative Free     Future Labs/Procedures Expected by Expires    DXA Bone Density Spine And Hip_Axial Skeleton  2/20/2017 5/21/2017    Hemoglobin A1c  2/20/2017 4/21/2018    Lipid panel  2/20/2017 2/20/2018    Microalbumin/creatinine urine ratio  2/20/2017 2/20/2018    PSA, Screening  2/20/2017 4/21/2018    TSH  2/20/2017 2/20/2018      Instructions    Take gabapentin 300mg three times a day for a week. Then, take 300 mg am, 300 mg noon, 600 mg pm for a week.  Then, take 600 mg am, 300 mg noon, 600 mg pm for a week.  Then, take 600 mg am, 600 mg noon, 600 mg pm for a week.        Language Assistance Services     ATTENTION: Language assistance services are available, free of charge. Please call [phone].      ATENCIÓN: micheal Gutierrez " disposición servicios gratuitos de asistencia lingüística. Zoie al [phone].     NATALIE Ý: N?u b?n nói Ti?ng Vi?t, có các d?ch v? h? tr? ngôn ng? mi?n phí dành cho b?n. G?i s? [phone].         [facility] complies with applicable Federal civil rights laws and does not discriminate on the basis of race, color, national origin, age, disability, or sex.

## 2017-02-21 NOTE — TELEPHONE ENCOUNTER
He states he is only able to urinate a small amount at a time. He reports he woke up at 2 AM saturated in urine. He performed an in and out. At 1100 he had to repeat in and out. Discussed SUDS and cysto. Explained to continue CIC 4x daily and call if problems. Verbalized understanding.        ----- Message from Yaa Cheek MA sent at 2/21/2017 10:42 AM CST -----  Contact: self  [phone]  States he is calling to update you on his cic and urine output.

## 2017-02-21 NOTE — PATIENT INSTRUCTIONS
What are Urodynamics Studies?     The bladder holds urine until it leaves the body through the urethra.     Urodynamics studies are a series of tests that give your doctor a close look at the working of your bladder and urethra. The tests can help your doctor learn about any problems storing urine or voiding (eliminating) urine from your body.  Understanding the lower urinary tract  The lower part of the urinary tract has several parts.  · The bladder stores urine until youre ready to release it.  · The urethra is the tube that carries urine from the bladder out of the body.  · The sphincter is made up of muscles around the opening of the bladder. The sphincter muscles tighten to hold urine in the bladder. They relax to let urine flow. Signals from the brain tell the sphincter when to tighten and relax. These signals also tell the bladder when to contract to let urine flow out of the body.  Why you need a urodynamics study  This test may be ordered if you:  · Are incontinent (leak urine).  · Have a bladder that does not empty all the way.  · Have symptoms such as the need to urinate often or a constant strong need to urinate.  · Have intermittent or weak urine stream.  · Have persistent urinary tract infections.  Preparing for the study  · Tell your doctor about any medications youre taking. Ask if you should stop them before the study.  · Keep a diary of your bathroom habits. Do this for a few days before the study. This diary can be a helpful part of the evaluation.  · Ask if you need to arrive for the study with a full bladder.  Date Last Reviewed: 9/12/2014  © 3806-3125 [employer]. [address] [place] [address], [place] [address]. All rights reserved. This information is not intended as a substitute for professional medical care. Always follow your healthcare professional's instructions.          Urodynamic Studies     The equipment used for the study varies depending upon the facility and what  tests are done.     Urodynamic studies may be done in your doctors office, a clinic, or a hospital. The studies may take up to an hour or more. This depends on which tests your doctor does. The tests are generally painless. You wont need sedating medication.  Tests that may be done  Uroflowmetry. This measures the amount and speed of urine you void from your bladder. You urinate into a funnel. Its attached to a computer that records your urine flow over time. The amount of urine left in your bladder after you void may also be measured right after this test.  Cystometry. This test evaluates how much your bladder can hold. It also measures how strong your bladder muscle is and how well the signals work that tell you when your bladder is full. Your health care provider fills your bladder with sterile water or saline solution, through a catheter. Your doctor will instruct you to report any sensations you feel. Mention if theyre similar to symptoms youve felt at home. Your doctor may ask you to cough, stand and walk, or bear down during this test.  Electromyogram. This helps evaluate the muscle contractions that control urination, such as sphincter muscle contractions. Your health care provider may place electrode patches or wires near your rectum or urethra to make the recording. He or she may ask you to try to tighten or relax your sphincter muscles during this test.  Pressure flow study. This test measures your detrusor, urethral, and abdominal pressures. Detrusor is the muscle surrounding the bladder walls that relaxes to allow your bladder to fill, and and contracts to squeeze out urine. A pressure flow study is often done after cystometry. Youre asked to urinate while a probe in your urethra measures pressures.  Video cystourethrography. This takes video pictures of urine flow through your urinary tract. It can help identify blockages or other problems. The bladder is filled with an X-ray contrast fluid. Then  X-ray video pictures are taken as the fluid is urinated out. Ultrasound imaging may also be combined with routine urodynamic studies.  Ambulatory urodynamics. This test can be used to evaluate you while doing usual activities.  Getting your results  After the study, youll get dressed and return to the consultation room. Test results may be ready soon after the study is finished. Or, you may return to your doctors office in a few days for your results. Your doctor can talk with you about the study report and your options.   Date Last Reviewed: 9/12/2014  © 6884-9178 [employer]. [address] [place] [address], [place] [address]. All rights reserved. This information is not intended as a substitute for professional medical care. Always follow your healthcare professional's instructions.

## 2017-02-21 NOTE — TELEPHONE ENCOUNTER
----- Message from Carmelita Madison MA sent at 2/21/2017  3:25 PM CST -----  Contact: [phone] tk  Needing a new rx for cath supplies, his name is scratched on on the one received.   Tk at  [facility]   Office#  [phone]      fax [phone]

## 2017-02-22 NOTE — TELEPHONE ENCOUNTER
Returned call to patient.   Patient was questioning what his next steps after MRI and would like to speak to Dr. Valdez regarding this.   I informed him I was unsure about next steps, and that Dr. Valdez is out office until Friday---but I could have her give him a call when back in.   Patient verbalized understanding.     Message routed to Dr. Valdez.       ----- Message from Angela Parrish sent at 2/22/2017  9:51 AM CST -----  Patient would like the nurse to give him a call back. Says his MRI results from Friday shows that the tumor has increase in size. Wants to know what's the next step. He can be reached at [phone].

## 2017-02-23 NOTE — TELEPHONE ENCOUNTER
Called to schedule an appointment with Dr. Tolliver, radiation oncologist; pt did not answer, left voicemail.

## 2017-03-01 NOTE — LETTER
March 1, 2017      Janine Valdez MD  [address]  [facility] [address]           [facility]  [address]  [facility] [address]  Phone: [phone]          Patient: Lex Billy   MR Number: [id number]   YOB: 1945   Date of Visit: 3/1/2017       Dear Dr. Janine Valdez:    Thank you for referring Lex Billy to me for evaluation. Attached you will find relevant portions of my assessment and plan of care.    If you have questions, please do not hesitate to call me. I look forward to following Lex Bilyl along with you.    Sincerely,    Michael Tolliver Jr., MD    Enclosure  CC:  No Recipients    If you would like to receive this communication electronically, please contact [email or web address] or [phone] to request more information on [employer] Link access.    For providers and/or their staff who would like to refer a patient to [facility], please contact us through our one-stop-shop provider referral line, [facility], at [phone].    If you feel you have received this communication in error or would no longer like to receive these types of communications, please e-mail [email or web address]

## 2017-03-02 NOTE — PROGRESS NOTES
I have seen and examined the patient, reviewed relevant diagnostic tests and agree with the assessment and plan below.

## 2017-03-02 NOTE — PROGRESS NOTES
Subjective:       Patient ID: Lex Billy is a 72 y.o. male.    Chief Complaint: Renal cell carcinoma (discuss xrt)    HPI Comments: This patient presents for discussion of further palliative radiotherapy.      Mr. Billy has a history of Stage IV renal cell carcinoma. He initially presented in June of 2014 for evaluation of pain in the Rt. leg.  An MRI revealed two suspicious right kidney lesions measuring 1.6 cm and 3 cm. Follow up CT scan of the abdomen and pelvis on 7/9/14 again revealed the two Rt. renal lesions with evidence of arterial enhancement. There was osseous metastatic disease to the Rt. sacrum and Lt. 12 th rib.  Biopsy of the sacral lesion revealed metastatic clear cell carcinoma of the kidney.  The patient was referred to our department and completed a course of palliative radiotherapy to the sacrum in August of 2014.  He received 24 Gy in 4 Gy fractions.  Following radiottherapy, he was initially treated on Nexavar this was discontinued secondary to side effects.  He was placed on Torisel but there was evidence of disease progression.  Most recently the patient has been receiving Votrient although the dose was decreased secondary to side effects.  Following his radiotherapy the patient noted some numbness in the Rt. buttock region.  Recently though, he has began to notice constant pain in the Rt. buttock region with radiation of the pain to the Rt. leg.  He has also noted episodes of urinary retention and is currently self- cathing twice a day.  Repeat MRI of the sacrum on 2/17/17 revealed the sacral mass has increased in size to the prior CT. There is probable involvement of the right S3 nerve root and possibly the right S4 nerve root and right piriformis.  He presents today with his wife to discuss further radiotherapy.  Today, the patient complains of significant pain in the Rt. buttock region.  He was recently given a Rx for MS contin but is currently not taking the medicine  regularly.      Review of Systems   Constitutional: Negative for activity change, appetite change, chills and fatigue.   Respiratory: Negative for cough and shortness of breath.    Cardiovascular: Negative for chest pain and palpitations.   Gastrointestinal: Negative for abdominal pain, nausea and vomiting.   Genitourinary: Positive for difficulty urinating. Negative for dysuria, hematuria, penile pain and scrotal swelling.   Musculoskeletal: Positive for back pain and gait problem.       Objective:      Physical Exam   Constitutional: He appears well-developed and well-nourished. No distress.   Abdominal: Soft. He exhibits no distension.   Musculoskeletal:   localizes his pain to the posterior Rt. buttock region       Radiology -  I reviewed the images from his most recent MRI with the patient and his wife.     Assessment:       1. 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 Renal cell carcinoma of right kidney        Plan:       We discussed the options of further radiotherapy.  Explained that given this area has been treated before there is a chance he may develop side effects including nerve pain from the radiotherapy.  Discussed other side effects of therapy, including continued urinary retention.  Overall , I Explained I sill think the benefits of therapy will out weigh the side effects.  Recommended further therapy using Stereotactic methods and IMRT to limit the radiotherapy to the normal surrounding tissues which have been previously irradiated.  The patient was agreeable to proceeding with radiotherapy.  Will plan simulation this week with treatment to begin thereafter.

## 2017-03-03 NOTE — TELEPHONE ENCOUNTER
Returned call to pt.   Pt stated he needs refill on Inylta.   Pt stated company should be faxing over paperwork for Inylta for Dr. Valdez to fill out.   I informed pt I would look for fax Monday and have Dr. Valdez fill out so he could get refill.   Pt verbalized understanding.       ----- Message from Karol Mcgregor sent at 3/3/2017  4:04 PM CST -----  Contact: Pt  Pt requesting a renewal on his rx Inlyta.        Please call pt at [phone].        Thanks!

## 2017-03-06 NOTE — TELEPHONE ENCOUNTER
Returned call to pt.   Informed him no fax received regarding Inlyta yet.   Provided him with fax number and he stated he would call and see if they could refax.           ----- Message from Tim Gonsales sent at 3/6/2017 10:26 AM CST -----  Contact: self  Pt would like to discuss chemo medication (axitinib (INLYTA) 5 mg Tab).  Contact number [phone]

## 2017-03-09 NOTE — PROGRESS NOTES
Subjective:       Patient ID: Lex Billy is a 72 y.o. male.    Chief Complaint: Follow-up    HPI   Doing better on reduced dose Inlyta- now taking at night and tolerated feeling better.  Reports taste back and eating better. Gaining weight.  Feels much better.      This is a 71 year old male with metastatic RCC, currently on Inlyta reduced dose. He ended cycle 3 several days early due to side effects, namely diarrhea and weight loss.    He has CHF and monitors his weight carefully, currently on lasix; he has had weight gain but no lower extremity edema or his usual signs of fluid overload.       CT C/A/P from 10/14/16:  1.  Slightly decreased size of cystic renal neoplasm the superior pole of the right kidney, now measuring 2.8 x 2.8 cm, previously 3.8 x 3.3 cm and the cystic component.  The solid component is not well defined on the current examination.  2.  Resolution of previously enlarged enhancing left axillary lymph node.  3.  Marked decrease in the size of the left 12th rib lesion, with essential complete resolution of the expansile soft tissue component.  3.  Unchanged size of right sacral lesion.  4.  Unchanged nonspecific index right lower lobe pulmonary micronodule.  5.  Coronary atherosclerosis and abundant aortic valve calcification.      Oncology History:  Initially treated on Nexavar therapy  Prior treatment with Sutent discontinued with side effects.  Completed XRT to sacrum and reports no pain. Still with some residual numbness in right buttock area.  Biopsy confirmed clear cell RCC  Then placed on Torisel and now with mild progression in known areas  Changed to Votrient- about to start 4th month  Zometa will be restarted after next scans      PLAN:  1-2) Recent scans show response to treatment. Will re-initiate Inlyta however at reduced dose (5 mg every day) given his recent symptoms. Will return in two weeks with labs and if tolerating drug well may increase medication to standard dose.    3)well controlled on current meds, continue to monitor; no recent fluid retention/weight gain.  4)well controlled on current meds; continue to monitor      Review of Systems   Constitutional: Negative for activity change, appetite change, chills, fatigue, fever and unexpected weight change.   HENT: Positive for postnasal drip. Negative for congestion, dental problem, ear pain, hearing loss, mouth sores, rhinorrhea, sinus pressure, sneezing, sore throat, trouble swallowing and voice change.    Eyes: Negative for redness and visual disturbance (rt eye decreased prior stroke).   Respiratory: Negative for cough, chest tightness, shortness of breath and wheezing.    Cardiovascular: Negative for chest pain, palpitations and leg swelling.   Gastrointestinal: Negative for abdominal distention, abdominal pain, blood in stool, constipation, diarrhea, nausea, rectal pain and vomiting.   Genitourinary: Negative for decreased urine volume, difficulty urinating, dysuria, hematuria and urgency.        Increased urination on lasix   Musculoskeletal: Negative for arthralgias, back pain, gait problem, myalgias and neck pain.   Skin: Negative for color change, pallor, rash and wound.   Neurological: Negative for dizziness, weakness, numbness and headaches.   Hematological: Negative for adenopathy. Does not bruise/bleed easily.   Psychiatric/Behavioral: Negative for dysphoric mood and suicidal ideas. The patient is not nervous/anxious.        Objective:      Physical Exam   Constitutional: He is oriented to person, place, and time. He appears well-developed and well-nourished. No distress.   Presents alone  ECOG= 0   HENT:   Head: Normocephalic and atraumatic.   Right Ear: External ear normal.   Left Ear: External ear normal.   Nose: Nose normal.   Mouth/Throat: Oropharynx is clear and moist. No oropharyngeal exudate.   Eyes: Conjunctivae and EOM are normal. Pupils are equal, round, and reactive to light. Right eye exhibits no  discharge. Left eye exhibits no discharge. No scleral icterus.   Neck: Normal range of motion. Neck supple. No tracheal deviation present. No thyromegaly present.   Cardiovascular: Normal rate, regular rhythm and intact distal pulses.  Exam reveals no gallop and no friction rub.    Murmur (known AS) heard.  Pulmonary/Chest: Effort normal and breath sounds normal. No respiratory distress. He has no wheezes. He has no rales. He exhibits no tenderness.   Abdominal: Soft. Bowel sounds are normal. He exhibits no distension and no mass. There is no tenderness. There is no rebound and no guarding.   No organomegaly   Musculoskeletal: Normal range of motion. He exhibits no edema or tenderness.   Lymphadenopathy:     He has no cervical adenopathy.   Neurological: He is alert and oriented to person, place, and time. No cranial nerve deficit. He exhibits normal muscle tone. Coordination normal.   Skin: Skin is warm and dry. No rash noted. He is not diaphoretic. No erythema. No pallor.   Dry, no rash   Psychiatric: He has a normal mood and affect. His behavior is normal. Judgment and thought content normal.   Nursing note and vitals reviewed.    Labs- reviewed  Assessment:       1. 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        Plan:     Continue Inlyta  XRT ongoing- we discussed difficulty penetrating bone with targeted and rationale for continuing current therapy as no new disease  Restart Zometa next week

## 2017-03-13 NOTE — TELEPHONE ENCOUNTER
Returned call to pt.   Pt stated he has Inlyta paperwork and wanted to know if he can drop off today---pt stated he took his last pill this morning.   I informed him okay to drop off with Ms. Linda at  and we will work on.   Pt verbalized understanding.         ----- Message from Aida Enriquez sent at 3/13/2017  9:32 AM CDT -----  Contact: Duy Lyons,    Pt would like to know if he can drop for Inlyta paperwork today?    Pt contact number [phone]  Thanks

## 2017-03-15 NOTE — LETTER
March 15, 2017      Everette Rowan MD  [address]  [place] [address]           [facility] - Neurosurgery 7th Fl  [address]  [place] [address]  Phone: [phone]          Patient: Lex Billy   MR Number: [id number]   YOB: 1945   Date of Visit: 3/15/2017       Dear Dr. Everette Rowan:    Thank you for referring Lex Billy to me for evaluation. Attached you will find relevant portions of my assessment and plan of care.    If you have questions, please do not hesitate to call me. I look forward to following Lex Billy along with you.    Sincerely,    Christian Monreal MD    Enclosure  CC:  No Recipients    If you would like to receive this communication electronically, please contact [email or web address] or [phone] to request more information on [employer] Link access.    For providers and/or their staff who would like to refer a patient to [facility], please contact us through our one-stop-shop provider referral line, [facility], at [phone].    If you feel you have received this communication in error or would no longer like to receive these types of communications, please e-mail [email or web address]

## 2017-03-15 NOTE — PATIENT INSTRUCTIONS
I have reviewed the MRI of the lumbar spine and CT abdomen/pelvis with the patient, which both show a large sacral tumor with primary involvement of S2, which measures 3.7 x 3.5 x 5 cm. There is involvement of the right S2 nerve root. I will schedule him follow up in 2 months with CT abdomen/pelvis with/without contrast. If he develops new or worsening symptoms, I want to see him as soon as possible.

## 2017-03-15 NOTE — MR AVS SNAPSHOT
[facility] Neurosurgery [facility]  [address]  [facility] [address]  Phone: [phone]                  Lex Billy   3/15/2017 10:00 AM   Office Visit    Description:  Male : 1945   Provider:  Christian Monreal MD   Department:  [facility] - Neurosurgery [facility]           Reason for Visit     Lumbar Spine Pain (L-Spine)                To Do List           Future Appointments        Provider Department Dept Phone    3/16/2017 7:40 AM [facility], DEXA1 [facility]-Bone Mineral Density [phone]    3/16/2017 8:00 AM [facility], CHEMO [facility] [phone]    3/21/2017 8:00 AM SU[facility], UROLOGY [facility] - Urology 4th Floor [phone]    2017 10:15 AM NOM CT1 64- LIMIT 450 LBS [facility] [phone]    2017 11:30 AM Christian Monreal MD [address] [phone]      Goals (5 Years of Data)     None      [facility] On Call     [facility] On Call Nurse Care Line -  Assistance  Registered nurses in the [facility] On Call Center provide clinical advisement, health education, appointment booking, and other advisory services.  Call for this free service at [phone].             Medications           Message regarding Medications     Verify the changes and/or additions to your medication regime listed below are the same as discussed with your clinician today.  If any of these changes or additions are incorrect, please notify your healthcare provider.             Verify that the below list of medications is an accurate representation of the medications you are currently taking.  If none reported, the list may be blank. If incorrect, please contact your healthcare provider. Carry this list with you in case of emergency.           Current Medications     aspirin (ECOTRIN) 81 MG EC tablet Take 81 mg by mouth once daily.    atorvastatin (LIPITOR) 40 MG tablet TAKE 1 TABLET ONE TIME DAILY    axitinib (INLYTA) 5 mg Tab Take 1 tablet by mouth 2  (two) times daily.    blood sugar diagnostic (TRUE METRIX GLUCOSE TEST STRIP) Strp Test three times daily    blood sugar diagnostic Strp 1 each by Misc.(Non-Drug; Combo Route) route 3 (three) times daily. Free style freedom lite test strips and lancets    blood-glucose meter (TRUE METRIX AIR GLUCOSE METER) Misc Use as directed    brimonidine 0.1% (ALPHAGAN P) 0.1 % Drop Place 1 drop into both eyes 3 (three) times daily.    CALCIUM CARBONATE-VITAMIN D3 ORAL Take 1 tablet by mouth every morning. Calcium carbonate 1000mg vitamin d3 1600 units per patient    clopidogrel (PLAVIX) 75 mg tablet Take 1 tablet (75 mg total) by mouth once daily.    duloxetine (CYMBALTA) 30 MG capsule Take 1 capsule (30 mg total) by mouth once daily.    fexofenadine (ALLEGRA) 180 MG tablet TAKE 1 TABLET ONE TIME DAILY    furosemide (LASIX) 40 MG tablet Take 1 tablet (40 mg total) by mouth once daily.    gabapentin (NEURONTIN) 300 MG capsule Take 2 capsules (600 mg total) by mouth 3 (three) times daily.    lancets 28 gauge Misc 1 lancet by Misc.(Non-Drug; Combo Route) route 3 (three) times daily.    metformin (GLUCOPHAGE) 500 MG tablet TAKE 1 TABLET BY MOUTH TWO TIMES A DAY WITH MEALS    metoprolol succinate (TOPROL-XL) 25 MG 24 hr tablet Take 0.5 tablets (12.5 mg total) by mouth once daily.    morphine (MS CONTIN) 15 MG 12 hr tablet Take 1 tablet (15 mg total) by mouth every 8 (eight) hours as needed for Pain.    potassium chloride SA (K-DUR,KLOR-CON) 20 MEQ tablet Take 1 tablet (20 mEq total) by mouth once daily.    tamsulosin (FLOMAX) 0.4 mg Cp24 Take 1 capsule (0.4 mg total) by mouth once daily.    verapamil (CALAN-SR) 240 MG CR tablet Take 1 tablet (240 mg total) by mouth once daily.    zolpidem (AMBIEN) 5 MG Tab Take 1 tablet (5 mg total) by mouth nightly as needed.    levoFLOXacin (LEVAQUIN) 500 MG tablet Take 500 mg by mouth once daily.    nitroGLYCERIN (NITROSTAT) 0.4 MG SL tablet Place 1 tablet (0.4 mg total) under the tongue every 5  "(five) minutes as needed for Chest pain.           Clinical Reference Information           Your Vitals Were     BP Pulse Temp Height Weight BMI    98/62 70 99.1 °F (37.3 °C) 5' 10" (1.778 m) 78.5 kg (173 lb) 24.82 kg/m2      Blood Pressure          Most Recent Value    BP  98/62      Allergies as of 3/15/2017     No Known Drug Allergies      Immunizations Administered on Date of Encounter - 3/15/2017     None      Instructions    I have reviewed the MRI of the lumbar spine and CT abdomen/pelvis with the patient, which both show a large sacral tumor with primary involvement of S2, which measures 3.7 x 3.5 x 5 cm. There is involvement of the right S2 nerve root. I will schedule him follow up in 2 months with CT abdomen/pelvis with/without contrast. If he develops new or worsening symptoms, I want to see him as soon as possible.       Language Assistance Services     ATTENTION: Language assistance services are available, free of charge. Please call [phone].      ATENCIÓN: Si habla nickañol, tiene a johnston disposición servicios gratuitos de asistencia lingüística. Llame al [phone].     NATALIE Ý: N?u b?n nói Ti?ng Vi?t, có các d?ch v? h? tr? ngôn ng? mi?n phí dành cho b?n. G?i s? [phone].         [facility] complies with applicable Federal civil rights laws and does not discriminate on the basis of race, color, national origin, age, disability, or sex.        "

## 2017-03-15 NOTE — PROGRESS NOTES
Subjective:    I, Dia Schafer, am scribing for, and in the presence of, Dr. Christian Monreal.     Patient ID: Lex Billy is a 72 y.o. male.    Chief Complaint: Lumbar Spine Pain (L-Spine)    HPI This is a 72-year-old male, referred by Dr. Rowan, who presents today for consultation for sacral tumor with facet arthritis of the lumbar region and lumbar radiculopathy. He was diagnosed with renal cell carcinoma in 2014. The patient is currently receiving radiation for a sacral tumor. The tumor was discovered after he complained of significant low back pain with radiation down bilateral lower extremities. He states that the tumor grew and is pressing against the sciatic nerves. This tumor was discovered within the last 1.5 months. He is receiving treatment with Dr. Tolliver. He states that his symptoms are slightly improved and that he is feeling better. He denies lower extremity weakness.The pt was referred to neurosurgery on 2/17/2017.     Review of Systems   Constitutional: Negative for activity change, fatigue and fever.   HENT: Negative for facial swelling.    Eyes: Negative.    Respiratory: Negative.    Cardiovascular: Negative.    Gastrointestinal: Negative for diarrhea, nausea and vomiting.   Genitourinary: Negative.    Musculoskeletal: Positive for back pain and myalgias (BLE). Negative for joint swelling.   Neurological: Negative for seizures, weakness, numbness and headaches.   Psychiatric/Behavioral: Negative.        Past Medical History:   Diagnosis Date    Acute on chronic congestive heart failure     Arthritis     Colon polyp     Coronary artery disease involving native coronary artery with unstable angina pectoris 10/7/2015    Diabetes mellitus     Heart disease, unspecified     Hyperlipidemia     Hypertension     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 7/15/2014    Pneumonia 02/2016    Stroke     right eye    Type 2 diabetes mellitus      Objective:     BP 98/62  Pulse 70  Temp 99.1 °F  "(37.3 °C)  Ht 5' 10" (1.778 m)  Wt 78.5 kg (173 lb)  BMI 24.82 kg/m2    Physical Exam   Constitutional: He is oriented to person, place, and time. He appears well-developed and well-nourished.   HENT:   Head: Normocephalic and atraumatic.   Eyes: Pupils are equal, round, and reactive to light.   Neck: Neck supple.   Pulmonary/Chest: Effort normal.   Musculoskeletal: He exhibits no edema.   Neurological: He is alert and oriented to person, place, and time. No cranial nerve deficit. He displays a negative Romberg sign. GCS eye subscore is 4. GCS verbal subscore is 5. GCS motor subscore is 6.   Skin: Skin is warm and dry.   Psychiatric: He has a normal mood and affect.       Imaging:  MRI of the lumbar spine, dated 2/14/2017, shows no central canal stenosis. There is tumor involvement primarily in the right S2 measures 3.7 x 3.5 x 5 cm. This involves the right S2 nerve root    CT abdomen/pelvis, dated 3/8/2017, shows large sacral tumor measuring 3.7 x 3.5 x 5 cm.    I have personally reviewed the images with the pt.      I, Dr. Christian Monreal, personally performed the services described in this documentation as scribed by Dia Schafer in my presence, and it is both accurate and complete.  Assessment:       1. Renal Cell CA.  2. Spine metastasis.   Plan:   I have reviewed the MRI of the lumbar spine and CT abdomen/pelvis with the patient, which both show a large sacral tumor with primary involvement of S2, which measures 3.7 x 3.5 x 5 cm. There is involvement of the right S2 nerve root. I will schedule him follow up in 2 months with CT abdomen/pelvis with/without contrast. If he develops new or worsening symptoms, I want to see him as soon as possible.  "

## 2017-03-16 NOTE — TELEPHONE ENCOUNTER
"Returned call to pt.   Pt stated he spoke with harvinder Bartlett from [employer] regarding his Inlyta medication---Dhruv informed him that renewal was received; however, pt would be denied assistance previously given in past.   Dhruv informed pt that he "makes too much to meet assistance requirements."   Pt stated he made less money this year than last and he received assistance last year.   I informed pt I would fwd message to Dr. Valdez and see if  could assist us on this issue to see if Beth could offer any other assistance suggestions.   Pt verbalized understanding.     Message routed to Dr. Valdez.       ----- Message from Colleen Gorman sent at 3/16/2017 11:16 AM CDT -----  Contact: PT  Would like Serena to call him, regarding medication axitinib (INLYTA) 5 mg Tab      Call: [phone]     "

## 2017-03-21 NOTE — PROCEDURES
Simple Urodynamics w/ Cysto  Date/Time: 3/21/2017 3:05 PM  Performed by: LIGIA SUNG  Authorized by: DWAINE MEDINA   Comments: Procedure Date:  03/21/2017    Procedure:   Diagnostic Cystourethroscopy   Complex Cystometrogram   Voiding / Pressure Study with Intrarectal Balloon   Complex Uroflow   Electromyogram of Anal Sphincter.     Pre-OP Diagnosis:   Incomplete bladder emptying, metastatic RCC to sacrum, s/p radiation therpay   Post-OP Diagnosis:   same   Anesthesia:   Anesthesia Administered:   Intraurethral instillation of 10 mL 2% lidocaine (Xylocaine) jelly.   Findings:   --- Bladder ---   CYSTOMETROGRAM ( Filling Phase ):   Cystometric Numeric Data: ( infusion volume did not register the actual number)  - First Desire (Sensation): n/a mL at 1cm of water.   - Normal Desire: n/a mL at 2 cm of water.   - Strong Desire: n/a mL at 4 cm of water.   - Urgency (Imminent Void) : n/a mL at 12cm of water.   - Maximum Cystometric Capacity: 650 mL.   Compliance:   - high.   Leak Point Pressure:   - Valsalva ( Abdominal ) Leak Point Pressure: none.   UROFLOW:   - Voided Volume: 155 mL.   - Residual Urine: 500 mL.   - Maximum Flow Rate: 34 mL/sec.   - Flow Pattern: intermittent  VOIDING PRESSURE STUDY ( Voiding Phase ):   Detrusor Pressure:   - Maximum Detrusor Pressure: 49 cm of water.   - Detrusor Pressure at Maximum Flow: n/a cm of water.   - Detrusor Contraction Characteristics: low intermittent contraction(s).   ELECTROMYOGRAM:   - normal.     ---Diagnostic Cystourethroscopy ---   Normal urethra.    Prostate 3.5 cm bilateral lateral lobe enlargement, but no significant obstruction noted.  Width of Bladder Neck Opening: Approximately 18 Fr.   Normal bladder.   Normal ureteral orifices bilaterally.       Description of Procedure:   Informed Consent:   - Risks, benefits and alternatives of procedure discussed with   patient and informed consent obtained.   Patient Position:   - Supine.   --- Bladder ---   Prep  and Drape:   - Patient prepped and draped in usual sterile fashion using povidone   iodine (Betadine).   --- Diagnostic Cystourethroscopy ---   Instruments:   - 16 Fr flexible cystoscope with 0 degree lens.   Procedure Details:   - Cystoscope passed under vision into bladder.   - Bladder and urethra examined in their entirety with findings as   above.   --- Urodynamic Studies ---   Procedure Details:   Cystometrogram:   - Catheter(s) passed into the bladder.   - Rectal balloon inserted.   - Catheter(s) connected to infusion medium and to pressure recording   device.   - Infusion Rate: 30 mL / min.   Electromyogram:   - Perineal electromyogram pad placed and connected to electromyogram   recording device.   Equipment:   - Catheters: Double lumen catheter.   - Medium: Liquid.   - Pressure Recording Device: Calibrated electronic equipment.   Complications:   No immediate complications.    CONCLUSIONS:   1. Decreased bladder contraction, due to metastatic disease involving the sacral region.  Still undergoing radiation therapy at this time.  Continue CIC x 2.  Will start him on urecholine.  Treat suspected UTI.  I will follow up in 3 months.  Keep voiding diary     Post-OP Plan:   Patient was discharged home in a stable condition.  Medications: cipro  Follow up:  3 months     Sacral germ cell tumor     Urinary retention  Simple Urodynamics w/ Cysto  ciprofloxacin HCl (CIPRO) 500 MG tablet; Take 1 tablet (500 mg total) by mouth 2 (two) times daily.  Dispense: 14 tablet; Refill: 0  bethanechol (URECHOLINE) 25 MG Tab; Take 1 tablet (25 mg total) by mouth 3 (three) times daily.  Dispense: 100 tablet; Refill: 6  bethanechol (URECHOLINE) 50 MG tablet; Take 1 tablet (50 mg total) by mouth 3 (three) times daily.  Dispense: 90 tablet; Refill: 11     Type 2 diabetes mellitus with diabetic polyneuropathy, without long-term current use of insulin     Acute UTI  ciprofloxacin HCl (CIPRO) 500 MG tablet; Take 1 tablet (500 mg  total) by mouth 2 (two) times daily.  Dispense: 14 tablet; Refill: 0  Urine culture

## 2017-03-21 NOTE — PATIENT INSTRUCTIONS
SIMPLE URODYNAMIC STUDY (SUDS) & CYSTOSCOPY  UROLOGY CLINIC DISCHARGE INSTRUCTIONS    You have had a procedure that will require time to properly heal. Follow the instructions you have been given on how to care for yourself once you are home. Below is additional information to help in your recovery.    ACTIVITY  · There are no restrictions in activity. Start doing again the things you did before the procedure.  · You may experience a slight burning sensation. You may notice a small amount of blood in your urine. This will clear up within a day. Call the clinic if this continues beyond 48 hours.    DIET  · Continue your normal diet. You may eat the same foods you ate before your procedure.  · Drink plenty of fluids during the first 24-48 hours following your procedure.    MEDICATIONS  · Resume all other previous medications from your prescribing physician.  · Continue any pre=procedure antibiotics until they are all gone.    SIGNS AND SYMPTOMS TO REPORT TO THE DOCTOR  · Chills or fever greater than 101° F within 24 hours of procedure.  · Changes in urination, such as increased bleeding, foul smell, cloudy urine, or painful urination.  · Call your doctor with any questions or concerns.    For any emergency situation, call 181 immediately or go to your nearest emergency room.    [facility]  [phone]      Instructed by_________________________________          Patient Signature___________________________________                                                                                                                                                                                             If any problems after hours or weekends, you may call [phone] and ask for the urology resident on call.

## 2017-03-21 NOTE — MR AVS SNAPSHOT
[facility] - Urology 4th Floor  [address]  [facility] [address]  Phone: [phone]                  Lex Billy   3/21/2017 8:00 AM   Procedure visit    Description:  Male : 1945   Provider:  SUDS, UROLOGY   Department:  [facility] - Urology 4th Floor           Diagnoses this Visit        Comments    Sacral germ cell tumor    -  Primary     Urinary retention         Type 2 diabetes mellitus with diabetic polyneuropathy, without long-term current use of insulin         Acute UTI                To Do List           Future Appointments        Provider Department Dept Phone    2017 11:30 AM LAB, HEMONC CANCER BLDG [facility] [phone]    2017 1:00 PM Janine Valdez MD [facility] - Hematology Oncology [phone]    2017 2:00 PM [facility], CHEMO [facility] [phone]    2017 10:15 AM [facility] CT1 64- LIMIT 450 LBS [facility] [phone]    2017 11:30 AM Christian Monreal MD [facility] - Neurosurgery 7th Fl [phone]      Goals (5 Years of Data)     None       These Medications        Disp Refills Start End    ciprofloxacin HCl (CIPRO) 500 MG tablet 14 tablet 0 3/21/2017 3/28/2017    Take 1 tablet (500 mg total) by mouth 2 (two) times daily. - Oral    Pharmacy: [facility] - [facility] [address] [facility] Ph #: [phone]       bethanechol (URECHOLINE) 25 MG Tab 100 tablet 6 3/21/2017     Take 1 tablet (25 mg total) by mouth 3 (three) times daily. - Oral    Pharmacy: [facility] - [facility] [address] [facility] Ph #: [phone]       bethanechol (URECHOLINE) 50 MG tablet 90 tablet 11 3/21/2017 3/21/2018    Take 1 tablet (50 mg total) by mouth 3 (three) times daily. - Oral    Pharmacy: [facility] - [place] [id number] [facility] Ph #: [phone]         [facility]     [facility] -   Assistance  Registered nurses in the [facility] provide clinical advisement, health education, appointment booking, and other advisory services.  Call for this free service at [phone].             Medications           Message regarding Medications     Verify the changes and/or additions to your medication regime listed below are the same as discussed with your clinician today.  If any of these changes or additions are incorrect, please notify your healthcare provider.        START taking these NEW medications        Refills    ciprofloxacin HCl (CIPRO) 500 MG tablet 0    Sig: Take 1 tablet (500 mg total) by mouth 2 (two) times daily.    Class: Print    Route: Oral    bethanechol (URECHOLINE) 25 MG Tab 6    Sig: Take 1 tablet (25 mg total) by mouth 3 (three) times daily.    Class: Print    Route: Oral    bethanechol (URECHOLINE) 50 MG tablet 11    Sig: Take 1 tablet (50 mg total) by mouth 3 (three) times daily.    Class: Print    Route: Oral           Verify that the below list of medications is an accurate representation of the medications you are currently taking.  If none reported, the list may be blank. If incorrect, please contact your healthcare provider. Carry this list with you in case of emergency.           Current Medications     aspirin (ECOTRIN) 81 MG EC tablet Take 81 mg by mouth once daily.    atorvastatin (LIPITOR) 40 MG tablet TAKE 1 TABLET ONE TIME DAILY    axitinib (INLYTA) 5 mg Tab Take 1 tablet by mouth 2 (two) times daily.    blood sugar diagnostic (TRUE METRIX GLUCOSE TEST STRIP) Strp Test three times daily    blood sugar diagnostic Strp 1 each by Misc.(Non-Drug; Combo Route) route 3 (three) times daily. Free style freedom lite test strips and lancets    blood-glucose meter (TRUE METRIX AIR GLUCOSE METER) Misc Use as directed    brimonidine 0.1% (ALPHAGAN P) 0.1 % Drop Place 1 drop into both eyes 3 (three) times daily.    CALCIUM CARBONATE-VITAMIN D3 ORAL Take 1 tablet by  mouth every morning. Calcium carbonate 1000mg vitamin d3 1600 units per patient    clopidogrel (PLAVIX) 75 mg tablet Take 1 tablet (75 mg total) by mouth once daily.    duloxetine (CYMBALTA) 30 MG capsule Take 1 capsule (30 mg total) by mouth once daily.    fexofenadine (ALLEGRA) 180 MG tablet TAKE 1 TABLET ONE TIME DAILY    lancets 28 gauge Misc 1 lancet by Misc.(Non-Drug; Combo Route) route 3 (three) times daily.    levoFLOXacin (LEVAQUIN) 500 MG tablet Take 500 mg by mouth once daily.    metformin (GLUCOPHAGE) 500 MG tablet TAKE 1 TABLET BY MOUTH TWO TIMES A DAY WITH MEALS    metoprolol succinate (TOPROL-XL) 25 MG 24 hr tablet Take 0.5 tablets (12.5 mg total) by mouth once daily.    morphine (MS CONTIN) 15 MG 12 hr tablet Take 1 tablet (15 mg total) by mouth every 8 (eight) hours as needed for Pain.    nitroGLYCERIN (NITROSTAT) 0.4 MG SL tablet Place 1 tablet (0.4 mg total) under the tongue every 5 (five) minutes as needed for Chest pain.    potassium chloride SA (K-DUR,KLOR-CON) 20 MEQ tablet Take 1 tablet (20 mEq total) by mouth once daily.    tamsulosin (FLOMAX) 0.4 mg Cp24 Take 1 capsule (0.4 mg total) by mouth once daily.    verapamil (CALAN-SR) 240 MG CR tablet Take 1 tablet (240 mg total) by mouth once daily.    zolpidem (AMBIEN) 5 MG Tab Take 1 tablet (5 mg total) by mouth nightly as needed.    bethanechol (URECHOLINE) 25 MG Tab Take 1 tablet (25 mg total) by mouth 3 (three) times daily.    bethanechol (URECHOLINE) 50 MG tablet Take 1 tablet (50 mg total) by mouth 3 (three) times daily.    ciprofloxacin HCl (CIPRO) 500 MG tablet Take 1 tablet (500 mg total) by mouth 2 (two) times daily.    furosemide (LASIX) 40 MG tablet Take 1 tablet (40 mg total) by mouth once daily.    gabapentin (NEURONTIN) 300 MG capsule Take 2 capsules (600 mg total) by mouth 3 (three) times daily.           Clinical Reference Information           Your Vitals Were     BP Pulse Temp Height Weight BMI    102/63 68 99.3 °F (37.4 °C)  "(Oral) 5' 10" (1.778 m) 78.9 kg (174 lb) 24.97 kg/m2      Blood Pressure          Most Recent Value    BP  (P) 102/63      Allergies as of 3/21/2017     No Known Drug Allergies      Immunizations Administered on Date of Encounter - 3/21/2017     None      Orders Placed During Today's Visit      Normal Orders This Visit    Simple Urodynamics w/ Cysto     Urine culture       Instructions    SIMPLE URODYNAMIC STUDY (SUDS) & CYSTOSCOPY  UROLOGY CLINIC DISCHARGE INSTRUCTIONS    You have had a procedure that will require time to properly heal. Follow the instructions you have been given on how to care for yourself once you are home. Below is additional information to help in your recovery.    ACTIVITY  · There are no restrictions in activity. Start doing again the things you did before the procedure.  · You may experience a slight burning sensation. You may notice a small amount of blood in your urine. This will clear up within a day. Call the clinic if this continues beyond 48 hours.    DIET  · Continue your normal diet. You may eat the same foods you ate before your procedure.  · Drink plenty of fluids during the first 24-48 hours following your procedure.    MEDICATIONS  · Resume all other previous medications from your prescribing physician.  · Continue any pre=procedure antibiotics until they are all gone.    SIGNS AND SYMPTOMS TO REPORT TO THE DOCTOR  · Chills or fever greater than 101° F within 24 hours of procedure.  · Changes in urination, such as increased bleeding, foul smell, cloudy urine, or painful urination.  · Call your doctor with any questions or concerns.    For any emergency situation, call 911 immediately or go to your nearest emergency room.    [facility]  [phone]      Instructed by_________________________________          Patient Signature___________________________________                                                                                                               "                                                                               If any problems after hours or weekends, you may call [phone] and ask for the urology resident on call.       Language Assistance Services     ATTENTION: Language assistance services are available, free of charge. Please call [phone].      ATENCIÓN: Si habla nickañol, tiene a johnston disposición servicios gratuitos de asistencia lingüística. Llame al [phone].     CHÚ Ý: N?u b?n nói Ti?ng Vi?t, có các d?ch v? h? tr? ngôn ng? mi?n phí dành cho b?n. G?i s? [phone].         [facility] 4th Floor complies with applicable Federal civil rights laws and does not discriminate on the basis of race, color, national origin, age, disability, or sex.

## 2017-03-23 NOTE — PLAN OF CARE
Problem: Patient Care Overview  Goal: Plan of Care Review  Outcome: Outcome(s) achieved Date Met:  03/23/17  Radiation to the sacrum completed 3/22/17  F/u appt made

## 2017-03-24 NOTE — TELEPHONE ENCOUNTER
DOCUMENTATION ONLY      [employer]  11,000 henrique   Effective 3-24-17 through 3-23-18    ID#[id number]  [employer] [id number]  RX BIN [id number]

## 2017-03-24 NOTE — TELEPHONE ENCOUNTER
Diagnoses and all orders for this visit:    Acute UTI  -     sulfamethoxazole-trimethoprim 800-160mg (BACTRIM DS) 800-160 mg Tab; Take 1 tablet by mouth 2 (two) times daily.    eRxed to the pharmacy.  Please call and advise the patient to take the medication as given.  Please stop cipro and take bactrim ds instead.

## 2017-03-27 NOTE — TELEPHONE ENCOUNTER
Returned call to pt.   Informed him message received this morning regarding sending to [facility].   Informed him Dr. Valdez had not sent yet but will work on.   Pt verbalized understanding and stated he would check tomorrow.       ----- Message from Tim Gonsales sent at 3/27/2017  1:27 PM CDT -----  Contact: self  Pt is calling to check the status of (axitinib (INLYTA) 5 mg Tab) medication being sent through [facility]  pharmacy.  Contact number [phone]

## 2017-03-28 NOTE — IP AVS SNAPSHOT
[facility]  [address]  [facility] [address]  Phone: [phone]           Patient Discharge Instructions   Our goal is to set you up for success. This packet includes information on your condition, medications, and your home care.  It will help you care for yourself to prevent having to return to the hospital.     Please ask your nurse if you have any questions.      There are many details to remember when preparing to leave the hospital. Here is what you will need to do:    1. Take your medicine. If you are prescribed medications, review your Medication List on the following pages. You may have new medications to  at the pharmacy and others that you'll need to stop taking. Review the instructions for how and when to take your medications. Talk with your doctor or nurses if you are unsure of what to do.     2. Go to your follow-up appointments. Specific follow-up information is listed in the following pages. Your may be contacted by a nurse or clinical provider about future appointments. Be sure we have all of the phone numbers to reach you. Please contact your provider's office if you are unable to make an appointment.     3. Watch for warning signs. Your doctor or nurse will give you detailed warning signs to watch for and when to call for assistance. These instructions may also include educational information about your condition. If you experience any of warning signs to your health, call your doctor.           [facility]  Unless otherwise directed by your provider, please   contact [facility], our nurse care line   that is available for 24/7 assistance.     [phone] (toll-free)     Registered nurses in the [facility]   provide: appointment scheduling, clinical advisement, health education, and other advisory services.                  ** Verify the list of medication(s) below is accurate and up to date. Carry this with you in case of  emergency. If your medications have changed, please notify your healthcare provider.             Medication List      CHANGE how you take these medications        Additional Info                      atorvastatin 40 MG tablet   Commonly known as:  LIPITOR   Quantity:  90 tablet   Refills:  3   What changed:  See the new instructions.    Last time this was given:  80 mg on 4/4/2017  8:40 AM   Instructions:  TAKE 1 TABLET ONE TIME DAILY     Begin Date    AM    Noon    PM    Bedtime       fexofenadine 180 MG tablet   Commonly known as:  ALLEGRA   Quantity:  90 tablet   Refills:  3   What changed:  See the new instructions.    Instructions:  TAKE 1 TABLET ONE TIME DAILY     Begin Date    AM    Noon    PM    Bedtime       metformin 500 MG tablet   Commonly known as:  GLUCOPHAGE   Quantity:  60 tablet   Refills:  5   What changed:  See the new instructions.    Instructions:  TAKE 1 TABLET BY MOUTH TWO TIMES A DAY WITH MEALS     Begin Date    AM    Noon    PM    Bedtime       metoprolol succinate 25 MG 24 hr tablet   Commonly known as:  TOPROL-XL   Quantity:  30 tablet   Refills:  1   Dose:  25 mg   What changed:  how much to take    Last time this was given:  25 mg on 4/4/2017  8:40 AM   Instructions:  Take 1 tablet (25 mg total) by mouth once daily.     Begin Date    AM    Noon    PM    Bedtime       zolpidem 5 MG Tab   Commonly known as:  AMBIEN   Quantity:  30 tablet   Refills:  3   Dose:  5 mg   What changed:  reasons to take this    Last time this was given:  5 mg on 4/3/2017 10:27 PM   Instructions:  Take 1 tablet (5 mg total) by mouth nightly as needed.     Begin Date    AM    Noon    PM    Bedtime         CONTINUE taking these medications        Additional Info                      aspirin 81 MG EC tablet   Commonly known as:  ECOTRIN   Refills:  0   Dose:  81 mg    Last time this was given:  81 mg on 4/4/2017  8:40 AM   Instructions:  Take 81 mg by mouth once daily.     Begin Date    AM    Noon    PM    Bedtime        axitinib 5 mg Tab   Commonly known as:  INLYTA   Quantity:  60 tablet   Refills:  3   Dose:  1 tablet    Instructions:  Take 1 tablet by mouth 2 (two) times daily.     Begin Date    AM    Noon    PM    Bedtime       bethanechol 50 MG tablet   Commonly known as:  URECHOLINE   Quantity:  90 tablet   Refills:  11   Dose:  50 mg    Instructions:  Take 1 tablet (50 mg total) by mouth 3 (three) times daily.     Begin Date    AM    Noon    PM    Bedtime       blood sugar diagnostic Strp   Quantity:  90 each   Refills:  3   Dose:  1 each    Instructions:  1 each by Misc.(Non-Drug; Combo Route) route 3 (three) times daily. Free style freedom lite test strips and lancets     Begin Date    AM    Noon    PM    Bedtime       blood-glucose meter Misc   Commonly known as:  TRUE METRIX AIR GLUCOSE METER   Quantity:  1 each   Refills:  0    Instructions:  Use as directed     Begin Date    AM    Noon    PM    Bedtime       brimonidine 0.1% 0.1 % Drop   Commonly known as:  ALPHAGAN P   Refills:  0   Dose:  1 drop    Instructions:  Place 1 drop into both eyes 3 (three) times daily.     Begin Date    AM    Noon    PM    Bedtime       CALCIUM CARBONATE-VITAMIN D3 ORAL   Refills:  0   Dose:  1 tablet    Instructions:  Take 1 tablet by mouth every morning. Calcium carbonate 1000mg vitamin d3 1600 units per patient     Begin Date    AM    Noon    PM    Bedtime       clopidogrel 75 mg tablet   Commonly known as:  PLAVIX   Quantity:  90 tablet   Refills:  3   Dose:  75 mg    Last time this was given:  75 mg on 4/4/2017  8:40 AM   Instructions:  Take 1 tablet (75 mg total) by mouth once daily.     Begin Date    AM    Noon    PM    Bedtime       duloxetine 30 MG capsule   Commonly known as:  CYMBALTA   Quantity:  30 capsule   Refills:  0   Dose:  30 mg    Last time this was given:  30 mg on 4/4/2017  8:40 AM   Instructions:  Take 1 capsule (30 mg total) by mouth once daily.     Begin Date    AM    Noon    PM    Bedtime       furosemide 40  MG tablet   Commonly known as:  LASIX   Refills:  0   Dose:  40 mg    Instructions:  Take 40 mg by mouth once daily.     Begin Date    AM    Noon    PM    Bedtime       gabapentin 300 MG capsule   Commonly known as:  NEURONTIN   Quantity:  540 capsule   Refills:  3   Dose:  600 mg    Last time this was given:  600 mg on 4/4/2017  5:30 AM   Instructions:  Take 2 capsules (600 mg total) by mouth 3 (three) times daily.     Begin Date    AM    Noon    PM    Bedtime       lancets 28 gauge Misc   Quantity:  300 each   Refills:  3   Dose:  1 lancet    Instructions:  1 lancet by Misc.(Non-Drug; Combo Route) route 3 (three) times daily.     Begin Date    AM    Noon    PM    Bedtime       morphine 15 MG 12 hr tablet   Commonly known as:  MS CONTIN   Quantity:  90 tablet   Refills:  0   Dose:  15 mg    Last time this was given:  15 mg on 4/4/2017  8:40 AM   Instructions:  Take 1 tablet (15 mg total) by mouth every 8 (eight) hours as needed for Pain.     Begin Date    AM    Noon    PM    Bedtime       nitroGLYCERIN 0.4 MG SL tablet   Commonly known as:  NITROSTAT   Quantity:  25 tablet   Refills:  4   Dose:  0.4 mg    Last time this was given:  0.4 mg on 3/31/2017  6:18 PM   Instructions:  Place 1 tablet (0.4 mg total) under the tongue every 5 (five) minutes as needed for Chest pain.     Begin Date    AM    Noon    PM    Bedtime       potassium chloride SA 20 MEQ tablet   Commonly known as:  K-DUR,KLOR-CON   Quantity:  90 tablet   Refills:  4   Dose:  20 mEq    Instructions:  Take 1 tablet (20 mEq total) by mouth once daily.     Begin Date    AM    Noon    PM    Bedtime       tamsulosin 0.4 mg Cp24   Commonly known as:  FLOMAX   Quantity:  30 capsule   Refills:  11   Dose:  0.4 mg    Last time this was given:  0.4 mg on 4/4/2017  8:40 AM   Instructions:  Take 1 capsule (0.4 mg total) by mouth once daily.     Begin Date    AM    Noon    PM    Bedtime       verapamil 240 MG CR tablet   Commonly known as:  CALAN-SR   Quantity:  90  tablet   Refills:  3   Dose:  240 mg    Instructions:  Take 1 tablet (240 mg total) by mouth once daily.     Begin Date    AM    Noon    PM    Bedtime         STOP taking these medications     sulfamethoxazole-trimethoprim 800-160mg 800-160 mg Tab   Commonly known as:  BACTRIM DS            Where to Get Your Medications      These medications were sent to [facility]/pharmacy #[id number] - [place] - [address]  [address] [place] [address]     Phone:  [phone]     metoprolol succinate 25 MG 24 hr tablet                  Please bring to all follow up appointments:    1. A copy of your discharge instructions.  2. All medicines you are currently taking in their original bottles.  3. Identification and insurance card.    Please arrive 15 minutes ahead of scheduled appointment time.    Please call 24 hours in advance if you must reschedule your appointment and/or time.        Your Scheduled Appointments     Apr 11, 2017  2:00 PM CDT   Hospital Follow Up with PHYSICIAN, [facility]IOR[facility]   [facility] ([facility]    [address]  [place] [address]   [phone]            Apr 20, 2017 11:30 AM CDT   Non-Fasting Lab with LAB, HEMONC CANCER BLDG   [facility] ([facility])    [address]  [place] [address]   [phone]            Apr 20, 2017  1:00 PM CDT   Established Patient Visit with MD Yonis Betts - Hematology Oncology ([facility])    [address]  [place] [address]   [phone]            Apr 20, 2017  2:00 PM CDT   Infusion 060 Min with [facility], CHEMO   [facility] ([facility])    [address] [facility] [address]   [phone]            May 17, 2017 11:30 AM CDT   Established Patient Visit with MD Romero Andre - Neurosurgery [facility] ([facility] )    [address]  [place]  [facility] [id number] [phone]              Follow-up Information     Follow up with [facility] On 4/11/2017.    Why:  Hospital Follow-up Tuesday 4/11 @ 2pm. Bring all medications and discharge instructions.    Contact information:    Otoniel HUBBARD  [facility] [address]  [phone]          Follow up with Everette Rowan MD, MD.    Specialty:  Internal Medicine    Contact information:    [address] [facility]  [place] [address]  [phone]          Discharge Instructions     Future Orders    Call MD for:  difficulty breathing or increased cough     Call MD for:  increased confusion or weakness     Call MD for:  persistent dizziness, light-headedness, or visual disturbances     Call MD for:  persistent nausea and vomiting or diarrhea     Call MD for:  redness, tenderness, or signs of infection (pain, swelling, redness, odor or green/yellow discharge around incision site)     Call MD for:  severe persistent headache     Call MD for:  severe uncontrolled pain     Call MD for:  temperature >100.4     Call MD for:  worsening rash     Diet general     Questions:    Total calories:      Fat restriction, if any:      Protein restriction, if any:      Na restriction, if any:      Fluid restriction:      Additional restrictions:          Primary Diagnosis     Your primary diagnosis was:  Severe Sepsis      Admission Information     Date & Time Provider Department [facility]    3/28/2017 10:48 PM Maria Luisa Eastman MD [facility] [phone]      Care Providers     Provider Role Specialty Primary office phone    Maria Luisa Eastman MD Attending Provider Hospitalist [phone]    Lexus Monreal MD Consulting Physician  Anesthesiology [phone]    Maria Luisa Eastman MD Team Attending  Hospitalist [phone]      Important Medicare Message          Most Recent Value    Important Message from Medicare Regarding Discharge Appeal Rights  Given to patient/caregiver, Explained to  "patient/caregiver, Signed/date by patient/caregiver yes 04/04/2017 0900      Your Vitals Were     BP Pulse Temp Resp Height Weight    102/50 (BP Location: Left arm, Patient Position: Lying, BP Method: Manual) 89 98.3 °F (36.8 °C) (Oral) 18 5' 10" (1.778 m) 79.6 kg (175 lb 7.8 oz)    SpO2 BMI             94% 25.18 kg/m2         Recent Lab Values        7/31/2014 2/11/2015 10/7/2015 11/2/2015 12/3/2015 2/1/2016 10/20/2016 2/20/2017      2:45 PM  8:34 AM 12:40 PM  7:50 AM  1:42 PM  4:00 AM 10:10 AM  9:25 AM    A1C 5.9 4.9 5.7 6.2 7.2 (H) 6.3 (H) 5.7 5.5       5.7         Comment for A1C at 10:10 AM on 10/20/2016:  According to ADA guidelines, hemoglobin A1C <7.0% represents  optimal control in non-pregnant diabetic patients.  Different  metrics may apply to specific populations.   Standards of Medical Care in Diabetes - 2016.  For the purpose of screening for the presence of diabetes:  <5.7%     Consistent with the absence of diabetes  5.7-6.4%  Consistent with increasing risk for diabetes   (prediabetes)  >or=6.5%  Consistent with diabetes  Currently no consensus exists for use of hemoglobin A1C  for diagnosis of diabetes for children.      Comment for A1C at  9:25 AM on 2/20/2017:  According to ADA guidelines, hemoglobin A1C <7.0% represents  optimal control in non-pregnant diabetic patients.  Different  metrics may apply to specific populations.   Standards of Medical Care in Diabetes - 2016.  For the purpose of screening for the presence of diabetes:  <5.7%     Consistent with the absence of diabetes  5.7-6.4%  Consistent with increasing risk for diabetes   (prediabetes)  >or=6.5%  Consistent with diabetes  Currently no consensus exists for use of hemoglobin A1C  for diagnosis of diabetes for children.        Allergies as of 4/4/2017        Reactions    No Known Drug Allergies       Advance Directives     An advance directive is a document which, in the event you are no longer able to make decisions for yourself, " tells your healthcare team what kind of treatment you do or do not want to receive, or who you would like to make those decisions for you.  If you do not currently have an advance directive, [facility] encourages you to create one.  For more information call:  [phone]), [phone] ([phone]),  or log on to [email or web address].        Language Assistance Services     ATTENTION: Language assistance services are available, free of charge. Please call [phone].      ATENCIÓN: Si habla español, tiene a johnston disposición servicios gratuitos de asistencia lingüística. Llame al [phone].     CHÚ Ý: N?u b?n nói Ti?ng Vi?t, có các d?ch v? h? tr? ngôn ng? mi?n phí dành cho b?n. G?i s? [phone].        Heart Failure Education       Heart Failure: Being Active  You have a condition called heart failure. Being active doesnt mean that you have to wear yourself out. Even a little movement each day helps to strengthen your heart. If you cant get out to exercise, you can do simple stretching and strengthening exercises at home. These are good ways to keep you well-conditioned and prevent you and your heart from becoming excessively weak.    Ideas to get you started  · Add a little movement to things you do now. Walk to mail letters. Park your car at the far end of the parking lot and walk to the store. Walk up a flight of stairs instead of taking the elevator.  · Choose activities you enjoy. You might walk, swim, or ride an exercise bike. Things like gardening and washing the car count, too. Other possibilities include: washing dishes, walking the dog, walking around the mall, and doing aerobic activities with friends.  · Join a group exercise program at a [facility] or [facility], a senior center, or a community center. Or look into a hospital cardiac rehabilitation program. Ask your doctor if you qualify.  Tips to keep you going  · Get up and get dressed each day. Go to a coffee shop and read a  newspaper or go somewhere that you'll be in the presence of other active people. Youll feel more like being active.  · Make a plan. Choose one or more activities that you enjoy and that you can easily do. Then plan to do at least one each day. You might write your plan on a calendar.  · Go with a friend or a group if you like company. This can help you feel supported and stay motivated, too.  · Plan social events that you enjoy. This will keep you mentally engaged as well as physically motivated to do things you find pleasure in.  For your safety  · Talk with your healthcare provider before starting an exercise program.  · Exercise indoors when its too hot or too cold outside, or when the air quality is poor. Try walking at a shopping mall.  · Wear socks and sturdy shoes to maintain your balance and prevent falls.  · Start slowly. Do a few minutes several times a day at first. Increase your time and speed little by little.  · Stop and rest whenever you feel tired or get short of breath.  · Dont push yourself on days when you dont feel well.  Date Last Reviewed: 3/20/2016  © 9451-0892 [employer]. [address] [place] [address], [place] [id number]. All rights reserved. This information is not intended as a substitute for professional medical care. Always follow your healthcare professional's instructions.              Heart Failure: Evaluating Your Heart  You have a condition called heart failure. To evaluate your condition, your doctor will examine you, ask questions, and do some tests. Along with looking for signs of heart failure, the doctor looks for any other health problems that may have led to heart failure. The results of your evaluation will help your doctor form a treatment plan.  Health history and physical exam  Your visit will start with a health history. Tell the doctor about any symptoms youve noticed and about all medicines you take. Then youll have a physical exam. This includes listening  to your heartbeat and breathing. Youll also be checked for swelling (edema) in your legs and neck. When you have fluid buildup or fluid in the lungs, it may be called congestive heart failure.  Diagnosing heart failure     During an echocardiogram, sound waves bounce off the heart. These are converted into a picture on the screen.   The following may be done to help your doctor form a diagnosis:  · X-rays show the size and shape of your heart. These pictures can also show fluid in your lungs.  · An electrocardiogram (ECG or EKG) shows the pattern of your heartbeat. Small pads (electrodes) are placed on your chest, arms, and legs. Wires connect the pads to the ECG machine, which records your hearts electrical signals. This can give the doctor information about heart function.  · An echocardiogram uses ultrasound waves to show the structure and movement of your heart muscle. This shows how well the heart pumps. It also shows the thickness of the heart walls, and if the heart is enlarged. It is one of the most useful, non-invasive tests as it provides information about the heart's general function. This helps your doctor make treatment decisions.  · Lab tests evaluate small amounts of blood or urine for signs of problems. A BNP lab test can help diagnose and evaluate heart failure. BNP stands for B-type natriuretic peptide. The ventricles secrete more BNP when heart failure worsens. Lab tests can also provide information about metabolic dysfunction or heart dysfunction.  Your treatment plan  Based on the results of your evaluation and tests, your doctor will develop a treatment plan. This plan is designed to relieve some of your heart failure symptoms and help make you more comfortable. Your treatment plan may include:  · Medicine to help your heart work better and improve your quality of life  · Changes in what you eat and drink to help prevent fluid from backing up in your body  · Daily monitoring of your weight  and heart failure symptoms to see how well your treatment plan is working  · Exercise to help you stay healthy  · Help with quitting smoking  · Emotional and psychological support to help adjust to the changes  · Referrals to other specialists to make sure you are being treated comprehensively  Date Last Reviewed: 3/21/2016  © 8283-0570 [employer]. [address] [place] [address], [place] [address]. All rights reserved. This information is not intended as a substitute for professional medical care. Always follow your healthcare professional's instructions.              Heart Failure: Making Changes to Your Diet  You have a condition called heart failure. When you have heart failure, excess fluid is more likely to build up in your body because your heart isn't working well. This makes the heart work harder to pump blood. Fluid buildup causes symptoms such as shortness of breath and swelling (edema). This is often referred to as congestive heart failure or CHF. Controlling the amount of salt (sodium) you eat may help stop fluid from building up. Your doctor may also tell you to reduce the amount of fluid you drink.  Reading food labels    Your healthcare provider will tell you how much sodium you can eat each day. Read food labels to keep track. Keep in mind that certain foods are high in salt. These include canned, frozen, and processed foods. Check the amount of sodium in each serving. Watch out for high-sodium ingredients. These include MSG (monosodium glutamate), baking soda, and sodium phosphate.   Eating less salt  Give yourself time to get used to eating less salt. It may take a little while. Here are some tips to help:  · Take the saltshaker off the table. Replace it with salt-free herb mixes and spices.  · Eat fresh or plain frozen vegetables. These have much less salt than canned vegetables.  · Choose low-sodium snacks like sodium-free pretzels, crackers, or air-popped popcorn.  · Dont add salt to  your food when youre cooking. Instead, season your foods with pepper, lemon, garlic, or onion.  · When you eat out, ask that your food be cooked without added salt.  · Avoid eating fried foods as these often have a great deal of salt.  If youre told to limit fluids  You may need to limit how much fluid you have to help prevent swelling. This includes anything that is liquid at room temperature, such as ice cream and soup. If your doctor tells you to limit fluid, try these tips:  · Measure drinks in a measuring cup before you drink them. This will help you meet daily goals.  · Chill drinks to make them more refreshing.  · Suck on frozen lemon wedges to quench thirst.  · Only drink when youre thirsty.  · Chew sugarless gum or suck on hard candy to keep your mouth moist.  · Weigh yourself daily to know if your body's fluid content is rising.  My sodium goal  Your healthcare provider may give you a sodium goal to meet each day. This includes sodium found in food as well as salt that you add. My goal is to eat no more than ___________ mg of sodium per day.     When to call your doctor  Call your doctor right away if you have any symptoms of worsening heart failure. These can include:  · Sudden weight gain  · Increased swelling of your legs or ankles  · Trouble breathing when youre resting or at night  · Increase in the number of pillows you have to sleep on  · Chest pain, pressure, discomfort, or pain in the jaw, neck, or back   Date Last Reviewed: 3/21/2016  © 8913-3050 [employer]. [address] [place] [address], [place] [address]. All rights reserved. This information is not intended as a substitute for professional medical care. Always follow your healthcare professional's instructions.              Heart Failure: Medicines to Help Your Heart    You have a condition called heart failure (also known as congestive heart failure, or CHF). Your doctor will likely prescribe medicines for heart failure and any  underlying health problems you have. Most heart failure patients take one or more types of medicinen. Your healthcare provider will work to find the combination of medicines that works best for you.  Heart failure medicines  Here are the most common heart failure medicines:  · ACE inhibitors lower blood pressure and decrease strain on the heart. This makes it easier for the heart to pump. Angiotensin receptor blockers have similar effects. These are prescribed for some patients instead of ACE inhibitors.  · Beta-blockers relieve stress on the heart. They also improve symptoms. They may also improve the heart's pumping action over time.  · Diuretics (also called water pills) help rid your body of excess water. This can help rid your body of swelling (edema). Having less fluid to pump means your heart doesnt have to work as hard. Some diuretics make your body lose a mineral called potassium. Your doctor will tell you if you need to take supplements or eat more foods high in potassium.  · Digoxin helps your heart pump with more strength. This helps your heart pump more blood with each beat. So, more oxygen-rich blood travels to the rest of the body.  · Aldosterone antagonists help alter hormones and decrease strain on the heart.  · Hydralazine and nitrates are two separate medicines used together to treat heart failure. They may come in one combination pill. They lower blood pressure and decrease how hard the heart has to pump.  Medicines for related conditions  Controlling other heart problems helps keep heart failure under control, too. Depending on other heart problems you have, medicines may be prescribed to:  · Lower blood pressure (antihypertensives).  · Lower cholesterol levels (statins).  · Prevent blood clots (anticoagulants or aspirin).  · Keep the heartbeat steady (antiarrhythmics).  Date Last Reviewed: 3/5/2016  © 8910-5194 [employer]. [address] [place] [address], [place] [address]. All  rights reserved. This information is not intended as a substitute for professional medical care. Always follow your healthcare professional's instructions.              Heart Failure: Procedures That May Help    The heart is a muscle that pumps oxygen-rich blood to all parts of the body. When you have heart failure, the heart is not able to pump as well as it should. Blood and fluid may back up into the lungs (congestive heart failure), and some parts of the body dont get enough oxygen-rich blood to work normally. These problems lead to the symptoms of heart failure.     Certain procedures may help the heart pump better in some cases of heart failure. Some procedures are done to treat health problems that may have caused the heart failure such as coronary artery disease or heart rhythm problems. For more serious heart failure, other options are available.  Treating artery and valve problems  If you have coronary artery disease or valve disease, procedures may be done to improve blood flow. This helps the heart pump better, which can improve heart failure symptoms. First, your doctor may do a cardiac catheterization to help detect clogged blood vessels or valve damage. During this procedure, a  thin tube (catheter) in inserted into a blood vessel and guided to the heart. There a dye is injected and a special type of X-ray (angiogram) is taken of the blood vessels. Procedures to open a blocked artery or fix damaged valves can also be done using catheterization.  · Angioplasty uses a balloon-tipped instrument at the end of the catheter. The balloon is inflated to widen the narrowed artery. In many cases, a stent is expanded to further support the narrowed artery. A stent is a metal mesh tube.  · Valve surgery repairs or replacement of faulty valves can also be done during catheterization so blood can flow properly through the chambers of the heart.  Bypass surgery is another option to help treat blocked arteries. It  uses a healthy blood vessel from elsewhere in the body. The healthy blood vessel is attached above and below the blocked area so that blood can flow around the blocked artery.  Treating heart rhythm problems  A device may be placed in the chest to help a weak heart maintain a healthy, heartbeat so the heart can pump more effectively:  · Pacemaker. A pacemaker is an implanted device that regulates your heartbeat electronically. It monitors your heart's rhythm and generates a painless electric impulse that helps the heart beat in a regular rhythm. A pacemaker is programmed to meet your specific heart rhythm needs.  · Biventricular pacing/cardiac resynchronization therapy. A type of pacemaker that paces both pumping chambers of the heart at the same time to coordinate contractions and to improve the heart's function. Some people with heart failure are candidates for this therapy.  · Implantable cardioverter defibrillator. A device similar to a pacemaker that senses when the heart is beating too fast and delivers an electrical shock to convert the fast rhythm to a normal rhythm. This can be a life saving device.  In severe cases  In more serious cases of heart failure when other treatments no longer work, other options may include:  · Ventricular assist devices (VADs). These are mechanical devices used to take over the pumping function for one or both of the heart's ventricles, or pumping chambers. A VAD may be necessary when heart failure progresses to the point that medicines and other treatments no longer help. In some cases, a VAD may be used as a bridge to transplant.  · Heart transplant. This is replacing the diseased heart with a healthy one from a donor. This is an option for a few people who are very sick. A heart transplant is very serious and not an option for all patients. Your doctor can tell you more.  Date Last Reviewed: 3/20/2016  © 0600-3858 [employer]. [address] [place] [address], [place]  [address]. All rights reserved. This information is not intended as a substitute for professional medical care. Always follow your healthcare professional's instructions.              Heart Failure: Tracking Your Weight  You have a condition called heart failure. When you have heart failure, a sudden weight gain or a steady rise in weight is a warning sign that your body is retaining too much water and salt. This could mean your heart failure is getting worse. If left untreated, it can cause problems for your lungs and result in shortness of breath. Weighing yourself each day is the best way to know if youre retaining water. If your weight goes up quickly, call your doctor. You will be given instructions on how to get rid of the excess water. You will likely need medicines and to avoid salt. This will help your heart work better.  Call your doctor if you gain more than 2 pounds in 1 day, more than 5 pounds in 1 week, or whatever weight gain you were told to report by your doctor. This is often a sign of worsening heart failure and needs to be evaluated and treated. Your doctor will tell you what to do next.   Tips for weighing yourself    · Weigh yourself at the same time each morning, wearing the same clothes. Weigh yourself after urinating and before eating.  · Use the same scale each day. Make sure the numbers are easy to read. Put the scale on a flat, hard surface -- not on a rug or carpet.  · Do not stop weighing yourself. If you forget one day, weigh again the next morning.  How to use your weight chart  · Keep your weight chart near the scale. Write your weight on the chart as soon as you get off the scale.  · Fill in the month and the start date on the chart. Then write down your weight each day. Your chart will look like this:    · If you miss a day, leave the space blank. Weigh yourself the next day and write your weight in the next space.  · Take your weight chart with you when you go to see your  doctor.  Date Last Reviewed: 3/20/2016  © 1636-4104 [employer]. [address] [place] [address], [place] [address]. All rights reserved. This information is not intended as a substitute for professional medical care. Always follow your healthcare professional's instructions.              Heart Failure: Warning Signs of a Flare-Up  You have a condition called heart failure. Once you have heart failure, flare-ups can happen. Below are signs that can mean your heart failure is getting worse. If you notice any of these warning signs, call your healthcare provider.  Swelling    · Your feet, ankles, or lower legs get puffier.  · You notice skin changes on your lower legs.  · Your shoes feel too tight.  · Your clothes are tighter in the waist.  · You have trouble getting rings on or off your fingers.  Shortness of breath  · You have to breathe harder even when youre doing your normal activities or when youre resting.  · You are short of breath walking up stairs or even short distances.  · You wake up at night short of breath or coughing.  · You need to use more pillows or sit up to sleep.  · You wake up tired or restless.  Other warning signs  · You feel weaker, dizzy, or more tired.  · You have chest pain or changes in your heartbeat.  · You have a cough that wont go away.  · You cant remember things or dont feel like eating.  Tracking your weight  Gaining weight is often the first warning sign that heart failure is getting worse. Gaining even a few pounds can be a sign that your body is retaining excess water and salt. Weighing yourself each day in the morning after you urinate and before you eat, is the best way to know if you're retaining water. Get a scale that is easy to read and make sure you wear the same clothes and use the same scale every time you weigh. Your healthcare provider will show you how to track your weight. Call your doctor if you gain more than 2 pounds in 1 day, 5 pounds in 1 week, or  whatever weight gain you were told to report by your doctor. This is often a sign of worsening heart failure and needs to be evaluated and treated before it compromises your breathing. Your doctor will tell you what to do next.    Date Last Reviewed: 3/15/2016  © 7064-9518 [employer]. [address] [place] [address]. All rights reserved. This information is not intended as a substitute for professional medical care. Always follow your healthcare professional's instructions.              Pneumonmia Discharge Instructions                Chronic Kindey Disease Education             Diabetes Discharge Instructions                                    [facility] complies with applicable Federal civil rights laws and does not discriminate on the basis of race, color, national origin, age, disability, or sex.

## 2017-03-29 PROBLEM — N17.9 AKI (ACUTE KIDNEY INJURY): Status: ACTIVE | Noted: 2017-01-01

## 2017-03-29 PROBLEM — R06.02 SHORTNESS OF BREATH: Status: ACTIVE | Noted: 2017-01-01

## 2017-03-29 PROBLEM — I50.32 CHRONIC DIASTOLIC HEART FAILURE: Status: ACTIVE | Noted: 2017-01-01

## 2017-03-29 PROBLEM — A41.9 SEVERE SEPSIS: Status: ACTIVE | Noted: 2017-01-01

## 2017-03-29 PROBLEM — J96.01 ACUTE RESPIRATORY FAILURE WITH HYPOXIA: Status: ACTIVE | Noted: 2017-01-01

## 2017-03-29 PROBLEM — N40.0 BPH (BENIGN PROSTATIC HYPERPLASIA): Status: ACTIVE | Noted: 2017-01-01

## 2017-03-29 PROBLEM — R65.20 SEVERE SEPSIS: Status: ACTIVE | Noted: 2017-01-01

## 2017-03-29 NOTE — EICU
eICU Note :    Called by the [facility] Resident.    Problem:Worsening Dyspnea in apt with renal Cell carcinoma on oral chemo(Axitinib) s/p nephrectomy    Pertinent History and labs reviewed :71 y/o male known case of Renal Cell Ca with mets to the ribs with  case of CAD with BM stents.presented with worsening SOB ,with sats in the 70's with labs showing WBC in the teens, ,,Pt on venti mask satting in the 90's ,Borderline high creatinine 1.7, slightly positive troponin .127.  CXR:Central perihilar airspace infiltrate in the right lung with partial silhouetting of the right heart border that appears to involve the upper and middle lobes. Possible pneumonia or aspiration.  CT chest:  No evidence of pulmonary thromboembolus on this adequate study.     2.  Interval development of extensive bilateral centrilobular opacities throughout both lungs, more prominent on the right, nonspecific and may represent pneumonia, aspiration, or noninfectious inflammation with underlying metastatic disease not excluded.  Close followup is recommended with repeat chest CT in 8-12 weeks.    3.  Interval increase in size of right renal mass.    4.  Interval increase in size of osseous metastatic lesion to the left posterior 12th rib.    5.  Moderate bilateral pleural effusions.    6.  Significant coronary artery atherosclerosis and dense calcification of the aortic valve.    Treatment /Intervention given:1. Acute Respiratory Failure Type 1 due to  HCAP  on Vanco+ cefepime +Azithromycin with P/F ratio 134.Change to high Flow cannula from Venti mask due to high O2 requirements., Suspect CHF vs ARDS due to high P/F ratio but CXR shows Rt sided predominance and fluffy looking infiltrates and bilateral pleural effusion ,cannot rule out mets as well echocardiogram   2.CHF with h/o CAD with increase in BMP , with hold off using Lasix, his BP is borderline.Echo for LV if not done already.  3.HERNANDEZ with Creatinine 1.7 check UA , Trend Creatinine  ,nephrology to follow.  4.CADs/p stents  with increase in Troponin 0.127. Due to demand ischemia , will monitor.  5. Hypoalbuminemia , with albumin 2 multifactorial, due chronic illness ,malnutrition   6.PUD,DVT prophylaxis.        Brandi Lawler M.D  [facility] Physician

## 2017-03-29 NOTE — PLAN OF CARE
Everette Rowan MD, MD  [address] / [place] [address]    [facility] - [place], [place] - [address]  [address]  [facility] [address]  Phone: [phone] Fax: [phone]    [id number] - [place], [place] - [address]  [address]  [place] [address]  Phone: [phone] Fax: [phone]    [facility] [place] - [address] [address]  Phone: [phone] Fax: [phone]    Payor: HU[employer] MANAGED MEDICARE / Plan: [employer] HE[employer] HMO SNP / Product Type: Medicare Advantage /      Future Appointments  Date Time Provider Department Center   3/29/2017 1:00 PM ECHO, [facility] ECHOL[facility]   4/20/2017 11:30 AM LAB, HEMONC CANCER BLDG [facility]   4/20/2017 1:00 PM Janine Valdez MD [facility] HEM ONC [facility]   4/20/2017 2:00 PM [facility], CHEMO [facility]   5/17/2017 10:15 AM [facility] CT1 64- LIMIT 450 LBS [facility] CT SCAN [facility]   5/17/2017 11:30 AM Christian Monreal MD [facility] NEUROS7 [facility]     Extended Emergency Contact Information  Primary Emergency Contact: Belkys Billy  Address: [address] [place] [address]  Home Phone: [phone]  Relation: Spouse  Preferred language: English  Secondary Emergency Contact: Lashawn Sagastume   [place]  Mobile Phone: [phone]  Relation: Daughter  Preferred language: English       03/29/17 1153   Discharge Assessment   Assessment Type Discharge Planning Assessment   Confirmed/corrected address and phone number on facesheet? Yes   Assessment information obtained from? Patient   Expected Length of Stay (days) 4   Communicated expected length of stay with patient/caregiver no   Prior to hospitilization cognitive status: Alert/Oriented   Prior to hospitalization functional status: Independent   Current cognitive status: Alert/Oriented    Current Functional Status: Assistive Equipment;Needs Assistance   Arrived From home or self-care   Lives With spouse   Able to Return to Prior Arrangements yes   Is patient able to care for self after discharge? Yes   How many people do you have in your home that can help with your care after discharge? 1   Who are your caregiver(s) and their phone number(s)? Belkys Billy (Spouse) [phone]    Patient's perception of discharge disposition home or selfcare   Readmission Within The Last 30 Days no previous admission in last 30 days   Patient currently being followed by outpatient case management? No   Patient currently receives home health services? No   Does the patient currently use HME? No   Patient currently receives private duty nursing? N/A   Patient currently receives any other outside agency services? No   Equipment Currently Used at Home none   Do you have any problems affording any of your prescribed medications? No  (does receive assistance with Hem/Onc med from )   Is the patient taking medications as prescribed? yes   Do you have any financial concerns preventing you from receiving the healthcare you need? No   Does the patient have transportation to healthcare appointments? Yes   Transportation Available family or friend will provide   On Dialysis? No   Does the patient receive services at the Coumadin Clinic? No   Are there any open cases? No   Discharge Plan A Home Health   Discharge Plan B Home with family   Patient/Family In Agreement With Plan yes   Veronika Almaguer RN, BSN  Case Management  [facility]  Ext. [phone]

## 2017-03-29 NOTE — H&P
[facility]  Critical Care Medicine  History & Physical    Patient Name: Lex Billy  MRN: [id number]  Admission Date: 3/28/2017  Hospital Length of Stay: 0 days  Code Status: Prior  Attending Physician: Xu Bettencourt MD  Primary Care Provider: Everette Rowan MD, MD   Principal Problem: Acute respiratory failure with hypoxia    Subjective:     HPI:  71 yo M with metastatic renal cell carcinoma, CAD, severe AS presents for worsening dyspnea. Patient reports dyspnea x 1 month with sudden worsening yesterday. He has orthopnea at baseline without worsening. He monitors weight daily and denies changes in weight. He denies cough, fevers, chills, or sick contacts.He is compliant with all medications.    Saturation on room air at presentation to ED in 70s%. He was placed on BiPAP 10/5 100% FiO2. ABG showed 7.4/37/134/23.4/99%.   Initial labs notable for leukocytosis WBC 15, HERNANDEZ Cr 1.7,  and troponin 0.127.  CXR shows right middle lobe infiltrate. CTA obtained in ED was negative for PE, but shows bilateral opacities R>L.      Hospital/ICU Course:  3/29/17: Admit to ICU for hypoxic respiratory failure. Antibiotics started to cover for pneumonia. Started on HFNC for hypoxia.      Past Medical History:   Diagnosis Date    Acute on chronic congestive heart failure     Arthritis     Colon polyp     Coronary artery disease involving native coronary artery with unstable angina pectoris 10/7/2015    Diabetes mellitus     Heart disease, unspecified     Hyperlipidemia     Hypertension     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 7/15/2014    Pneumonia 02/2016    Stroke     right eye    Type 2 diabetes mellitus        Past Surgical History:   Procedure Laterality Date    BACK SURGERY      cervical disc replacement    CARPAL TUNNEL RELEASE      left    CORONARY ANGIOPLASTY WITH STENT PLACEMENT  10/2015    4 stents    EYE SURGERY      laser surgery in right eye    JOINT REPLACEMENT       orthoscopic surgery on knee      ROTATOR CUFF REPAIR      right side    SINUS SURGERY      TONSILLECTOMY      torn ligament      repair. left shoulder    TOTAL KNEE ARTHROPLASTY      left side    VASECTOMY         Review of patient's allergies indicates:   Allergen Reactions    No known drug allergies        Family History     Problem Relation (Age of Onset)    ALS Father    Asthma Son    Cancer Brother    Heart disease Father    Migraines Daughter    Rheum arthritis Mother        Social History Main Topics    Smoking status: Never Smoker    Smokeless tobacco: Never Used    Alcohol use No      Comment: Heavy in past, quit about 20 years ago    Drug use: No    Sexual activity: Not Currently     Partners: Female      Review of Systems   Constitutional: Negative for chills, fever and unexpected weight change.   Respiratory: Positive for shortness of breath. Negative for cough and wheezing.    Cardiovascular: Negative for chest pain, palpitations and leg swelling.   Gastrointestinal: Positive for constipation. Negative for abdominal pain, diarrhea, nausea and vomiting.   Genitourinary: Positive for difficulty urinating (CIC BID). Negative for dysuria and hematuria.   Musculoskeletal: Positive for back pain.   Skin: Negative for color change and rash.   Neurological: Negative for dizziness and light-headedness.     Objective:     Vital Signs (Most Recent):  Temp: 99.5 °F (37.5 °C) (03/28/17 2245)  Pulse: 80 (03/29/17 0317)  Resp: 17 (03/29/17 0317)  BP: 110/61 (03/29/17 0317)  SpO2: (!) 93 % (03/29/17 0317) Vital Signs (24h Range):  Temp:  [99.5 °F (37.5 °C)] 99.5 °F (37.5 °C)  Pulse:  [] 80  Resp:  [17-30] 17  SpO2:  [70 %-96 %] 93 %  BP: (102-110)/(52-64) 110/61   Weight: 79.4 kg (175 lb)  Body mass index is 25.11 kg/(m^2).    No intake or output data in the 24 hours ending 03/29/17 0353    Physical Exam   Constitutional: He is oriented to person, place, and time. He appears well-developed and  well-nourished.   Eyes: Conjunctivae and EOM are normal.   Neck: Normal range of motion.   Cardiovascular: Normal rate and regular rhythm.    Murmur heard.   Systolic murmur is present   Pulmonary/Chest: No respiratory distress.   Decreased breath sounds bibasilar, slight crackles mid-lung on right   Abdominal: Soft. Bowel sounds are normal. He exhibits no distension. There is no tenderness.   Musculoskeletal: He exhibits no edema.   Lymphadenopathy:     He has no cervical adenopathy.   Neurological: He is alert and oriented to person, place, and time.       Vents:  Oxygen Concentration (%): 100 (03/28/17 2301)  Lines/Drains/Airways     Drain                 Urethral Catheter 03/29/17 0103 Double-lumen less than 1 day          Peripheral Intravenous Line                 Peripheral IV - Single Lumen 03/28/17 2300 Right Antecubital less than 1 day              Significant Labs:    CBC/Anemia Profile:    Recent Labs  Lab 03/28/17 2259 03/28/17 2328   WBC 15.25*  --    HGB 13.1*  --    HCT 37.6* 35*     --    MCV 89  --    RDW 16.0*  --         Chemistries:    Recent Labs  Lab 03/28/17 2259   *   K 5.0   CL 98   CO2 20*   BUN 36*   CREATININE 1.7*   CALCIUM 9.3   ALBUMIN 2.7*   PROT 7.6   BILITOT 0.7   ALKPHOS 73   ALT 26   AST 24   MG 2.5       ABGs:   Recent Labs  Lab 03/28/17 2328   PH 7.404   PCO2 37.4   HCO3 23.4*   POCSATURATED 99   BE -1     Cardiac Markers: No results for input(s): CKMB, TROPONINT, MYOGLOBIN in the last 48 hours.  Coagulation:   Recent Labs  Lab 03/28/17 2259   INR 1.3*     Lactic Acid:   Recent Labs  Lab 03/29/17  0314   LACTATE 1.5       Significant Imaging:     CXR:   Central perihilar airspace infiltrate in the right lung with partial silhouetting of the right heart border that appears to involve the upper and middle lobes. Possible pneumonia or aspiration.    CTA chest:   1.  No evidence of pulmonary thromboembolus on this adequate study.   2.  Interval development of  extensive bilateral centrilobular opacities throughout both lungs, more prominent on the right, nonspecific and may represent pneumonia, aspiration, or noninfectious inflammation with underlying metastatic disease not excluded.  Close followup is recommended with repeat chest CT in 8-12 weeks.          Assessment/Plan:     Pulmonary  * Acute respiratory failure with hypoxia  Differential diagnosis includes pneumonia vs heart failure vs ARDS vs metastatic cancer. Will treat with antibiotics to cover for pneumonia. BNP is elevated suggestive of CHF, however patient reports stable weight and physical exam suggestive of euvolemia. Patient receive one dose of lasix 80mg IV in ED, will monitor urine and output and clinical response. Repeat 2DE ordered to evaluate severe AS, and diastolic dysfunction. P/F ratio on  compatible with moderate ARDS but infiltrate seem to be more right sided and centrilobar than diffuse bilateral. Will place patient on comfort flow (HFNC) and wean as tolerated.     Cardiac  Hypertension  BP low- normal. Continue to monitor.    CAD (coronary artery disease)  Troponin elevated at 0.127. EKG without new changes and patient denies chest pain. Continue aspirin and plavix. If patient develops chest pain, treat as ACS with heparin drip, however patient is asymptomatic at this time.     Severe aortic stenosis  Repeat 2DE. Not a TAVR candidate due to RCC.    Chronic diastolic heart failure  Repeat 2DE. BNP elevated at 474. Received lasix 80mg IV x 1 in ED with good urinary output.    Renal  Renal cell carcinoma of right kidney  Metastasis to left posterior 12th rib and right sacrum. Continue axitinib.     HERNANDEZ (acute kidney injury)  Creatinine elevated at 1.7 on baseline creatinine of 1.0 to 1.1. Will obtain urine studies to evaluated etiology, differential includes pre-renal due to sepsis, post-renal due to BPH requiring CIC. Follow daily creatinine, patient received IV contrast for CTA in ED and  IV lasix.     BPH (benign prostatic hyperplasia)  Patient requires CIC BID at home. Jauregui placed in ED, will continue for now. Continue tamsulosin.    ID  Severe sepsis  Patient with leukocytosis, tachypnea. Concern for pneumonia given CXR findings and hypoxia. Will treat with cefepime, vancomycin, and azithromycin. Blood cultures, and urine cultures in process. If patient develops productive sputum, will obtain respiratory culture.       Critical Care Medicine Daily Checklist:    A: Awake: RASS Goal/Actual Goal:    Actual:     B: Spontaneous Breathing Trial Performed?     C: SAT & SBT Coordinated?  n/a                      D: Delirium: CAM-ICU     E: Early Mobility Performed? Yes   F: Feeding Goal:    Status:     Current Diet Order   No orders of the defined types were placed in this encounter.      AS: Analgesia/Sedation Oxycodone prn   T: Thromboembolic Prophylaxis Heparin SQ   H: HOB > 300 Yes   U: Stress Ulcer Prophylaxis (if needed) n/a   G: Glucose Control wnl   B: Bowel Function     I: Indwelling Catheter (Lines & Jauregui) Necessity jauregui   D: De-escalation of Antimicrobials/Pharmacotherapies continue    Plan for the day/ETD Monitor respiratory status, obtain 2DE    Code Status:  Family/Goals of Care: full     Admission discussed with e-ICU, Dr. Lawler.      Madeleine Mendez MD  Critical Care Medicine  [facility]

## 2017-03-29 NOTE — ASSESSMENT & PLAN NOTE
Patient with leukocytosis, tachypnea. Concern for pneumonia given CXR findings and hypoxia. Will treat with cefepime, vancomycin, and azithromycin. Blood cultures, and urine cultures in process. If patient develops productive sputum, will obtain respiratory culture.

## 2017-03-29 NOTE — ASSESSMENT & PLAN NOTE
Creatinine elevated at 1.7 on baseline creatinine of 1.0 to 1.1. Will obtain urine studies to evaluated etiology, differential includes pre-renal due to sepsis, post-renal due to BPH requiring CIC. Follow daily creatinine, patient received IV contrast for CTA in ED and IV lasix.

## 2017-03-29 NOTE — PLAN OF CARE
Problem: Patient Care Overview  Goal: Plan of Care Review  Outcome: Ongoing (interventions implemented as appropriate)  Pt arrived from emergency room on Ventimask at 50%, 12L/m, SaO2 reading 90-92%. Pt transitioned to high flow nasal cannula, SaO2 reading 90-92%. Pt self catheterizes at home, Gardner placed for accuate I&Os. Pt in no noted distress at this time. Administered antibiotics as ordered. Pt denies c/o shortness of breath or pain at this time. Will continue to monitor pt for changes in status.

## 2017-03-29 NOTE — ASSESSMENT & PLAN NOTE
Differential diagnosis includes pneumonia vs heart failure vs ARDS vs metastatic cancer. Will treat with antibiotics to cover for pneumonia. BNP is elevated suggestive of CHF, however patient reports stable weight and physical exam suggestive of euvolemia. Patient receive one dose of lasix 80mg IV in ED, will monitor urine and output and clinical response. Repeat 2DE ordered to evaluate severe AS, and diastolic dysfunction. P/F ratio on  compatible with moderate ARDS but infiltrate seem to be more right sided and centrilobar than diffuse bilateral. Will place patient on comfort flow (HFNC) and wean as tolerated.

## 2017-03-29 NOTE — ASSESSMENT & PLAN NOTE
Troponin elevated at 0.127. EKG without new changes and patient denies chest pain. Continue aspirin and plavix. If patient develops chest pain, treat as ACS with heparin drip, however patient is asymptomatic at this time.

## 2017-03-29 NOTE — ASSESSMENT & PLAN NOTE
Patient requires CIC BID at home. Gardner placed in ED, will continue for now. Continue tamsulosin.

## 2017-03-29 NOTE — NURSING TRANSFER
Nursing Transfer Note      3/29/2017     Transfer From: Emergency Department    Transfer via stretcher    Transfer with Ventimask 50% To 12 L/M O2     Transported by [facility]    Medicines sent: None    Chart send with patient: Yes    Notified: spouse    Patient reassessed at: 0548 on 03/29/2017    Upon arrival to floor: cardiac monitor applied, patient oriented to room, call bell in reach and bed in lowest position

## 2017-03-29 NOTE — ED PROVIDER NOTES
Encounter Date: 3/28/2017    SCRIBE #1 NOTE: I, Yarelisbeba Webber, am scribing for, and in the presence of, Dr. Bhakta.       History     Chief Complaint   Patient presents with    Shortness of Breath     SOB and cough that started yesterday. Reports slight CP.      Review of patient's allergies indicates:   Allergen Reactions    No known drug allergies      HPI Comments: Time seen by provider: 10:53 PM    This is a 72 y.o. Male with a PMHx of DM, HLD, HTN, CAD, CHF, Stroke and metastatic cancer with a met to the lungs.  who presents with complaint of shortness of breath. The patient reports onset about an hour ago. He reports he does not normally use oxygen and denies any chest pain. The patient complains of an associated cough. His wife reports that he took his pain medication and Ambien at 10 PM.     The history is provided by the patient.     Past Medical History:   Diagnosis Date    Acute on chronic congestive heart failure     Arthritis     Colon polyp     Coronary artery disease involving native coronary artery with unstable angina pectoris 10/7/2015    Diabetes mellitus     Heart disease, unspecified     Hyperlipidemia     Hypertension     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 7/15/2014    Pneumonia 02/2016    Stroke     right eye    Type 2 diabetes mellitus      Past Surgical History:   Procedure Laterality Date    BACK SURGERY      cervical disc replacement    CARPAL TUNNEL RELEASE      left    CORONARY ANGIOPLASTY WITH STENT PLACEMENT  10/2015    4 stents    EYE SURGERY      laser surgery in right eye    JOINT REPLACEMENT      orthoscopic surgery on knee      ROTATOR CUFF REPAIR      right side    SINUS SURGERY      TONSILLECTOMY      torn ligament      repair. left shoulder    TOTAL KNEE ARTHROPLASTY      left side    VASECTOMY       Family History   Problem Relation Age of Onset    Heart disease Father     ALS Father     Cancer Brother      esophageal    Rheum arthritis  Mother     Migraines Daughter     Asthma Son     Diabetes Neg Hx     Colon polyps Neg Hx      Social History   Substance Use Topics    Smoking status: Never Smoker    Smokeless tobacco: Never Used    Alcohol use No      Comment: Heavy in past, quit about 20 years ago     Review of Systems   Constitutional: Negative for chills and fever.   HENT: Negative for facial swelling and nosebleeds.    Eyes: Negative for visual disturbance.   Respiratory: Positive for cough and shortness of breath.    Cardiovascular: Negative for chest pain and palpitations.   Gastrointestinal: Negative for abdominal distention, abdominal pain, diarrhea, nausea and vomiting.   Genitourinary: Negative for difficulty urinating, dysuria, frequency and hematuria.   Musculoskeletal: Negative for neck pain and neck stiffness.   Skin: Negative for rash.   Neurological: Negative for seizures, syncope and speech difficulty.       Physical Exam   Initial Vitals   BP Pulse Resp Temp SpO2   03/28/17 2245 03/28/17 2245 03/28/17 2245 03/28/17 2245 03/28/17 2245   102/52 106 24 99.5 °F (37.5 °C) 70 %     Physical Exam    Nursing note and vitals reviewed.  Constitutional: He appears well-developed and well-nourished. He appears distressed.   HENT:   Head: Normocephalic and atraumatic.   Mouth/Throat: Oropharynx is clear and moist.   Eyes: Conjunctivae are normal.   Neck: Normal range of motion.   Cardiovascular: Normal rate, regular rhythm and normal heart sounds.   Pulmonary/Chest: Tachypnea noted. He is in respiratory distress.   Patient has course crackles diffusely with audible crackles. He is hypoxic.    Abdominal: Soft. He exhibits no distension. There is no tenderness.   Musculoskeletal: Normal range of motion.   No lower extremity swelling.    Neurological: He is alert and oriented to person, place, and time.   Skin: Skin is warm and dry.         ED Course   Procedures  Labs Reviewed   CBC W/ AUTO DIFFERENTIAL - Abnormal; Notable for the  following:        Result Value    WBC 15.25 (*)     RBC 4.21 (*)     Hemoglobin 13.1 (*)     Hematocrit 37.6 (*)     MCH 31.1 (*)     RDW 16.0 (*)     Gran # 13.7 (*)     Lymph # 0.5 (*)     Gran% 89.6 (*)     Lymph% 3.0 (*)     All other components within normal limits   COMPREHENSIVE METABOLIC PANEL - Abnormal; Notable for the following:     Sodium 133 (*)     CO2 20 (*)     Glucose 178 (*)     BUN, Bld 36 (*)     Creatinine 1.7 (*)     Albumin 2.7 (*)     eGFR if  45.6 (*)     eGFR if non  39.4 (*)     All other components within normal limits   B-TYPE NATRIURETIC PEPTIDE - Abnormal; Notable for the following:      (*)     All other components within normal limits   PROTIME-INR - Abnormal; Notable for the following:     Prothrombin Time 13.1 (*)     INR 1.3 (*)     All other components within normal limits   TROPONIN I - Abnormal; Notable for the following:     Troponin I 0.127 (*)     All other components within normal limits   ISTAT PROCEDURE - Abnormal; Notable for the following:     POC PO2 134 (*)     POC HCO3 23.4 (*)     POC Sodium 131 (*)     POC Hematocrit 35 (*)     All other components within normal limits   CULTURE, BLOOD   CULTURE, BLOOD   MAGNESIUM   PROCALCITONIN   LACTIC ACID, PLASMA     EKG Readings: (Independently Interpreted)   tachycardia with a bifascicular block at a rate of 101 bpm.       X-Rays:   Independently Interpreted Readings:   Chest X-Ray: perihilar infiltrate and right upper and mild lobe infiltrate.      Medical Decision Making:   History:   Old Medical Records: I decided to obtain old medical records.  Initial Assessment:   Emergent evaluation of a 72 y.o. male who is in respiratory distress and has sounds of fluid in his lungs. I will start on BiPAP and reassess. He has a history of MI and CHF. His last echo showed an EF of 50% with severe aortic stenosis. He will be monitored closely ob BiPAP. I will obtain labs including cardiac enzymes  and chest imaging. Initial concerns include acute valvular dysfunction.  Independently Interpreted Test(s):   I have ordered and independently interpreted X-rays - see prior notes.  I have ordered and independently interpreted EKG Reading(s) - see prior notes  Clinical Tests:   Lab Tests: Ordered and Reviewed  Radiological Study: Ordered and Reviewed  Medical Tests: Ordered and Reviewed            Scribe Attestation:   Scribe #1: I performed the above scribed service and the documentation accurately describes the services I performed. I attest to the accuracy of the note.    Attending Attestation:         Attending Critical Care:   Critical Care Times:   Direct Patient Care (initial evaluation, reassessments, and time considering the case)................................................................25 minutes.   Additional History from reviewing old medical records or taking additional history from the family, EMS, PCP, etc.......................5 minutes.   Ordering, Reviewing, and Interpreting Diagnostic Studies...............................................................................................................5 minutes.   Documentation..................................................................................................................................................................................5 minutes.   Consultation with other Physicians. .................................................................................................................................................5 minutes.   Consultation with the patient's family directly relating to the patient's condition, care, and DNR status (when patient unable)......5 minutes.   ==============================================================  · Total Critical Care Time - exclusive of procedural time: 50 minutes.  ==============================================================  Critical care was necessary to treat or prevent  imminent or life-threatening deterioration of the following conditions: respiratory failure.     Physician Attestation for Scribe:  Physician Attestation Statement for Scribe #1: I, Dr. Bhakta, reviewed documentation, as scribed by Yarelis Webber in my presence, and it is both accurate and complete.         Attending ED Notes:   Patient workup included an elevated troponin and slightly elevated BNP.  IV Lasix was given.  Aspirin was given.his chest x-ray was concerning for possible right lower lobe infiltrate.  Slightly inconsistent with its presentation.  Slightly elevated white blood cell count noted at 15.  The PE study was obtained and this was negative for acute pulmonary embolism.  Discussed with oncology, the patient's primary team.  The patient unable to be weaned from BiPAP he desats even with supplemental oxygen.  The patient will be admitted to ICU for further therapy and management.          ED Course     Clinical Impression:   Diagnoses of Shortness of breath and Respiratory failure were pertinent to this visit.    Disposition:   Disposition: Admitted  Condition: Rosalinda Bhakta MD  03/29/17 0318

## 2017-03-29 NOTE — SUBJECTIVE & OBJECTIVE
Past Medical History:   Diagnosis Date    Acute on chronic congestive heart failure     Arthritis     Colon polyp     Coronary artery disease involving native coronary artery with unstable angina pectoris 10/7/2015    Diabetes mellitus     Heart disease, unspecified     Hyperlipidemia     Hypertension     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 7/15/2014    Pneumonia 02/2016    Stroke     right eye    Type 2 diabetes mellitus        Past Surgical History:   Procedure Laterality Date    BACK SURGERY      cervical disc replacement    CARPAL TUNNEL RELEASE      left    CORONARY ANGIOPLASTY WITH STENT PLACEMENT  10/2015    4 stents    EYE SURGERY      laser surgery in right eye    JOINT REPLACEMENT      orthoscopic surgery on knee      ROTATOR CUFF REPAIR      right side    SINUS SURGERY      TONSILLECTOMY      torn ligament      repair. left shoulder    TOTAL KNEE ARTHROPLASTY      left side    VASECTOMY         Review of patient's allergies indicates:   Allergen Reactions    No known drug allergies        Family History     Problem Relation (Age of Onset)    ALS Father    Asthma Son    Cancer Brother    Heart disease Father    Migraines Daughter    Rheum arthritis Mother        Social History Main Topics    Smoking status: Never Smoker    Smokeless tobacco: Never Used    Alcohol use No      Comment: Heavy in past, quit about 20 years ago    Drug use: No    Sexual activity: Not Currently     Partners: Female      Review of Systems   Constitutional: Negative for chills, fever and unexpected weight change.   Respiratory: Positive for shortness of breath. Negative for cough and wheezing.    Cardiovascular: Negative for chest pain, palpitations and leg swelling.   Gastrointestinal: Positive for constipation. Negative for abdominal pain, diarrhea, nausea and vomiting.   Genitourinary: Positive for difficulty urinating (CIC BID). Negative for dysuria and hematuria.   Musculoskeletal: Positive  for back pain.   Skin: Negative for color change and rash.   Neurological: Negative for dizziness and light-headedness.     Objective:     Vital Signs (Most Recent):  Temp: 99.5 °F (37.5 °C) (03/28/17 2245)  Pulse: 80 (03/29/17 0317)  Resp: 17 (03/29/17 0317)  BP: 110/61 (03/29/17 0317)  SpO2: (!) 93 % (03/29/17 0317) Vital Signs (24h Range):  Temp:  [99.5 °F (37.5 °C)] 99.5 °F (37.5 °C)  Pulse:  [] 80  Resp:  [17-30] 17  SpO2:  [70 %-96 %] 93 %  BP: (102-110)/(52-64) 110/61   Weight: 79.4 kg (175 lb)  Body mass index is 25.11 kg/(m^2).    No intake or output data in the 24 hours ending 03/29/17 0353    Physical Exam   Constitutional: He is oriented to person, place, and time. He appears well-developed and well-nourished.   Eyes: Conjunctivae and EOM are normal.   Neck: Normal range of motion.   Cardiovascular: Normal rate and regular rhythm.    Murmur heard.   Systolic murmur is present   Pulmonary/Chest: No respiratory distress.   Decreased breath sounds bibasilar, slight crackles mid-lung on right   Abdominal: Soft. Bowel sounds are normal. He exhibits no distension. There is no tenderness.   Musculoskeletal: He exhibits no edema.   Lymphadenopathy:     He has no cervical adenopathy.   Neurological: He is alert and oriented to person, place, and time.       Vents:  Oxygen Concentration (%): 100 (03/28/17 2301)  Lines/Drains/Airways     Drain                 Urethral Catheter 03/29/17 0103 Double-lumen less than 1 day          Peripheral Intravenous Line                 Peripheral IV - Single Lumen 03/28/17 2300 Right Antecubital less than 1 day              Significant Labs:    CBC/Anemia Profile:    Recent Labs  Lab 03/28/17  2259 03/28/17  2328   WBC 15.25*  --    HGB 13.1*  --    HCT 37.6* 35*     --    MCV 89  --    RDW 16.0*  --         Chemistries:    Recent Labs  Lab 03/28/17  2259   *   K 5.0   CL 98   CO2 20*   BUN 36*   CREATININE 1.7*   CALCIUM 9.3   ALBUMIN 2.7*   PROT 7.6   BILITOT  0.7   ALKPHOS 73   ALT 26   AST 24   MG 2.5       ABGs:   Recent Labs  Lab 03/28/17  2328   PH 7.404   PCO2 37.4   HCO3 23.4*   POCSATURATED 99   BE -1     Cardiac Markers: No results for input(s): CKMB, TROPONINT, MYOGLOBIN in the last 48 hours.  Coagulation:   Recent Labs  Lab 03/28/17  2259   INR 1.3*     Lactic Acid:   Recent Labs  Lab 03/29/17  0314   LACTATE 1.5       Significant Imaging:     CXR:   Central perihilar airspace infiltrate in the right lung with partial silhouetting of the right heart border that appears to involve the upper and middle lobes. Possible pneumonia or aspiration.    CTA chest:   1.  No evidence of pulmonary thromboembolus on this adequate study.   2.  Interval development of extensive bilateral centrilobular opacities throughout both lungs, more prominent on the right, nonspecific and may represent pneumonia, aspiration, or noninfectious inflammation with underlying metastatic disease not excluded.  Close followup is recommended with repeat chest CT in 8-12 weeks.

## 2017-03-30 PROBLEM — E87.1 HYPONATREMIA: Status: ACTIVE | Noted: 2017-01-01

## 2017-03-30 NOTE — PLAN OF CARE
Problem: Patient Care Overview  Goal: Plan of Care Review  Outcome: Ongoing (interventions implemented as appropriate)  Patient remains on hiflow NC, 15L 60% FIO2. O2 sats >88. IV antibiotics continued. Gardner intact and patent. HR elevated during shift, CCS team notified. See flowsheet for VS and assessment. Plan of care discussed with patient. Progressing as tolerated.  Monitoring ongoing.

## 2017-03-30 NOTE — PLAN OF CARE
Problem: Physical Therapy Goal  Goal: Physical Therapy Goal  Goals to be met by: 17    Patient will increase functional independence with mobility by performin. Sit to stand transfer with Modified [place] - not met  2. Gait x 220 feet with Supervision - not met  eval completed and goals appropriate. Angelica Espino, PT 3/30/2017

## 2017-03-30 NOTE — ASSESSMENT & PLAN NOTE
- Patient with leukocytosis, tachypnea. Concern for pneumonia given CXR findings and hypoxia. Will treat with cefepime, vancomycin, and azithromycin.   - Blood cultures, and urine cultures have been NGTD. If patient develops productive sputum, will obtain respiratory culture.   - Will narrow antibiotics at 48 hours no growth

## 2017-03-30 NOTE — PROGRESS NOTES
[facility]  Critical Care Medicine  Progress Note    Patient Name: Lex Billy  MRN: [id number]  Admission Date: 3/28/2017  Hospital Length of Stay: 1 days  Code Status: Full Code  Attending Provider: Xu Bettencourt*  Primary Care Provider: Everette Rowan MD, MD   Principal Problem: Acute respiratory failure with hypoxia    Subjective:     HPI:  71 yo M with metastatic renal cell carcinoma, CAD, severe AS presents for worsening dyspnea. Patient reports dyspnea x 1 month with sudden worsening yesterday. He has orthopnea at baseline without worsening. He monitors weight daily and denies changes in weight. He denies cough, fevers, chills, or sick contacts.He is compliant with all medications.    Saturation on room air at presentation to ED in 70s%. He was placed on BiPAP 10/5 100% FiO2. ABG showed 7.4/37/134/23.4/99%.   Initial labs notable for leukocytosis WBC 15, HERNANDEZ Cr 1.7,  and troponin 0.127.  CXR shows right middle lobe infiltrate. CTA obtained in ED was negative for PE, but shows bilateral opacities R>L.      Hospital/ICU Course:  3/29/17: Admit to ICU for hypoxic respiratory failure. Antibiotics started to cover for pneumonia. Started on HFNC for hypoxia.     3/30/17: Patient tolerated weaning to nasal canula with maintained saturations. Patient will be stepped down to hospital medicine.       Interval History/Significant Events: Patient doing better this AM and tolerated NC at 6L with no SOB. This is weaned from high flow NC at 15L and 55%. Now stepped down.     Review of Systems   Constitutional: Negative for chills, fever and unexpected weight change.   Respiratory: Negative for cough, shortness of breath and wheezing.    Cardiovascular: Negative for chest pain, palpitations and leg swelling.   Gastrointestinal: Positive for constipation. Negative for abdominal pain, diarrhea, nausea and vomiting.   Genitourinary: Positive for difficulty urinating (CIC BID). Negative for  dysuria and hematuria.   Musculoskeletal: Positive for back pain.   Skin: Negative for color change and rash.   Neurological: Negative for dizziness and light-headedness.     Objective:     Vital Signs (Most Recent):  Temp: 98.2 °F (36.8 °C) (03/30/17 0700)  Pulse: 104 (03/30/17 1000)  Resp: (!) 24 (03/30/17 1000)  BP: 106/69 (03/30/17 1000)  SpO2: 100 % (03/30/17 1000) Vital Signs (24h Range):  Temp:  [97.6 °F (36.4 °C)-98.5 °F (36.9 °C)] 98.2 °F (36.8 °C)  Pulse:  [] 104  Resp:  [18-41] 24  SpO2:  [87 %-100 %] 100 %  BP: ()/(50-78) 106/69   Weight: 79.6 kg (175 lb 7.8 oz)  Body mass index is 25.18 kg/(m^2).      Intake/Output Summary (Last 24 hours) at 03/30/17 1132  Last data filed at 03/30/17 1000   Gross per 24 hour   Intake             2030 ml   Output             1867 ml   Net              163 ml       Physical Exam   Constitutional: He is oriented to person, place, and time. He appears well-developed and well-nourished.   Eyes: Conjunctivae and EOM are normal.   Neck: Normal range of motion.   Cardiovascular: Normal rate and regular rhythm.    Murmur heard.   Systolic murmur is present   Pulmonary/Chest: No respiratory distress.   Decreased breath sounds bibasilar, slight crackles mid-lung on right   Abdominal: Soft. Bowel sounds are normal. He exhibits no distension. There is no tenderness.   Musculoskeletal: He exhibits no edema.   Lymphadenopathy:     He has no cervical adenopathy.   Neurological: He is alert and oriented to person, place, and time.       Vents:  Oxygen Concentration (%): 55 (03/30/17 0800)  Lines/Drains/Airways     Drain                 Urethral Catheter 03/29/17 0103 Double-lumen 1 day          Peripheral Intravenous Line                 Peripheral IV - Single Lumen 03/28/17 2300 Right Antecubital 1 day         Peripheral IV - Single Lumen 03/29/17 1030 Right Hand 1 day              Significant Labs:    CBC/Anemia Profile:    Recent Labs  Lab 03/28/17  2259 03/28/17  8927  03/29/17  0350 03/30/17  0544   WBC 15.25*  --  14.81* 10.79   HGB 13.1*  --  11.7* 9.6*   HCT 37.6* 35* 35.3* 29.7*     --  257 231   MCV 89  --  91 91   RDW 16.0*  --  15.9* 16.2*        Chemistries:    Recent Labs  Lab 03/28/17  2259 03/29/17  0350   * 130*   K 5.0 5.0   CL 98 96   CO2 20* 23   BUN 36* 26*   CREATININE 1.7* 1.7*   CALCIUM 9.3 8.7   ALBUMIN 2.7* 2.5*   PROT 7.6 7.0   BILITOT 0.7 0.6   ALKPHOS 73 72   ALT 26 24   AST 24 20   MG 2.5 2.7*   PHOS  --  3.7           Significant Imaging:  I have reviewed all pertinent imaging results/findings within the past 24 hours.    Assessment/Plan:     Neuro  - alert and oriented x4  - good mentation and no sedation     Pulmonary  * Acute respiratory failure with hypoxia  Differential diagnosis includes pneumonia vs heart failure vs ARDS vs metastatic cancer. Started on antibiotics to cover for pneumonia. BNP is elevated suggestive of CHF, however patient reports stable weight and physical exam suggestive of euvolemia. Patient received one dose of lasix 80mg IV in ED, will monitor urine and output and clinical response. Repeat 2DE ordered to evaluate severe AS, and diastolic dysfunction. P/F ratio on  compatible with moderate ARDS but infiltrate seem to be more right sided and centrilobar than diffuse bilateral.  - was able to be weaned off HF to NC today at 6L and tolerated it well  - cultures have grown nothing, will continue broad-spectrum abx until cultures negative for full 48 hours      Shortness of breath  - resolved, has been tolerating NC well with no SOB     Cardiac  Hypertension  BP low- normal. Continue to monitor and hold antihypertensives for now    CAD (coronary artery disease)  - Troponin elevated at 0.127. EKG without new changes and patient denies chest pain. Continue aspirin and plavix. If patient develops chest pain, treat as ACS with heparin drip, however patient is asymptomatic at this time.     Severe aortic stenosis  -  Repeated Sd ECHO showed EF 40% with severe aortic stenosis, relatively unchanged  - Not a TAVR candidate due to RCC   - CXR with slight improvement in R sided middle lung zone opacity  - will hold off further diuresis in the setting of hyponatremia to 125 (although corrected for hypoglycemia is about 129)    Chronic diastolic heart failure  - Repeated 2D ECHO, relatively unchanged  - BNP elevated at 474. Received lasix 80mg IV x 1 in ED with good urinary output.    Renal  Renal cell carcinoma of right kidney  - Metastasis to left posterior 12th rib and right sacrum. Continue axitinib.   - Pt stated he has not been taking his axitinib due to running out of prescription and not being able to afford refill  - Will defer restarting this to his oncologist     HERNANDEZ (acute kidney injury)  - Creatinine elevated at 1.7 on baseline creatinine of 1.0 to 1.1  - Urine studies demonstrate intrinsic renal disease (70% Urine urea)   - Cr improved today to 1.2 from 1.7 and has been making a good amount of UOP  - will check BMP this afternoon for Na level    BPH (benign prostatic hyperplasia)  - Patient requires CIC BID at home. Gardner placed in ED, will continue for now. Continue tamsulosin.    ID  Severe sepsis  - Patient with leukocytosis, tachypnea. Concern for pneumonia given CXR findings and hypoxia. Will treat with cefepime, vancomycin, and azithromycin.   - Blood cultures, and urine cultures have been NGTD. If patient develops productive sputum, will obtain respiratory culture.   - Will narrow antibiotics at 48 hours no growth    Ppx: Heparin 5KU  Diet: Diabetic 2200     Tae Langston MD  Critical Care Medicine  [facility]

## 2017-03-30 NOTE — PT/OT/SLP EVAL
Occupational Therapy  Evaluation    Lex Billy   MRN: [id number]   Admitting Diagnosis: Acute respiratory failure with hypoxia    OT Date of Treatment: 03/30/17   OT Start Time: 0829  OT Stop Time: 0845  OT Total Time (min): 16 min    Billable Minutes:  Evaluation 16    Diagnosis: Acute respiratory failure with hypoxia       Past Medical History:   Diagnosis Date    Acute on chronic congestive heart failure     Arthritis     Colon polyp     Coronary artery disease involving native coronary artery with unstable angina pectoris 10/7/2015    Diabetes mellitus     Heart disease, unspecified     Hyperlipidemia     Hypertension     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 7/15/2014    Pneumonia 02/2016    Stroke     right eye    Type 2 diabetes mellitus       Past Surgical History:   Procedure Laterality Date    BACK SURGERY      cervical disc replacement    CARPAL TUNNEL RELEASE      left    CORONARY ANGIOPLASTY WITH STENT PLACEMENT  10/2015    4 stents    EYE SURGERY      laser surgery in right eye    JOINT REPLACEMENT      orthoscopic surgery on knee      ROTATOR CUFF REPAIR      right side    SINUS SURGERY      TONSILLECTOMY      torn ligament      repair. left shoulder    TOTAL KNEE ARTHROPLASTY      left side    VASECTOMY         Referring physician: Bebeto  Date referred to OT: 3/29/17    General Precautions: Standard, fall  Orthopedic Precautions:    Braces:      Do you have any cultural, spiritual, [employer] conflicts, given your current situation?: None reported     Patient History:  Living Environment  Lives With: spouse  Living Arrangements: house (slab)  Transportation Available: family or friend will provide  Living Environment Comment: pt's wife can assist  Equipment Currently Used at Home: none    Prior level of function:   Bed Mobility/Transfers: independent  Grooming: independent  Bathing: independent  Upper Body Dressing: independent  Lower Body Dressing:  "independent  Toileting: independent  Driving License: Yes  Occupation: Retired     Dominant hand: right    Subjective:  Communicated with RN prior to session.    Chief Complaint: "I've been fighting this for two months." (Sciatica)  Patient/Family stated goals: to get better    Pain Rating: 3/10        Location: back (d/t sciatica per pt report)  Pain Addressed: Reposition  Pain Rating Post-Intervention: 3/10    Objective:  Patient found with: blood pressure cuff, telemetry, pulse ox (continuous), jauregui catheter, peripheral IV, oxygen (HFNC)    Cognitive Exam:  Oriented to: Person, Place, Time and Situation  Follows Commands/attention: Follows multistep  commands  Communication: clear/fluent  Memory:  No Deficits noted  Safety awareness/insight to disability: intact  Coping skills/emotional control: Appropriate to situation    Visual/perceptual:  Intact    Physical Exam:  Postural examination/scapula alignment: No postural abnormalities identified  Skin integrity: Visible skin intact  Edema: None noted     Sensation:   Intact    Upper Extremity Range of Motion:  Right Upper Extremity: WFL  Left Upper Extremity: WFL    Upper Extremity Strength:  Right Upper Extremity: WFL  Left Upper Extremity: WFL   Strength: Good    Fine motor coordination:   Intact    Gross motor coordination: WFL    Functional Mobility:  Bed Mobility:  Rolling/Turning to Left: Modified independent  Scooting/Bridging: Stand by Assistance  Supine to Sit: Modified Independent    Transfers:  Sit <> Stand Assistance: Contact Guard Assistance  Sit <> Stand Assistive Device: No Assistive Device  Bed <> Chair Technique: Stand Pivot  Bed <> Chair Transfer Assistance: Contact Guard Assistance    Functional Ambulation: Minimal A to/from sink    Activities of Daily Living:  Feeding Level of Assistance: Modified independent  Feeding adaptive equipment:   UE Dressing Level of Assistance: Stand by assistance  UE adaptive equipment:   LE Dressing Level of " "Assistance: Contact guard  LE adaptive equipment:   Grooming Position: Standing at sink  Grooming Level of Assistance: Contact guard assistance  Toileting Where Assessed: Toilet  Toileting Level of Assistance: Minimum assistance          AM-PAC 6 CLICK ADL  How much help from another person does this patient currently need?  1 = Unable, Total/Dependent Assistance  2 = A lot, Maximum/Moderate Assistance  3 = A little, Minimum/Contact Guard/Supervision  4 = None, Modified [place]/Independent    Putting on and taking off regular lower body clothing? : 3  Bathing (including washing, rinsing, drying)?: 3  Toileting, which includes using toilet, bedpan, or urinal? : 3  Putting on and taking off regular upper body clothing?: 3  Taking care of personal grooming such as brushing teeth?: 3  Eating meals?: 4  Total Score: 19    AM-PAC Raw Score CMS "G-Code Modifier Level of Impairment Assistance   6 % Total / Unable   7 - 9 CM 80 - 100% Maximal Assist   10 - 14 CL 60 - 80% Moderate Assist   15 - 19 CK 40 - 60% Moderate Assist   20 - 22 CJ 20 - 40% Minimal Assist   23 CI 1-20% SBA / CGA   24 CH 0% Independent/ Mod I       Patient left up in chair with all lines intact, call button in reach and RN notified    Assessment:  Lex Billy is a 72 y.o. male with a medical diagnosis of Acute respiratory failure with hypoxia and presents with decreased overall balance and endurance affecting ADL (I).    Rehab identified problem list/impairments: Rehab identified problem list/impairments: weakness, impaired endurance, impaired self care skills, impaired functional mobilty, gait instability    Rehab potential is good.    Activity tolerance: Good    Discharge recommendations: Discharge Facility/Level Of Care Needs: home     Barriers to discharge: Barriers to Discharge: None    Equipment recommendations: none     GOALS:   Occupational Therapy Goals        Problem: Occupational Therapy Goal    Goal Priority Disciplines " Outcome Interventions   Occupational Therapy Goal     OT, PT/OT Ongoing (interventions implemented as appropriate)    Description:  Goals to be met by: 4/5/17     Patient will increase functional independence with ADLs by performing:    Feeding with [place].  UE Dressing with Modified [place].  LE Dressing with Modified [place].  Grooming while standing at sink with Modified [place].  Toileting from toilet with Modified [place] for hygiene and clothing management.   Toilet transfer to toilet with Modified [place].                PLAN:  Patient to be seen 3 x/week to address the above listed problems via self-care/home management, therapeutic activities, therapeutic exercises  Plan of Care expires: 04/29/17  Plan of Care reviewed with: patient         Sisi MINDY Hatch  03/30/2017

## 2017-03-30 NOTE — ASSESSMENT & PLAN NOTE
- Repeated Sd ECHO showed EF 40% with severe aortic stenosis, relatively unchanged  - Not a TAVR candidate due to RCC   - CXR with slight improvement in R sided middle lung zone opacity  - will hold off further diuresis in the setting of hyponatremia to 125 (although corrected for hypoglycemia is about 129)

## 2017-03-30 NOTE — ASSESSMENT & PLAN NOTE
- Creatinine elevated at 1.7 on baseline creatinine of 1.0 to 1.1  - Urine studies demonstrate intrinsic renal disease (70% Urine urea)   - Cr improved today to 1.2 from 1.7 and has been making a good amount of UOP  - will check BMP this afternoon for Na level

## 2017-03-30 NOTE — ASSESSMENT & PLAN NOTE
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 Continue axitinib.   - Pt stated he has not been taking his axitinib due to running out of prescription and not being able to afford refill  - Will defer restarting this to his oncologist

## 2017-03-30 NOTE — ASSESSMENT & PLAN NOTE
- Troponin elevated at 0.127. EKG without new changes and patient denies chest pain. Continue aspirin and plavix. If patient develops chest pain, treat as ACS with heparin drip, however patient is asymptomatic at this time.

## 2017-03-30 NOTE — ASSESSMENT & PLAN NOTE
- Today to 125 correct to 129  - Will hold off on lasix   - Urine studies demonstrated intrarenal disease  - should follow Na levels and correct if becomes too low

## 2017-03-30 NOTE — SUBJECTIVE & OBJECTIVE
Interval History/Significant Events: Patient doing better this AM and tolerated NC at 6L with no SOB. This is weaned from high flow NC at 15L and 55%. Now stepped down.     Review of Systems   Constitutional: Negative for chills, fever and unexpected weight change.   Respiratory: Negative for cough, shortness of breath and wheezing.    Cardiovascular: Negative for chest pain, palpitations and leg swelling.   Gastrointestinal: Positive for constipation. Negative for abdominal pain, diarrhea, nausea and vomiting.   Genitourinary: Positive for difficulty urinating (CIC BID). Negative for dysuria and hematuria.   Musculoskeletal: Positive for back pain.   Skin: Negative for color change and rash.   Neurological: Negative for dizziness and light-headedness.     Objective:     Vital Signs (Most Recent):  Temp: 98.2 °F (36.8 °C) (03/30/17 0700)  Pulse: 104 (03/30/17 1000)  Resp: (!) 24 (03/30/17 1000)  BP: 106/69 (03/30/17 1000)  SpO2: 100 % (03/30/17 1000) Vital Signs (24h Range):  Temp:  [97.6 °F (36.4 °C)-98.5 °F (36.9 °C)] 98.2 °F (36.8 °C)  Pulse:  [] 104  Resp:  [18-41] 24  SpO2:  [87 %-100 %] 100 %  BP: ()/(50-78) 106/69   Weight: 79.6 kg (175 lb 7.8 oz)  Body mass index is 25.18 kg/(m^2).      Intake/Output Summary (Last 24 hours) at 03/30/17 1132  Last data filed at 03/30/17 1000   Gross per 24 hour   Intake             2030 ml   Output             1867 ml   Net              163 ml       Physical Exam   Constitutional: He is oriented to person, place, and time. He appears well-developed and well-nourished.   Eyes: Conjunctivae and EOM are normal.   Neck: Normal range of motion.   Cardiovascular: Normal rate and regular rhythm.    Murmur heard.   Systolic murmur is present   Pulmonary/Chest: No respiratory distress.   Decreased breath sounds bibasilar, slight crackles mid-lung on right   Abdominal: Soft. Bowel sounds are normal. He exhibits no distension. There is no tenderness.   Musculoskeletal: He  exhibits no edema.   Lymphadenopathy:     He has no cervical adenopathy.   Neurological: He is alert and oriented to person, place, and time.       Vents:  Oxygen Concentration (%): 55 (03/30/17 0800)  Lines/Drains/Airways     Drain                 Urethral Catheter 03/29/17 0103 Double-lumen 1 day          Peripheral Intravenous Line                 Peripheral IV - Single Lumen 03/28/17 2300 Right Antecubital 1 day         Peripheral IV - Single Lumen 03/29/17 1030 Right Hand 1 day              Significant Labs:    CBC/Anemia Profile:    Recent Labs  Lab 03/28/17 2259 03/28/17 2328 03/29/17  0350 03/30/17  0544   WBC 15.25*  --  14.81* 10.79   HGB 13.1*  --  11.7* 9.6*   HCT 37.6* 35* 35.3* 29.7*     --  257 231   MCV 89  --  91 91   RDW 16.0*  --  15.9* 16.2*        Chemistries:    Recent Labs  Lab 03/28/17 2259 03/29/17  0350   * 130*   K 5.0 5.0   CL 98 96   CO2 20* 23   BUN 36* 26*   CREATININE 1.7* 1.7*   CALCIUM 9.3 8.7   ALBUMIN 2.7* 2.5*   PROT 7.6 7.0   BILITOT 0.7 0.6   ALKPHOS 73 72   ALT 26 24   AST 24 20   MG 2.5 2.7*   PHOS  --  3.7           Significant Imaging:  I have reviewed all pertinent imaging results/findings within the past 24 hours.

## 2017-03-30 NOTE — ASSESSMENT & PLAN NOTE
- Repeated 2D ECHO, relatively unchanged  - BNP elevated at 474. Received lasix 80mg IV x 1 in ED with good urinary output.

## 2017-03-30 NOTE — ASSESSMENT & PLAN NOTE
Differential diagnosis includes pneumonia vs heart failure vs ARDS vs metastatic cancer. Started on antibiotics to cover for pneumonia. BNP is elevated suggestive of CHF, however patient reports stable weight and physical exam suggestive of euvolemia. Patient received one dose of lasix 80mg IV in ED, will monitor urine and output and clinical response. Repeat 2DE ordered to evaluate severe AS, and diastolic dysfunction. P/F ratio on  compatible with moderate ARDS but infiltrate seem to be more right sided and centrilobar than diffuse bilateral.  - was able to be weaned off HF to NC today at 6L and tolerated it well  - cultures have grown nothing, will continue broad-spectrum abx until cultures negative for full 48 hours

## 2017-03-30 NOTE — ASSESSMENT & PLAN NOTE
- Patient requires CIC BID at home. Gardner placed in ED, will continue for now. Continue tamsulosin.

## 2017-03-30 NOTE — PLAN OF CARE
Problem: Oncology Care (Adult)  Intervention: Relieve Dyspnea  72 year old male with a history of renal cell carcinoma with indwelling catheter, diabetes, CHF (40%), aortic stenosis and left sided stroke with blurred vision, admitted for increasing SOB, dyspnea and chest pain. Chest xray's, CT scans ordered to r/o pneumonia vs. Aspiration vs. Possible mets to lungs.  Patient currently on humidified 5Lpm per NC with oxygen saturation 94-99%. patient denies pain, sob and/or any discomfort today, vitals are stable. Gardner draining clear yellow urine. Bed alarms set, frequent checks made for pain management, and safety precautions.          03/30/17 7283   Positioning   Head of Bed (HOB) HOB at 30-45 degrees   Respiratory Interventions   Airway/Ventilation Management airway patency maintained;calming measures promoted;humidification applied;pulmonary hygiene promoted

## 2017-03-30 NOTE — PLAN OF CARE
Problem: Occupational Therapy Goal  Goal: Occupational Therapy Goal  Goals to be met by: 4/5/17     Patient will increase functional independence with ADLs by performing:    Feeding with [place].  UE Dressing with Modified [place].  LE Dressing with Modified [place].  Grooming while standing at sink with Modified [place].  Toileting from toilet with Modified [place] for hygiene and clothing management.   Toilet transfer to toilet with Modified [place].  Outcome: Ongoing (interventions implemented as appropriate)  OT eval completed, and above goals established. MINDY Palomino  3/30/2017

## 2017-03-30 NOTE — PROGRESS NOTES
Spoke with Bharati about stepping patient down. She agreed and he will be stepped down to hospital medicine.     Tae Langston  PGY-1  Critical Care Medicine

## 2017-03-30 NOTE — RESIDENT HANDOFF
Handoff     Primary Team: [facility] Room Number: 1026/1026 A     Patient Name: Lex Billy MRN: [id number]     Date of Birth: [id number] Allergies: No known drug allergies     Age: 72 y.o. Admit Date: 3/28/2017     Sex: male  BMI: Body mass index is 25.18 kg/(m^2).     Code Status: Full Code        Illness Level (current clinical status): Watcher - Yes    Reason for Admission: Acute respiratory failure with hypoxia    Brief HPI (pertinent PMH and diagnosis or differential diagnosis):   71 yo M with metastatic renal cell carcinoma, CAD, severe AS presents for worsening dyspnea. Patient reports dyspnea x 1 month with sudden worsening yesterday. He has orthopnea at baseline without worsening. He monitors weight daily and denies changes in weight. He denies cough, fevers, chills, or sick contacts.He is compliant with all medications.   Saturation on room air at presentation to ED in 70s%. He was placed on BiPAP 10/5 100% FiO2. ABG showed 7.4/37/134/23.4/99%.   Initial labs notable for leukocytosis WBC 15, HERNANDEZ Cr 1.7,  and troponin 0.127.  CXR shows right middle lobe infiltrate. CTA obtained in ED was negative for PE, but shows bilateral opacities R>L. Concern for pneumonia vs metastatic disease, will need follow up CT in 8-12 weeks.     Procedure Date: None    Hospital Course (updated, brief assessment by system or problem, significant events):   3/29/17: Admit to ICU for hypoxic respiratory failure. Antibiotics started to cover for pneumonia. Started on HFNC for hypoxia.      3/30/17: Patient tolerated weaning to nasal canula with maintained saturations. Patient will be stepped down to hospital medicine. WBC down to 10 today and patient has been afebrile. Much more comfotable on HFNC.     Tasks (specific, using if-then statements):   - monitor hyponatremia  - make sure he has follow up with oncologist and that repeat CT chest is planned for 8-12 weeks  - narrow antibiotics is cultures are  negative for 48 hours      Estimated Discharge Date:   4/1    Discharge Disposition: Home or Self Care

## 2017-03-30 NOTE — PT/OT/SLP EVAL
Physical Therapy  Evaluation    Lex Billy   MRN: [id number]   Admitting Diagnosis: Acute respiratory failure with hypoxia    PT Received On: 03/30/17  PT Start Time: 0833     PT Stop Time: 0850    PT Total Time (min): 17 min       Billable Minutes:  Evaluation 17 min    Diagnosis: Acute respiratory failure with hypoxia   To ED with c/o SOB, cough    Past Medical History:   Diagnosis Date    Acute on chronic congestive heart failure     Arthritis     Colon polyp     Coronary artery disease involving native coronary artery with unstable angina pectoris 10/7/2015    Diabetes mellitus     Heart disease, unspecified     Hyperlipidemia     Hypertension     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 7/15/2014    Pneumonia 02/2016    Stroke     right eye    Type 2 diabetes mellitus       Past Surgical History:   Procedure Laterality Date    BACK SURGERY      cervical disc replacement    CARPAL TUNNEL RELEASE      left    CORONARY ANGIOPLASTY WITH STENT PLACEMENT  10/2015    4 stents    EYE SURGERY      laser surgery in right eye    JOINT REPLACEMENT      orthoscopic surgery on knee      ROTATOR CUFF REPAIR      right side    SINUS SURGERY      TONSILLECTOMY      torn ligament      repair. left shoulder    TOTAL KNEE ARTHROPLASTY      left side    VASECTOMY         Referring physician: Kristy Tate  Date referred to PT: 3/29/17    General Precautions: Standard, fall  Orthopedic Precautions:     Braces:         Do you have any cultural, spiritual, Scientologist conflicts, given your current situation?:  (none)    Patient History:  Lives With: spouse (pt is retired and lives with his wife who can assist after discharge. )  Living Arrangements: house (1 story, slab)  Equipment Currently Used at Home: none  DME owned (not currently used): none    Previous Level of Function:  Ambulation Skills: independent  Transfer Skills: independent    Subjective:  Communicated with nurse prior to  session.    Chief Complaint: pt c/o pain in his back  Patient goals: to get better and go home.    Pain Rating: 3/10         Location:  (back, sciatica per pt report)     Pain Rating Post-Intervention: 3/10    Objective:   Patient found with: telemetry, pulse ox (continuous), blood pressure cuff, peripheral IV, oxygen, jauregui catheter (HFNC for O2)     Cognitive Exam:  Oriented to: Person, Place, Time and Situation    Follows Commands/attention: Follows multistep  commands  Communication: clear/fluent  Safety awareness/insight to disability: intact    Physical Exam:    Lower Extremity Range of Motion:  Right Lower Extremity: WFL  Left Lower Extremity: WFL    Lower Extremity Strength:  Right Lower Extremity: WFL  Left Lower Extremity: WFL       Functional Mobility:  Bed Mobility:  Rolling/Turning to Left: Modified independent  Supine to Sit: Modified Independent    Transfers:  Sit <> Stand Assistance: Contact Guard Assistance    Gait:   Gait Distance: 16 ft with HFNC O2. Pt stood at sink and performed ADLS with OT.  Assistance 1: Minimum assistance (pt had slight decreased balance)      AM-PAC 6 CLICK MOBILITY  How much help from another person does this patient currently need?   1 = Unable, Total/Dependent Assistance  2 = A lot, Maximum/Moderate Assistance  3 = A little, Minimum/Contact Guard/Supervision  4 = None, Modified [place]/Independent    Turning over in bed (including adjusting bedclothes, sheets and blankets)?: 4  Sitting down on and standing up from a chair with arms (e.g., wheelchair, bedside commode, etc.): 3  Moving from lying on back to sitting on the side of the bed?: 3  Moving to and from a bed to a chair (including a wheelchair)?: 3  Need to walk in hospital room?: 3  Climbing 3-5 steps with a railing?: 2  Total Score: 18     AM-PAC Raw Score CMS G-Code Modifier Level of Impairment Assistance   6 % Total / Unable   7 - 9 CM 80 - 100% Maximal Assist   10 - 14 CL 60 - 80% Moderate Assist    15 - 19 CK 40 - 60% Moderate Assist   20 - 22 CJ 20 - 40% Minimal Assist   23 CI 1-20% SBA / CGA   24 CH 0% Independent/ Mod I     Patient left seated on toilet with call button in reach and nursing notified. with all lines intact.    Assessment:   Lex Billy is a 72 y.o. male with a medical diagnosis of Acute respiratory failure with hypoxia and presents with decreased mobility and decreased distance ambulated. Pt would benefit from cont PT to address deficits and may need HHPT for discharge. Pt will benefit from skilled PT 5x/wk to return to Independent status. .    Rehab identified problem list/impairments: Rehab identified problem list/impairments: impaired endurance, gait instability, impaired functional mobilty    Rehab potential is good.    Activity tolerance: Good    Discharge recommendations: Discharge Facility/Level Of Care Needs: home health PT     Barriers to discharge: Barriers to Discharge: None    Equipment recommendations: Equipment Needed After Discharge: none     GOALS:   Physical Therapy Goals        Problem: Physical Therapy Goal    Goal Priority Disciplines Outcome Goal Variances Interventions   Physical Therapy Goal     PT/OT, PT      Description:  Goals to be met by: 17    Patient will increase functional independence with mobility by performin. Sit to stand transfer with Modified [place] - not met  2. Gait  x 220 feet with Supervision - not met                PLAN:    Patient to be seen 5 x/week to address the above listed problems via gait training, therapeutic activities  Plan of Care expires: 17  Plan of Care reviewed with: patient    Functional Assessment Tool Used: FIM  Score: 1  Functional Limitation: Mobility: Walking and moving around  Mobility: Walking and Moving Around Current Status (): CN  Mobility: Walking and Moving Around Goal Status (): DEONTE Espino, PT  2017

## 2017-03-30 NOTE — PROGRESS NOTES
Patient arrived to room via wheelchair. Alert and oriented X4, ambulatory to bed. Oriented patient to room, call light.  Reminded pt to call for assistance.

## 2017-03-31 PROBLEM — J18.9 PNEUMONIA OF RIGHT LOWER LOBE DUE TO INFECTIOUS ORGANISM: Status: ACTIVE | Noted: 2017-01-01

## 2017-03-31 PROBLEM — I21.4 NSTEMI, INITIAL EPISODE OF CARE: Status: ACTIVE | Noted: 2017-01-01

## 2017-03-31 PROBLEM — M47.816 FACET ARTHRITIS OF LUMBAR REGION: Status: RESOLVED | Noted: 2017-01-01 | Resolved: 2017-01-01

## 2017-03-31 PROBLEM — R06.02 SHORTNESS OF BREATH: Status: RESOLVED | Noted: 2017-01-01 | Resolved: 2017-01-01

## 2017-03-31 PROBLEM — N17.9 AKI (ACUTE KIDNEY INJURY): Status: RESOLVED | Noted: 2017-01-01 | Resolved: 2017-01-01

## 2017-03-31 PROBLEM — N40.1 BENIGN NON-NODULAR PROSTATIC HYPERPLASIA WITH LOWER URINARY TRACT SYMPTOMS: Status: ACTIVE | Noted: 2017-01-01

## 2017-03-31 PROBLEM — R91.8 PULMONARY INFILTRATES ON CXR: Status: ACTIVE | Noted: 2017-01-01

## 2017-03-31 PROBLEM — J90 PLEURAL EFFUSION: Status: ACTIVE | Noted: 2017-01-01

## 2017-03-31 NOTE — PROGRESS NOTES
[facility] Hospital Medicine  Progress Note    Patient Name: Lex Billy  MRN: [id number]  Patient Class: IP- Inpatient   Admission Date: 3/28/2017  Length of Stay: 2 days  Attending Physician: Jc Rowan MD  Primary Care Provider: Everette Rowan MD, MD    Hospital Medicine Team: [facility] HOSP MED 4 Bebeto Lawrence MD    Subjective:     Principal Problem:Severe sepsis    HPI:  71 yo M with metastatic renal cell carcinoma, CAD, severe AS presents for worsening dyspnea. Patient reports dyspnea x 1 month with sudden worsening yesterday. He has orthopnea at baseline without worsening. He monitors weight daily and denies changes in weight. He denies cough, fevers, chills, or sick contacts.He is compliant with all medications.    Saturation on room air at presentation to ED in 70s%. He was placed on BiPAP 10/5 100% FiO2. ABG showed 7.4/37/134/23.4/99%.   Initial labs notable for leukocytosis WBC 15, HERNANDEZ Cr 1.7,  and troponin 0.127.  CXR shows right middle lobe infiltrate. CTA obtained in ED was negative for PE, but shows bilateral opacities R>L.      Hospital Course:  3/29/17: Admit to ICU for hypoxic respiratory failure. Antibiotics started to cover for pneumonia. Started on HFNC for hypoxia.     3/30/17: Patient tolerated weaning to nasal canula with maintained saturations. Patient will be stepped down to hospital medicine.       Interval History: Patient reports near-resolution of pulmonary symptoms with some minor SOB still present. He is still on low flow NC to maintain adequate saturations. Denies chest pain, any other infectious symptoms.     Review of Systems   Constitutional: Negative for chills, fever and unexpected weight change.   Respiratory: Positive for shortness of breath. Negative for cough and wheezing.    Cardiovascular: Negative for chest pain, palpitations and leg swelling.   Gastrointestinal: Positive for constipation. Negative for abdominal pain, diarrhea, nausea and  vomiting.   Genitourinary: Positive for difficulty urinating (CIC BID). Negative for dysuria and hematuria.   Musculoskeletal: Positive for back pain.   Skin: Negative for color change and rash.   Neurological: Negative for dizziness and light-headedness.     Objective:     Vital Signs (Most Recent):  Temp: 98.1 °F (36.7 °C) (03/31/17 1100)  Pulse: 96 (03/31/17 1500)  Resp: 19 (03/31/17 1100)  BP: 112/65 (03/31/17 1100)  SpO2: 95 % (03/31/17 1500) Vital Signs (24h Range):  Temp:  [98.1 °F (36.7 °C)-99.4 °F (37.4 °C)] 98.1 °F (36.7 °C)  Pulse:  [] 96  Resp:  [18-20] 19  SpO2:  [90 %-100 %] 95 %  BP: ()/(56-69) 112/65     Weight: 79.6 kg (175 lb 7.8 oz)  Body mass index is 25.18 kg/(m^2).    Intake/Output Summary (Last 24 hours) at 03/31/17 1527  Last data filed at 03/31/17 0827   Gross per 24 hour   Intake              930 ml   Output             2100 ml   Net            -1170 ml      Physical Exam   Constitutional: He is oriented to person, place, and time. He appears well-developed and well-nourished.   HENT:   Mouth/Throat: Oropharynx is clear and moist. No oropharyngeal exudate.   Eyes: Conjunctivae and EOM are normal.   Neck: Normal range of motion. No tracheal deviation present. No thyromegaly present.   Cardiovascular: Normal rate and regular rhythm.    Murmur heard.   Systolic murmur is present   Pulmonary/Chest: No respiratory distress.   Decreased breath sounds bibasilar, slight crackles mid-lung on right   Abdominal: Soft. Bowel sounds are normal. He exhibits no distension. There is no tenderness.   Musculoskeletal: He exhibits no edema.   Lymphadenopathy:     He has no cervical adenopathy.   Neurological: He is alert and oriented to person, place, and time. No cranial nerve deficit.       Significant Labs:   CBC:     Recent Labs  Lab 03/30/17  0544 03/31/17  0442   WBC 10.79 7.94   HGB 9.6* 10.5*   HCT 29.7* 31.2*    231     CMP:   Recent Labs  Lab 03/30/17  1016 03/30/17  1837  03/31/17  0442   * 134* 136   K 4.3 4.2 4.2   CL 94* 99 104   CO2 22* 26 23   * 135* 103   BUN 21 23 17   CREATININE 1.2 1.2 0.9   CALCIUM 8.1* 8.4* 8.2*   PROT  --   --  5.9*   ALBUMIN  --   --  2.0*   BILITOT  --   --  0.7   ALKPHOS  --   --  69   AST  --   --  62*   ALT  --   --  59*   ANIONGAP 9 9 9   EGFRNONAA >60.0 >60.0 >60.0         Significant Imaging: I have reviewed and interpreted all pertinent imaging results/findings within the past 24 hours.         Assessment/Plan:      * Severe sepsis  - pneumonia: Started on antibiotics to cover for pneumonia. BNP is elevated suggestive of CHF, however patient reports stable weight and physical exam suggestive of euvolemia. Patient received one dose of lasix 80mg IV in ED, will monitor urine and output and clinical response. Repeat 2DE ordered to evaluate severe AS, and diastolic dysfunction. P/F ratio on  compatible with moderate ARDS but infiltrate seem to be more right sided and centrilobar than diffuse bilateral.  - was able to be weaned off HF to NC at 6L and tolerated it well  - cultures have grown nothing, deescalating broad spectrum abx to azithromycin and Augmentin         Essential hypertension  -Toprol XL 12.5mg QD    Type 2 diabetes mellitus with stage 2 chronic kidney disease, without long-term current use of insulin  -renal function improved and Cr improved to 0.9      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  -plan per outpatient Oncology      Coronary artery disease involving native coronary artery of native heart without angina pectoris  -s/p PCI in 2015; continue DAPT, BB, statin  - Troponin elevated at .1 to 0.5. EKG without new changes and patient denies chest pain. TN consistently elevated today 2.4   -will consult Cardiology for recs on managing CAD with troponemia, as well on DAPT regimen duration  Continue aspirin and plavix. If patient develops chest pain, treat as ACS with heparin drip, however patient is asymptomatic at  this time.    -repeat TN today     Severe aortic stenosis  - Repeated 2D ECHO showed EF 40% with severe aortic stenosis, relatively unchanged  - Not a TAVR candidate due to RCC   - CXR with slight improvement in R sided middle lung zone opacity       Renal cell carcinoma of right kidney  - Metastasis to left posterior 12th rib and right sacrum. Hold axitinib.   - Pt stated he has not been taking his axitinib due to running out of prescription and not being able to afford refill  - Will defer restarting this to his oncologist      Type 2 diabetes mellitus with diabetic polyneuropathy  -on metformin at home; LDSSI now    Acute respiratory failure with hypoxia  -patient presented with acute decompensated respiratory failure- will reassess need for oxygen as clinical presentation is much improved  -weaning oxygen as tolerated  -treat underlying lobar PNA    Chronic diastolic heart failure  -continue home ASA/statin/BB    Benign non-nodular prostatic hyperplasia with lower urinary tract symptoms  -continue home tamsulosin    Hyponatremia  -resolved    Pneumonia of right lower lobe due to infectious organism  -patient stepped down from ICU with significantly improved oxygen requirements on 3/31.  -patient on vancomycin/azithromycin/cefepime initially  -will continue CAP coverage with Augmentin/azithromycin to complete a 5 day course  -low flow NC PRN  -discharge planning    VTE Risk Mitigation         Ordered     enoxaparin injection 40 mg  Daily     Route:  Subcutaneous        03/31/17 0951     High Risk of VTE  Once      03/29/17 0328          Bebeto Lawrence MD  Department of Hospital Medicine   [facility]

## 2017-03-31 NOTE — PLAN OF CARE
IMM discussed & signed. Copy of IMM left at bedside w/ [employer] # circled.     03/31/17 1100   Medicare Message   Important Message from Medicare regarding Discharge Appeal Rights Given to patient/caregiver;Explained to patient/caregiver;Signed/date by patient/caregiver   Date IMM was signed 03/31/17   Time IMM was signed 1100

## 2017-03-31 NOTE — ASSESSMENT & PLAN NOTE
- pneumonia: Started on antibiotics to cover for pneumonia. BNP is elevated suggestive of CHF, however patient reports stable weight and physical exam suggestive of euvolemia. Patient received one dose of lasix 80mg IV in ED, will monitor urine and output and clinical response. Repeat 2DE ordered to evaluate severe AS, and diastolic dysfunction. P/F ratio on  compatible with moderate ARDS but infiltrate seem to be more right sided and centrilobar than diffuse bilateral.  - was able to be weaned off HF to NC at 6L and tolerated it well  - cultures have grown nothing, deescalating broad spectrum abx to azithromycin and Augmentin

## 2017-03-31 NOTE — CONSULTS
Cardiology Consult   Attending Physician: Jc Rowan MD  Reason for Consult: Elevated trops / SOB     HPI:     Lex Powell is 71 yo M with medical history significant for DM- diet controlled, HTN, HLD, severe AS (NAMAN 0.8, PV 4, MG 44- not a TAVR candidate per Dr Ramirez), NSTEMI (10/9-10/9/15) s/p LM BMS x 1, mRCA BMS x 2, and dRCA BMS x 1, Diastolic Dysfunction with preserved EF 50% (2/12/16) AND metastatic renal cell carcinoma undergoing chemotherapy. Admitted for acute hypoxic respiratory failure managed with IV antibiotics and diuresis for pneumonia vs heart failure vs ARDS vs metastatic cancer.     On cardiac standpoint, BNP was elevated but patient was euvolemia on examination. Repeat 2DE on admission revealed severe AS, and diastolic dysfunction with EF of 40%. Trop was 0.127 without new changes on EKG and with no symptoms of ACS/angina/PND/orhtopnea. Was on maintenance dose of ASA/plavix. However trop done today for SOB revealed 2.419 with ST depression on EKG, hence cardiology was consulted for further management.     ROS:    Constitution: Negative for fever, chills, weight loss or gain.   HENT: Negative for sore throat, rhinorrhea, or headache.  Eyes: Negative for blurred or double vision.   Cardiovascular: See above  Pulmonary: Positive for SOB   Gastrointestinal: Negative for abdominal pain, nausea, vomiting, or diarrhea.   : Negative for dysuria.   Neurological: Negative for focal weakness or sensory changes.  PMH:     Past Medical History:   Diagnosis Date    Acute on chronic congestive heart failure     Arthritis     Chronic kidney disease, stage II (mild) 10/20/2016   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    Coronary artery disease involving native coronary artery with unstable angina pectoris 10/7/2015    Diabetes mellitus     Drug-induced diabetes mellitus 12/3/2015    Facet arthritis of lumbar region 2/20/2017    Heart disease,  unspecified     Hyperlipidemia     Hypertension     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 7/15/2014    Pneumonia 02/2016    Polyneuropathy 10/20/2016    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 left shoulder    TOTAL KNEE ARTHROPLASTY      left side    VASECTOMY       Allergies:     Review of patient's allergies indicates:   Allergen Reactions    No known drug allergies      Medications:     No current facility-administered medications on file prior to encounter.      Current Outpatient Prescriptions on File Prior to Encounter   Medication Sig Dispense Refill    aspirin (ECOTRIN) 81 MG EC tablet Take 81 mg by mouth once daily.      atorvastatin (LIPITOR) 40 MG tablet TAKE 1 TABLET ONE TIME DAILY (Patient taking differently: TAKE 1 TABLET BY MOUTH ONE TIME DAILY) 90 tablet 3    axitinib (INLYTA) 5 mg Tab Take 1 tablet by mouth 2 (two) times daily. 60 tablet 3    bethanechol (URECHOLINE) 50 MG tablet Take 1 tablet (50 mg total) by mouth 3 (three) times daily. 90 tablet 11    blood sugar diagnostic Strp 1 each by Misc.(Non-Drug; Combo Route) route 3 (three) times daily. Free style freedom lite test strips and lancets 90 each 3    blood-glucose meter (TRUE METRIX AIR GLUCOSE METER) Misc Use as directed 1 each 0    brimonidine 0.1% (ALPHAGAN P) 0.1 % Drop Place 1 drop into both eyes 3 (three) times daily.      CALCIUM CARBONATE-VITAMIN D3 ORAL Take 1 tablet by mouth every morning. Calcium carbonate 1000mg vitamin d3 1600 units per patient       clopidogrel (PLAVIX) 75 mg tablet Take 1 tablet (75 mg total) by mouth once daily. 90 tablet 3    duloxetine (CYMBALTA) 30 MG capsule Take 1 capsule (30 mg total) by mouth once daily. 30 capsule 0    fexofenadine (ALLEGRA) 180 MG tablet TAKE 1 TABLET ONE TIME DAILY (Patient taking differently: TAKE 1 TABLET BY MOUTH ONE TIME DAILY) 90 tablet 3    gabapentin (NEURONTIN) 300 MG capsule Take 2 capsules (600 mg total) by mouth 3 (three) times daily. 540 capsule 3    lancets 28 gauge Misc 1 lancet by Misc.(Non-Drug; Combo Route) route 3 (three) times daily. 300 each 3    metformin (GLUCOPHAGE) 500 MG tablet TAKE 1 TABLET BY MOUTH TWO TIMES A DAY WITH MEALS (Patient taking differently: TAKE 1 TABLET BY MOUTH ONE TIME A DAY WITH MEALS) 60 tablet 5    metoprolol succinate (TOPROL-XL) 25 MG 24 hr tablet Take 0.5 tablets (12.5 mg total) by mouth once daily. 45 tablet 6    morphine (MS CONTIN) 15 MG 12 hr tablet Take 1 tablet (15 mg total) by mouth every 8 (eight) hours as needed for Pain. 90 tablet 0    nitroGLYCERIN (NITROSTAT) 0.4 MG SL tablet Place 1 tablet (0.4 mg total) under the tongue every 5 (five) minutes as needed for Chest pain. 25 tablet 4    potassium chloride SA (K-DUR,KLOR-CON) 20 MEQ tablet Take 1 tablet (20 mEq total) by mouth once daily. 90 tablet 4    sulfamethoxazole-trimethoprim 800-160mg (BACTRIM DS) 800-160 mg Tab Take 1 tablet by mouth 2 (two) times daily. 14 tablet 0    tamsulosin (FLOMAX) 0.4 mg Cp24 Take 1 capsule (0.4 mg total) by mouth once daily. 30 capsule 11    verapamil (CALAN-SR) 240 MG CR tablet Take 1 tablet (240 mg total) by mouth once daily. 90 tablet 3    zolpidem (AMBIEN) 5 MG Tab Take 1 tablet (5 mg total) by mouth nightly as needed. (Patient taking differently: Take 5 mg by mouth nightly as needed (for sleep). ) 30 tablet 3    [DISCONTINUED] bethanechol (URECHOLINE) 25 MG Tab Take 1 tablet (25 mg total) by mouth 3 (three) times daily. 100 tablet 6    [DISCONTINUED]  blood sugar diagnostic (TRUE METRIX GLUCOSE TEST STRIP) Strp Test three times daily 300 strip 3    [DISCONTINUED] ciprofloxacin HCl (CIPRO) 500 MG tablet Take 1 tablet (500 mg total) by mouth 2 (two) times daily. 14 tablet 0    [DISCONTINUED] furosemide (LASIX) 40 MG tablet Take 1 tablet (40 mg total) by mouth once daily. 90 tablet 4    [DISCONTINUED] levoFLOXacin (LEVAQUIN) 500 MG tablet Take 500 mg by mouth once daily.  0       Inpatient Medications   Continuous Infusions:   heparin (porcine) in D5W 17 Units/kg/hr (03/31/17 1808)     Scheduled Meds:   amoxicillin-clavulanate 500-125mg  1 tablet Oral TID    aspirin  81 mg Oral Daily    [START ON 4/1/2017] atorvastatin  80 mg Oral Daily    [START ON 4/1/2017] azithromycin  500 mg Oral Daily    cetirizine  10 mg Oral QHS    clopidogrel  525 mg Oral Once    clopidogrel  75 mg Oral Daily    duloxetine  30 mg Oral Daily    gabapentin  600 mg Oral TID    metoprolol succinate  12.5 mg Oral Daily    polyethylene glycol  17 g Oral Daily    sodium chloride 0.9%  500 mL Intravenous Once    sodium chloride 0.9%  3 mL Intravenous Q8H    tamsulosin  0.4 mg Oral Daily     PRN Meds:acetaminophen, dextrose 50%, dextrose 50%, glucagon (human recombinant), glucose, glucose, insulin aspart, nitroGLYCERIN, oxycodone, oxycodone, senna-docusate 8.6-50 mg, zolpidem     Social History:     Social History   Substance Use Topics    Smoking status: Never Smoker    Smokeless tobacco: Never Used    Alcohol use No      Comment: Heavy in past, quit about 20 years ago     Family History:     Family History   Problem Relation Age of Onset    Heart disease Father     ALS Father     Cancer Brother      esophageal    Rheum arthritis Mother     Migraines Daughter     Asthma Son     Diabetes Neg Hx     Colon polyps Neg Hx      Physical Exam:     Vitals:  Temp:  [98.1 °F (36.7 °C)-99.4 °F (37.4 °C)]   Pulse:  []   Resp:  [18-19]   BP: ()/(56-65)   SpO2:  [90 %-100  %]  on NC I/O's:    Intake/Output Summary (Last 24 hours) at 03/31/17 1843  Last data filed at 03/31/17 1600   Gross per 24 hour   Intake             1120 ml   Output             2700 ml   Net            -1580 ml        Constitutional: NAD, conversant  HEENT: Sclera anicteric, PERRLA, EOMI  Neck: Unable to visualize JVD, no carotid bruits  CV: RRR, 3/6 mid peaking systolic ejection murmur, normal S1/S2  Pulm: Bibasilar crackles  GI: Abdomen soft, NTND, +BS  Extremities: No LE edema, warm and well perfused  Skin: No ecchymosis, erythema, or ulcers  Psych: AOx3, appropriate affect  Neuro: CNII-XII intact, no focal deficits    Labs:       Recent Labs  Lab 03/30/17  1016 03/30/17  1836 03/31/17  0442   * 134* 136   K 4.3 4.2 4.2   CL 94* 99 104   CO2 22* 26 23   BUN 21 23 17   CREATININE 1.2 1.2 0.9   ANIONGAP 9 9 9       Recent Labs  Lab 03/28/17  2259 03/29/17  0350 03/31/17  0442   AST 24 20 62*   ALT 26 24 59*   ALKPHOS 73 72 69   BILITOT 0.7 0.6 0.7   ALBUMIN 2.7* 2.5* 2.0*       Recent Labs  Lab 03/28/17  2259 03/29/17  0350 03/31/17  0442   TROPONINI 0.127* 0.525* 2.419*   *  --   --       Recent Labs  Lab 03/29/17  0350 03/30/17  0544 03/31/17  0442   WBC 14.81* 10.79 7.94   HGB 11.7* 9.6* 10.5*   HCT 35.3* 29.7* 31.2*    231 231   GRAN 89.5*  13.3* 87.4*  9.4* 79.0*  6.3       Recent Labs  Lab 03/28/17 2259   INR 1.3*     Lab Results   Component Value Date    CHOL 125 02/20/2017    HDL 36 (L) 02/20/2017    LDLCALC 69.8 02/20/2017    TRIG 96 02/20/2017     Lab Results   Component Value Date    HGBA1C 5.5 02/20/2017        Micro:  Blood Cultures  Lab Results   Component Value Date    LABBLOO No Growth to date 03/29/2017    LABBLOO No Growth to date 03/29/2017    LABBLOO No Growth to date 03/29/2017    LABBLOO No Growth to date 03/29/2017    LABBLOO No Growth to date 03/29/2017    LABBLOO No Growth to date 03/29/2017     Urine Cultures  Lab Results   Component Value Date    LABURIN No  growth 03/29/2017    LABURIN STAPHYLOCOCCUS EPIDERMIDIS  > 100,000 cfu/ml   03/21/2017    LABURIN No growth 02/13/2017    LABURIN No growth 06/19/2014       Imaging:       EF   Date Value Ref Range Status   03/29/2017 40 (A) 55 - 65    02/12/2016 50 55 - 65    10/08/2015 63 55 - 65    02/09/2015 65 55 - 65    08/18/2014 60         EKG:   Normal sinus rhythm  Possible Left atrial enlargement  Left axis deviation  Incomplete right bundle branch block  LVH  Cannot rule out Septal infarct ,age undetermined  Abnormal ECG  When compared with ECG of 30-MAR-2017 00:55,    ASSESSMENT/PLAN:     Current Problems List:  Active Hospital Problems    Diagnosis  POA    *Sepsis with acute organ dysfunction [A41.9, R65.20]  Yes    Pneumonia of right lower lobe due to infectious organism [J18.1]  Yes    Hyponatremia [E87.1]  Yes    Acute respiratory failure with hypoxia [J96.01]  Yes    Chronic diastolic heart failure [I50.32]  Yes    Benign non-nodular prostatic hyperplasia with lower urinary tract symptoms [N40.1]  Yes    Type 2 diabetes mellitus with diabetic polyneuropathy [E11.42]  Yes     Chronic    Renal cell carcinoma of right kidney [C64.1]  Yes    Severe aortic stenosis [I35.0]  Yes    Coronary artery disease involving native coronary artery of native heart without angina pectoris [I25.10]  Yes    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 [C79.51, C64.9]  Yes     Chronic    Essential hypertension [I10]  Yes    Type 2 diabetes mellitus with stage 2 chronic kidney disease, without long-term current use of insulin [E11.22, N18.2]  Yes      Resolved Hospital Problems    Diagnosis Date Resolved POA    HERNANDEZ (acute kidney injury) [N17.9] 03/31/2017 Yes    Shortness of breath [R06.02] 03/31/2017 Yes        ASSESSMENT:   - 73 yo M with medical history significant for DM- diet controlled, HTN, HLD, severe AS (NAMAN 0.8, PV 4, MG 44- not a TAVR candidate per Dr Ramirez), NSTEMI (10/9-10/9/15) s/p LM BMS x 1, mRCA BMS x 2, and  dRCA BMS x 1, Diastolic Dysfunction with preserved EF 50% (2/12/16) AND metastatic renal cell carcinoma undergoing chemotherapy.   - Admitted for acute hypoxic respiratory failure managed with IV antibiotics and diuresis for pneumonia vs heart failure vs ARDS vs metastatic cancer.   - Repeat 2DE on admission revealed severe AS, and diastolic dysfunction with EF of 40%.   - No specific ST/T wave changes / ST depressions on joellen-lateral and inferior leads.   - Trop trends of 0.127 to 0.525 to 2.419    PLAN:  NSTEMI:   - high probability of NSTEMI considering the risk factors, prior ACS, trending trops  - ACS protocol for NSTEMI  --- Heparin bolus followed by infusion   --- continue ASA 81 mg OD (already received loading dose during this admission)  --- Load plavix, following by 75 mg OD  --- high dose statins, atorvastatin 80 mg OD  --- discussed with patient about the need for LHC / possible PCI on Monday. Urgent IC consult in-case of ongoing unrelievable chest pain  --- trend trop q6hrly  --- PRN NTG/EKG for chest pain     Thank you for this consult.  Will continue to follow with you.    Marilin Johnson MD  PGY1  Page: [phone]

## 2017-03-31 NOTE — PLAN OF CARE
CM noted that pt's o2 is off and pt is satting 91-93% on RA. Pt declined to be d/c'ed w/ h/h which are the recs from therapy.     03/31/17 1234   Right Care Assessment   Can the patient answer the patient profile reliably? Yes, cognitively intact   How often would a person be available to care for the patient? Whenever needed   Describe the patient's ability to walk at the present time. Minor restrictions or changes   How does the patient rate their overall health at the present time? Fair   Number of comorbid conditions (as recorded on the chart) Three   During the past month, has the patient often been bothered by feeling down, depressed or hopeless? No   During the past month, has the patient often been bothered by little interest or pleasure in doing things? Yes   Have you felt little interest or pleasure in doing things? 1

## 2017-03-31 NOTE — CONSULTS
Consult Note  Pulmonology     Primary Team: [facility] MED 4    PRESENTING HISTORY     Chief Complaint/Reason for Consult: respiratory failure     History of Present Illness:  71 yo M with metastatic renal cell carcinoma, CAD, severe AS presents for worsening dyspnea. Patient reports dyspnea x 1 month with sudden worsening yesterday. He has orthopnea at baseline without worsening. He monitors weight daily and denies changes in weight. He denies cough, fevers, chills, or sick contacts.He is compliant with all medications.   Saturation on room air at presentation to ED in 70s%. He was placed on BiPAP 10/5 100% FiO2. ABG showed 7.4/37/134/23.4/99%.   Initial labs notable for leukocytosis WBC 15, HERNANDEZ Cr 1.7,  and troponin 0.127.  CXR shows right middle lobe infiltrate. CTA obtained in ED was negative for PE, but shows bilateral opacities R>L. Concern for pneumonia vs metastatic disease      Interval hx: NAEON; patient states he feels well today. Breathing comfortably. SOB worse with laying flat; feels much better than on admission.    Review of Systems:  Constitutional: Denies weight loss, chills, or weakness.  Eyes: denies vision changes, loss or blurry vision.   ENT: Denies dysphagia, nasal discharge, ear pain or discharge.  Cardiovascular: Denies chest pain, palpitations, orthopnea, or claudication.  Respiratory: Denies cough, hemoptysis and wheezing.  GI: Denies nausea/vomiting, hematochezia, melena, abd pain, or changes in appetite. + constipation   : + difficulty urinating   Musculoskeletal: + back pain   Skin/breast: Denies rashes, lumps, lesions, or discharge.  Neurologic: Denies headache, dizziness, vertigo, or paresthesias.  Psychiatric: denies suicidal ideation and changes in mood. Denies depression   Endocrine: Denies polyuria, polydipsia, heat/cold intolerance.  Hematologic/Lymph: Denies lymphadenopathy, easy bruising or easy bleeding.  Extremities: denies claudication; denies edema    Allergic/Immunologic: Denies rash, rhinitis.      PAST HISTORY:     Past Medical History:   Diagnosis Date    Acute on chronic congestive heart failure     Arthritis     Chronic kidney disease, stage II (mild) 10/20/2016   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    Coronary artery disease involving native coronary artery with unstable angina pectoris 10/7/2015    Diabetes mellitus     Drug-induced diabetes mellitus 12/3/2015    Facet arthritis of lumbar region 2/20/2017    Heart disease, unspecified     Hyperlipidemia     Hypertension     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 7/15/2014    Pneumonia 02/2016    Polyneuropathy 10/20/2016    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 left shoulder    TOTAL KNEE ARTHROPLASTY      left side    VASECTOMY         Family History   Problem Relation Age of Onset    Heart disease Father     ALS Father     Cancer Brother      esophageal    Rheum arthritis Mother     Migraines Daughter     Asthma Son     Diabetes Neg Hx     Colon polyps Neg Hx        Social History     Social History    Marital status:      Spouse name: Belkys    Number of children: N/A    Years of education: N/A     Occupational History    Retired Not [employer]     Social History Main Topics    Smoking status: Never Smoker    Smokeless tobacco: Never Used    Alcohol use No      Comment: Heavy in past, quit about 20 years ago    Drug use: No    Sexual activity: Not  Currently     Partners: Female     Other Topics Concern    None     Social History Narrative    , previous longshoreman.    Wife OK has dm2.    2 kids, 1 son RT, 1 dtr .    Has not been able to exercise       MEDICATIONS & ALLERGIES:     No current facility-administered medications on file prior to encounter.      Current Outpatient Prescriptions on File Prior to Encounter   Medication Sig Dispense Refill    clopidogrel (PLAVIX) 75 mg tablet Take 1 tablet (75 mg total) by mouth once daily. 90 tablet 3    aspirin (ECOTRIN) 81 MG EC tablet Take 81 mg by mouth once daily.      atorvastatin (LIPITOR) 40 MG tablet TAKE 1 TABLET ONE TIME DAILY 90 tablet 3    axitinib (INLYTA) 5 mg Tab Take 1 tablet by mouth 2 (two) times daily. 60 tablet 3    bethanechol (URECHOLINE) 25 MG Tab Take 1 tablet (25 mg total) by mouth 3 (three) times daily. 100 tablet 6    bethanechol (URECHOLINE) 50 MG tablet Take 1 tablet (50 mg total) by mouth 3 (three) times daily. 90 tablet 11    blood sugar diagnostic (TRUE METRIX GLUCOSE TEST STRIP) Strp Test three times daily 300 strip 3    blood sugar diagnostic Strp 1 each by Misc.(Non-Drug; Combo Route) route 3 (three) times daily. Free style freedom lite test strips and lancets 90 each 3    blood-glucose meter (TRUE METRIX AIR GLUCOSE METER) Misc Use as directed 1 each 0    brimonidine 0.1% (ALPHAGAN P) 0.1 % Drop Place 1 drop into both eyes 3 (three) times daily.      CALCIUM CARBONATE-VITAMIN D3 ORAL Take 1 tablet by mouth every morning. Calcium carbonate 1000mg vitamin d3 1600 units per patient      duloxetine (CYMBALTA) 30 MG capsule Take 1 capsule (30 mg total) by mouth once daily. 30 capsule 0    fexofenadine (ALLEGRA) 180 MG tablet TAKE 1 TABLET ONE TIME DAILY 90 tablet 3    furosemide (LASIX) 40 MG tablet Take 1 tablet (40 mg total) by mouth once daily. 90 tablet 4    gabapentin (NEURONTIN) 300 MG capsule Take 2 capsules (600 mg total) by mouth 3 (three)  times daily. 540 capsule 3    lancets 28 gauge Misc 1 lancet by Misc.(Non-Drug; Combo Route) route 3 (three) times daily. 300 each 3    levoFLOXacin (LEVAQUIN) 500 MG tablet Take 500 mg by mouth once daily.  0    metformin (GLUCOPHAGE) 500 MG tablet TAKE 1 TABLET BY MOUTH TWO TIMES A DAY WITH MEALS 60 tablet 5    metoprolol succinate (TOPROL-XL) 25 MG 24 hr tablet Take 0.5 tablets (12.5 mg total) by mouth once daily. 45 tablet 6    morphine (MS CONTIN) 15 MG 12 hr tablet Take 1 tablet (15 mg total) by mouth every 8 (eight) hours as needed for Pain. 90 tablet 0    nitroGLYCERIN (NITROSTAT) 0.4 MG SL tablet Place 1 tablet (0.4 mg total) under the tongue every 5 (five) minutes as needed for Chest pain. 25 tablet 4    potassium chloride SA (K-DUR,KLOR-CON) 20 MEQ tablet Take 1 tablet (20 mEq total) by mouth once daily. 90 tablet 4    sulfamethoxazole-trimethoprim 800-160mg (BACTRIM DS) 800-160 mg Tab Take 1 tablet by mouth 2 (two) times daily. 14 tablet 0    tamsulosin (FLOMAX) 0.4 mg Cp24 Take 1 capsule (0.4 mg total) by mouth once daily. 30 capsule 11    verapamil (CALAN-SR) 240 MG CR tablet Take 1 tablet (240 mg total) by mouth once daily. 90 tablet 3    zolpidem (AMBIEN) 5 MG Tab Take 1 tablet (5 mg total) by mouth nightly as needed. 30 tablet 3        Review of patient's allergies indicates:   Allergen Reactions    No known drug allergies        OBJECTIVE:     Vital Signs:  Temp:  [98.4 °F (36.9 °C)-99.4 °F (37.4 °C)] 98.6 °F (37 °C)  Pulse:  [] 96  Resp:  [18-20] 18  SpO2:  [90 %-100 %] 100 %  BP: ()/(56-69) 106/59  Body mass index is 25.18 kg/(m^2).     Physical Exam:  General: alert, oriented and appears stated age  HENT: NC/AT.Conjunctivae/corneas clear. PERRL, EOMI. Nares normal. Septum midline. Mucosa normal. No drainage or sinus tenderness.  Neck: no adenopathy, no carotid bruit, no JVD, supple, symmetrical, trachea midline and thyroid not enlarged, symmetric, no  tenderness/mass/nodules  Back: symmetric, no curvature. ROM normal. No CVA tenderness.  Lungs: clear to auscultation bilaterally  CV: RRR, S1 and S2 Normal. No chest wall tenderness  Abdomen: S, NT, ND. Bowel sounds normal   Extremities: WWP, intact distal pulses. no cyanosis or edema  Skin: Skin color, texture, turgor normal. No rashes or lesions  Lymph nodes: Cervical, supraclavicular, and axillary nodes normal.  Neurologic: Grossly normal      Laboratory  Lab Results   Component Value Date    WBC 7.94 03/31/2017    HGB 10.5 (L) 03/31/2017    HCT 31.2 (L) 03/31/2017    MCV 89 03/31/2017     03/31/2017       Recent Labs  Lab 03/31/17  0442         K 4.2      CO2 23   BUN 17   CREATININE 0.9   CALCIUM 8.2*   MG 2.2     Lab Results   Component Value Date    INR 1.3 (H) 03/28/2017    INR 1.1 02/11/2016    INR 1.0 10/09/2015     Lab Results   Component Value Date    HGBA1C 5.5 02/20/2017     Recent Labs      03/29/17   1205  03/29/17   1700  03/29/17   2111  03/30/17   1236  03/30/17   1707  03/30/17   2156  03/31/17   0844   POCTGLUCOSE  111*  115*  116*  103  132*  129*  98       Diagnostic Results:  CTA 3/29:  1.  No evidence of pulmonary thromboembolus on this adequate study.     2.  Interval development of extensive bilateral centrilobular opacities throughout both lungs, more prominent on the right, nonspecific and may represent pneumonia, aspiration, or noninfectious inflammation with underlying metastatic disease not excluded.  Close followup is recommended with repeat chest CT in 8-12 weeks.    3.  Interval increase in size of right renal mass.    4.  Interval increase in size of osseous metastatic lesion to the left posterior 12th rib.    5.  Moderate bilateral pleural effusions.    6.  Significant coronary artery atherosclerosis and dense calcification of the aortic valve.    ASSESSMENT & PLAN:     Current Problems List:  Active Hospital Problems    Diagnosis  POA    *Severe sepsis  [A41.9, R65.20]  Yes    Pneumonia of right lower lobe due to infectious organism [J18.1]  Yes    Hyponatremia [E87.1]  Yes    Acute respiratory failure with hypoxia [J96.01]  Yes    Chronic diastolic heart failure [I50.32]  Yes    Benign non-nodular prostatic hyperplasia with lower urinary tract symptoms [N40.1]  Yes    Type 2 diabetes mellitus with diabetic polyneuropathy [E11.42]  Yes     Chronic    Renal cell carcinoma of right kidney [C64.1]  Yes    Severe aortic stenosis [I35.0]  Yes    Coronary artery disease involving native coronary artery of native heart without angina pectoris [I25.10]  Yes    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 [C79.51, C64.9]  Yes     Chronic    Essential hypertension [I10]  Yes    Type 2 diabetes mellitus with stage 2 chronic kidney disease, without long-term current use of insulin [E11.22, N18.2]  Yes      Resolved Hospital Problems    Diagnosis Date Resolved POA    HERNANDEZ (acute kidney injury) [N17.9] 03/31/2017 Yes    Shortness of breath [R06.02] 03/31/2017 Yes         Acute Hypoxic Respiratory Failure   - likely PNA on top of chronic CHF respiratory distress  - improving; now satting well on NC  - if BCx negative tomorrow, recommend de-escalating to oral abx   - repeat CXR in 4-6 weeks with PCP; refer to pulm clinic if unresolved     Will sign off; please call with questions  Discussed with Dr. Abhinav Monreal MD  Pulm Consults

## 2017-03-31 NOTE — PLAN OF CARE
Problem: Pneumonia (Adult)  Intervention: Prevent/Manage Infection Progression  72 year old male with a history of renal cell carcinoma with indwelling catheter, diabetes, CHF (40%), aortic stenosis and left sided stroke with blurred vision, admitted for increasing SOB, dyspnea and chest pain. Chest xray's, CT scans ordered to r/o pneumonia vs. Aspiration vs. Possible mets to lungs. Patient transitioned to room air per pulmonology to assess the needs for home oxygen. Currently saturations are 91-93%.  Case management offered to assist patient with home health needs, however, patient declined.  Antibiotics changed from IVPB to oral tablets. Patient denies pain, sob and/or any discomfort today, vitals are stable. Gardner draining clear yellow urine. Bed alarms set, frequent checks made for pain management, and safety precautions.         03/31/17 1446   Prevent/Manage Colorectal Surgical Infection   Fever Reduction/Comfort Measures fluid intake increased;medication administered   Safety Interventions   Infection Prevention gylcemic control management;hydration promoted;nutrition promoted;rest/sleep promoted;single patient room provided   Infection Management aseptic technique maintained;cultures obtained and sent to lab

## 2017-03-31 NOTE — ASSESSMENT & PLAN NOTE
-patient stepped down from ICU with significantly improved oxygen requirements on 3/31.  -patient on vancomycin/azithromycin/cefepime initially  -will continue CAP coverage with Augmentin/azithromycin to complete a 5 day course  -low flow NC PRN  -discharge planning

## 2017-03-31 NOTE — PLAN OF CARE
Problem: Infection, Risk/Actual (Adult)  Goal: Infection Prevention/Resolution  Patient will demonstrate the desired outcomes by discharge/transition of care.   Outcome: Ongoing (interventions implemented as appropriate)  Pt remains afebrile. ABX therapy maintained. Gardner care performed.     Problem: Patient Care Overview  Goal: Plan of Care Review  Outcome: Ongoing (interventions implemented as appropriate)  AOx4, VSS, Remains accuchecks AC/HS with no coverage needed. ABX therapy maintained. New PIV started d/t leaking of previous IV site. Slept throughout the night. Multiple BMs this shift. No acute events.

## 2017-03-31 NOTE — ASSESSMENT & PLAN NOTE
-s/p PCI in 2015; continue DAPT, BB, statin  - Troponin elevated at 0.127. EKG without new changes and patient denies chest pain. Continue aspirin and plavix. If patient develops chest pain, treat as ACS with heparin drip, however patient is asymptomatic at this time.

## 2017-03-31 NOTE — ASSESSMENT & PLAN NOTE
-patient presented with acute decompensated respiratory failure- will reassess need for oxygen as clinical presentation is much improved  -weaning oxygen as tolerated  -treat underlying lobar PNA

## 2017-03-31 NOTE — PROGRESS NOTES
Cardiology at bedside to inform patient and spouse that he possibly had some cardiac event, i.e. NSTEMI.  Patient visibly upset.  Plan is to start a heparin infusion, starting with a bolus, cardiac enzymes every 6 hours starting at 2000 tonight.  If patient should become symptomatic such as having SOB, and/or chest pain, cardiology is to be paged and patient taken to cath lab for possible stent placement and intervention. Patient educated to inform staff if he starts to have chest pain or SOB.

## 2017-03-31 NOTE — SUBJECTIVE & OBJECTIVE
Interval History: Patient reports near-resolution of pulmonary symptoms with some minor SOB still present. He is still on low flow NC to maintain adequate saturations. Denies chest pain, any other infectious symptoms.     Review of Systems   Constitutional: Negative for chills, fever and unexpected weight change.   Respiratory: Positive for shortness of breath. Negative for cough and wheezing.    Cardiovascular: Negative for chest pain, palpitations and leg swelling.   Gastrointestinal: Positive for constipation. Negative for abdominal pain, diarrhea, nausea and vomiting.   Genitourinary: Positive for difficulty urinating (CIC BID). Negative for dysuria and hematuria.   Musculoskeletal: Positive for back pain.   Skin: Negative for color change and rash.   Neurological: Negative for dizziness and light-headedness.     Objective:     Vital Signs (Most Recent):  Temp: 98.1 °F (36.7 °C) (03/31/17 1100)  Pulse: 96 (03/31/17 1500)  Resp: 19 (03/31/17 1100)  BP: 112/65 (03/31/17 1100)  SpO2: 95 % (03/31/17 1500) Vital Signs (24h Range):  Temp:  [98.1 °F (36.7 °C)-99.4 °F (37.4 °C)] 98.1 °F (36.7 °C)  Pulse:  [] 96  Resp:  [18-20] 19  SpO2:  [90 %-100 %] 95 %  BP: ()/(56-69) 112/65     Weight: 79.6 kg (175 lb 7.8 oz)  Body mass index is 25.18 kg/(m^2).    Intake/Output Summary (Last 24 hours) at 03/31/17 1527  Last data filed at 03/31/17 0827   Gross per 24 hour   Intake              930 ml   Output             2100 ml   Net            -1170 ml      Physical Exam   Constitutional: He is oriented to person, place, and time. He appears well-developed and well-nourished.   HENT:   Mouth/Throat: Oropharynx is clear and moist. No oropharyngeal exudate.   Eyes: Conjunctivae and EOM are normal.   Neck: Normal range of motion. No tracheal deviation present. No thyromegaly present.   Cardiovascular: Normal rate and regular rhythm.    Murmur heard.   Systolic murmur is present   Pulmonary/Chest: No respiratory distress.    Decreased breath sounds bibasilar, slight crackles mid-lung on right   Abdominal: Soft. Bowel sounds are normal. He exhibits no distension. There is no tenderness.   Musculoskeletal: He exhibits no edema.   Lymphadenopathy:     He has no cervical adenopathy.   Neurological: He is alert and oriented to person, place, and time. No cranial nerve deficit.       Significant Labs:   CBC:     Recent Labs  Lab 03/30/17  0544 03/31/17  0442   WBC 10.79 7.94   HGB 9.6* 10.5*   HCT 29.7* 31.2*    231     CMP:   Recent Labs  Lab 03/30/17  1016 03/30/17  1836 03/31/17  0442   * 134* 136   K 4.3 4.2 4.2   CL 94* 99 104   CO2 22* 26 23   * 135* 103   BUN 21 23 17   CREATININE 1.2 1.2 0.9   CALCIUM 8.1* 8.4* 8.2*   PROT  --   --  5.9*   ALBUMIN  --   --  2.0*   BILITOT  --   --  0.7   ALKPHOS  --   --  69   AST  --   --  62*   ALT  --   --  59*   ANIONGAP 9 9 9   EGFRNONAA >60.0 >60.0 >60.0         Significant Imaging: I have reviewed and interpreted all pertinent imaging results/findings within the past 24 hours.

## 2017-03-31 NOTE — ASSESSMENT & PLAN NOTE
- Repeated 2D ECHO showed EF 40% with severe aortic stenosis, relatively unchanged  - Not a TAVR candidate due to RCC   - CXR with slight improvement in R sided middle lung zone opacity

## 2017-04-01 PROBLEM — E87.1 HYPONATREMIA: Status: RESOLVED | Noted: 2017-01-01 | Resolved: 2017-01-01

## 2017-04-01 NOTE — PLAN OF CARE
Problem: Fall Risk (Adult)  Intervention: Reduce Risk/Promote Restraint Free Environment  Pt up ad graeme. Pt remain free from falls, Call bell in reach, environment free from clutter.  Bed in low position. Wheels locked. Personal items in reach. Pt educated to call staff if need of assistance. Will continue to monitor.     04/01/17 0418   Safety Interventions   Safety Precautions chemotherapy precautions maintained;emergency equipment at bedside   Safety Interventions   Environmental Safety Modification assistive device/personal items within reach;clutter free environment maintained;lighting adjusted           Problem: Patient Care Overview  Goal: Plan of Care Review  Outcome: Ongoing (interventions implemented as appropriate)  Pt AAOx4. Pt  Up ad graeme, free from falls.. Pt educated to inform staff of SOB or chest pain ASAP. Pt had no c/o of chest pain or SOB. Pt c/o lower back pain and diff to sleep. Admin PRN pain med and sleep aid med. Pt anti xa levels remained therapeutic, no dosage change needed. Pt BS within normal limits, no coverage needed. Will continue to monitor.

## 2017-04-01 NOTE — NURSING
Patient C/O inability to move bowels.  PRN senna administered with a.m. medications.  Will continue to monitor.

## 2017-04-01 NOTE — PLAN OF CARE
Problem: Patient Care Overview  Goal: Plan of Care Review  Outcome: Ongoing (interventions implemented as appropriate)  No acute events.  Patient AAOx4, resting comfortably in bed, calm and in no distress.  AVSS.  Heparin infusion continued at 19 units/kg/hr.  12:00 Anti-Xa level therapeutic.  Lab draw ordered for tomorrow morning.  Patient oxygenating well on room air, but placed on 2L NC at request of MD for possible NSTEMI. Team aware of Troponin levels.  Standard precautions maintained and patient remains afebrile.  Patient's skin remains in tact.  Up in chair all morning and ambulates to bathroom with even and steady gait with standby assist.  Gardner catheter output adequate.  Patient complains of pain to left hip;  moderately controlled with PRN oxy.  Scheduled 12hr time-release morphine added to regimen today.  Blood sugar checks continued at mealtime and required no insulin coverage.  Side rails up x2, call light in reach.  Will continue to monitor.

## 2017-04-01 NOTE — SUBJECTIVE & OBJECTIVE
Interval History: Started on ACS protocol with heparin gtt overnight. Denies any chest pain episodes. Reports SOB improved.     Review of Systems   Constitutional: Negative for chills, fever and unexpected weight change.   Eyes: Negative for discharge and redness.   Respiratory: Positive for shortness of breath. Negative for cough and wheezing.    Cardiovascular: Negative for chest pain, palpitations and leg swelling.   Gastrointestinal: Negative for abdominal pain, diarrhea, nausea and vomiting.   Genitourinary: Negative for dysuria and hematuria.   Musculoskeletal: Positive for back pain and myalgias.   Skin: Negative for color change and rash.   Neurological: Negative for dizziness and light-headedness.   Psychiatric/Behavioral: Negative for agitation and confusion.     Objective:     Vital Signs (Most Recent):  Temp: 98.1 °F (36.7 °C) (04/01/17 1111)  Pulse: 96 (04/01/17 1200)  Resp: 16 (04/01/17 1111)  BP: 108/62 (04/01/17 1111)  SpO2: 96 % (04/01/17 1200) Vital Signs (24h Range):  Temp:  [98 °F (36.7 °C)-99.2 °F (37.3 °C)] 98.1 °F (36.7 °C)  Pulse:  [] 96  Resp:  [16-19] 16  SpO2:  [92 %-99 %] 96 %  BP: (106-116)/(60-70) 108/62     Weight: 79.6 kg (175 lb 7.8 oz)  Body mass index is 25.18 kg/(m^2).    Intake/Output Summary (Last 24 hours) at 04/01/17 1223  Last data filed at 04/01/17 1010   Gross per 24 hour   Intake              720 ml   Output             3125 ml   Net            -2405 ml      Physical Exam   Constitutional: He is oriented to person, place, and time. He appears well-developed and well-nourished.   HENT:   Mouth/Throat: Oropharynx is clear and moist. No oropharyngeal exudate.   Eyes: Conjunctivae and EOM are normal.   Neck: Normal range of motion. No tracheal deviation present. No thyromegaly present.   Cardiovascular: Normal rate and regular rhythm.    Murmur heard.   Systolic murmur is present   Pulmonary/Chest: No respiratory distress.   Decreased breath sounds bibasilar, slight  crackles mid-lung on right   Abdominal: Soft. Bowel sounds are normal. He exhibits no distension. There is no tenderness.   Musculoskeletal: He exhibits no edema.   Lymphadenopathy:     He has no cervical adenopathy.   Neurological: He is alert and oriented to person, place, and time. No cranial nerve deficit.       Significant Labs:   CBC:   Recent Labs  Lab 03/31/17  0442 03/31/17  2049 04/01/17  0508   WBC 7.94 9.28 7.41   HGB 10.5* 10.2* 10.3*   HCT 31.2* 31.7* 31.1*    241 240     CMP:   Recent Labs  Lab 03/30/17  1836 03/31/17  0442 04/01/17  0508   * 136 137   K 4.2 4.2 4.4   CL 99 104 105   CO2 26 23 25   * 103 105   BUN 23 17 15   CREATININE 1.2 0.9 1.0   CALCIUM 8.4* 8.2* 8.3*   PROT  --  5.9* 6.1   ALBUMIN  --  2.0* 2.1*   BILITOT  --  0.7 0.6   ALKPHOS  --  69 76   AST  --  62* 97*   ALT  --  59* 109*   ANIONGAP 9 9 7*   EGFRNONAA >60.0 >60.0 >60.0       Significant Imaging: I have reviewed all pertinent imaging results/findings within the past 24 hours.

## 2017-04-01 NOTE — PROGRESS NOTES
Cardiology  Progress Note                                                              Team: [facility] 4     Patient Name: Lex Billy   YOB: 1945  Location: [address] [place] [address] A    Admit Date: 3/28/2017                     LOS: 3    SUBJECTIVE     INTERVAL HISTORY:     No adverse events overnight. No c/o chest pain or SOB. Hemodynamically stable, with net negative of 3.5 L since admission.     HPI:     Lex Powell is 71 yo M with medical history significant for DM- diet controlled, HTN, HLD, severe AS (NAMAN 0.8, PV 4, MG 44- not a TAVR candidate per Dr Ramirez), NSTEMI (10/9-10/9/15) s/p LM BMS x 1, mRCA BMS x 2, and dRCA BMS x 1, Diastolic Dysfunction with preserved EF 50% (2/12/16) AND metastatic renal cell carcinoma undergoing chemotherapy. Admitted for acute hypoxic respiratory failure managed with IV antibiotics and diuresis for pneumonia vs heart failure vs ARDS vs metastatic cancer.      On cardiac standpoint, BNP was elevated but patient was euvolemia on examination. Repeat 2DE on admission revealed severe AS, and diastolic dysfunction with EF of 40%. Trop was 0.127 without new changes on EKG and with no symptoms of ACS/angina/PND/orhtopnea. Was on maintenance dose of ASA/plavix. However trop done today for SOB revealed 2.419 with ST depression on EKG, hence cardiology was consulted for further management.     REVIEW OF SYSTEMS:  General: No syncope, fatigue, fevers, chills, nausea, or vomiting  HEENT: No HAs; No change in vision or pallor  Cardiac: No angina, palpitations, orthopnea, or PND  Pulmonary: No dyspnea, cough, hemoptysis, or wheezing  GI: No abdominal distention, pain, constipation, or diarrhea  : No dysuria, urgency, frequency, hematuria  Hematologic/Lymphatic: No night sweats, easy bruising, or LAD  Extremities: No claudication, arthralgias, or myalgias  Derm: No cyanosis or rash  Neuro: No focal weakness or numbness      MEDICATIONS:  Scheduled:    "amoxicillin-clavulanate 500-125mg  1 tablet Oral TID    aspirin  81 mg Oral Daily    atorvastatin  80 mg Oral Daily    azithromycin  500 mg Oral Daily    cetirizine  10 mg Oral QHS    clopidogrel  75 mg Oral Daily    duloxetine  30 mg Oral Daily    gabapentin  600 mg Oral TID    [START ON 4/2/2017] metoprolol succinate  25 mg Oral Daily    polyethylene glycol  17 g Oral Daily    sodium chloride 0.9%  500 mL Intravenous Once    sodium chloride 0.9%  3 mL Intravenous Q8H    tamsulosin  0.4 mg Oral Daily     Continuous:   heparin (porcine) in D5W 19 Units/kg/hr (04/01/17 0610)     PRN:  acetaminophen, dextrose 50%, dextrose 50%, glucagon (human recombinant), glucose, glucose, insulin aspart, nitroGLYCERIN, oxycodone, oxycodone, senna-docusate 8.6-50 mg, zolpidem      OBJECTIVE:     VITALS  Most Recent Range (Last 24H)   /68 (BP Location: Left arm, Patient Position: Lying, BP Method: Automatic)  Pulse 91  Temp 98 °F (36.7 °C) (Oral)   Resp 18  Ht 5' 10" (1.778 m)  Wt 79.6 kg (175 lb 7.8 oz)  SpO2 95%  BMI 25.18 kg/m2 Temp:  [98 °F (36.7 °C)-99.2 °F (37.3 °C)]   Pulse:  []   Resp:  [16-19]   BP: (106-116)/(60-70)   SpO2:  [91 %-100 %]      Intake and Output  BMI     Intake/Output Summary (Last 24 hours) at 04/01/17 0942  Last data filed at 04/01/17 0518   Gross per 24 hour   Intake              720 ml   Output             2650 ml   Net            -1930 ml       Net I/O since admission: Body mass index is 25.18 kg/(m^2).        PHYSICAL EXAM  General: AAOx3, NAD;  HEENT: NCAT; No conj. injection, pallor, or icterus; No xanthelasma   Neck: Supple; Trachea midline; No JVD; No bruits;   Cardiac: RRR, +S1 & S2; No M/R/G; PMI non-displaced; No thrills or heave   Pulmonary: CTAB; No W/R/R;  Abdominal: Soft, NT/ND +Normoactive BS; No organomegaly; No bruits or pulsatile masses  /Rectal: deferred   Extremities: No clubbing, cyanosis, or edema  Skin: No bruising, rash, ulceration, xanthomata, or " splinter hemorrhages present  Neurological: No focal motor or sensory defects; PERRLA, EOMI      LABS  CBC/Anemia Labs Coag Labs     Recent Labs  Lab 03/31/17  0442 03/31/17  2049 04/01/17  0508   WBC 7.94 9.28 7.41   HGB 10.5* 10.2* 10.3*   HCT 31.2* 31.7* 31.1*   MCV 89 92 89    241 240    Lab Results   Component Value Date    INR 1.3 (H) 03/28/2017    INR 1.1 02/11/2016    INR 1.0 10/09/2015        BMP     Recent Labs  Lab 03/30/17  1836 03/31/17  0442 04/01/17  0508   * 103 105   * 136 137   K 4.2 4.2 4.4   CL 99 104 105   CO2 26 23 25   BUN 23 17 15   CREATININE 1.2 0.9 1.0   CALCIUM 8.4* 8.2* 8.3*      Mag & Phos LFTs     Recent Labs  Lab 03/30/17  1016 03/31/17  0442 04/01/17  0508   MG 2.5 2.2 2.1   PHOS 2.2* 2.4* 2.7      Recent Labs  Lab 03/29/17  0350 03/31/17  0442 04/01/17  0508   PROT 7.0 5.9* 6.1   BILITOT 0.6 0.7 0.6   ALKPHOS 72 69 76   AST 20 62* 97*   ALT 24 59* 109*             Cardiac Enzymes Ejection Fractions/BNP     Recent Labs  Lab 03/31/17 2049 03/31/17  2353 04/01/17  0508   CPK  --   --  71   TROPONINI 1.501* 1.252* 1.652*   CPKMB  --   --  2.7   MB  --   --  3.8    EF   Date Value Ref Range Status   03/29/2017 40 (A) 55 - 65    02/12/2016 50 55 - 65    10/08/2015 63 55 - 65        Recent Labs  Lab 03/28/17  2259   *        Lab Results   Component Value Date    HGBA1C 5.5 02/20/2017         Microbiology Results (last 7 days)     Procedure Component Value Units Date/Time    Blood culture [[id number]] Collected:  03/29/17 0314    Order Status:  Completed Specimen:  Blood from Peripheral, Antecubital, Right Updated:  04/01/17 0612     Blood Culture, Routine No Growth to date     Blood Culture, Routine No Growth to date     Blood Culture, Routine No Growth to date     Blood Culture, Routine No Growth to date    Blood culture [[id number]] Collected:  03/29/17 0322    Order Status:  Completed Specimen:  Blood from Peripheral, Hand, Right Updated:  04/01/17 0612      Blood Culture, Routine No Growth to date     Blood Culture, Routine No Growth to date     Blood Culture, Routine No Growth to date     Blood Culture, Routine No Growth to date    Respiratory Viral Panel by PCR [[id number]] Collected:  03/29/17 0350    Order Status:  Completed Updated:  03/31/17 0429     Respiratory Virus Panel, source NP     RVP - Adenovirus Not Detected      Respiratory Viral Panel is a product of [employer].  It has been approved or cleared by the U.S. Food and Drug  Administration for in vitro diagnostic use.  Results should be  used in conjunction with clinical findings, and should not form  the sole basis for a diagnosis or treatment decision.  Negative results do not preclude respiratory virus infection  and should not be used as the sole basis for diagnosis,   treatment, or other management decisions.  Positive results do not rule out bacterial infection, or  co-infection with other viruses.  The agent detected may not  be the definitive cause of the disease. The use of additional   laboratory testing (e.g. bacterial culture, immunofluorescence,  radiography) and clinical presentation must be taken into  consideration in order to obtain the final diagnosis of   respiratory viral infection.  The RVP assay cannot adequately detect Adenovirus species C,  or serotypes 7a and 41.  The RVP primers for detection of   rhinovirus have been shown to cross-react with enterovirus.  A rhinovirus reactive result should be confirmed by an   alternative method (e.g. cell culture).  The  of the Respiratory Viral Panel has   recommmended that specimens found to be negative for  Adenovirus be confirmed by an alternative method.  The  of the Respiratory Viral Panel has  recommended that specimens found to be negative for   Influenza be confirmed by an alternative method.          Enterovirus Not Detected      Cross-reactivity has been observed between certain  Rhinovirus  strains and the Enterovirus assay.          Human Bocavirus Not Detected     Human Coronavirus Not Detected      The Human Coronavirus assay detects Human coronavirus types  229E, OC43,NL63 and HKO1.          RVP - Human Metapneumovirus (hMPV) Not Detected     RVP - Influenza A Not Detected     Influenza A - O5R6-66 Not Detected     RVP - Influenza B Not Detected     Parainfluenza Not Detected     Respiratory Syncytial VirusVirus (RSV) A Not Detected      The Respiratory Syncytial Viral assay detects types A and B,  however it does not distinguish between the two.          RVP - Rhinovirus Not Detected    Urine culture [[id number]] Collected:  03/29/17 0350    Order Status:  Completed Specimen:  Urine from Catheterized Updated:  03/30/17 0804     Urine Culture, Routine No growth    Narrative:       Add on per Dr Bhakta order #[id number] 03/29/2017  05:05 cxurn    Respiratory virus antigens panel [[id number]]     Order Status:  Canceled Specimen:  Respiratory from Nasopharyngeal Wash     Urine culture [[id number]]     Order Status:  Completed Specimen:  Urine from Urine, Catheterized     Urine culture [[id number]]     Order Status:  Canceled Specimen:  Urine     Respiratory Viral Panel by PCR Nasal Swab [[id number]] Collected:  03/29/17 0350    Order Status:  Canceled Specimen:  Respiratory Updated:  03/29/17 0351    Narrative:       Respiratory Viral Panel by PCR was cancelled on 03/29/2017 at 04:02   by ADAM; MOVED TO [id number]          INVESTIGATION RESULTS    Imaging Results         X-Ray Chest 1 View (Final result) Result time:  03/30/17 09:18:58    Final result by Ubaldo Oro MD (03/30/17 09:18:58)    Impression:     See above      Electronically signed by: Ubaldo Oro MD  Date:     03/30/17  Time:    09:18     Narrative:    Ill-defined diffuse patchy airspace consolidation in the right lung identified and better appreciated on the chest CT scan.  Heart size is normal            CTA Chest  Non-Coronary - PE Study (Final result) Result time:  03/29/17 01:55:37    Final result by Atilio Love MD (03/29/17 01:55:37)    Impression:      1.  No evidence of pulmonary thromboembolus on this adequate study.     2.  Interval development of extensive bilateral centrilobular opacities throughout both lungs, more prominent on the right, nonspecific and may represent pneumonia, aspiration, or noninfectious inflammation with underlying metastatic disease not excluded.  Close followup is recommended with repeat chest CT in 8-12 weeks.    3.  Interval increase in size of right renal mass.    4.  Interval increase in size of osseous metastatic lesion to the left posterior 12th rib.    5.  Moderate bilateral pleural effusions.    6.  Significant coronary artery atherosclerosis and dense calcification of the aortic valve.    ______________________________________     Electronically signed by resident: ATILIO LOVE MD  Date:     03/29/17  Time:    01:50            As the supervising and teaching physician, I personally reviewed the images and resident's interpretation and I agree with the findings.          Electronically signed by: URIEL DAY MD  Date:     03/29/17  Time:    01:55     Narrative:    Comparison: CT abdomen/pelvis 3/8/17.    Indication: Shortness of breath; history of renal cancer with osseous metastatic disease.    Technique:   During intravenous bolus injection of contrast medium, the chest was surveyed from the apices to the costophrenic angles.  Data was reconstructed for thin multiplanar images as well as maximum intensity projection images in the axial, sagittal and coronal planes. 75mL of Omnipaque 350 was administered for opacification of the pulmonary arteries.      Findings:  The structures at the base of the neck are unremarkable.      There is a left-sided aortic arch with 3 branch vessels.  The thoracic aorta maintains normal caliber and course.  There is dense calcific atherosclerosis of  the coronary arteries, and the thoracic aorta demonstrates mild calcific atherosclerosis.  There is dense calcification of the aortic valve.    The pulmonary arteries distributed normally without filling defect to suggest pulmonary thromboembolism.  Systemic and pulmonary venoatrial connections are concordant.    There is a moderate volume of dependent pleural fluid bilaterally. No significant pericardial fluid is identified.  The heart is not enlarged.  There is reflux of contrast into the hepatic IVC, which may represent right-sided heart dysfunction.    No mediastinal, hilar, or axillary lymphadenopathy is identified.    The esophagus maintains normal caliber and course.  There is a 3.2 cm mass within the superior pole the right kidney slightly increased in size from prior study, previously measuring 2.8 cm.  No acute abnormality is identified within the upper abdomen.    The superficial soft tissues are unremarkable.    The osseous structures again demonstrate a metastatic lesion within the posterior 12th rib on the left, which appears increased in size measuring 7.7 cm.  No additional aggressive osseous lesions are identified.    The trachea and large proximal airways are patent.    The lungs demonstrate extensive bilateral centrilobular consolidative opacities throughout both lungs, more prominent on the right.  These findings were not identified on prior CT  Additionally, there is a prominent opacity within the right lower lobe measured 3.6 cm.  Findings are nonspecific and may represent pulmonary edema, pneumonia, aspiration or metastatic disease.            X-Ray Chest 1 View (Final result) Result time:  03/29/17 00:02:19    Final result by Geoffrey Singh MD (03/29/17 00:02:19)    Impression:        Central perihilar airspace infiltrate in the right lung with partial silhouetting of the right heart border that appears to involve the upper and middle lobes. Possible pneumonia or  aspiration.        Electronically signed by: URIEL DAY MD  Date:     03/29/17  Time:    00:02     Narrative:    Chest AP portable    Indication:.    Comparison:July 23, 2016.    Findings:         The cardiomediastinal silhouette appear stable.  There is no pleural effusion.  The trachea is midline.  The lungs are symmetrically expanded bilaterally with central perihilar airspace infiltrate in the right lung. Left lung is clear.  There is no pneumothorax.  The osseous structures appear unchanged.                ASSESSMENT/PLAN:     Current Problems List:  Active Hospital Problems    Diagnosis  POA    *Sepsis with acute organ dysfunction [A41.9, R65.20]  Yes    Pneumonia of right lower lobe due to infectious organism [J18.1]  Yes    NSTEMI, initial episode of care [I21.4]  No    Hyponatremia [E87.1]  Yes    Acute respiratory failure with hypoxia [J96.01]  Yes    Chronic diastolic heart failure [I50.32]  Yes    Benign non-nodular prostatic hyperplasia with lower urinary tract symptoms [N40.1]  Yes    Type 2 diabetes mellitus with diabetic polyneuropathy [E11.42]  Yes     Chronic    Renal cell carcinoma of right kidney [C64.1]  Yes    Severe aortic stenosis [I35.0]  Yes    Coronary artery disease involving native coronary artery of native heart without angina pectoris [I25.10]  Yes    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 [C79.51, C64.9]  Yes     Chronic    Essential hypertension [I10]  Yes    Type 2 diabetes mellitus with stage 2 chronic kidney disease, without long-term current use of insulin [E11.22, N18.2]  Yes      Resolved Hospital Problems    Diagnosis Date Resolved POA    HERNANDEZ (acute kidney injury) [N17.9] 03/31/2017 Yes    Shortness of breath [R06.02] 03/31/2017 Yes        ASSESSMENT:     - 73 yo M with medical history significant for DM- diet controlled, HTN, HLD, severe AS (NAMAN 0.8, PV 4, MG 44- not a TAVR candidate per Dr Ramirez), NSTEMI (10/9-10/9/15) s/p LM BMS x 1, mRCA BMS x 2, and  dRCA BMS x 1, Diastolic Dysfunction with preserved EF 50% (2/12/16) AND metastatic renal cell carcinoma undergoing chemotherapy.   - Admitted for acute hypoxic respiratory failure managed with IV antibiotics and diuresis for pneumonia vs heart failure vs ARDS vs metastatic cancer.   - Repeat 2DE on admission revealed severe AS, and diastolic dysfunction with EF of 40%.   - No specific ST/T wave changes / ST depressions on joellen-lateral and inferior leads.   - Trop trends of 0.127 to 0.525 to 2.419 with plateau thereafter      PLAN:    NSTEMI / Heart failure / AS:     - probability of NSTEMI considering the risk factors, prior ACS, trending trops.   --- ACS protocol for NSTEMI  --- Heparin bolus followed by infusion   --- continue ASA 81 mg OD (already received loading dose during this admission)  --- Continue plavix 75 mg OD, (receiced a loading dose)   --- Continue high dose statins, atorvastatin 80 mg OD  --- discussed with patient about the need for LHC / possible PCI on Monday. Urgent IC consult in-case of ongoing unrelievable chest pain  --- stop trop trends   --- PRN NTG/EKG for chest pain     - Other ongoing concerns are AS/HF  --- moderate AS with regards to mean gradient of 34 mmHg, despite NAMAN < 1 cm2  --- IC recs of not a surgical candidate   --- EF reduction from 50 % to 40 %  --- Increase toprol 12.5 to 25 mg OD     Thank you for this consult.  Will continue to follow with you.     Marilin Johnson MD  PGY1  Page: [phone]

## 2017-04-01 NOTE — NURSING
Patient placed back on 2L NC per nursing communication order.  Heat packs brought to bedside to apply to affected hip area.  Patient up in the chair at this time; compliant with calling for assistance.  Will continue to monitor.

## 2017-04-01 NOTE — NURSING
Anti-Xa level 0.31; therapeutic at noon lab draw.  No changes to infusion at 19 units/kg/hr; next level ordered for 4/2/17 morning labs.  Will continue to monitor.

## 2017-04-01 NOTE — PROGRESS NOTES
[facility] Hospital Medicine  Progress Note    Patient Name: Lex Billy  MRN: [id number]  Patient Class: IP- Inpatient   Admission Date: 3/28/2017  Length of Stay: 3 days  Attending Physician: Jc Rowan MD  Primary Care Provider: Everette Rowan MD, MD    Hospital Medicine Team: [facility] 4 Roosevelt Haro IV, MD    Subjective:     Principal Problem:Sepsis with acute organ dysfunction    HPI:  73 yo M with metastatic renal cell carcinoma, CAD, severe AS presents for worsening dyspnea. Patient reports dyspnea x 1 month with sudden worsening yesterday. He has orthopnea at baseline without worsening. He monitors weight daily and denies changes in weight. He denies cough, fevers, chills, or sick contacts.He is compliant with all medications.    Saturation on room air at presentation to ED in 70s%. He was placed on BiPAP 10/5 100% FiO2. ABG showed 7.4/37/134/23.4/99%.   Initial labs notable for leukocytosis WBC 15, HERNANDEZ Cr 1.7,  and troponin 0.127.  CXR shows right middle lobe infiltrate. CTA obtained in ED was negative for PE, but shows bilateral opacities R>L.      Hospital Course:  3/29/17: Admit to ICU for hypoxic respiratory failure. Antibiotics started to cover for pneumonia. Started on HFNC for hypoxia.     3/30/17: Patient tolerated weaning to nasal canula with maintained saturations. Patient will be stepped down to hospital medicine.       Interval History: Started on ACS protocol with heparin gtt overnight. Denies any chest pain episodes. Reports SOB improved.     Review of Systems   Constitutional: Negative for chills, fever and unexpected weight change.   Eyes: Negative for discharge and redness.   Respiratory: Positive for shortness of breath. Negative for cough and wheezing.    Cardiovascular: Negative for chest pain, palpitations and leg swelling.   Gastrointestinal: Negative for abdominal pain, diarrhea, nausea and vomiting.   Genitourinary: Negative for dysuria and  hematuria.   Musculoskeletal: Positive for back pain and myalgias.   Skin: Negative for color change and rash.   Neurological: Negative for dizziness and light-headedness.   Psychiatric/Behavioral: Negative for agitation and confusion.     Objective:     Vital Signs (Most Recent):  Temp: 98.1 °F (36.7 °C) (04/01/17 1111)  Pulse: 96 (04/01/17 1200)  Resp: 16 (04/01/17 1111)  BP: 108/62 (04/01/17 1111)  SpO2: 96 % (04/01/17 1200) Vital Signs (24h Range):  Temp:  [98 °F (36.7 °C)-99.2 °F (37.3 °C)] 98.1 °F (36.7 °C)  Pulse:  [] 96  Resp:  [16-19] 16  SpO2:  [92 %-99 %] 96 %  BP: (106-116)/(60-70) 108/62     Weight: 79.6 kg (175 lb 7.8 oz)  Body mass index is 25.18 kg/(m^2).    Intake/Output Summary (Last 24 hours) at 04/01/17 1223  Last data filed at 04/01/17 1010   Gross per 24 hour   Intake              720 ml   Output             3125 ml   Net            -2405 ml      Physical Exam   Constitutional: He is oriented to person, place, and time. He appears well-developed and well-nourished.   HENT:   Mouth/Throat: Oropharynx is clear and moist. No oropharyngeal exudate.   Eyes: Conjunctivae and EOM are normal.   Neck: Normal range of motion. No tracheal deviation present. No thyromegaly present.   Cardiovascular: Normal rate and regular rhythm.    Murmur heard.   Systolic murmur is present   Pulmonary/Chest: No respiratory distress.   Decreased breath sounds bibasilar, slight crackles mid-lung on right   Abdominal: Soft. Bowel sounds are normal. He exhibits no distension. There is no tenderness.   Musculoskeletal: He exhibits no edema.   Lymphadenopathy:     He has no cervical adenopathy.   Neurological: He is alert and oriented to person, place, and time. No cranial nerve deficit.       Significant Labs:   CBC:   Recent Labs  Lab 03/31/17  0442 03/31/17  2049 04/01/17  0508   WBC 7.94 9.28 7.41   HGB 10.5* 10.2* 10.3*   HCT 31.2* 31.7* 31.1*    241 240     CMP:   Recent Labs  Lab 03/30/17  0988  03/31/17  0442 04/01/17  0508   * 136 137   K 4.2 4.2 4.4   CL 99 104 105   CO2 26 23 25   * 103 105   BUN 23 17 15   CREATININE 1.2 0.9 1.0   CALCIUM 8.4* 8.2* 8.3*   PROT  --  5.9* 6.1   ALBUMIN  --  2.0* 2.1*   BILITOT  --  0.7 0.6   ALKPHOS  --  69 76   AST  --  62* 97*   ALT  --  59* 109*   ANIONGAP 9 9 7*   EGFRNONAA >60.0 >60.0 >60.0       Significant Imaging: I have reviewed all pertinent imaging results/findings within the past 24 hours.    Assessment/Plan:       * Sepsis w/ acute organ dysfunction  Acute hypoxic respiratory failure  Pneumonia of the right lung due to infectious organism  - Admitted with dyspnea, right lung infiltrate, leukocytosis, with HERNANDEZ, troponinemia requiring 6L oxygen to maintain sats  - In MICU for respiratory failure, weaned to NC  - Started on vanc, cefepime, azithromycin and since de-escalated to azithromycin and augmentin  - Cx NGTD    NSTEMI  Coronary artery disease involving native coronary artery of native heart without angina pectoris  - Troponin trend 0.127 -> 0.525 -> 2.4  - Given dyspnea and EKG with possible ischemic changes started on ACS protocol   - Heparin gtt for 48 hours, lipitor 80mg, ASA, Plavix, toprol  - Cardiology consulted, appreciate help  - GALEN score 5, possible cath Monday    Severe aortic stenosis  - Repeated 2D ECHO showed EF 40% with severe aortic stenosis, relatively unchanged  - Not a TAVR candidate due to RCC   - CXR with slight improvement in R sided middle lung zone opacity    Chronic diastolic heart failure  -continue home ASA/statin/BB       Essential hypertension  -Toprol XL 12.5mg QD     Type 2 diabetes mellitus with stage 2 chronic kidney disease, without long-term current use of insulin  Type 2 diabetes mellitus with diabetic polyneuropathy  -on metformin at home; LDSSI now      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  Renal cell carcinoma of right kidney  - Metastasis to left posterior 12th rib and right sacrum. Hold axitinib.    - Pt stated he has not been taking his axitinib due to running out of prescription and not being able to afford refill  - Will defer restarting this to his oncologist      Benign non-nodular prostatic hyperplasia with lower urinary tract symptoms  -continue home tamsulosin  -Gardner in place, straight caths BID at home     VTE Risk Mitigation         Ordered     High Risk of VTE  Once      03/29/17 1756          Roosevelt Haro IV, MD  Department of Hospital Medicine   [facility]

## 2017-04-02 NOTE — NURSING
Morning Anti-Xa result 0.30 in therapeutic range.  Heparin gtt continued at 19 units/kg/hr at this time.  Patient has no cardiac symptoms or complaints of SOB, chest pain at this time.  Patient still on 2L NC per nursing communication order, with plans to ween today.  Will continue to monitor.

## 2017-04-02 NOTE — PROGRESS NOTES
Cardiology  Progress Note                                                              Team: [facility] 4     Patient Name: Lex Billy   YOB: 1945  Location: [address] [place] [address] A    Admit Date: 3/28/2017                     LOS: 4    SUBJECTIVE     INTERVAL HISTORY:     NAEON. No chest pain or SOB. Hemodynamically stable, with net negative of 4.7 L since admission.     HPI:     Lex Powell is 71 yo M with medical history significant for DM- diet controlled, HTN, HLD, severe AS (NAMAN 0.8, PV 4, MG 44- not a TAVR candidate per Dr aRmirez), NSTEMI (10/9-10/9/15) s/p LM BMS x 1, mRCA BMS x 2, and dRCA BMS x 1, Diastolic Dysfunction with preserved EF 50% (2/12/16) AND metastatic renal cell carcinoma undergoing chemotherapy. Admitted for acute hypoxic respiratory failure managed with IV antibiotics and diuresis for pneumonia vs heart failure vs ARDS vs metastatic cancer.      On cardiac standpoint, BNP was elevated but patient was euvolemia on examination. Repeat 2DE on admission revealed severe AS, and diastolic dysfunction with EF of 40%. Trop was 0.127 without new changes on EKG and with no symptoms of ACS/angina/PND/orhtopnea. Was on maintenance dose of ASA/plavix. However trop done today for SOB revealed 2.419 with ST depression on EKG, hence cardiology was consulted for further management.     REVIEW OF SYSTEMS:  General: No syncope, fatigue, fevers, chills, nausea, or vomiting  HEENT: No HAs; No change in vision or pallor  Cardiac: No angina, palpitations, orthopnea, or PND  Pulmonary: No dyspnea, cough, hemoptysis, or wheezing  GI: No abdominal distention, pain, constipation, or diarrhea  : No dysuria, urgency, frequency, hematuria  Hematologic/Lymphatic: No night sweats, easy bruising, or LAD  Extremities: No claudication, arthralgias, or myalgias  Derm: No cyanosis or rash  Neuro: No focal weakness or numbness      MEDICATIONS:  Scheduled:   amoxicillin-clavulanate 500-125mg  1  "tablet Oral TID    aspirin  81 mg Oral Daily    atorvastatin  80 mg Oral Daily    azithromycin  500 mg Oral Daily    cetirizine  10 mg Oral QHS    clopidogrel  75 mg Oral Daily    duloxetine  30 mg Oral Daily    gabapentin  600 mg Oral TID    metoprolol succinate  25 mg Oral Daily    morphine  15 mg Oral Q12H    polyethylene glycol  17 g Oral Daily    sodium chloride 0.9%  500 mL Intravenous Once    sodium chloride 0.9%  3 mL Intravenous Q8H    tamsulosin  0.4 mg Oral Daily     Continuous:   heparin (porcine) in D5W 18.97 Units/kg/hr (04/01/17 2315)     PRN:  acetaminophen, dextrose 50%, dextrose 50%, glucagon (human recombinant), glucose, glucose, insulin aspart, nitroGLYCERIN, oxycodone, oxycodone, senna-docusate 8.6-50 mg, zolpidem      OBJECTIVE:     VITALS  Most Recent Range (Last 24H)   BP (!) 111/58 (BP Location: Left arm, Patient Position: Lying, BP Method: Automatic)  Pulse 85  Temp 98.2 °F (36.8 °C) (Oral)   Resp 16  Ht 5' 10" (1.778 m)  Wt 79.6 kg (175 lb 7.8 oz)  SpO2 97%  BMI 25.18 kg/m2 Temp:  [97.9 °F (36.6 °C)-98.3 °F (36.8 °C)]   Pulse:  []   Resp:  [16-18]   BP: (102-118)/(58-70)   SpO2:  [96 %-99 %]      Intake and Output  BMI     Intake/Output Summary (Last 24 hours) at 04/02/17 0833  Last data filed at 04/02/17 0553   Gross per 24 hour   Intake             1080 ml   Output             1925 ml   Net             -845 ml       Net I/O since admission: Body mass index is 25.18 kg/(m^2).        PHYSICAL EXAM  General: AAOx3, NAD;  Neck: Supple; Trachea midline; No JVD; No bruits;   Cardiac: RRR, +S1 & S2; No M/R/G; PMI non-displaced; No thrills or heave   Pulmonary: CTAB; No W/R/R;  Abdominal: Soft, NT/ND +Normoactive BS; No organomegaly  Extremities: No clubbing, cyanosis, or edema      LABS  CBC/Anemia Labs Coag Labs     Recent Labs  Lab 03/31/17  2049 04/01/17  0508 04/02/17  0626   WBC 9.28 7.41 7.14   HGB 10.2* 10.3* 9.7*   HCT 31.7* 31.1* 29.9*   MCV 92 89 90   PLT " 241 240 218    Lab Results   Component Value Date    INR 1.3 (H) 03/28/2017    INR 1.1 02/11/2016    INR 1.0 10/09/2015        BMP     Recent Labs  Lab 03/31/17  0442 04/01/17  0508 04/02/17  0626    105 89    137 135*   K 4.2 4.4 5.1    105 103   CO2 23 25 24   BUN 17 15 15   CREATININE 0.9 1.0 0.8   CALCIUM 8.2* 8.3* 8.3*      Mag & Phos LFTs     Recent Labs  Lab 03/31/17  0442 04/01/17  0508 04/02/17  0626   MG 2.2 2.1 2.2   PHOS 2.4* 2.7 4.0      Recent Labs  Lab 03/31/17 0442 04/01/17  0508 04/02/17  0626   PROT 5.9* 6.1 6.2   BILITOT 0.7 0.6 0.5   ALKPHOS 69 76 74   AST 62* 97* 58*   ALT 59* 109* 94*             Cardiac Enzymes Ejection Fractions/BNP     Recent Labs  Lab 04/01/17  0508  04/01/17  1757 04/02/17  0047 04/02/17  0626   CPK 71  --   --   --   --    TROPONINI 1.652*  < > 1.616* 1.663* 1.713*   CPKMB 2.7  --   --   --   --    MB 3.8  --   --   --   --    < > = values in this interval not displayed. EF   Date Value Ref Range Status   03/29/2017 40 (A) 55 - 65    02/12/2016 50 55 - 65    10/08/2015 63 55 - 65        Recent Labs  Lab 03/28/17  2259   *        Lab Results   Component Value Date    HGBA1C 5.5 02/20/2017         Microbiology Results (last 7 days)     Procedure Component Value Units Date/Time    Blood culture [[id number]] Collected:  03/29/17 0314    Order Status:  Completed Specimen:  Blood from Peripheral, Antecubital, Right Updated:  04/02/17 0612     Blood Culture, Routine No Growth to date     Blood Culture, Routine No Growth to date     Blood Culture, Routine No Growth to date     Blood Culture, Routine No Growth to date     Blood Culture, Routine No Growth to date    Blood culture [[id number]] Collected:  03/29/17 0322    Order Status:  Completed Specimen:  Blood from Peripheral, Hand, Right Updated:  04/02/17 0612     Blood Culture, Routine No Growth to date     Blood Culture, Routine No Growth to date     Blood Culture, Routine No Growth to date     Blood  Culture, Routine No Growth to date     Blood Culture, Routine No Growth to date    Respiratory Viral Panel by PCR [[id number]] Collected:  03/29/17 0350    Order Status:  Completed Updated:  03/31/17 0429     Respiratory Virus Panel, source NP     RVP - Adenovirus Not Detected      Respiratory Viral Panel is a product of [employer].  It has been approved or cleared by the U.S. Food and Drug  Administration for in vitro diagnostic use.  Results should be  used in conjunction with clinical findings, and should not form  the sole basis for a diagnosis or treatment decision.  Negative results do not preclude respiratory virus infection  and should not be used as the sole basis for diagnosis,   treatment, or other management decisions.  Positive results do not rule out bacterial infection, or  co-infection with other viruses.  The agent detected may not  be the definitive cause of the disease. The use of additional   laboratory testing (e.g. bacterial culture, immunofluorescence,  radiography) and clinical presentation must be taken into  consideration in order to obtain the final diagnosis of   respiratory viral infection.  The RVP assay cannot adequately detect Adenovirus species C,  or serotypes 7a and 41.  The RVP primers for detection of   rhinovirus have been shown to cross-react with enterovirus.  A rhinovirus reactive result should be confirmed by an   alternative method (e.g. cell culture).  The  of the Respiratory Viral Panel has   recommmended that specimens found to be negative for  Adenovirus be confirmed by an alternative method.  The  of the Respiratory Viral Panel has  recommended that specimens found to be negative for   Influenza be confirmed by an alternative method.          Enterovirus Not Detected      Cross-reactivity has been observed between certain Rhinovirus  strains and the Enterovirus assay.          Human Bocavirus Not Detected     Human Coronavirus Not  Detected      The Human Coronavirus assay detects Human coronavirus types  229E, OC43,NL63 and HKO1.          RVP - Human Metapneumovirus (hMPV) Not Detected     RVP - Influenza A Not Detected     Influenza A - [id number] Not Detected     RVP - Influenza B Not Detected     Parainfluenza Not Detected     Respiratory Syncytial VirusVirus (RSV) A Not Detected      The Respiratory Syncytial Viral assay detects types A and B,  however it does not distinguish between the two.          RVP - Rhinovirus Not Detected    Urine culture [[id number]] Collected:  03/29/17 0350    Order Status:  Completed Specimen:  Urine from Catheterized Updated:  03/30/17 0804     Urine Culture, Routine No growth    Narrative:       Add on per Dr Bhakta order #[id number] 03/29/2017  05:05 cxurn    Respiratory virus antigens panel [[id number]]     Order Status:  Canceled Specimen:  Respiratory from Nasopharyngeal Wash     Urine culture [[id number]]     Order Status:  Completed Specimen:  Urine from Urine, Catheterized     Urine culture [[id number]]     Order Status:  Canceled Specimen:  Urine     Respiratory Viral Panel by PCR Nasal Swab [[id number]] Collected:  03/29/17 0350    Order Status:  Canceled Specimen:  Respiratory Updated:  03/29/17 0351    Narrative:       Respiratory Viral Panel by PCR was cancelled on 03/29/2017 at 04:02   by ADAM; MOVED TO [id number]          INVESTIGATION RESULTS    Imaging Results         X-Ray Chest 1 View (Final result) Result time:  03/30/17 09:18:58    Final result by Ubaldo Oro MD (03/30/17 09:18:58)    Impression:     See above      Electronically signed by: Ubaldo Oro MD  Date:     03/30/17  Time:    09:18     Narrative:    Ill-defined diffuse patchy airspace consolidation in the right lung identified and better appreciated on the chest CT scan.  Heart size is normal            CTA Chest Non-Coronary - PE Study (Final result) Result time:  03/29/17 01:55:37    Final result by Atilio Clayton MD (03/29/17  01:55:37)    Impression:      1.  No evidence of pulmonary thromboembolus on this adequate study.     2.  Interval development of extensive bilateral centrilobular opacities throughout both lungs, more prominent on the right, nonspecific and may represent pneumonia, aspiration, or noninfectious inflammation with underlying metastatic disease not excluded.  Close followup is recommended with repeat chest CT in 8-12 weeks.    3.  Interval increase in size of right renal mass.    4.  Interval increase in size of osseous metastatic lesion to the left posterior 12th rib.    5.  Moderate bilateral pleural effusions.    6.  Significant coronary artery atherosclerosis and dense calcification of the aortic valve.    ______________________________________     Electronically signed by resident: CLIFTON LOVE MD  Date:     03/29/17  Time:    01:50            As the supervising and teaching physician, I personally reviewed the images and resident's interpretation and I agree with the findings.          Electronically signed by: URIEL DAY MD  Date:     03/29/17  Time:    01:55     Narrative:    Comparison: CT abdomen/pelvis 3/8/17.    Indication: Shortness of breath; history of renal cancer with osseous metastatic disease.    Technique:   During intravenous bolus injection of contrast medium, the chest was surveyed from the apices to the costophrenic angles.  Data was reconstructed for thin multiplanar images as well as maximum intensity projection images in the axial, sagittal and coronal planes. 75mL of Omnipaque 350 was administered for opacification of the pulmonary arteries.      Findings:  The structures at the base of the neck are unremarkable.      There is a left-sided aortic arch with 3 branch vessels.  The thoracic aorta maintains normal caliber and course.  There is dense calcific atherosclerosis of the coronary arteries, and the thoracic aorta demonstrates mild calcific atherosclerosis.  There is dense  calcification of the aortic valve.    The pulmonary arteries distributed normally without filling defect to suggest pulmonary thromboembolism.  Systemic and pulmonary venoatrial connections are concordant.    There is a moderate volume of dependent pleural fluid bilaterally. No significant pericardial fluid is identified.  The heart is not enlarged.  There is reflux of contrast into the hepatic IVC, which may represent right-sided heart dysfunction.    No mediastinal, hilar, or axillary lymphadenopathy is identified.    The esophagus maintains normal caliber and course.  There is a 3.2 cm mass within the superior pole the right kidney slightly increased in size from prior study, previously measuring 2.8 cm.  No acute abnormality is identified within the upper abdomen.    The superficial soft tissues are unremarkable.    The osseous structures again demonstrate a metastatic lesion within the posterior 12th rib on the left, which appears increased in size measuring 7.7 cm.  No additional aggressive osseous lesions are identified.    The trachea and large proximal airways are patent.    The lungs demonstrate extensive bilateral centrilobular consolidative opacities throughout both lungs, more prominent on the right.  These findings were not identified on prior CT  Additionally, there is a prominent opacity within the right lower lobe measured 3.6 cm.  Findings are nonspecific and may represent pulmonary edema, pneumonia, aspiration or metastatic disease.            X-Ray Chest 1 View (Final result) Result time:  03/29/17 00:02:19    Final result by Geoffrey Singh MD (03/29/17 00:02:19)    Impression:        Central perihilar airspace infiltrate in the right lung with partial silhouetting of the right heart border that appears to involve the upper and middle lobes. Possible pneumonia or aspiration.        Electronically signed by: GEOFFREY SINGH MD  Date:     03/29/17  Time:    00:02     Narrative:    Chest AP  portable    Indication:.    Comparison:July 23, 2016.    Findings:         The cardiomediastinal silhouette appear stable.  There is no pleural effusion.  The trachea is midline.  The lungs are symmetrically expanded bilaterally with central perihilar airspace infiltrate in the right lung. Left lung is clear.  There is no pneumothorax.  The osseous structures appear unchanged.                ASSESSMENT/PLAN:     Current Problems List:  Active Hospital Problems    Diagnosis  POA    *Sepsis with acute organ dysfunction [A41.9, R65.20]  Yes    Pneumonia of right lower lobe due to infectious organism [J18.1]  Yes    NSTEMI, initial episode of care [I21.4]  No    Acute respiratory failure with hypoxia [J96.01]  Yes    Chronic diastolic heart failure [I50.32]  Yes    Benign non-nodular prostatic hyperplasia with lower urinary tract symptoms [N40.1]  Yes    Type 2 diabetes mellitus with diabetic polyneuropathy, without long-term current use of insulin [E11.42]  Yes     Chronic    Renal cell carcinoma of right kidney [C64.1]  Yes    Severe aortic stenosis [I35.0]  Yes    Coronary artery disease involving native coronary artery of native heart without angina pectoris [I25.10]  Yes    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 [C79.51, C64.9]  Yes     Chronic    Essential hypertension [I10]  Yes    Type 2 diabetes mellitus with stage 2 chronic kidney disease, without long-term current use of insulin [E11.22, N18.2]  Yes      Resolved Hospital Problems    Diagnosis Date Resolved POA    Hyponatremia [E87.1] 04/01/2017 Yes    HERNANDEZ (acute kidney injury) [N17.9] 03/31/2017 Yes    Shortness of breath [R06.02] 03/31/2017 Yes        ASSESSMENT:     - 71 yo M with medical history significant for DM- diet controlled, HTN, HLD, severe AS (NAMAN 0.8, PV 4, MG 44- not a TAVR candidate per Dr Ramirez), NSTEMI (10/9-10/9/15) s/p LM BMS x 1, mRCA BMS x 2, and dRCA BMS x 1, Diastolic Dysfunction with preserved EF 50% (2/12/16) AND  metastatic renal cell carcinoma undergoing chemotherapy.   - Admitted for acute hypoxic respiratory failure managed with IV antibiotics and diuresis for pneumonia vs heart failure vs ARDS vs metastatic cancer.   - Repeat 2DE on admission revealed severe AS, and diastolic dysfunction with EF of 40%.   - No specific ST/T wave changes / ST depressions on joellen-lateral and inferior leads.   - Trop trends of 0.127 to 0.525 to 2.419 with plateau thereafter      PLAN:    NSTEMI / Heart failure / AS:     -- probability of NSTEMI considering the risk factors, prior ACS, trending trops.   --- ACS protocol for NSTEMI  --- Heparin bolus followed by infusion   --- continue ASA 81 mg OD (already received loading dose during this admission)  --- Continue plavix 75 mg OD, (receiced a loading dose)   --- Continue high dose statins, atorvastatin 80 mg OD  --- discussed with patient about the need for LHC / possible PCI tomorrow. NPO tonight.  Urgent IC consult in-case of ongoing unrelievable chest pain  --- stop trop trends   --- PRN NTG/EKG for chest pain     - Other ongoing concerns are AS/HF  --- moderate AS with regards to mean gradient of 34 mmHg, despite NAMAN < 1 cm2  --- IC recs of not a surgical candidate   --- EF reduction from 50 % to 40 %  --- Continue toprol 25 mg OD     Thank you for this consult.  Will continue to follow with you.     Amber Burgos MD

## 2017-04-02 NOTE — NURSING
Reached out to [facility] regarding need for Gardner catheter. MD staff said they will discuss removing.  New nursing communication order in place stating to leave Gardner in place until discharge.

## 2017-04-02 NOTE — ASSESSMENT & PLAN NOTE
-patient stepped down from ICU with significantly improved oxygen requirements on 3/31.  -patient on vancomycin/azithromycin/cefepime initially  -will continue CAP coverage with Augmentin/azithromycin to complete a 5 day course  -low flow NC

## 2017-04-02 NOTE — ASSESSMENT & PLAN NOTE
-s/p PCI in 2015; continue DAPT, BB, statin  - Troponin elevated at 0.127. EKG without new changes and patient denies chest pain. Continue aspirin and plavix.   -treat as ACS with heparin drip, however patient is asymptomatic at this time.   - plan for C +/- PCI on 4/3

## 2017-04-02 NOTE — PROGRESS NOTES
[facility] Hospital Medicine  Progress Note    Patient Name: Lex Billy  MRN: [id number]  Patient Class: IP- Inpatient   Admission Date: 3/28/2017  Length of Stay: 4 days  Attending Physician: Jc Rowan MD  Primary Care Provider: Everette Rowan MD, MD    Hospital Medicine Team: [facility] HOSP MED 4 Bebeto Lawrence MD    Subjective:     Principal Problem:Sepsis with acute organ dysfunction    HPI:  71 yo M with metastatic renal cell carcinoma, CAD, severe AS presents for worsening dyspnea. Patient reports dyspnea x 1 month with sudden worsening yesterday. He has orthopnea at baseline without worsening. He monitors weight daily and denies changes in weight. He denies cough, fevers, chills, or sick contacts.He is compliant with all medications.    Saturation on room air at presentation to ED in 70s%. He was placed on BiPAP 10/5 100% FiO2. ABG showed 7.4/37/134/23.4/99%.   Initial labs notable for leukocytosis WBC 15, HERNANDEZ Cr 1.7,  and troponin 0.127.  CXR shows right middle lobe infiltrate. CTA obtained in ED was negative for PE, but shows bilateral opacities R>L.      Hospital Course:  3/29/17: Admit to ICU for hypoxic respiratory failure. Antibiotics started to cover for pneumonia. Started on HFNC for hypoxia.     3/30/17: Patient tolerated weaning to nasal canula with maintained saturations. Patient will be stepped down to hospital medicine.   3/31: Cardiology consult for persistent tropenemia, now on ACS protocol and C planned.      Interval History: Patient continues to rest comfortably on low flow NC oxygen without any change in SOB quality or chest pain. He reports SOB is improving.     Review of Systems   Constitutional: Negative for chills, fever and unexpected weight change.   HENT: Negative for postnasal drip.    Eyes: Negative for discharge and redness.   Respiratory: Positive for shortness of breath. Negative for cough, chest tightness and wheezing.    Cardiovascular:  Negative for chest pain, palpitations and leg swelling.   Gastrointestinal: Negative for abdominal pain, diarrhea, nausea and vomiting.   Genitourinary: Negative for dysuria and hematuria.   Musculoskeletal: Positive for back pain and myalgias.   Skin: Negative for color change and rash.   Neurological: Negative for dizziness and light-headedness.   Psychiatric/Behavioral: Negative for agitation and confusion.     Objective:     Vital Signs (Most Recent):  Temp: 98.2 °F (36.8 °C) (04/02/17 0719)  Pulse: 85 (04/02/17 0719)  Resp: 16 (04/02/17 0719)  BP: (!) 111/58 (04/02/17 0719)  SpO2: 97 % (04/02/17 0719) Vital Signs (24h Range):  Temp:  [97.9 °F (36.6 °C)-98.3 °F (36.8 °C)] 98.2 °F (36.8 °C)  Pulse:  [] 85  Resp:  [16-18] 16  SpO2:  [96 %-99 %] 97 %  BP: (102-118)/(58-70) 111/58     Weight: 79.6 kg (175 lb 7.8 oz)  Body mass index is 25.18 kg/(m^2).    Intake/Output Summary (Last 24 hours) at 04/02/17 0937  Last data filed at 04/02/17 0553   Gross per 24 hour   Intake             1080 ml   Output             1925 ml   Net             -845 ml      Physical Exam    Significant Labs:   CBC:   Recent Labs  Lab 03/31/17  2049 04/01/17  0508 04/02/17  0626   WBC 9.28 7.41 7.14   HGB 10.2* 10.3* 9.7*   HCT 31.7* 31.1* 29.9*    240 218     CMP:   Recent Labs  Lab 04/01/17  0508 04/02/17  0626    135*   K 4.4 5.1    103   CO2 25 24    89   BUN 15 15   CREATININE 1.0 0.8   CALCIUM 8.3* 8.3*   PROT 6.1 6.2   ALBUMIN 2.1* 2.0*   BILITOT 0.6 0.5   ALKPHOS 76 74   AST 97* 58*   * 94*   ANIONGAP 7* 8   EGFRNONAA >60.0 >60.0       Significant Imaging: I have reviewed and interpreted all pertinent imaging results/findings within the past 24 hours.    Assessment/Plan:      * Sepsis with acute organ dysfunction  - pneumonia: Started on antibiotics to cover for pneumonia. BNP is elevated suggestive of CHF, however patient reports stable weight and physical exam suggestive of euvolemia. Patient  received one dose of lasix 80mg IV in ED, will monitor urine and output and clinical response. Repeat 2DE ordered to evaluate severe AS, and diastolic dysfunction. P/F ratio on  compatible with moderate ARDS but infiltrate seem to be more right sided and centrilobar than diffuse bilateral.  - was able to be weaned off HF to NC at 6L and tolerated it well  - cultures have grown nothing, deescalating broad spectrum abx to azithromycin and Augmentin         Essential hypertension  -Toprol XL 12.5mg QD    Type 2 diabetes mellitus with stage 2 chronic kidney disease, without long-term current use of insulin  -renal function improved and Cr improved to 0.8      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  -plan per outpatient Oncology      Coronary artery disease involving native coronary artery of native heart without angina pectoris  -s/p PCI in 2015; continue DAPT, BB, statin  - Troponin elevated at 0.127. EKG without new changes and patient denies chest pain. Continue aspirin and plavix.   -treat as ACS with heparin drip, however patient is asymptomatic at this time.   - plan for LHC +/- PCI on 4/3         Severe aortic stenosis  - Repeated 2D ECHO showed EF 40% with severe aortic stenosis, relatively unchanged  - Not a TAVR candidate due to RCC   --- moderate AS with regards to mean gradient of 34 mmHg, despite NAMAN < 1 cm2  --- IC recs of not a surgical candidate   --- EF reduction from 50 % to 40 %  --- Increase toprol 12.5 to 25 mg OD               Renal cell carcinoma of right kidney  - Metastasis to left posterior 12th rib and right sacrum. Continue axitinib.   - Pt stated he has not been taking his axitinib due to running out of prescription and not being able to afford refill  - Will defer restarting this to his oncologist                  Type 2 diabetes mellitus with diabetic polyneuropathy, without long-term current use of insulin  -on metformin at home; LDSSI now      Acute respiratory failure with  hypoxia  -patient presented with acute decompensated respiratory failure- will reassess need for oxygen as clinical presentation is much improved  -weaning oxygen as tolerated  -treat underlying lobar PNA    Chronic diastolic heart failure  -continue home ASA/statin/BB      Benign non-nodular prostatic hyperplasia with lower urinary tract symptoms  -continue home tamsulosin    Pneumonia of right lower lobe due to infectious organism  -patient stepped down from ICU with significantly improved oxygen requirements on 3/31.  -patient on vancomycin/azithromycin/cefepime initially  -will continue CAP coverage with Augmentin/azithromycin to complete a 5 day course  -low flow NC       NSTEMI, initial episode of care  - ACS protocol with heparin gtt; [facility] planning  -Cardiology following- recs:  ACS protocol for NSTEMI  --- Heparin bolus followed by infusion   --- continue ASA 81 mg OD (already received loading dose during this admission)  --- Continue plavix 75 mg OD, (receiced a loading dose)   --- Continue high dose statins, atorvastatin 80 mg OD  --- discussed with patient about the need for LHC / possible PCI on Monday. Urgent IC consult in-case of ongoing unrelievable chest pain  --- stop trop trends   --- PRN NTG/EKG for chest pain        VTE Risk Mitigation         Ordered     High Risk of VTE  Once      03/29/17 1358          Bebeto Lawrence MD  Department of Hospital Medicine   [facility]

## 2017-04-02 NOTE — SUBJECTIVE & OBJECTIVE
Interval History: Patient continues to rest comfortably on low flow NC oxygen without any change in SOB quality or chest pain. He reports SOB is improving.     Review of Systems   Constitutional: Negative for chills, fever and unexpected weight change.   HENT: Negative for postnasal drip.    Eyes: Negative for discharge and redness.   Respiratory: Positive for shortness of breath. Negative for cough, chest tightness and wheezing.    Cardiovascular: Negative for chest pain, palpitations and leg swelling.   Gastrointestinal: Negative for abdominal pain, diarrhea, nausea and vomiting.   Genitourinary: Negative for dysuria and hematuria.   Musculoskeletal: Positive for back pain and myalgias.   Skin: Negative for color change and rash.   Neurological: Negative for dizziness and light-headedness.   Psychiatric/Behavioral: Negative for agitation and confusion.     Objective:     Vital Signs (Most Recent):  Temp: 98.2 °F (36.8 °C) (04/02/17 0719)  Pulse: 85 (04/02/17 0719)  Resp: 16 (04/02/17 0719)  BP: (!) 111/58 (04/02/17 0719)  SpO2: 97 % (04/02/17 0719) Vital Signs (24h Range):  Temp:  [97.9 °F (36.6 °C)-98.3 °F (36.8 °C)] 98.2 °F (36.8 °C)  Pulse:  [] 85  Resp:  [16-18] 16  SpO2:  [96 %-99 %] 97 %  BP: (102-118)/(58-70) 111/58     Weight: 79.6 kg (175 lb 7.8 oz)  Body mass index is 25.18 kg/(m^2).    Intake/Output Summary (Last 24 hours) at 04/02/17 0937  Last data filed at 04/02/17 0553   Gross per 24 hour   Intake             1080 ml   Output             1925 ml   Net             -845 ml      Physical Exam    Significant Labs:   CBC:   Recent Labs  Lab 03/31/17 2049 04/01/17  0508 04/02/17  0626   WBC 9.28 7.41 7.14   HGB 10.2* 10.3* 9.7*   HCT 31.7* 31.1* 29.9*    240 218     CMP:   Recent Labs  Lab 04/01/17  0508 04/02/17  0626    135*   K 4.4 5.1    103   CO2 25 24    89   BUN 15 15   CREATININE 1.0 0.8   CALCIUM 8.3* 8.3*   PROT 6.1 6.2   ALBUMIN 2.1* 2.0*   BILITOT 0.6 0.5    ALKPHOS 76 74   AST 97* 58*   * 94*   ANIONGAP 7* 8   EGFRNONAA >60.0 >60.0       Significant Imaging: I have reviewed and interpreted all pertinent imaging results/findings within the past 24 hours.

## 2017-04-02 NOTE — PLAN OF CARE
Problem: Fall Risk (Adult)  Intervention: Safety Promotion/Fall Prevention  Pt up ad graeme. Pt remained calm and rested through out night. Pt remain free from falls. Call bell in reach, freq checks maintained for safety. Environment free from clutter. Bed in lowest position. Will continue to monitor.    04/02/17 0603   Safety Interventions   Safety Promotion/Fall Prevention assistive device/personal item within reach;Fall Risk reviewed with patient/family;medications reviewed;nonskid shoes/socks when out of bed;side rails raised x 2           Problem: Patient Care Overview  Goal: Plan of Care Review  Outcome: Ongoing (interventions implemented as appropriate)  Pt AAOx4. No acute events.pt remained calm, no distress. VSS. Heparin infusion continued at 19 units/kg/hr. Awaiting 0400 anti-xa lab results. Pt has no c/o chest pain or SOB. Pt c/o hip and lower back pain. PRN pain med given x1, PRN sleep aid admin x1. Standard precautions maintained and patient remains afebrile.  Gardner catheter output adequate. accu checks required no coverage.  Side rails up x2, call light in reach. Will continue to monitor.

## 2017-04-02 NOTE — PLAN OF CARE
Problem: Patient Care Overview  Goal: Plan of Care Review  Outcome: Ongoing (interventions implemented as appropriate)  No acute events.  Patient AAOx4, calm and in no distress.  AVSS.  NPO at midnight tonight for heart cath.  Morning anti-xa level therapeutic.  Heparin infusion continued at 19 units/kg/hr.  Complaints of pain moderately relieved by PRN oxy.  BG checks continued and mealtime and required no coverage.  Standard precautions maintained and patient remains afebrile.  Skin remains in tact.  Patient up in chair for most of day, ambulates with even and steady gait and remained free of falls.  Gardner catheter to stay in place until discharge according to nursing communication order.  Output adequate.    Side rails up x2, call light in reach.  Will continue to monitor.

## 2017-04-02 NOTE — ASSESSMENT & PLAN NOTE
- ACS protocol with heparin gtt; [facility] planning  -Cardiology following- recs:  ACS protocol for NSTEMI  --- Heparin bolus followed by infusion   --- continue ASA 81 mg OD (already received loading dose during this admission)  --- Continue plavix 75 mg OD, (receiced a loading dose)   --- Continue high dose statins, atorvastatin 80 mg OD  --- discussed with patient about the need for LHC / possible PCI on Monday. Urgent IC consult in-case of ongoing unrelievable chest pain  --- stop trop trends   --- PRN NTG/EKG for chest pain

## 2017-04-02 NOTE — ASSESSMENT & PLAN NOTE
- Repeated 2D ECHO showed EF 40% with severe aortic stenosis, relatively unchanged  - Not a TAVR candidate due to RCC   --- moderate AS with regards to mean gradient of 34 mmHg, despite NAMAN < 1 cm2  --- IC recs of not a surgical candidate   --- EF reduction from 50 % to 40 %  --- Increase toprol 12.5 to 25 mg OD

## 2017-04-03 PROBLEM — R33.9 CHRONIC RETENTION OF URINE: Status: ACTIVE | Noted: 2017-01-01

## 2017-04-03 NOTE — CONSULTS
CONSULT NOTE  Hematology/Oncology        Consult Requested By: Jc Rowan MD  Reason for Consult: Cardiology needs exact prognosis for RCC and also want to know if current chemotherapy will interfere with aspirin/plavix if stents needed    SUBJECTIVE:     History of Present Illness:  Patient is a 72 y.o. male with PMHx of DM2, HTN, HLD, severe AS, NSTEMI (10/2015 s/p BMS x4), HFrEF (40%), metastatic RCC (on axitinib) who [place] [facility] [place]. Patient was found to be in acute hypoxemic respiratory failure 2/2 volume overloaded. Patient tolerated IV diuresis well and was stepped down to hospital medicine. He is net negative 6.2L. Patient had repeat troponin drawn that was 2.419 from 0.127 on admission. Cardiology consulted and was planned to undergo LHC today. Heme/Onc consulted for RCC prognosis and ability for patient to undergo DAPT.    Continuous Infusions:   heparin (porcine) in D5W 21 Units/kg/hr (04/03/17 0702)     Scheduled Meds:   amoxicillin-clavulanate 500-125mg  1 tablet Oral TID    aspirin  81 mg Oral Daily    atorvastatin  80 mg Oral Daily    cetirizine  10 mg Oral QHS    clopidogrel  75 mg Oral Daily    duloxetine  30 mg Oral Daily    gabapentin  600 mg Oral TID    metoprolol succinate  25 mg Oral Daily    morphine  15 mg Oral Q12H    polyethylene glycol  17 g Oral Daily    sodium chloride 0.9%  500 mL Intravenous Once    sodium chloride 0.9%  3 mL Intravenous Q8H    tamsulosin  0.4 mg Oral Daily     PRN Meds:acetaminophen, dextrose 50%, dextrose 50%, glucagon (human recombinant), glucose, glucose, insulin aspart, nitroGLYCERIN, oxycodone, oxycodone, senna-docusate 8.6-50 mg, zolpidem    Past Medical History:   Diagnosis Date    Acute on chronic congestive heart failure     Arthritis     Chronic kidney disease, stage II (mild) 10/20/2016    Chronic retention of urine 4/3/2017    Patient does self cath at home.   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 left shoulder    TOTAL KNEE ARTHROPLASTY      left side    VASECTOMY       Family History   Problem Relation Age of Onset    Heart disease Father     ALS Father     Cancer Brother      esophageal    Rheum arthritis Mother     Migraines Daughter     Asthma Son     Diabetes Neg Hx     Colon polyps Neg Hx      Social History   Substance Use Topics    Smoking status: Never Smoker    Smokeless tobacco: Never Used    Alcohol use No      Comment: Heavy in past, quit about 20 years ago       Review of patient's allergies indicates:   Allergen Reactions    No known drug allergies         Review of Systems:  Constitutional: no fever or chills  Eyes: no visual changes  ENT: no nasal congestion or sore throat  Respiratory: no cough, +shortness of breath improved  Cardiovascular: no chest pain or palpitations  Gastrointestinal: no nausea or vomiting, no abdominal pain or change in bowel habits  Genitourinary: no hematuria or dysuria  Integument/Breast: no rash or pruritis  Musculoskeletal: no arthralgias or  myalgias  Neurological: no seizures or tremors  Behavioral/Psych: no auditory or visual hallucinations  Endocrine: no heat or cold intolerance    OBJECTIVE:     Vital Signs (Most Recent)  Temp: 98.6 °F (37 °C) (04/03/17 0735)  Pulse: 105 (04/03/17 1100)  Resp: 16 (04/03/17 0735)  BP: (!) 95/54 (04/03/17 0930)  SpO2: 95 % (04/03/17 0735)    Vital Signs Range (Last 24H):  Temp:  [98.1 °F (36.7 °C)-99.4 °F (37.4 °C)]   Pulse:  []   Resp:  [16-18]   BP: ()/(52-60)   SpO2:  [93 %-98 %]     Physical Exam:  General: well developed, well nourished, no distress  HENT: Head:normocephalic, atraumatic. Ears:right ear normal, left ear normal. Nose: Nares normal. Septum midline. Mucosa normal. No drainage or sinus tenderness., no discharge. Throat: lips, mucosa, and tongue normal; teeth and gums normal and no throat erythema.  Eyes: conjunctivae/corneas clear. PERRL.   Neck: supple, symmetrical, trachea midline, no JVD and thyroid not enlarged, symmetric, no tenderness/mass/nodules  Lungs:  Mild bibasilar crackles, no wheeze, good air movement, non-labored   Cardiovascular: Heart: regular rate and rhythm, S1, S2 normal, +systeolic murmur, no click, rub or gallop. Chest Wall: no tenderness. Extremities: no cyanosis or edema, or clubbing. Pulses: 2+ and symmetric.  Abdomen/Rectal: Abdomen: soft, non-tender non-distented; bowel sounds normal; no masses,  no organomegaly. Rectal: not examined  Genitalia: deferred  Skin: Skin color, texture, turgor normal. No rashes or lesions  Musculoskeletal:no clubbing, cyanosis  Neurologic: Mental status: Alert, oriented, thought content appropriate  Psych/Behavioral:  Normal.    Laboratory:  CBC with Differential:    Recent Labs  Lab 04/03/17  0510   WBC 7.54   LYMPH 16.2*  1.2   BASOPHIL 0.1   RBC 3.34*   HCT 30.9*   HGB 9.8*   MCV 93   MCH 29.3   RDW 16.3*      MPV 9.9     CMP:    Recent Labs  Lab 04/03/17  0510   GLU 86   CALCIUM 8.7   ALBUMIN 2.1*   PROT 6.2      K  5.2*   CO2 29      BUN 14   CREATININE 1.0   ALKPHOS 87   ALT 94*   AST 57*   BILITOT 0.6       Recent Labs  Lab 04/01/17  0508  04/02/17  1210   CPK 71  --   --    CPKMB 2.7  --   --    TROPONINI 1.652*  < > 1.423*   MB 3.8  --   --    < > = values in this interval not displayed.      ASSESSMENT/PLAN:     Current Problems List:  Active Hospital Problems    Diagnosis  POA    *Sepsis with acute organ dysfunction [A41.9, R65.20]  Yes    Chronic retention of urine [R33.9]  Yes     Patient does self cath at home.      Pneumonia of right lower lobe due to infectious organism [J18.1]  Yes    NSTEMI, initial episode of care [I21.4]  No    Acute respiratory failure with hypoxia [J96.01]  Yes    Chronic diastolic heart failure [I50.32]  Yes    Benign non-nodular prostatic hyperplasia with lower urinary tract symptoms [N40.1]  Yes    Type 2 diabetes mellitus with diabetic polyneuropathy, without long-term current use of insulin [E11.42]  Yes     Chronic    Renal cell carcinoma of right kidney [C64.1]  Yes    Severe aortic stenosis [I35.0]  Yes    Coronary artery disease involving native coronary artery of native heart without angina pectoris [I25.10]  Yes    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 [C79.51, C64.9]  Yes     Chronic    Essential hypertension [I10]  Yes    Type 2 diabetes mellitus with stage 2 chronic kidney disease, without long-term current use of insulin [E11.22, N18.2]  Yes      Resolved Hospital Problems    Diagnosis Date Resolved POA    Hyponatremia [E87.1] 04/01/2017 Yes    HERNANDEZ (acute kidney injury) [N17.9] 03/31/2017 Yes    Shortness of breath [R06.02] 03/31/2017 Yes        Mr. Lex Billy is a 72 y.o. male with multiple medical comorbidities admitted for NSTEMI    Problem Assessment & Recommendations:    # RCC, metastatic   - currently on 5th line therapy: nexavar --> sutent --> torisel -> votreint -> axitinib  - patient with good performance status, ECOG 0  - despite  advanced stage disease and prior lines of therapy, patient remains with good performance status with other lines of therapy still available (ex: nivolumab)  - patient has good 1 year prognosis and agree with cardiac intervention  - there is no contraindication for prolonged dual anti-platelet therapy from the standpoint of his RCC    Thank you for allowing us to participate in the care of this patient.    Please do not hesitate to call with any questions.     Kyle Randall MD (PGY-4)  Hematology/Oncology Fellow  Will discuss with Dr. Valdez (Hematology/Oncology Staff)      Attending Note  I have personally taken the history and examined this patient and agree with the fellow's note as stated above.  Patient has stable disease and additional treatment options   His performance status remains excellent- would proceed with appropriate cardiac intervention as life expectancy still expected > 1 year as can be best approximated

## 2017-04-03 NOTE — ASSESSMENT & PLAN NOTE
- ACS protocol with heparin gtt; [facility] planning  -Cardiology following- recs:  ACS protocol for NSTEMI  --- Heparin bolus followed by infusion   --- continue ASA 81 mg OD (already received loading dose during this admission)  --- Continue plavix 75 mg OD, (receiced a loading dose)   --- Continue high dose statins, atorvastatin 80 mg OD  --- discussed with patient about the need for LHC / possible PCI on Monday. Urgent IC consult in-case of ongoing unrelievable chest pain  --- stop trop trends   --- PRN NTG/EKG for chest pain

## 2017-04-03 NOTE — PLAN OF CARE
Problem: Physical Therapy Goal  Goal: Physical Therapy Goal  Goals to be met by: 17    Patient will increase functional independence with mobility by performin. Sit to stand transfer with Modified [place] - not met  2. Gait x 220 feet with Supervision - not met   Outcome: Ongoing (interventions implemented as appropriate)  No goals met today. Goals remain appropriate.

## 2017-04-03 NOTE — SUBJECTIVE & OBJECTIVE
Interval History: NAEON. Pt is doing well.  He is currently on acute MI treatment. Was planning stent placement today. Cardiology states that they need further prognosis regarding his RCC and interactions of chemo drugs with aspirin.      Review of Systems   Constitutional: Negative for chills, fever and unexpected weight change.   HENT: Negative for postnasal drip.    Eyes: Negative for redness.   Respiratory: Positive for shortness of breath. Negative for cough, chest tightness and wheezing.    Cardiovascular: Negative for chest pain, palpitations and leg swelling.   Gastrointestinal: Negative for abdominal pain, diarrhea, nausea and vomiting.   Genitourinary: Negative for dysuria and hematuria.   Musculoskeletal: Positive for back pain and myalgias.   Skin: Negative for color change and rash.   Neurological: Negative for dizziness and light-headedness.   Psychiatric/Behavioral: Negative for agitation and confusion.     Objective:     Vital Signs (Most Recent):  Temp: 98.6 °F (37 °C) (04/03/17 0735)  Pulse: 77 (04/03/17 0735)  Resp: 16 (04/03/17 0735)  BP: (!) 98/52 (04/03/17 0735)  SpO2: 95 % (04/03/17 0735) Vital Signs (24h Range):  Temp:  [98.1 °F (36.7 °C)-99.4 °F (37.4 °C)] 98.6 °F (37 °C)  Pulse:  [77-95] 77  Resp:  [16-18] 16  SpO2:  [93 %-98 %] 95 %  BP: ()/(52-60) 98/52     Weight: 79.6 kg (175 lb 7.8 oz)  Body mass index is 25.18 kg/(m^2).    Intake/Output Summary (Last 24 hours) at 04/03/17 0755  Last data filed at 04/03/17 0581   Gross per 24 hour   Intake          1663.51 ml   Output             3225 ml   Net         -1561.49 ml      Physical Exam   Constitutional: He is oriented to person, place, and time. He appears well-developed and well-nourished.   HENT:   Mouth/Throat: Oropharynx is clear and moist. No oropharyngeal exudate.   Eyes: Conjunctivae and EOM are normal.   Neck: Normal range of motion. No tracheal deviation present. No thyromegaly present.   Cardiovascular: Normal rate and  regular rhythm.    Murmur heard.   Systolic murmur is present   Pulmonary/Chest: No respiratory distress.   Decreased breath sounds bibasilar, slight crackles mid-lung on right   Abdominal: Soft. Bowel sounds are normal. He exhibits no distension. There is no tenderness.   Musculoskeletal: He exhibits no edema.   Lymphadenopathy:     He has no cervical adenopathy.   Neurological: He is alert and oriented to person, place, and time. No cranial nerve deficit.       Significant Labs:   CBC:   Recent Labs  Lab 04/02/17  0626 04/03/17  0510   WBC 7.14 7.54   HGB 9.7* 9.8*   HCT 29.9* 30.9*    231     CMP:   Recent Labs  Lab 04/02/17  0626 04/03/17  0510   * 138   K 5.1 5.2*    102   CO2 24 29   GLU 89 86   BUN 15 14   CREATININE 0.8 1.0   CALCIUM 8.3* 8.7   PROT 6.2 6.2   ALBUMIN 2.0* 2.1*   BILITOT 0.5 0.6   ALKPHOS 74 87   AST 58* 57*   ALT 94* 94*   ANIONGAP 8 7*   EGFRNONAA >60.0 >60.0       Significant Imaging: no imaging to review

## 2017-04-03 NOTE — PROGRESS NOTES
Cardiology  Progress Note                                                              Team: [facility] MED 4     Patient Name: Lex Billy   YOB: 1945  Location: [address] [place] [address] A    Admit Date: 3/28/2017                     LOS: 5    SUBJECTIVE     INTERVAL HISTORY:     NAEON. No chest pain or SOB. Hemodynamically stable, with net negative of 6.2 L since admission.     HPI:     Lex Powell is 71 yo M with medical history significant for DM- diet controlled, HTN, HLD, severe AS (NAMAN 0.8, PV 4, MG 44- not a TAVR candidate per Dr Ramirez), NSTEMI (10/9-10/9/15) s/p LM BMS x 1, mRCA BMS x 2, and dRCA BMS x 1, Diastolic Dysfunction with preserved EF 50% (2/12/16) AND metastatic renal cell carcinoma undergoing chemotherapy. Admitted for acute hypoxic respiratory failure managed with IV antibiotics and diuresis for pneumonia vs heart failure vs ARDS vs metastatic cancer.      On cardiac standpoint, BNP was elevated but patient was euvolemia on examination. Repeat 2DE on admission revealed severe AS, and diastolic dysfunction with EF of 40%. Trop was 0.127 without new changes on EKG and with no symptoms of ACS/angina/PND/orhtopnea. Was on maintenance dose of ASA/plavix. However trop done today for SOB revealed 2.419 with ST depression on EKG, hence cardiology was consulted for further management.       MEDICATIONS:  Scheduled:   amoxicillin-clavulanate 500-125mg  1 tablet Oral TID    aspirin  81 mg Oral Daily    atorvastatin  80 mg Oral Daily    cetirizine  10 mg Oral QHS    clopidogrel  75 mg Oral Daily    duloxetine  30 mg Oral Daily    gabapentin  600 mg Oral TID    metoprolol succinate  25 mg Oral Daily    morphine  15 mg Oral Q12H    polyethylene glycol  17 g Oral Daily    sodium chloride 0.9%  500 mL Intravenous Once    sodium chloride 0.9%  3 mL Intravenous Q8H    tamsulosin  0.4 mg Oral Daily     Continuous:   heparin (porcine) in D5W 21 Units/kg/hr (04/03/17 0702)  "    PRN:  acetaminophen, dextrose 50%, dextrose 50%, glucagon (human recombinant), glucose, glucose, insulin aspart, nitroGLYCERIN, oxycodone, oxycodone, senna-docusate 8.6-50 mg, zolpidem      OBJECTIVE:     VITALS  Most Recent Range (Last 24H)   BP (!) 98/52 (BP Location: Left arm, Patient Position: Lying, BP Method: Manual)  Pulse 77  Temp 98.6 °F (37 °C) (Oral)   Resp 16  Ht 5' 10" (1.778 m)  Wt 79.6 kg (175 lb 7.8 oz)  SpO2 95%  BMI 25.18 kg/m2 Temp:  [98.1 °F (36.7 °C)-99.4 °F (37.4 °C)]   Pulse:  [77-95]   Resp:  [16-18]   BP: ()/(52-60)   SpO2:  [93 %-98 %]      Intake and Output  BMI     Intake/Output Summary (Last 24 hours) at 04/03/17 0803  Last data filed at 04/03/17 0537   Gross per 24 hour   Intake          1663.51 ml   Output             3225 ml   Net         -1561.49 ml       Net I/O since admission: Body mass index is 25.18 kg/(m^2).        PHYSICAL EXAM  General: AAOx3, NAD;  Neck: Supple; Trachea midline; No JVD; No bruits;   Cardiac: RRR, +S1 & S2; No M/R/G; PMI non-displaced; No thrills or heave   Pulmonary: CTAB; No W/R/R;  Abdominal: Soft, NT/ND +Normoactive BS; No organomegaly  Extremities: No clubbing, cyanosis, or edema      LABS  CBC/Anemia Labs Coag Labs     Recent Labs  Lab 04/01/17  0508 04/02/17  0626 04/03/17  0510   WBC 7.41 7.14 7.54   HGB 10.3* 9.7* 9.8*   HCT 31.1* 29.9* 30.9*   MCV 89 90 93    218 231    Lab Results   Component Value Date    INR 1.3 (H) 03/28/2017    INR 1.1 02/11/2016    INR 1.0 10/09/2015        BMP     Recent Labs  Lab 04/01/17  0508 04/02/17  0626 04/03/17  0510    89 86    135* 138   K 4.4 5.1 5.2*    103 102   CO2 25 24 29   BUN 15 15 14   CREATININE 1.0 0.8 1.0   CALCIUM 8.3* 8.3* 8.7      Mag & Phos LFTs     Recent Labs  Lab 04/01/17  0508 04/02/17  0626 04/03/17  0510   MG 2.1 2.2 2.2   PHOS 2.7 4.0 4.3      Recent Labs  Lab 04/01/17  0508 04/02/17  0626 04/03/17  0510   PROT 6.1 6.2 6.2   BILITOT 0.6 0.5 0.6 "   ALKPHOS 76 74 87   AST 97* 58* 57*   * 94* 94*             Cardiac Enzymes Ejection Fractions/BNP     Recent Labs  Lab 04/01/17  0508  04/02/17  0047 04/02/17  0626 04/02/17  1210   CPK 71  --   --   --   --    TROPONINI 1.652*  < > 1.663* 1.713* 1.423*   CPKMB 2.7  --   --   --   --    MB 3.8  --   --   --   --    < > = values in this interval not displayed. EF   Date Value Ref Range Status   03/29/2017 40 (A) 55 - 65    02/12/2016 50 55 - 65    10/08/2015 63 55 - 65        Recent Labs  Lab 03/28/17  2259   *        Lab Results   Component Value Date    HGBA1C 5.5 02/20/2017         Microbiology Results (last 7 days)     Procedure Component Value Units Date/Time    Blood culture [[id number]] Collected:  03/29/17 0322    Order Status:  Completed Specimen:  Blood from Peripheral, Hand, Right Updated:  04/03/17 0612     Blood Culture, Routine No growth after 5 days.    Blood culture [[id number]] Collected:  03/29/17 0314    Order Status:  Completed Specimen:  Blood from Peripheral, Antecubital, Right Updated:  04/03/17 0612     Blood Culture, Routine No growth after 5 days.    Respiratory Viral Panel by PCR [[id number]] Collected:  03/29/17 0350    Order Status:  Completed Updated:  03/31/17 0429     Respiratory Virus Panel, source NP     RVP - Adenovirus Not Detected      Respiratory Viral Panel is a product of [employer].  It has been approved or cleared by the U.S. Food and Drug  Administration for in vitro diagnostic use.  Results should be  used in conjunction with clinical findings, and should not form  the sole basis for a diagnosis or treatment decision.  Negative results do not preclude respiratory virus infection  and should not be used as the sole basis for diagnosis,   treatment, or other management decisions.  Positive results do not rule out bacterial infection, or  co-infection with other viruses.  The agent detected may not  be the definitive cause of the disease. The use of  additional   laboratory testing (e.g. bacterial culture, immunofluorescence,  radiography) and clinical presentation must be taken into  consideration in order to obtain the final diagnosis of   respiratory viral infection.  The RVP assay cannot adequately detect Adenovirus species C,  or serotypes 7a and 41.  The RVP primers for detection of   rhinovirus have been shown to cross-react with enterovirus.  A rhinovirus reactive result should be confirmed by an   alternative method (e.g. cell culture).  The  of the Respiratory Viral Panel has   recommmended that specimens found to be negative for  Adenovirus be confirmed by an alternative method.  The  of the Respiratory Viral Panel has  recommended that specimens found to be negative for   Influenza be confirmed by an alternative method.          Enterovirus Not Detected      Cross-reactivity has been observed between certain Rhinovirus  strains and the Enterovirus assay.          Human Bocavirus Not Detected     Human Coronavirus Not Detected      The Human Coronavirus assay detects Human coronavirus types  229E, OC43,NL63 and HKO1.          RVP - Human Metapneumovirus (hMPV) Not Detected     RVP - Influenza A Not Detected     Influenza A - G9P5-09 Not Detected     RVP - Influenza B Not Detected     Parainfluenza Not Detected     Respiratory Syncytial VirusVirus (RSV) A Not Detected      The Respiratory Syncytial Viral assay detects types A and B,  however it does not distinguish between the two.          RVP - Rhinovirus Not Detected    Urine culture [[id number]] Collected:  03/29/17 0350    Order Status:  Completed Specimen:  Urine from Catheterized Updated:  03/30/17 0804     Urine Culture, Routine No growth    Narrative:       Add on per Dr Bhakta order #[id number] 03/29/2017  05:05 cxurn    Respiratory virus antigens panel [[id number]]     Order Status:  Canceled Specimen:  Respiratory from Nasopharyngeal Wash     Urine culture [[id number]]      Order Status:  Completed Specimen:  Urine from Urine, Catheterized     Urine culture [[id number]]     Order Status:  Canceled Specimen:  Urine     Respiratory Viral Panel by PCR Nasal Swab [[id number]] Collected:  03/29/17 0350    Order Status:  Canceled Specimen:  Respiratory Updated:  03/29/17 0351    Narrative:       Respiratory Viral Panel by PCR was cancelled on 03/29/2017 at 04:02   by [email or web address]; MOVED TO [id number]          INVESTIGATION RESULTS    Imaging Results         X-Ray Chest 1 View (Final result) Result time:  03/30/17 09:18:58    Final result by Ubaldo Oro MD (03/30/17 09:18:58)    Impression:     See above      Electronically signed by: Ubaldo Oro MD  Date:     03/30/17  Time:    09:18     Narrative:    Ill-defined diffuse patchy airspace consolidation in the right lung identified and better appreciated on the chest CT scan.  Heart size is normal            CTA Chest Non-Coronary - PE Study (Final result) Result time:  03/29/17 01:55:37    Final result by Atilio Love MD (03/29/17 01:55:37)    Impression:      1.  No evidence of pulmonary thromboembolus on this adequate study.     2.  Interval development of extensive bilateral centrilobular opacities throughout both lungs, more prominent on the right, nonspecific and may represent pneumonia, aspiration, or noninfectious inflammation with underlying metastatic disease not excluded.  Close followup is recommended with repeat chest CT in 8-12 weeks.    3.  Interval increase in size of right renal mass.    4.  Interval increase in size of osseous metastatic lesion to the left posterior 12th rib.    5.  Moderate bilateral pleural effusions.    6.  Significant coronary artery atherosclerosis and dense calcification of the aortic valve.    ______________________________________     Electronically signed by resident: ATILIO LOVE MD  Date:     03/29/17  Time:    01:50            As the supervising and teaching physician, I personally reviewed the  images and resident's interpretation and I agree with the findings.          Electronically signed by: URIEL DAY MD  Date:     03/29/17  Time:    01:55     Narrative:    Comparison: CT abdomen/pelvis 3/8/17.    Indication: Shortness of breath; history of renal cancer with osseous metastatic disease.    Technique:   During intravenous bolus injection of contrast medium, the chest was surveyed from the apices to the costophrenic angles.  Data was reconstructed for thin multiplanar images as well as maximum intensity projection images in the axial, sagittal and coronal planes. 75mL of Omnipaque 350 was administered for opacification of the pulmonary arteries.      Findings:  The structures at the base of the neck are unremarkable.      There is a left-sided aortic arch with 3 branch vessels.  The thoracic aorta maintains normal caliber and course.  There is dense calcific atherosclerosis of the coronary arteries, and the thoracic aorta demonstrates mild calcific atherosclerosis.  There is dense calcification of the aortic valve.    The pulmonary arteries distributed normally without filling defect to suggest pulmonary thromboembolism.  Systemic and pulmonary venoatrial connections are concordant.    There is a moderate volume of dependent pleural fluid bilaterally. No significant pericardial fluid is identified.  The heart is not enlarged.  There is reflux of contrast into the hepatic IVC, which may represent right-sided heart dysfunction.    No mediastinal, hilar, or axillary lymphadenopathy is identified.    The esophagus maintains normal caliber and course.  There is a 3.2 cm mass within the superior pole the right kidney slightly increased in size from prior study, previously measuring 2.8 cm.  No acute abnormality is identified within the upper abdomen.    The superficial soft tissues are unremarkable.    The osseous structures again demonstrate a metastatic lesion within the posterior 12th rib on the left,  which appears increased in size measuring 7.7 cm.  No additional aggressive osseous lesions are identified.    The trachea and large proximal airways are patent.    The lungs demonstrate extensive bilateral centrilobular consolidative opacities throughout both lungs, more prominent on the right.  These findings were not identified on prior CT  Additionally, there is a prominent opacity within the right lower lobe measured 3.6 cm.  Findings are nonspecific and may represent pulmonary edema, pneumonia, aspiration or metastatic disease.            X-Ray Chest 1 View (Final result) Result time:  03/29/17 00:02:19    Final result by Geoffrey Singh MD (03/29/17 00:02:19)    Impression:        Central perihilar airspace infiltrate in the right lung with partial silhouetting of the right heart border that appears to involve the upper and middle lobes. Possible pneumonia or aspiration.        Electronically signed by: GEOFFREY SINGH MD  Date:     03/29/17  Time:    00:02     Narrative:    Chest AP portable    Indication:.    Comparison:July 23, 2016.    Findings:         The cardiomediastinal silhouette appear stable.  There is no pleural effusion.  The trachea is midline.  The lungs are symmetrically expanded bilaterally with central perihilar airspace infiltrate in the right lung. Left lung is clear.  There is no pneumothorax.  The osseous structures appear unchanged.                ASSESSMENT/PLAN:     Current Problems List:  Active Hospital Problems    Diagnosis  POA    *Sepsis with acute organ dysfunction [A41.9, R65.20]  Yes    Pneumonia of right lower lobe due to infectious organism [J18.1]  Yes    NSTEMI, initial episode of care [I21.4]  No    Acute respiratory failure with hypoxia [J96.01]  Yes    Chronic diastolic heart failure [I50.32]  Yes    Benign non-nodular prostatic hyperplasia with lower urinary tract symptoms [N40.1]  Yes    Type 2 diabetes mellitus with diabetic polyneuropathy, without  long-term current use of insulin [E11.42]  Yes     Chronic    Renal cell carcinoma of right kidney [C64.1]  Yes    Severe aortic stenosis [I35.0]  Yes    Coronary artery disease involving native coronary artery of native heart without angina pectoris [I25.10]  Yes    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 [C79.51, C64.9]  Yes     Chronic    Essential hypertension [I10]  Yes    Type 2 diabetes mellitus with stage 2 chronic kidney disease, without long-term current use of insulin [E11.22, N18.2]  Yes      Resolved Hospital Problems    Diagnosis Date Resolved POA    Hyponatremia [E87.1] 04/01/2017 Yes    HERNANDEZ (acute kidney injury) [N17.9] 03/31/2017 Yes    Shortness of breath [R06.02] 03/31/2017 Yes        ASSESSMENT:     - 71 yo M with medical history significant for DM- diet controlled, HTN, HLD, severe AS (NAMAN 0.8, PV 4, MG 44- not a TAVR candidate per Dr Ramirez), NSTEMI (10/9-10/9/15) s/p LM BMS x 1, mRCA BMS x 2, and dRCA BMS x 1, Diastolic Dysfunction with preserved EF 50% (2/12/16) AND metastatic renal cell carcinoma undergoing chemotherapy.   - Admitted for acute hypoxic respiratory failure managed with IV antibiotics and diuresis for pneumonia vs heart failure vs ARDS vs metastatic cancer.   - Repeat 2DE on admission revealed severe AS, and diastolic dysfunction with EF of 40%.   - No specific ST/T wave changes / ST depressions on joellen-lateral and inferior leads.   - Trop trends of 0.127 to 0.525 to 2.419 with plateau thereafter      PLAN:    NSTEMI / Heart failure / AS:     -- probability of NSTEMI considering the risk factors, prior ACS, trending trops.   --- ACS protocol for NSTEMI  --- Heparin infusion   --- continue ASA 81 mg OD (already received loading dose during this admission)  --- Continue plavix 75 mg OD, (receiced a loading dose)   --- Continue high dose statins, atorvastatin 80 mg OD  --- Discussed case with interventional Cardiology who is hesitant to proceed with LHC today given  patient's Metastatic RCC. They would like a [facility] consult for prognosis as well as their input on long term ASA and Plavix use with current chemo regimen. If prognosis good and no interference with chemo, they may proceed to cath.    - Other ongoing concerns are AS/HF  --- moderate AS with regards to mean gradient of 34 mmHg, despite NAMAN < 1 cm2  --- IC recs of not a surgical candidate   --- EF reduction from 50 % to 40 %  --- Continue toprol 25 mg OD     Thank you for this consult.  Will continue to follow with you.     Amber Burgos MD

## 2017-04-03 NOTE — NURSING
Patient departed unit for cath lab.  Heparin infusion stopped at 15:16.  Previously held plavix and ASA administered for procedure.  Gardner emptied prior to departure.  Patient asymptomatic of cardio issues at this time.  Will continue to monitor.

## 2017-04-03 NOTE — PT/OT/SLP PROGRESS
Physical Therapy  Treatment    Lex Billy   MRN: [id number]   Admitting Diagnosis: Sepsis with acute organ dysfunction    PT Received On: 17  PT Start Time: 1103     PT Stop Time: 1122    PT Total Time (min): 19 min       Billable Minutes:  Gait [id number]    Treatment Type: Treatment  PT/PTA: PT       General Precautions: Standard, fall  Orthopedic Precautions: N/A   Braces: N/A    Do you have any cultural, spiritual, Tenriism conflicts, given your current situation?:  (none)    Subjective:  Communicated with RN prior to session.  Pt agreeable to treatment.     Pain Ratin/10  Pain Rating Post-Intervention: 0/10    Objective:   Patient found with: telemetry, pulse ox (continuous), jauregui catheter, peripheral IV   Pt found supine in bed.     Functional Mobility:  Bed Mobility:   Supine to Sit: Modified Independent    Transfers:  Sit <> Stand Assistance: Stand By Assistance  Sit <> Stand Assistive Device: No Assistive Device    Gait:   Gait Distance: Pt ambulated ~300ft w/ HHA and CGA.   Assistance 1: Contact Guard Assistance  Gait Assistive Device: Hand held assist  Gait Pattern: reciprocal  Gait Deviation(s): decreased roxane, decreased step length, decreased stride length    Stairs:  Not assessed.     Balance:   Static Sit: GOOD: Takes MODERATE challenges from all directions  Dynamic Sit: GOOD: Maintains balance through MODERATE excursions of active trunk movement  Static Stand: FAIR+: Takes MINIMAL challenges from all directions  Dynamic stand: FAIR+: Needs CLOSE SUPERVISION during gait and is able to right self with minor LOB     Therapeutic Activities and Exercises:  Pt performed seated LE exercises:   -LAQ x10 reps  -seated marching x20 reps  -heel raises x10 reps  -hip adduction squeezes w/ pillow x15 reps   Pt educated on supine and seated exercises to perform in room on own x30 reps 3-5x/per day. Pt educated on all the exercises listed above and supine SLR and AP.  Pt verbalized  understanding. Pt educated on importance of exercises and OOB activities.       AM-PAC 6 CLICK MOBILITY  How much help from another person does this patient currently need?   1 = Unable, Total/Dependent Assistance  2 = A lot, Maximum/Moderate Assistance  3 = A little, Minimum/Contact Guard/Supervision  4 = None, Modified [place]/Independent    Turning over in bed (including adjusting bedclothes, sheets and blankets)?: 4  Sitting down on and standing up from a chair with arms (e.g., wheelchair, bedside commode, etc.): 4  Moving from lying on back to sitting on the side of the bed?: 4  Moving to and from a bed to a chair (including a wheelchair)?: 4  Need to walk in hospital room?: 4  Climbing 3-5 steps with a railing?: 3  Total Score: 23    AM-PAC Raw Score CMS G-Code Modifier Level of Impairment Assistance   6 % Total / Unable   7 - 9 CM 80 - 100% Maximal Assist   10 - 14 CL 60 - 80% Moderate Assist   15 - 19 CK 40 - 60% Moderate Assist   20 - 22 CJ 20 - 40% Minimal Assist   23 CI 1-20% SBA / CGA   24 CH 0% Independent/ Mod I     Patient left up in chair with all lines intact, call button in reach and spouse present.    Assessment:  Lex Billy is a 72 y.o. male with a medical diagnosis of Sepsis with acute organ dysfunction and presents with the impairments listed below. Pt tolerated treatment well today. Pt is progressing well from a mobility standpoint. Pt ambulation ~300ft w/ HHA and CGA. Pt had good tolerance of seated exercises. Pt will continue to benefit from acute PT services to increase independence with functional activities.     Rehab identified problem list/impairments: Rehab identified problem list/impairments: weakness, impaired endurance, impaired functional mobilty    Rehab potential is good.    Activity tolerance: Good    Discharge recommendations: Discharge Facility/Level Of Care Needs: home     Barriers to discharge: Barriers to Discharge: None    Equipment recommendations:  Equipment Needed After Discharge: none     GOALS:   Physical Therapy Goals        Problem: Physical Therapy Goal    Goal Priority Disciplines Outcome Goal Variances Interventions   Physical Therapy Goal     PT/OT, PT Ongoing (interventions implemented as appropriate)     Description:  Goals to be met by: 17    Patient will increase functional independence with mobility by performin. Sit to stand transfer with Modified [place] - not met  2. Gait  x 220 feet with Supervision - not met                PLAN:    Patient to be seen 3 x/week  to address the above listed problems via gait training, therapeutic activities, therapeutic exercises  Plan of Care expires: 17  Plan of Care reviewed with: patient    Ba Lorenzo Ana, SPT  2017

## 2017-04-03 NOTE — ASSESSMENT & PLAN NOTE
-s/p PCI in 2015; continue DAPT, BB, statin  - Troponin elevated at 0.127. EKG without new changes and patient denies chest pain. Continue aspirin and plavix.   -treat as ACS with heparin drip, however patient is asymptomatic at this time.   - plan for LHC +/- PCI this AM but cardiology would like to determine interactions with chemo and aspirin and prognosis  -cath will likely take place tomorrow, pending hem/oncs note re: prognosis and drug interactions

## 2017-04-03 NOTE — NURSING
Patient returned from cath lab by stretcher accompanied by nurse.   mL/hr.  AAOx4 calm and in no distress.  AVSS.  Right groin site WDL, no signs of swelling or hemtoma.  Site covered with gauze and transparent occlusive dressing.  No complaints of pain.  All pulses present and accounted for.  Handoff with pm DESIRE Banda.  Will continue to monitor.

## 2017-04-03 NOTE — PLAN OF CARE
Pt w/ NSTEMI over the wknd and expected to have heart cath today; CM to bedside and pt is somewhat grumpy, angry and confrontational in discussing medical issues or d/c plan. CM stated would return tmw. Friday AM 3/31, pt declined offer of h/h per therapy recs.      04/03/17 1436   Right Care Assessment   Can the patient answer the patient profile reliably? Yes, cognitively intact   How often would a person be available to care for the patient? Occasionally   Describe the patient's ability to walk at the present time. Minor restrictions or changes   How does the patient rate their overall health at the present time? Fair   Number of comorbid conditions (as recorded on the chart) Two   During the past month, has the patient often been bothered by feeling down, depressed or hopeless? Yes   Have you felt down, depressed, or hopeless? 1   During the past month, has the patient often been bothered by little interest or pleasure in doing things? Yes   Have you felt little interest or pleasure in doing things? 2

## 2017-04-03 NOTE — PROGRESS NOTES
[facility] Hospital Medicine  Progress Note    Patient Name: Lex Billy  MRN: [id number]  Patient Class: IP- Inpatient   Admission Date: 3/28/2017  Length of Stay: 5 days  Attending Physician: Jc Rowan MD  Primary Care Provider: Everette Rowan MD, MD    Hospital Medicine Team: [facility] 4 Laury Chauhan MD    Subjective:     Principal Problem:Sepsis with acute organ dysfunction    HPI:  71 yo M with metastatic renal cell carcinoma, CAD, severe AS presents for worsening dyspnea. Patient reports dyspnea x 1 month with sudden worsening yesterday. He has orthopnea at baseline without worsening. He monitors weight daily and denies changes in weight. He denies cough, fevers, chills, or sick contacts.He is compliant with all medications.    Saturation on room air at presentation to ED in 70s%. He was placed on BiPAP 10/5 100% FiO2. ABG showed 7.4/37/134/23.4/99%.   Initial labs notable for leukocytosis WBC 15, HERNANDEZ Cr 1.7,  and troponin 0.127.  CXR shows right middle lobe infiltrate. CTA obtained in ED was negative for PE, but shows bilateral opacities R>L.      Hospital Course:  3/29/17: Admit to ICU for hypoxic respiratory failure. Antibiotics started to cover for pneumonia. Started on HFNC for hypoxia.     3/30/17: Patient tolerated weaning to nasal canula with maintained saturations. Patient will be stepped down to hospital medicine.   3/31: Cardiology consult for persistent tropenemia, now on ACS protocol and C planned.      Interval History: NAEON. Pt is doing well.  He is currently on acute MI treatment. Was planning stent placement today. Cardiology states that they need further prognosis regarding his RCC and interactions of chemo drugs with aspirin.      Review of Systems   Constitutional: Negative for chills, fever and unexpected weight change.   HENT: Negative for postnasal drip.    Eyes: Negative for redness.   Respiratory: Positive for shortness of breath. Negative for  cough, chest tightness and wheezing.    Cardiovascular: Negative for chest pain, palpitations and leg swelling.   Gastrointestinal: Negative for abdominal pain, diarrhea, nausea and vomiting.   Genitourinary: Negative for dysuria and hematuria.   Musculoskeletal: Positive for back pain and myalgias.   Skin: Negative for color change and rash.   Neurological: Negative for dizziness and light-headedness.   Psychiatric/Behavioral: Negative for agitation and confusion.     Objective:     Vital Signs (Most Recent):  Temp: 98.6 °F (37 °C) (04/03/17 0735)  Pulse: 77 (04/03/17 0735)  Resp: 16 (04/03/17 0735)  BP: (!) 98/52 (04/03/17 0735)  SpO2: 95 % (04/03/17 0735) Vital Signs (24h Range):  Temp:  [98.1 °F (36.7 °C)-99.4 °F (37.4 °C)] 98.6 °F (37 °C)  Pulse:  [77-95] 77  Resp:  [16-18] 16  SpO2:  [93 %-98 %] 95 %  BP: ()/(52-60) 98/52     Weight: 79.6 kg (175 lb 7.8 oz)  Body mass index is 25.18 kg/(m^2).    Intake/Output Summary (Last 24 hours) at 04/03/17 0755  Last data filed at 04/03/17 0537   Gross per 24 hour   Intake          1663.51 ml   Output             3225 ml   Net         -1561.49 ml      Physical Exam   Constitutional: He is oriented to person, place, and time. He appears well-developed and well-nourished.   HENT:   Mouth/Throat: Oropharynx is clear and moist. No oropharyngeal exudate.   Eyes: Conjunctivae and EOM are normal.   Neck: Normal range of motion. No tracheal deviation present. No thyromegaly present.   Cardiovascular: Normal rate and regular rhythm.    Murmur heard.   Systolic murmur is present   Pulmonary/Chest: No respiratory distress.   Decreased breath sounds bibasilar, slight crackles mid-lung on right   Abdominal: Soft. Bowel sounds are normal. He exhibits no distension. There is no tenderness.   Musculoskeletal: He exhibits no edema.   Lymphadenopathy:     He has no cervical adenopathy.   Neurological: He is alert and oriented to person, place, and time. No cranial nerve deficit.        Significant Labs:   CBC:   Recent Labs  Lab 04/02/17  0626 04/03/17  0510   WBC 7.14 7.54   HGB 9.7* 9.8*   HCT 29.9* 30.9*    231     CMP:   Recent Labs  Lab 04/02/17  0626 04/03/17  0510   * 138   K 5.1 5.2*    102   CO2 24 29   GLU 89 86   BUN 15 14   CREATININE 0.8 1.0   CALCIUM 8.3* 8.7   PROT 6.2 6.2   ALBUMIN 2.0* 2.1*   BILITOT 0.5 0.6   ALKPHOS 74 87   AST 58* 57*   ALT 94* 94*   ANIONGAP 8 7*   EGFRNONAA >60.0 >60.0       Significant Imaging: no imaging to review    Assessment/Plan:      * Sepsis with acute organ dysfunction  - pneumonia: Started on antibiotics to cover for pneumonia. BNP is elevated suggestive of CHF, however patient reports stable weight and physical exam suggestive of euvolemia. Patient received one dose of lasix 80mg IV in ED, will monitor urine and output and clinical response. Repeat 2DE ordered to evaluate severe AS, and diastolic dysfunction. P/F ratio on  compatible with moderate ARDS but infiltrate seem to be more right sided and centrilobar than diffuse bilateral.  - was able to be weaned off HF to NC at 6L and tolerated it well  - cultures have grown nothing, deescalating broad spectrum abx to azithromycin and Augmentin         Essential hypertension  -Toprol XL 12.5mg QD    Type 2 diabetes mellitus with stage 2 chronic kidney disease, without long-term current use of insulin  -renal function improved and Cr improved to 0.8      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  -plan per outpatient Oncology      Coronary artery disease involving native coronary artery of native heart without angina pectoris  -s/p PCI in 2015; continue DAPT, BB, statin  - Troponin elevated at 0.127. EKG without new changes and patient denies chest pain. Continue aspirin and plavix.   -treat as ACS with heparin drip, however patient is asymptomatic at this time.   - plan for LHC +/- PCI this AM but cardiology would like to determine interactions with chemo and aspirin and  prognosis  -cath will likely take place tomorrow, pending hem/oncs note re: prognosis and drug interactions         Severe aortic stenosis  - Repeated 2D ECHO showed EF 40% with severe aortic stenosis, relatively unchanged  - Not a TAVR candidate due to RCC   --- moderate AS with regards to mean gradient of 34 mmHg, despite NAMAN < 1 cm2  --- IC recs of not a surgical candidate   --- EF reduction from 50 % to 40 %  --- Increase toprol 12.5 to 25 mg OD               Renal cell carcinoma of right kidney  - Metastasis to left posterior 12th rib and right sacrum. Continue axitinib.   - Pt stated he has not been taking his axitinib due to running out of prescription and not being able to afford refill  - Will defer restarting this to his oncologist                  Type 2 diabetes mellitus with diabetic polyneuropathy, without long-term current use of insulin  -on metformin at home; [place] [employer] now      Acute respiratory failure with hypoxia  -patient presented with acute decompensated respiratory failure- will reassess need for oxygen as clinical presentation is much improved  -weaning oxygen as tolerated  -treat underlying lobar PNA    Chronic diastolic heart failure  -continue home ASA/statin/BB      Benign non-nodular prostatic hyperplasia with lower urinary tract symptoms  -continue home tamsulosin    Pneumonia of right lower lobe due to infectious organism  -patient stepped down from ICU with significantly improved oxygen requirements on 3/31.  -patient on vancomycin/azithromycin/cefepime initially  -will continue CAP coverage with Augmentin/azithromycin to complete a 5 day course  -low flow NC       NSTEMI, initial episode of care  - ACS protocol with heparin gtt; [facility] planning  -Cardiology following- recs:  ACS protocol for NSTEMI  --- Heparin bolus followed by infusion   --- continue ASA 81 mg OD (already received loading dose during this admission)  --- Continue plavix 75 mg OD, (receiced a loading dose)   --- Continue  high dose statins, atorvastatin 80 mg OD  --- discussed with patient about the need for LHC / possible PCI on Monday. Urgent IC consult in-case of ongoing unrelievable chest pain  --- stop trop trends   --- PRN NTG/EKG for chest pain        VTE Risk Mitigation         Ordered     High Risk of VTE  Once      03/29/17 0328          Laury Chauhan MD  Department of Hospital Medicine   [facility]

## 2017-04-03 NOTE — CONSULTS
Interventional Cardiology Consult Note  Attending Physician: Jc Rowan MD  Procedure: HEART CATH-LEFT (Left)     HPI:   72 y.o. gentleman with PMH of DM2, HTN, HLD, severe AS (NAMAN 0.8, PV 4 MG 44), CAD s/p NSTEMI (10/2015 s/p BMS to LM, mRCA, dRCA), HFrEF (40%), metastatic RCC (on axitinib) who presents to [facility] [place] for SOB. He stated that it occurred suddenly and progressively worsening until he presented to [facility] [place]. He was found to have severe AS on echo and his troponins increased from 0.127 to 2.419, and has now plateaued in the mid 1.5's. He denies any further SOB or CP. He has been diuresed roughly 7L and feels much better. He stated he went for a walk down the hallway without any complaints. He does not remember his last angiogram very well. He had no problems with DAPT therapy or complications from the angiogram itself. He denies any signs or symptoms of bleeding at this time. He is eager to be discharged so that he can undergo chemo for his RCC. He has a good 1 year prognosis as per heme onc consults and has no contraindication to DAPT therapy.    ROS:    Constitution: Negative for fever, chills, weight loss or gain.   HENT: Negative for sore throat, rhinorrhea, or headache.  Eyes: Negative for blurred or double vision.   Cardiovascular: See above  Pulmonary: Negative for SOB   Gastrointestinal: Negative for abdominal pain, nausea, vomiting, or diarrhea.   : Negative for dysuria.   Neurological: Negative for focal weakness or sensory changes.  PMH:     PTA Medications   Medication Sig    aspirin (ECOTRIN) 81 MG EC tablet Take 81 mg by mouth once daily.    atorvastatin (LIPITOR) 40 MG tablet TAKE 1 TABLET ONE TIME DAILY (Patient taking differently: TAKE 1 TABLET BY MOUTH ONE TIME DAILY)    axitinib (INLYTA) 5 mg Tab Take 1 tablet by mouth 2 (two) times daily.    bethanechol (URECHOLINE) 50 MG tablet Take 1 tablet (50 mg total) by mouth 3 (three) times daily.    blood sugar diagnostic Strp 1 each by  Misc.(Non-Drug; Combo Route) route 3 (three) times daily. Free style freedom lite test strips and lancets    blood-glucose meter (TRUE METRIX AIR GLUCOSE METER) Misc Use as directed    brimonidine 0.1% (ALPHAGAN P) 0.1 % Drop Place 1 drop into both eyes 3 (three) times daily.    CALCIUM CARBONATE-VITAMIN D3 ORAL Take 1 tablet by mouth every morning. Calcium carbonate 1000mg vitamin d3 1600 units per patient    clopidogrel (PLAVIX) 75 mg tablet Take 1 tablet (75 mg total) by mouth once daily.    duloxetine (CYMBALTA) 30 MG capsule Take 1 capsule (30 mg total) by mouth once daily.    fexofenadine (ALLEGRA) 180 MG tablet TAKE 1 TABLET ONE TIME DAILY (Patient taking differently: TAKE 1 TABLET BY MOUTH ONE TIME DAILY)    furosemide (LASIX) 40 MG tablet Take 40 mg by mouth once daily.    gabapentin (NEURONTIN) 300 MG capsule Take 2 capsules (600 mg total) by mouth 3 (three) times daily.    lancets 28 gauge Misc 1 lancet by Misc.(Non-Drug; Combo Route) route 3 (three) times daily.    metformin (GLUCOPHAGE) 500 MG tablet TAKE 1 TABLET BY MOUTH TWO TIMES A DAY WITH MEALS (Patient taking differently: TAKE 1 TABLET BY MOUTH ONE TIME A DAY WITH MEALS)    metoprolol succinate (TOPROL-XL) 25 MG 24 hr tablet Take 0.5 tablets (12.5 mg total) by mouth once daily.    morphine (MS CONTIN) 15 MG 12 hr tablet Take 1 tablet (15 mg total) by mouth every 8 (eight) hours as needed for Pain.    nitroGLYCERIN (NITROSTAT) 0.4 MG SL tablet Place 1 tablet (0.4 mg total) under the tongue every 5 (five) minutes as needed for Chest pain.    potassium chloride SA (K-DUR,KLOR-CON) 20 MEQ tablet Take 1 tablet (20 mEq total) by mouth once daily.    [] sulfamethoxazole-trimethoprim 800-160mg (BACTRIM DS) 800-160 mg Tab Take 1 tablet by mouth 2 (two) times daily.    tamsulosin (FLOMAX) 0.4 mg Cp24 Take 1 capsule (0.4 mg total) by mouth once daily.    verapamil (CALAN-SR) 240 MG CR tablet Take 1 tablet (240 mg total) by mouth once  daily.    zolpidem (AMBIEN) 5 MG Tab Take 1 tablet (5 mg total) by mouth nightly as needed. (Patient taking differently: Take 5 mg by mouth nightly as needed (for sleep). )      Review of patient's allergies indicates:   Allergen Reactions    No known drug allergies        Past Medical History:   Diagnosis Date    Acute on chronic congestive heart failure     Arthritis     Chronic kidney disease, stage II (mild) 10/20/2016    Chronic retention of urine 4/3/2017    Patient does self cath at home.   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 left shoulder    TOTAL KNEE ARTHROPLASTY      left side    VASECTOMY        Family History   Problem Relation Age of Onset    Heart disease Father     ALS Father     Cancer Brother      esophageal    Rheum arthritis Mother     Migraines Daughter     Asthma Son     Diabetes Neg Hx     Colon polyps Neg Hx        Social History   Substance Use Topics    Smoking status: Never Smoker    Smokeless tobacco: Never Used    Alcohol use No      Comment:  Heavy in past, quit about 20 years ago        Allergies:     Review of patient's allergies indicates:   Allergen Reactions    No known drug allergies      Medications:     No current facility-administered medications on file prior to encounter.      Current Outpatient Prescriptions on File Prior to Encounter   Medication Sig Dispense Refill    aspirin (ECOTRIN) 81 MG EC tablet Take 81 mg by mouth once daily.      atorvastatin (LIPITOR) 40 MG tablet TAKE 1 TABLET ONE TIME DAILY (Patient taking differently: TAKE 1 TABLET BY MOUTH ONE TIME DAILY) 90 tablet 3    axitinib (INLYTA) 5 mg Tab Take 1 tablet by mouth 2 (two) times daily. 60 tablet 3    bethanechol (URECHOLINE) 50 MG tablet Take 1 tablet (50 mg total) by mouth 3 (three) times daily. 90 tablet 11    blood sugar diagnostic Strp 1 each by Misc.(Non-Drug; Combo Route) route 3 (three) times daily. Free style freedom lite test strips and lancets 90 each 3    blood-glucose meter (TRUE METRIX AIR GLUCOSE METER) Misc Use as directed 1 each 0    brimonidine 0.1% (ALPHAGAN P) 0.1 % Drop Place 1 drop into both eyes 3 (three) times daily.      CALCIUM CARBONATE-VITAMIN D3 ORAL Take 1 tablet by mouth every morning. Calcium carbonate 1000mg vitamin d3 1600 units per patient      clopidogrel (PLAVIX) 75 mg tablet Take 1 tablet (75 mg total) by mouth once daily. 90 tablet 3    duloxetine (CYMBALTA) 30 MG capsule Take 1 capsule (30 mg total) by mouth once daily. 30 capsule 0    fexofenadine (ALLEGRA) 180 MG tablet TAKE 1 TABLET ONE TIME DAILY (Patient taking differently: TAKE 1 TABLET BY MOUTH ONE TIME DAILY) 90 tablet 3    gabapentin (NEURONTIN) 300 MG capsule Take 2 capsules (600 mg total) by mouth 3 (three) times daily. 540 capsule 3    lancets 28 gauge Misc 1 lancet by Misc.(Non-Drug; Combo Route) route 3 (three) times daily. 300 each 3    metformin (GLUCOPHAGE) 500 MG tablet TAKE 1 TABLET BY MOUTH TWO TIMES A DAY WITH MEALS (Patient taking differently: TAKE  1 TABLET BY MOUTH ONE TIME A DAY WITH MEALS) 60 tablet 5    metoprolol succinate (TOPROL-XL) 25 MG 24 hr tablet Take 0.5 tablets (12.5 mg total) by mouth once daily. 45 tablet 6    morphine (MS CONTIN) 15 MG 12 hr tablet Take 1 tablet (15 mg total) by mouth every 8 (eight) hours as needed for Pain. 90 tablet 0    nitroGLYCERIN (NITROSTAT) 0.4 MG SL tablet Place 1 tablet (0.4 mg total) under the tongue every 5 (five) minutes as needed for Chest pain. 25 tablet 4    potassium chloride SA (K-DUR,KLOR-CON) 20 MEQ tablet Take 1 tablet (20 mEq total) by mouth once daily. 90 tablet 4    tamsulosin (FLOMAX) 0.4 mg Cp24 Take 1 capsule (0.4 mg total) by mouth once daily. 30 capsule 11    verapamil (CALAN-SR) 240 MG CR tablet Take 1 tablet (240 mg total) by mouth once daily. 90 tablet 3    zolpidem (AMBIEN) 5 MG Tab Take 1 tablet (5 mg total) by mouth nightly as needed. (Patient taking differently: Take 5 mg by mouth nightly as needed (for sleep). ) 30 tablet 3       Inpatient Medications   Continuous Infusions:   heparin (porcine) in D5W 21 Units/kg/hr (04/03/17 0702)     Scheduled Meds:   amoxicillin-clavulanate 500-125mg  1 tablet Oral TID    aspirin  81 mg Oral Daily    atorvastatin  80 mg Oral Daily    cetirizine  10 mg Oral QHS    clopidogrel  75 mg Oral Daily    duloxetine  30 mg Oral Daily    gabapentin  600 mg Oral TID    metoprolol succinate  25 mg Oral Daily    morphine  15 mg Oral Q12H    polyethylene glycol  17 g Oral Daily    sodium chloride 0.9%  500 mL Intravenous Once    sodium chloride 0.9%  3 mL Intravenous Q8H    tamsulosin  0.4 mg Oral Daily     PRN Meds:acetaminophen, dextrose 50%, dextrose 50%, glucagon (human recombinant), glucose, glucose, insulin aspart, nitroGLYCERIN, oxycodone, oxycodone, senna-docusate 8.6-50 mg, zolpidem     Social History:     Social History   Substance Use Topics    Smoking status: Never Smoker    Smokeless tobacco: Never Used    Alcohol use No       Comment: Heavy in past, quit about 20 years ago     Family History:     Family History   Problem Relation Age of Onset    Heart disease Father     ALS Father     Cancer Brother      esophageal    Rheum arthritis Mother     Migraines Daughter     Asthma Son     Diabetes Neg Hx     Colon polyps Neg Hx      Physical Exam:     Vitals:  Temp:  [98.1 °F (36.7 °C)-99.4 °F (37.4 °C)]   Pulse:  []   Resp:  [16-18]   BP: ()/(52-60)   SpO2:  [93 %-98 %]  on RA I/O's:    Intake/Output Summary (Last 24 hours) at 04/03/17 1248  Last data filed at 04/03/17 1223   Gross per 24 hour   Intake          1063.51 ml   Output             3650 ml   Net         -2586.49 ml        Constitutional: NAD, conversant  HEENT: Sclera anicteric, PERRLA, EOMI  Neck: No JVD, no carotid bruits  CV: RRR, 4/6 systolic ejection murmur, normal S1/S2  Pulm: CTAB, no wheezes, rales, or ronchi  GI: Abdomen soft, NTND, +BS  Extremities: No LE edema, warm and well perfused  Skin: No ecchymosis, erythema, or ulcers  Psych: AOx3, appropriate affect  Pulses: 2+ bilateral femoral, DP and PT pulses    Labs:       CBC: CMP:      Recent Labs  Lab 04/01/17  0508 04/02/17  0626 04/03/17  0510   WBC 7.41 7.14 7.54   HGB 10.3* 9.7* 9.8*   HCT 31.1* 29.9* 30.9*    218 231   MCV 89 90 93   RDW 16.3* 16.3* 16.3*      Recent Labs  Lab 04/01/17  0508 04/02/17  0626 04/03/17  0510    135* 138   K 4.4 5.1 5.2*    103 102   CO2 25 24 29   BUN 15 15 14   CREATININE 1.0 0.8 1.0   CALCIUM 8.3* 8.3* 8.7   PROT 6.1 6.2 6.2   BILITOT 0.6 0.5 0.6   ALKPHOS 76 74 87   * 94* 94*   AST 97* 58* 57*   MG 2.1 2.2 2.2   PHOS 2.7 4.0 4.3        Cholesterol: Coagulation   Lab Results   Component Value Date    CHOL 125 02/20/2017    HDL 36 (L) 02/20/2017    LDLCALC 69.8 02/20/2017    TRIG 96 02/20/2017      Recent Labs  Lab 03/28/17  2259   INR 1.3*        Misc:     Recent Labs  Lab 04/01/17  0508  04/02/17  1210   CPK 71  --   --    CPKMB 2.7  --    --    TROPONINI 1.652*  < > 1.423*   MB 3.8  --   --    < > = values in this interval not displayed.   Lab Results   Component Value Date    HGBA1C 5.5 02/20/2017        Micro:   Microbiology Results (last 7 days)     Procedure Component Value Units Date/Time    Blood culture [[id number]] Collected:  03/29/17 0322    Order Status:  Completed Specimen:  Blood from Peripheral, Hand, Right Updated:  04/03/17 0612     Blood Culture, Routine No growth after 5 days.    Blood culture [[id number]] Collected:  03/29/17 0314    Order Status:  Completed Specimen:  Blood from Peripheral, Antecubital, Right Updated:  04/03/17 0612     Blood Culture, Routine No growth after 5 days.    Respiratory Viral Panel by PCR [[id number]] Collected:  03/29/17 0350    Order Status:  Completed Updated:  03/31/17 0429     Respiratory Virus Panel, source NP     RVP - Adenovirus Not Detected      Respiratory Viral Panel is a product of [employer].  It has been approved or cleared by the U.S. Food and Drug  Administration for in vitro diagnostic use.  Results should be  used in conjunction with clinical findings, and should not form  the sole basis for a diagnosis or treatment decision.  Negative results do not preclude respiratory virus infection  and should not be used as the sole basis for diagnosis,   treatment, or other management decisions.  Positive results do not rule out bacterial infection, or  co-infection with other viruses.  The agent detected may not  be the definitive cause of the disease. The use of additional   laboratory testing (e.g. bacterial culture, immunofluorescence,  radiography) and clinical presentation must be taken into  consideration in order to obtain the final diagnosis of   respiratory viral infection.  The RVP assay cannot adequately detect Adenovirus species C,  or serotypes 7a and 41.  The RVP primers for detection of   rhinovirus have been shown to cross-react with enterovirus.  A rhinovirus reactive  result should be confirmed by an   alternative method (e.g. cell culture).  The  of the Respiratory Viral Panel has   recommmended that specimens found to be negative for  Adenovirus be confirmed by an alternative method.  The  of the Respiratory Viral Panel has  recommended that specimens found to be negative for   Influenza be confirmed by an alternative method.          Enterovirus Not Detected      Cross-reactivity has been observed between certain Rhinovirus  strains and the Enterovirus assay.          Human Bocavirus Not Detected     Human Coronavirus Not Detected      The Human Coronavirus assay detects Human coronavirus types  229E, OC43,NL63 and HKO1.          RVP - Human Metapneumovirus (hMPV) Not Detected     RVP - Influenza A Not Detected     Influenza A - A7Z2-39 Not Detected     RVP - Influenza B Not Detected     Parainfluenza Not Detected     Respiratory Syncytial VirusVirus (RSV) A Not Detected      The Respiratory Syncytial Viral assay detects types A and B,  however it does not distinguish between the two.          RVP - Rhinovirus Not Detected    Urine culture [[id number]] Collected:  03/29/17 0350    Order Status:  Completed Specimen:  Urine from Catheterized Updated:  03/30/17 0804     Urine Culture, Routine No growth    Narrative:       Add on per Dr Bhakta order #[id number] 03/29/2017  05:05 cxurn    Respiratory virus antigens panel [[id number]]     Order Status:  Canceled Specimen:  Respiratory from Nasopharyngeal Wash     Urine culture [[id number]]     Order Status:  Completed Specimen:  Urine from Urine, Catheterized     Urine culture [[id number]]     Order Status:  Canceled Specimen:  Urine     Respiratory Viral Panel by PCR Nasal Swab [[id number]] Collected:  03/29/17 0350    Order Status:  Canceled Specimen:  Respiratory Updated:  03/29/17 0351    Narrative:       Respiratory Viral Panel by PCR was cancelled on 03/29/2017 at 04:02   by ADAM; MOVED TO [id number]            Imaging:     CXR:   reviewed     EKG (03/31/2017):   LVH with flattened T waves in the inferior leads.     2D Echo (03/29/2017):   1 - Mildly to moderately depressed left ventricular systolic function (EF 40-45%).     2 - Normal right ventricular systolic function .     3 - Left ventricular diastolic dysfunction.     4 - Pulmonary hypertension. The estimated PA systolic pressure is 43 mmHg.     5 - Mild left atrial enlargement.     6 - Severe aortic stenosis, NAMAN = 0.89 cm2, peak velocity = 3.7 m/s, mean gradient = 34.0 mmHg. AV not seen well in SAX, in LAX views it appears moderate to severely stenotic. LVOT flow is borderline low (SVi = 32 ml/m2). This may  be low flow low   gradient severe AS.     7 - Trivial to mild aortic regurgitation.     8 - Moderate mitral regurgitation.     9 - Trivial to mild tricuspid regurgitation.     10 - Intermediate central venous pressure.        Cath (10/08/2015):     - Left Main Coronary Artery:             The distal LM has a 90% stenosis. There is GALEN 3 flow.     - Left Anterior Descending Artery:             The LAD has luminal irregularities. There is GALEN 3 flow.     - Left Circumflex Artery:             The LCX is normal. There is GALEN 3 flow.     - OM1:             The OM1 has a 90% stenosis. There is GALEN 3 flow.       - Right Coronary Artery:             The mid RCA has a 70% stenosis. There is GALEN 3 flow.               The distal RCA has a 99% stenosis. There is GALEN 3 flow.                     Lesion Details: Heavy calcification.  INTERVENTION:         Distal LM:              The lesion was successfully intervened. Post-stenosis of 0%, post-GALEN 3 flow and TMP grade 3. The vessel was accessed natively.  The following items were used: 3.5X08 Vision Stent.       Mid RCA:              The lesion was successfully intervened. Post-stenosis of 0%, post-GALEN 3 flow and TMP grade 3. The vessel was accessed natively.  The following items were used: 2.0X08 Voyager Rx  Balloon, 2.5X08 Mini Vision Stent, 2.5X12 Mini Vision Stent and   2.0I99W997 Sprinter Legend RX Balloon.       Distal RCA:              The lesion was successfully intervened. Post-stenosis of 0%, post-GALEN 3 flow and TMP grade 3. The vessel was accessed natively.  The following items were used: 2.0X08 Voyager Rx Balloon, 2.5X12 Mini Vision Stent and Stent Integrity 2.5 X 8.     Assessment:   Mr. Billy is a 72 year old gentleman with multiple comorbidities who presents with NSTEMI    Plan:     1.) NSTEMI  --proceed with C via R CFA  --hold heparin 1 hr prior to procedure  --NPO since yesterday.    --The risks, benefits, and alternatives of coronary vascular angiography and possible intervention were discussed with the patient. All questions were answered and informed consent was obtained. I had a detailed discussion with the patient regarding risk of stroke, MI, bleeding access site complications, renal failure, emergent need for heart surgery, acute limb complications including ischemia and loss, contrast allergy and death. Patient understands the risks and benefits of the procedure and wishes to proceed. If stents are needed and there is preference for KIRAN, patient understands that would necessitate aspirin for life with plavix or other anti-platelet agent for at least 1 year. Additionally, patient is aware that non-compliance is likely to result in stent clotting with heart attack, heart failure, and/or death.    Staff addendum to follow    Signed:  Pamela Petty MD  Cardiology Fellow, PGY4  4/3/2017 12:48 PM

## 2017-04-03 NOTE — PROGRESS NOTES
"Post Cath Note  Referring Physician: Dr Rowan  Procedure: selective coronary angiogram  Indication: NSTEMI    HPI:   72 y.o. gentleman with PMH of DM2, HTN, HLD, severe AS (NAMAN 0.8, PV 4 MG 44), CAD s/p NSTEMI (10/2015 s/p BMS to LM, mRCA, dRCA), HFrEF (40%), metastatic RCC (on axitinib) who presents to [facility] for SOB. He stated that it occurred suddenly and progressively worsening until he presented to [facility]. He was found to have severe AS on echo and his troponins increased from 0.127 to 2.419, and has now plateaued in the mid 1.5's. He denies any further SOB or CP. He has been diuresed roughly 7L and feels much better. He stated he went for a walk down the hallway without any complaints. He does not remember his last angiogram very well. He had no problems with DAPT therapy or complications from the angiogram itself. He denies any signs or symptoms of bleeding at this time. He is eager to be discharged so that he can undergo chemo for his RCC. He has a good 1 year prognosis as per heme onc consults and has no contraindication to DAPT therapy.    Cath Results:   Access:  R CFA    Non-obstructive CAD  Patent LM and RCA stents    See full cath report for further details    Intervention:   None    Closure device: none  Patient tolerated the procedure well, no complications    Post Cath Exam:   BP (!) 106/58 (BP Location: Left arm, Patient Position: Lying, BP Method: Automatic)  Pulse 79  Temp 98.8 °F (37.1 °C) (Oral)   Resp 16  Ht 5' 10" (1.778 m)  Wt 79.6 kg (175 lb 7.8 oz)  SpO2 95%  BMI 25.18 kg/m2  No unusual pain, hematoma, thrill or bruit at vascular access site.  Distal pulse present without signs of ischemia.    Recommendations:   Routine post cath  Care  Medical management for CAD    Signed:  Liz vargas MD  Interventional Cardiology Fellow  Pager: [phone]  4/3/2017 4:36 PM    "

## 2017-04-03 NOTE — PLAN OF CARE
Problem: Fall Risk (Adult)  Intervention: Safety Promotion/Fall Prevention  environment free from clutter. Pt up ad graeme, independent.  Remain free from falls/injury. Pt slept through out night, did not get out of bed. Bed locked, lowest position, personal items in reach, frequent checks done to maintain safety. Will continue to monitor.       Problem: Patient Care Overview  Goal: Plan of Care Review  Outcome: Ongoing (interventions implemented as appropriate)  Pt had no acute events, AAOx4, VSS.Pt maintained NPO at midnight. Heparin infusion continued at 19 units/kg/hr, awaiting anti-xa level. BG checks maintained within normal limits, no coverage given.  Skin remains in tact. remained free of falls. Gardner catheter in place, output adequate. Side rails up x2, call light in reach. Will continue to monitor.

## 2017-04-03 NOTE — MEDICAL/APP STUDENT
Progress Note  Hospital Medicine    Team: [facility] [place] [facility] 4 Jimbo Almeida  Admit Date: 3/28/2017   Length of Stay:  LOS: 5 days    Principal Problem:  Sepsis with acute organ dysfunction  Admission Chief  Complaint: Shortness of Breath (SOB and cough that started yesterday. Reports slight CP. )        SUBJECTIVE:     Hospital Course:   3/29/17: Admit to ICU for hypoxic respiratory failure. Antibiotics started to cover for pneumonia. Started on HFNC for hypoxia.      3/30/17: Patient tolerated weaning to nasal canula with maintained saturations. Patient will be stepped down to hospital medicine.     3/31: Cardiology consult for persistent tropenemia, now on ACS protocol and C planned for Monday.     Interval History:   No acute events overnight, pt in good spirits feels much better than on presentation. Pt has not had a bowel movement in 2 days.     Subjective:  Symptoms:  Resolved.  No shortness of breath, cough, chest pain, chest pressure, diarrhea or anxiety.    Diet:  NPO.  No nausea or vomiting.    Activity level: Returning to normal.    Pain:  He reports no pain.          PMH, social hx, and family history are unchanged from 3/28/2017    OBJECTIVE:       Intake/Output Summary (Last 24 hours) at 04/03/17 0848  Last data filed at 04/03/17 0537   Gross per 24 hour   Intake          1423.51 ml   Output             3225 ml   Net         -1801.49 ml       Objective    Diagnostics:  Significant Labs:   CBC:   Recent Labs  Lab 04/02/17  0626 04/03/17  0510   WBC 7.14 7.54   HGB 9.7* 9.8*   HCT 29.9* 30.9*    231     CMP:   Recent Labs  Lab 04/02/17  0626 04/03/17  0510   * 138   K 5.1 5.2*    102   CO2 24 29   GLU 89 86   BUN 15 14   CREATININE 0.8 1.0   CALCIUM 8.3* 8.7   PROT 6.2 6.2   ALBUMIN 2.0* 2.1*   BILITOT 0.5 0.6   ALKPHOS 74 87   AST 58* 57*   ALT 94* 94*   ANIONGAP 8 7*   EGFRNONAA >60.0 >60.0     Troponin:   Recent Labs  Lab 04/02/17  0047 04/02/17  0626 04/02/17  1210   TROPONINI  1.663* 1.713* 1.423*       ASSESSMENT/PLAN:     Problem List:  Active Hospital Problems    Diagnosis  POA    *Sepsis with acute organ dysfunction [A41.9, R65.20]  Yes    Pneumonia of right lower lobe due to infectious organism [J18.1]  Yes    NSTEMI, initial episode of care [I21.4]  No    Acute respiratory failure with hypoxia [J96.01]  Yes    Chronic diastolic heart failure [I50.32]  Yes    Benign non-nodular prostatic hyperplasia with lower urinary tract symptoms [N40.1]  Yes    Type 2 diabetes mellitus with diabetic polyneuropathy, without long-term current use of insulin [E11.42]  Yes     Chronic    Renal cell carcinoma of right kidney [C64.1]  Yes    Severe aortic stenosis [I35.0]  Yes    Coronary artery disease involving native coronary artery of native heart without angina pectoris [I25.10]  Yes    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 [C79.51, C64.9]  Yes     Chronic    Essential hypertension [I10]  Yes    Type 2 diabetes mellitus with stage 2 chronic kidney disease, without long-term current use of insulin [E11.22, N18.2]  Yes      Resolved Hospital Problems    Diagnosis Date Resolved POA    Hyponatremia [E87.1] 04/01/2017 Yes    HERNANDEZ (acute kidney injury) [N17.9] 03/31/2017 Yes    Shortness of breath [R06.02] 03/31/2017 Yes     Assessment:    Condition: In stable condition.  Improving.   (Lex Billy is a 72 y.o. male who has a past medical history of metastatic RCC, diastolic heart failure, aortic stenosis and T2DM who presented with shortness of breath. Pt has also had an NSTEMI in hospital and is on ACS protocol. ).     Plan:   1 Sepsis with acute organ dysfunction  - pneumonia: Started on antibiotics to cover for pneumonia. BNP is elevated suggestive of CHF, however patient reports stable weight and physical exam suggestive of euvolemia. Patient received one dose of lasix 80mg IV in ED, will monitor urine and output and clinical response. Repeat 2DE ordered to evaluate severe  AS, and diastolic dysfunction. P/F ratio on  compatible with moderate ARDS but infiltrate seem to be more right sided and centrilobar than diffuse bilateral.  - was able to be weaned off HF to NC at 6L and tolerated it well  - cultures have grown nothing, deescalating broad spectrum abx to azithromycin and Augmentin      2 Essential hypertension  -Toprol XL 12.5mg QD     3 Type 2 diabetes mellitus with stage 2 chronic kidney disease, without long-term current use of insulin  -renal function improved and Cr improved to 0.8     4 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  -plan per outpatient Oncology     5 Coronary artery disease involving native coronary artery of native heart without angina pectoris  -s/p PCI in 2015; continue DAPT, BB, statin  - Troponin elevated at 0.127. EKG without new changes and patient denies chest pain. Continue aspirin and plavix.   -treat as ACS with heparin drip, however patient is asymptomatic at this time.   - plan for LHC +/- PCI on 4/3     6 Severe aortic stenosis  - Repeated 2D ECHO showed EF 40% with severe aortic stenosis, relatively unchanged  - Not a TAVR candidate due to RCC   --- moderate AS with regards to mean gradient of 34 mmHg, despite NAMAN < 1 cm2  --- IC recs of not a surgical candidate   --- EF reduction from 50 % to 40 %  --- Increase toprol 12.5 to 25 mg OD    7 Renal cell carcinoma of right kidney  - Metastasis to left posterior 12th rib and right sacrum. Continue axitinib.   - Pt stated he has not been taking his axitinib due to running out of prescription and not being able to afford refill  - Will defer restarting this to his oncologist      8 Type 2 diabetes mellitus with diabetic polyneuropathy, without long-term current use of insulin  -on metformin at home; LDSSI now    9 Acute respiratory failure with hypoxia  -patient presented with acute decompensated respiratory failure- will reassess need for oxygen as clinical presentation is much improved  -weaning  oxygen as tolerated  -treat underlying lobar PNA     10 Chronic diastolic heart failure  -continue home ASA/statin/BB     11 Benign non-nodular prostatic hyperplasia with lower urinary tract symptoms  -continue home tamsulosin     12 Pneumonia of right lower lobe due to infectious organism  -patient stepped down from ICU with significantly improved oxygen requirements on 3/31.  -patient on vancomycin/azithromycin/cefepime initially  -will continue CAP coverage with Augmentin/azithromycin to complete a 5 day course  -low flow NC      13 NSTEMI, initial episode of care  - ACS protocol with heparin gtt; [facility] planning  -Cardiology following- recs:  ACS protocol for NSTEMI  --- Heparin bolus followed by infusion   --- continue ASA 81 mg OD (already received loading dose during this admission)  --- Continue plavix 75 mg OD, (receiced a loading dose)   --- Continue high dose statins, atorvastatin 80 mg OD  --- discussed with patient about the need for LHC / possible PCI on Monday. Urgent IC consult in-case of ongoing unrelievable chest pain  --- stop trop trends   --- PRN NTG/EKG for chest pain   .       VTE Risk Mitigation         Ordered     High Risk of VTE  Once      03/29/17 0328            Jimbo Almeida  Medical Student  Department of Hospital Medicine

## 2017-04-04 NOTE — PROGRESS NOTES
[facility] Hospital Medicine  Progress Note    Patient Name: Lex Billy  MRN: [id number]  Patient Class: IP- Inpatient   Admission Date: 3/28/2017  Length of Stay: 6 days  Attending Physician: Maria Luisa Eastman MD  Primary Care Provider: Everette Rowan MD, MD    Hospital Medicine Team: [facility] MED 4 Laury Chauhan MD    Subjective:     Principal Problem:Sepsis with acute organ dysfunction    HPI:  71 yo M with metastatic renal cell carcinoma, CAD, severe AS presents for worsening dyspnea. Patient reports dyspnea x 1 month with sudden worsening yesterday. He has orthopnea at baseline without worsening. He monitors weight daily and denies changes in weight. He denies cough, fevers, chills, or sick contacts.He is compliant with all medications.    Saturation on room air at presentation to ED in 70s%. He was placed on BiPAP 10/5 100% FiO2. ABG showed 7.4/37/134/23.4/99%.   Initial labs notable for leukocytosis WBC 15, HERNANDEZ Cr 1.7,  and troponin 0.127.  CXR shows right middle lobe infiltrate. CTA obtained in ED was negative for PE, but shows bilateral opacities R>L.      Hospital Course:  3/29/17: Admit to ICU for hypoxic respiratory failure. Antibiotics started to cover for pneumonia. Started on HFNC for hypoxia.     3/30/17: Patient tolerated weaning to nasal canula with maintained saturations. Patient will be stepped down to hospital medicine.   3/31: Cardiology consult for persistent tropenemia, now on ACS protocol and C planned.      Interval History: patient went to cath lab, but no intervention was preformed as patent LM and RCA stents on 4/3/17 procedure was well tolerated. Pt complains of minimal pain in his R groin, but very well tolerated.  Pt denies any CP, SOB, Leg swelling.,  Patient is eager to be discharged home to start chemo for RCC. Awaiting cards recommendations regarding dc    Review of Systems   Constitutional: Negative.  Negative for chills, fever and unexpected  weight change.   HENT: Negative for postnasal drip.    Eyes: Negative for redness.   Respiratory: Positive for shortness of breath. Negative for cough, chest tightness and wheezing.    Cardiovascular: Negative for chest pain, palpitations and leg swelling.   Gastrointestinal: Negative for abdominal pain, diarrhea, nausea and vomiting.   Endocrine: Positive for cold intolerance.   Genitourinary: Negative for dysuria and hematuria.   Musculoskeletal: Positive for back pain and myalgias.        Mild R groin pain   Skin: Negative for color change and rash.   Neurological: Negative for dizziness and light-headedness.   Psychiatric/Behavioral: Negative for agitation and confusion.     Objective:     Vital Signs (Most Recent):  Temp: 97.9 °F (36.6 °C) (04/04/17 0000)  Pulse: 75 (04/04/17 0657)  Resp: 18 (04/04/17 0000)  BP: (!) 103/58 (04/04/17 0000)  SpO2: 97 % (04/04/17 0657) Vital Signs (24h Range):  Temp:  [97.9 °F (36.6 °C)-98.8 °F (37.1 °C)] 97.9 °F (36.6 °C)  Pulse:  [] 75  Resp:  [16-18] 18  SpO2:  [95 %-99 %] 97 %  BP: ()/(52-61) 103/58     Weight: 79.6 kg (175 lb 7.8 oz)  Body mass index is 25.18 kg/(m^2).    Intake/Output Summary (Last 24 hours) at 04/04/17 0716  Last data filed at 04/03/17 2300   Gross per 24 hour   Intake           858.89 ml   Output             2400 ml   Net         -1541.11 ml      Physical Exam   Constitutional: He is oriented to person, place, and time. He appears well-developed and well-nourished.   HENT:   Mouth/Throat: Oropharynx is clear and moist. No oropharyngeal exudate.   Eyes: Conjunctivae and EOM are normal.   Neck: Normal range of motion. No tracheal deviation present. No thyromegaly present.   Cardiovascular: Normal rate and regular rhythm.    Murmur heard.   Systolic murmur is present   Pulmonary/Chest: No respiratory distress.   Decreased breath sounds bibasilar, slight crackles mid-lung on right   Abdominal: Soft. Bowel sounds are normal. He exhibits no  distension. There is no tenderness.   Musculoskeletal: He exhibits no edema.   Lymphadenopathy:     He has no cervical adenopathy.   Neurological: He is alert and oriented to person, place, and time. No cranial nerve deficit.       Significant Labs:   CBC:   Recent Labs  Lab 04/03/17  0510 04/04/17  0550   WBC 7.54 6.70   HGB 9.8* 9.7*   HCT 30.9* 29.6*    214     CMP:   Recent Labs  Lab 04/03/17  0510      K 5.2*      CO2 29   GLU 86   BUN 14   CREATININE 1.0   CALCIUM 8.7   PROT 6.2   ALBUMIN 2.1*   BILITOT 0.6   ALKPHOS 87   AST 57*   ALT 94*   ANIONGAP 7*   EGFRNONAA >60.0       Significant Imaging: no imaging to review    Assessment/Plan:      * Sepsis with acute organ dysfunction  - pneumonia: Started on antibiotics to cover for pneumonia. BNP is elevated suggestive of CHF, however patient reports stable weight and physical exam suggestive of euvolemia. Patient received one dose of lasix 80mg IV in ED, will monitor urine and output and clinical response. Repeat 2DE ordered to evaluate severe AS, and diastolic dysfunction. P/F ratio on  compatible with moderate ARDS but infiltrate seem to be more right sided and centrilobar than diffuse bilateral.  - was able to be weaned off HF to NC at 6L and tolerated it well  - cultures have grown nothing, deescalating broad spectrum abx to azithromycin and Augmentin         Essential hypertension  -Toprol XL 12.5mg QD    Type 2 diabetes mellitus with stage 2 chronic kidney disease, without long-term current use of insulin  -renal function improved and Cr improved to 0.8      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  -plan per outpatient Oncology      Coronary artery disease involving native coronary artery of native heart without angina pectoris  -s/p PCI in 2015; continue DAPT, BB, statin  - Troponin elevated at 0.127. EKG without new changes and patient denies chest pain. Continue aspirin and plavix.   -treat as ACS with heparin drip, however  patient is asymptomatic at this time.   ---patient went to cath lab, but no intervention was preformed as patent LM and RCA stents4/3/17    Severe aortic stenosis  - Repeated 2D ECHO showed EF 40% with severe aortic stenosis, relatively unchanged  - Not a TAVR candidate due to RCC   --- moderate AS with regards to mean gradient of 34 mmHg, despite NAMAN < 1 cm2  --- IC recs of not a surgical candidate   --- EF reduction from 50 % to 40 %  --- Increase toprol 12.5 to 25 mg OD               Renal cell carcinoma of right kidney  - Metastasis to left posterior 12th rib and right sacrum. Continue axitinib.   - Pt stated he has not been taking his axitinib due to running out of prescription and not being able to afford refill  - Will defer restarting this to his oncologist                  Type 2 diabetes mellitus with diabetic polyneuropathy, without long-term current use of insulin  -on metformin at home; LDSSI now      Acute respiratory failure with hypoxia  -patient presented with acute decompensated respiratory failure- will reassess need for oxygen as clinical presentation is much improved  -weaning oxygen as tolerated  -treat underlying lobar PNA    Chronic diastolic heart failure  -continue home ASA/statin/BB      Benign non-nodular prostatic hyperplasia with lower urinary tract symptoms  -continue home tamsulosin    Pneumonia of right lower lobe due to infectious organism  -patient stepped down from ICU with significantly improved oxygen requirements on 3/31.  -patient on vancomycin/azithromycin/cefepime initially  -will continue CAP coverage with Augmentin/azithromycin to complete a 5 day course (end date today)  -low flow NC       NSTEMI, initial episode of care  - ACS protocol with heparin gtt; [facility] planning  -Cardiology following- recs:  ACS protocol for NSTEMI  --- Heparin bolus followed by infusion   --- continue ASA 81 mg OD (already received loading dose during this admission)  --- Continue plavix 75 mg OD,  (receiced a loading dose)   --- Continue high dose statins, atorvastatin 80 mg OD  --- PRN NTG/EKG for chest pain   ---patient went to cath lab, but no intervention was preformed as patent LM and RCA stents4/3/17       Chronic retention of urine        VTE Risk Mitigation         Ordered     High Risk of VTE  Once      03/29/17 0328          Laury Chauhan MD  Department of Hospital Medicine   [facility]

## 2017-04-04 NOTE — DISCHARGE SUMMARY
[facility] Hospital Medicine  Discharge Summary      Patient Name: Lex Billy  MRN: [id number]  Admission Date: 3/28/2017  Hospital Length of Stay: 6 days  Discharge Date and Time:  04/04/2017 1:31 PM  Attending Physician: Maria Luisa Eastman MD   Discharging Provider: Laury Chauhan MD  Primary Care Provider: Everette Rowan MD, MD  Hospital Medicine Team: [facility] 4 Laury Chauhan MD    HPI:   73 yo M with metastatic renal cell carcinoma, CAD, severe AS presents for worsening dyspnea. Patient reports dyspnea x 1 month with sudden worsening yesterday. He has orthopnea at baseline without worsening. He monitors weight daily and denies changes in weight. He denies cough, fevers, chills, or sick contacts.He is compliant with all medications.    Saturation on room air at presentation to ED in 70s%. He was placed on BiPAP 10/5 100% FiO2. ABG showed 7.4/37/134/23.4/99%.   Initial labs notable for leukocytosis WBC 15, HERNANDEZ Cr 1.7,  and troponin 0.127.  CXR shows right middle lobe infiltrate. CTA obtained in ED was negative for PE, but shows bilateral opacities R>L.      Procedure(s) (LRB):  HEART CATH-LEFT (Left)      Indwelling Lines/Drains at time of discharge:   Lines/Drains/Airways     Drain                 Urethral Catheter 03/29/17 0103 Double-lumen 6 days              Hospital Course:   Admit to ICU on 3/29/17 for hypoxic respiratory failure. Antibiotics started to cover for pneumonia (vanc and cefepime and cetirizine). Started on HFNC for hypoxia. Patient tolerated weaning to nasal canula with maintained saturations. . Patient received .  Later transitioned to 5 day course of azithromycin/augmentin (completed course on 4/4/17). On 3/31, patient stepped down to hospital medicine team.  Noted to have worseing troponins and a new q wave on EKG.  Cardiology consult for persistent tropenemia, and patient was placed on ACS protocol.  4/3/17, [facility] found patent stents (no intervention  needed).  Patient clear for dc with follow up OP. Discharged on 4/4/17     Consults:   Consults         Status Ordering Provider     Inpatient consult to Cardiology  Once     Provider:  (Not yet assigned)    Completed TANG THOMAS     Inpatient consult to Hematology/Oncology  Once     Provider:  (Not yet assigned)    Completed AGATHA ZAVALA     Inpatient consult to Intensivist (Critical Care)  Once     Provider:  (Not yet assigned)    Completed TG BROWN     Inpatient consult to Interventional Cardiology  Once     Provider:  (Not yet assigned)    Completed KATHI PALOMINO     Inpatient consult to Pulmonology  Once     Provider:  Lexus Monreal MD    Completed TUSHAR KENT IV     IP consult to case management  Once     Provider:  (Not yet assigned)    Acknowledged ALEXANDER COTA          Significant Diagnostic Studies: Labs:   BMP:     Recent Labs  Lab 04/03/17  0510 04/04/17  0550   GLU 86 78    136   K 5.2* 5.1    103   CO2 29 24   BUN 14 17   CREATININE 1.0 0.9   CALCIUM 8.7 8.4*   MG 2.2 1.9   , CMP     Recent Labs  Lab 04/03/17  0510 04/04/17  0550    136   K 5.2* 5.1    103   CO2 29 24   GLU 86 78   BUN 14 17   CREATININE 1.0 0.9   CALCIUM 8.7 8.4*   PROT 6.2 6.0   ALBUMIN 2.1* 2.0*   BILITOT 0.6 0.4   ALKPHOS 87 89   AST 57* 51*   ALT 94* 72*   ANIONGAP 7* 9   ESTGFRAFRICA >60.0 >60.0   EGFRNONAA >60.0 >60.0   , Troponin     Recent Labs  Lab 04/02/17  1210   TROPONINI 1.423*    and A1C:     Recent Labs  Lab 10/20/16  1010 02/20/17  0925   HGBA1C 5.7 5.5       Pending Diagnostic Studies:     None        Final Active Diagnoses:    Diagnosis Date Noted POA    PRINCIPAL PROBLEM:  Sepsis with acute organ dysfunction [A41.9, R65.20] 03/29/2017 Yes    Chronic retention of urine [R33.9] 04/03/2017 Yes    Pneumonia of right lower lobe due to infectious organism [J18.1] 03/31/2017 Yes    NSTEMI, initial episode of care [I21.4] 03/31/2017 No     Acute respiratory failure with hypoxia [J96.01] 03/29/2017 Yes    Chronic diastolic heart failure [I50.32] 03/29/2017 Yes    Benign non-nodular prostatic hyperplasia with lower urinary tract symptoms [N40.1] 03/29/2017 Yes    Type 2 diabetes mellitus with diabetic polyneuropathy, without long-term current use of insulin [E11.42] 10/20/2016 Yes     Chronic    Renal cell carcinoma of right kidney [C64.1] 02/11/2016 Yes    Severe aortic stenosis [I35.0] 01/31/2016 Yes    Coronary artery disease involving native coronary artery of native heart without angina pectoris [I25.10] 10/22/2015 Yes    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 [C79.51, C64.9] 07/15/2014 Yes     Chronic    Essential hypertension [I10] 08/06/2012 Yes    Type 2 diabetes mellitus with stage 2 chronic kidney disease, without long-term current use of insulin [E11.22, N18.2] 08/06/2012 Yes      Problems Resolved During this Admission:    Diagnosis Date Noted Date Resolved POA    Hyponatremia [E87.1] 03/30/2017 04/01/2017 Yes    HERNANDEZ (acute kidney injury) [N17.9] 03/29/2017 03/31/2017 Yes    Shortness of breath [R06.02] 03/29/2017 03/31/2017 Yes      * Sepsis with acute organ dysfunction  - pneumonia: Started on antibiotics to cover for pneumonia. BNP is elevated suggestive of CHF, however patient reports stable weight and physical exam suggestive of euvolemia. Patient received one dose of lasix 80mg IV in ED, will monitor urine and output and clinical response. Repeat 2DE ordered to evaluate severe AS, and diastolic dysfunction. P/F ratio on  compatible with moderate ARDS but infiltrate seem to be more right sided and centrilobar than diffuse bilateral.  - was able to be weaned off HF to NC at 6L and tolerated it well  - cultures have grown nothing to date so we deescalated broad spectrum abx to azithromycin and Augmentin   -finished course of antibiotics in hospital; will not be discharged on antibiotics        Essential  hypertension  -Toprol XL 12.5mg QD    Type 2 diabetes mellitus with stage 2 chronic kidney disease, without long-term current use of insulin  -renal function improved and Cr improved to 0.8      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  -plan per outpatient Oncology      Coronary artery disease involving native coronary artery of native heart without angina pectoris  -s/p PCI in 2015; continue DAPT, BB, statin  - Troponin elevated at 0.127. EKG without new changes and patient denies chest pain. Continue aspirin and plavix.   -treat as ACS with heparin drip, however patient is asymptomatic at this time.   -patient went to cath lab, but no intervention was preformed as patent LM and RCA stents4/3/17  -from a cardiology standpoint, the patient is free to go home with follow up (he sees Dr. Lind in clinic)    Severe aortic stenosis  - Repeated 2D ECHO showed EF 40% with severe aortic stenosis, relatively unchanged  - Not a TAVR candidate due to RCC   --- moderate AS with regards to mean gradient of 34 mmHg, despite NAMAN < 1 cm2  --- IC recs of not a surgical candidate   --- EF reduction from 50 % to 40 %  --- Increase toprol 12.5 to 25 mg OD               Renal cell carcinoma of right kidney  - Metastasis to left posterior 12th rib and right sacrum. Continue axitinib.   - Pt stated he has not been taking his axitinib due to running out of prescription and not being able to afford refill  - Will defer to oncologist for further treatment                  Type 2 diabetes mellitus with diabetic polyneuropathy, without long-term current use of insulin  -on metformin at home; LDSSI now  -can continue metformin at home.       Acute respiratory failure with hypoxia  -patient presented with acute decompensated respiratory failure- will reassess need for oxygen as clinical presentation is much improved  -weaned oxygen as tolerated; treated underlying lobar PNA  -now stable    Chronic diastolic heart failure  -continue home  ASA/statin/BB on discharge      Benign non-nodular prostatic hyperplasia with lower urinary tract symptoms  -continued home tamsulosin    Pneumonia of right lower lobe due to infectious organism  -patient stepped down from ICU with significantly improved oxygen requirements on 3/31.  -patient on vancomycin/azithromycin/cefepime initially  -continued CAP coverage with Augmentin/azithromycin to complete a 5 day course ended on 4/4/17  -low flow NC       NSTEMI, initial episode of care  - ACS protocol with heparin gtt; [facility] planning  -Cardiology followed patients and recs:  ACS protocol for NSTEMI  --- Heparin bolus followed by infusion   --- continued ASA 81 mg OD (already received loading dose during this admission)  --- Continued plavix 75 mg OD, (receiced a loading dose)   --- Continued high dose statins, atorvastatin 80 mg OD  --- PRN NTG/EKG for chest pain   ---patient went to cath lab, but no intervention was preformed as patent LM and RCA stents4/3/17  -patient stable for discharge      Chronic retention of urine          Discharged Condition: fair    Disposition: Home or Self Care    Follow Up:  Follow-up Information     Follow up with [facility] On 4/11/2017.    Why:  Hospital Follow-up Tuesday 4/11 @ 2pm. Bring all medications and discharge instructions.    Contact information:    [facility] [address] [phone]          Follow up with Everette Rowan MD, MD.    Specialty:  Internal Medicine    Contact information:    [address] [facility] [address]  [phone]          Patient Instructions:     Diet general     Call MD for:  temperature >100.4     Call MD for:  persistent nausea and vomiting or diarrhea     Call MD for:  severe uncontrolled pain     Call MD for:  redness, tenderness, or signs of infection (pain, swelling, redness, odor or green/yellow discharge around incision site)     Call MD for:  difficulty breathing or increased cough     Call MD for:   severe persistent headache     Call MD for:  worsening rash     Call MD for:  persistent dizziness, light-headedness, or visual disturbances     Call MD for:  increased confusion or weakness       Medications:  Reconciled Home Medications:   Current Discharge Medication List      CONTINUE these medications which have CHANGED    Details   metoprolol succinate (TOPROL-XL) 25 MG 24 hr tablet Take 1 tablet (25 mg total) by mouth once daily.  Qty: 30 tablet, Refills: 1         CONTINUE these medications which have NOT CHANGED    Details   aspirin (ECOTRIN) 81 MG EC tablet Take 81 mg by mouth once daily.      atorvastatin (LIPITOR) 40 MG tablet TAKE 1 TABLET ONE TIME DAILY  Qty: 90 tablet, Refills: 3    Associated Diagnoses: Hyperlipidemia      axitinib (INLYTA) 5 mg Tab Take 1 tablet by mouth 2 (two) times daily.  Qty: 60 tablet, Refills: 3    Associated Diagnoses: 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      bethanechol (URECHOLINE) 50 MG tablet Take 1 tablet (50 mg total) by mouth 3 (three) times daily.  Qty: 90 tablet, Refills: 11    Associated Diagnoses: Urinary retention      blood sugar diagnostic Strp 1 each by Misc.(Non-Drug; Combo Route) route 3 (three) times daily. Free style freedom lite test strips and lancets  Qty: 90 each, Refills: 3      blood-glucose meter (TRUE METRIX AIR GLUCOSE METER) Misc Use as directed  Qty: 1 each, Refills: 0      brimonidine 0.1% (ALPHAGAN P) 0.1 % Drop Place 1 drop into both eyes 3 (three) times daily.      CALCIUM CARBONATE-VITAMIN D3 ORAL Take 1 tablet by mouth every morning. Calcium carbonate 1000mg vitamin d3 1600 units per patient      clopidogrel (PLAVIX) 75 mg tablet Take 1 tablet (75 mg total) by mouth once daily.  Qty: 90 tablet, Refills: 3      duloxetine (CYMBALTA) 30 MG capsule Take 1 capsule (30 mg total) by mouth once daily.  Qty: 30 capsule, Refills: 0      fexofenadine (ALLEGRA) 180 MG tablet TAKE 1 TABLET ONE TIME DAILY  Qty: 90 tablet, Refills: 3    Associated  Diagnoses: Environmental allergies      furosemide (LASIX) 40 MG tablet Take 40 mg by mouth once daily.      gabapentin (NEURONTIN) 300 MG capsule Take 2 capsules (600 mg total) by mouth 3 (three) times daily.  Qty: 540 capsule, Refills: 3      lancets 28 gauge Misc 1 lancet by Misc.(Non-Drug; Combo Route) route 3 (three) times daily.  Qty: 300 each, Refills: 3      metformin (GLUCOPHAGE) 500 MG tablet TAKE 1 TABLET BY MOUTH TWO TIMES A DAY WITH MEALS  Qty: 60 tablet, Refills: 5      morphine (MS CONTIN) 15 MG 12 hr tablet Take 1 tablet (15 mg total) by mouth every 8 (eight) hours as needed for Pain.  Qty: 90 tablet, Refills: 0    Associated Diagnoses: Pain      nitroGLYCERIN (NITROSTAT) 0.4 MG SL tablet Place 1 tablet (0.4 mg total) under the tongue every 5 (five) minutes as needed for Chest pain.  Qty: 25 tablet, Refills: 4      potassium chloride SA (K-DUR,KLOR-CON) 20 MEQ tablet Take 1 tablet (20 mEq total) by mouth once daily.  Qty: 90 tablet, Refills: 4      tamsulosin (FLOMAX) 0.4 mg Cp24 Take 1 capsule (0.4 mg total) by mouth once daily.  Qty: 30 capsule, Refills: 11    Associated Diagnoses: Urinary retention      verapamil (CALAN-SR) 240 MG CR tablet Take 1 tablet (240 mg total) by mouth once daily.  Qty: 90 tablet, Refills: 3    Associated Diagnoses: Essential hypertension      zolpidem (AMBIEN) 5 MG Tab Take 1 tablet (5 mg total) by mouth nightly as needed.  Qty: 30 tablet, Refills: 3    Associated Diagnoses: Insomnia         STOP taking these medications       sulfamethoxazole-trimethoprim 800-160mg (BACTRIM DS) 800-160 mg Tab Comments:   Reason for Stopping:             Time spent on the discharge of patient: 30 minutes    Laury Chauhan MD  Department of Hospital Medicine  [facility]

## 2017-04-04 NOTE — ASSESSMENT & PLAN NOTE
-s/p PCI in 2015; continue DAPT, BB, statin  - Troponin elevated at 0.127. EKG without new changes and patient denies chest pain. Continue aspirin and plavix.   -treat as ACS with heparin drip, however patient is asymptomatic at this time.   -patient went to cath lab, but no intervention was preformed as patent LM and RCA stents4/3/17  -from a cardiology standpoint, the patient is free to go home with follow up (he sees Dr. Lind in clinic)

## 2017-04-04 NOTE — PHARMACY MED REC
[facility] Medication Reconciliation  Template    Patient was admitted on 3/28/2017 for Sepsis with acute organ dysfunction.      Patient's prior to admission medication regimen was as follows:  Prescriptions Prior to Admission   Medication Sig Dispense Refill Last Dose    aspirin (ECOTRIN) 81 MG EC tablet Take 81 mg by mouth once daily.   Taking    atorvastatin (LIPITOR) 40 MG tablet TAKE 1 TABLET ONE TIME DAILY (Patient taking differently: TAKE 1 TABLET BY MOUTH ONE TIME DAILY) 90 tablet 3 Taking    axitinib (INLYTA) 5 mg Tab Take 1 tablet by mouth 2 (two) times daily. 60 tablet 3     bethanechol (URECHOLINE) 50 MG tablet Take 1 tablet (50 mg total) by mouth 3 (three) times daily. 90 tablet 11     blood sugar diagnostic Strp 1 each by Misc.(Non-Drug; Combo Route) route 3 (three) times daily. Free style freedom lite test strips and lancets 90 each 3 Taking    blood-glucose meter (TRUE METRIX AIR GLUCOSE METER) Misc Use as directed 1 each 0 Taking    brimonidine 0.1% (ALPHAGAN P) 0.1 % Drop Place 1 drop into both eyes 3 (three) times daily.   Taking    CALCIUM CARBONATE-VITAMIN D3 ORAL Take 1 tablet by mouth every morning. Calcium carbonate 1000mg vitamin d3 1600 units per patient   Taking    clopidogrel (PLAVIX) 75 mg tablet Take 1 tablet (75 mg total) by mouth once daily. 90 tablet 3 3/28/2017    duloxetine (CYMBALTA) 30 MG capsule Take 1 capsule (30 mg total) by mouth once daily. 30 capsule 0 Taking    fexofenadine (ALLEGRA) 180 MG tablet TAKE 1 TABLET ONE TIME DAILY (Patient taking differently: TAKE 1 TABLET BY MOUTH ONE TIME DAILY) 90 tablet 3 Taking    furosemide (LASIX) 40 MG tablet Take 40 mg by mouth once daily.       gabapentin (NEURONTIN) 300 MG capsule Take 2 capsules (600 mg total) by mouth 3 (three) times daily. 540 capsule 3 Taking    lancets 28 gauge Misc 1 lancet by Misc.(Non-Drug; Combo Route) route 3 (three) times daily. 300 each 3 Taking    metformin (GLUCOPHAGE) 500 MG tablet  "TAKE 1 TABLET BY MOUTH TWO TIMES A DAY WITH MEALS (Patient taking differently: TAKE 1 TABLET BY MOUTH ONE TIME A DAY WITH MEALS) 60 tablet 5 Taking    metoprolol succinate (TOPROL-XL) 25 MG 24 hr tablet Take 0.5 tablets (12.5 mg total) by mouth once daily. 45 tablet 6 Taking    morphine (MS CONTIN) 15 MG 12 hr tablet Take 1 tablet (15 mg total) by mouth every 8 (eight) hours as needed for Pain. 90 tablet 0 Taking    nitroGLYCERIN (NITROSTAT) 0.4 MG SL tablet Place 1 tablet (0.4 mg total) under the tongue every 5 (five) minutes as needed for Chest pain. 25 tablet 4 Taking    potassium chloride SA (K-DUR,KLOR-CON) 20 MEQ tablet Take 1 tablet (20 mEq total) by mouth once daily. 90 tablet 4 Taking    [] sulfamethoxazole-trimethoprim 800-160mg (BACTRIM DS) 800-160 mg Tab Take 1 tablet by mouth 2 (two) times daily. 14 tablet 0     tamsulosin (FLOMAX) 0.4 mg Cp24 Take 1 capsule (0.4 mg total) by mouth once daily. 30 capsule 11 Taking    verapamil (CALAN-SR) 240 MG CR tablet Take 1 tablet (240 mg total) by mouth once daily. 90 tablet 3 Taking    zolpidem (AMBIEN) 5 MG Tab Take 1 tablet (5 mg total) by mouth nightly as needed. (Patient taking differently: Take 5 mg by mouth nightly as needed (for sleep). ) 30 tablet 3 Taking         Please add appropriate    SmartPhrase below:      Admission Medication Reconciliation - Pharmacy Consult Note    The home medication history was taken by Serena Ramos, pharmacy technician.  Based on information gathered and subsequent review by the clinical pharmacist, the items below may need attention.    You may go to "Admission" then "Reconcile Home Medications" tabs to review and/or act upon these items.        No issues noted with the medication reconciliation.      Please address this information as you see fit.  Feel free to contact us if you have any questions or require assistance.    Jonathan Gonsales, PharmD  [id number]  "

## 2017-04-04 NOTE — ASSESSMENT & PLAN NOTE
-s/p PCI in 2015; continue DAPT, BB, statin  - Troponin elevated at 0.127. EKG without new changes and patient denies chest pain. Continue aspirin and plavix.   -treat as ACS with heparin drip, however patient is asymptomatic at this time.   ---patient went to cath lab, but no intervention was preformed as patent LM and RCA stents4/3/17

## 2017-04-04 NOTE — MEDICAL/APP STUDENT
Progress Note  Hospital Medicine    Team: [facility] [place] [place] HOSP MED 4 Jimbo Almeida  Admit Date: 3/28/2017   Length of Stay:  LOS: 6 days    Principal Problem:  Sepsis with acute organ dysfunction  Admission Chief  Complaint: Shortness of Breath (SOB and cough that started yesterday. Reports slight CP. )       SUBJECTIVE:   Lex Billy is a 72 y.o. male who has a past medical history of Acute on chronic congestive heart failure; Arthritis; Chronic kidney disease, stage II (mild); Colon polyp; ; Coronary artery disease involving native coronary artery with unstable angina pectoris; Diabetes mellitus Hyperlipidemia; Hypertension; 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; Polyneuropathy; Sacral germ cell tumor ; Stroke;  presenting with worsening dyspnea for 2 days, found to have right l;shelly pneumonia, HERNANDEZ, and NSTEMI.      Hospital Course:   3/29/17: Admit to ICU for hypoxic respiratory failure. Antibiotics started to cover for pneumonia. Started on HFNC for hypoxia.       3/30/17: Patient tolerated weaning to nasal canula with maintained saturations. Patient will be stepped down to hospital medicine.      3/31: Cardiology consult for persistent tropenemia, now on ACS protocol and [facility] planned for Mon 4/3.    4/3: PCI done, revealed no abnormality with patent stents    Interval History:   No acute events overnight, pt is in good spirits ready to go home.     Subjective:  Symptoms:  Resolved.  No shortness of breath, malaise, cough, chest pain, weakness or chest pressure.    Diet:  Adequate intake.  No nausea or vomiting.    Activity level: Normal.    Pain:  He reports no pain.          PMH, social hx, and family history are unchanged from 3/28/2017    OBJECTIVE:       Intake/Output Summary (Last 24 hours) at 04/04/17 0824  Last data filed at 04/04/17 0600   Gross per 24 hour   Intake          1983.89 ml   Output             3700 ml   Net         -1716.11 ml       Objective:  General Appearance:  Comfortable and  "well-appearing.    Vital signs: (most recent): Blood pressure 106/60, pulse 75, temperature 98.1 °F (36.7 °C), temperature source Oral, resp. rate 16, height 5' 10" (1.778 m), weight 79.6 kg (175 lb 7.8 oz), SpO2 97 %.  Vital signs are normal.    Output: Producing urine and producing stool.    Lungs:  Normal respiratory rate and normal effort.  He is not in respiratory distress.  Breath sounds clear to auscultation.    Heart: Normal rate.  Positive for murmur.  (4/6 Systolic murmur, Aortic stenosis on ECHO  )  Abdomen: Abdomen is soft and non-distended.      ASSESSMENT/PLAN:     Assessment:  (Lex Billy is a 72 y.o. male who has a past medical history of Acute on chronic congestive heart failure; Arthritis; Chronic kidney disease, stage II (mild); Colon polyp; Coronary artery disease involving native coronary artery with unstable angina pectoris; Diabetes mellitus Hyperlipidemia; Hypertension; 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; Polyneuropathy; Sacral germ cell tumor; Stroke; presenting with worsening dyspnea for 2 days, found to have right l;shelly pneumonia, HERNANDEZ, and NSTEMI. Pneumonia has resolved, pt feels well and is ready to go home. ).     Plan:   Discharge home.  Encourage ambulation.  Regular diet.  Resume oral medications and resume home regimen.             VTE Risk Mitigation         Ordered     High Risk of VTE  Once      03/29/17 0328          Jimbo Almeida  Medical Student  Department of Hospital Medicine    "

## 2017-04-04 NOTE — ASSESSMENT & PLAN NOTE
- ACS protocol with heparin gtt; [facility] planning  -Cardiology followed patients and recs:  ACS protocol for NSTEMI  --- Heparin bolus followed by infusion   --- continued ASA 81 mg OD (already received loading dose during this admission)  --- Continued plavix 75 mg OD, (receiced a loading dose)   --- Continued high dose statins, atorvastatin 80 mg OD  --- PRN NTG/EKG for chest pain   ---patient went to cath lab, but no intervention was preformed as patent LM and RCA stents4/3/17  -patient stable for discharge

## 2017-04-04 NOTE — PHYSICIAN QUERY
PT Name: Lex Billy  MR #: [id number]    Physician Query Form - Consultant Diagnosis Clarification     CDS/: Tova Mcbride RN, CDI               Contact information: [phone]  This form is a permanent document in the medical record.     Query Date: April 4, 2017      By submitting this query, we are merely seeking further clarification of documentation.  Please utilize your independent clinical judgment when addressing the question(s) below.      The Medical record contains the following:   Diagnosis Supporting Clinical Information Location in Medical Record       malnutrition     Hypoalbuminemia , with albumin 2 multifactorial, due chronic illness ,malnutrition       eICU  3/29/17         Do you agree with the Consultants diagnosis of __________malnutrition_________________________?                 [x   ]   Yes               [   ]   Yes, but it resolved prior to my assessment of the patient               [   ]   No                [   ]   Clinically insignificant               [   ]   Clinically undetermined               [   ]   Other/Clarification of findings: ___________________________________________

## 2017-04-04 NOTE — PLAN OF CARE
IMM discussed & signed. Copy of IMM left at bedside w/ Francisco # circled.     04/04/17 0900   Medicare Message   Important Message from Medicare regarding Discharge Appeal Rights Given to patient/caregiver;Explained to patient/caregiver;Signed/date by patient/caregiver   Date IMM was signed 04/04/17   Time IMM was signed 0900

## 2017-04-04 NOTE — NURSING
Spoke with IM4 this morning. NPO d/catalina with MD approval and diet re-instated.  IM 4 checking with cardiology about heparin gtt.  Night RN unable to clarify.  Will continue to monitor.

## 2017-04-04 NOTE — ASSESSMENT & PLAN NOTE
- ACS protocol with heparin gtt; [facility] planning  -Cardiology following- recs:  ACS protocol for NSTEMI  --- Heparin bolus followed by infusion   --- continue ASA 81 mg OD (already received loading dose during this admission)  --- Continue plavix 75 mg OD, (receiced a loading dose)   --- Continue high dose statins, atorvastatin 80 mg OD  --- PRN NTG/EKG for chest pain   ---patient went to cath lab, but no intervention was preformed as patent LM and RCA stents4/3/17

## 2017-04-04 NOTE — ASSESSMENT & PLAN NOTE
-patient stepped down from ICU with significantly improved oxygen requirements on 3/31.  -patient on vancomycin/azithromycin/cefepime initially  -continued CAP coverage with Augmentin/azithromycin to complete a 5 day course ended on 4/4/17  -low flow NC

## 2017-04-04 NOTE — ASSESSMENT & PLAN NOTE
- pneumonia: Started on antibiotics to cover for pneumonia. BNP is elevated suggestive of CHF, however patient reports stable weight and physical exam suggestive of euvolemia. Patient received one dose of lasix 80mg IV in ED, will monitor urine and output and clinical response. Repeat 2DE ordered to evaluate severe AS, and diastolic dysfunction. P/F ratio on  compatible with moderate ARDS but infiltrate seem to be more right sided and centrilobar than diffuse bilateral.  - was able to be weaned off HF to NC at 6L and tolerated it well  - cultures have grown nothing to date so we deescalated broad spectrum abx to azithromycin and Augmentin   -finished course of antibiotics in hospital; will not be discharged on antibiotics

## 2017-04-04 NOTE — ASSESSMENT & PLAN NOTE
-patient stepped down from ICU with significantly improved oxygen requirements on 3/31.  -patient on vancomycin/azithromycin/cefepime initially  -will continue CAP coverage with Augmentin/azithromycin to complete a 5 day course (end date today)  -low flow NC

## 2017-04-04 NOTE — PROGRESS NOTES
Cardiology  Progress Note                                                              Team: [facility] MED 4     Patient Name: Lex Billy   YOB: 1945  Location: [address] [place] [address] A    Admit Date: 3/28/2017                     LOS: 6    SUBJECTIVE     INTERVAL HISTORY:     NAEON. Denies pain or swelling in right groin. No chest pain or SOB. Hemodynamically stable.     HPI:     Lex Powell is 73 yo M with medical history significant for DM- diet controlled, HTN, HLD, severe AS (NAMAN 0.8, PV 4, MG 44- not a TAVR candidate per Dr Ramirez), NSTEMI (10/9-10/9/15) s/p LM BMS x 1, mRCA BMS x 2, and dRCA BMS x 1, Diastolic Dysfunction with preserved EF 50% (2/12/16) AND metastatic renal cell carcinoma undergoing chemotherapy. Admitted for acute hypoxic respiratory failure managed with IV antibiotics and diuresis for pneumonia vs heart failure vs ARDS vs metastatic cancer.      On cardiac standpoint, BNP was elevated but patient was euvolemia on examination. Repeat 2DE on admission revealed severe AS, and diastolic dysfunction with EF of 40%. Trop was 0.127 without new changes on EKG and with no symptoms of ACS/angina/PND/orhtopnea. Was on maintenance dose of ASA/plavix. However trop done today for SOB revealed 2.419 with ST depression on EKG, hence cardiology was consulted for further management.       MEDICATIONS:  Scheduled:   amoxicillin-clavulanate 500-125mg  1 tablet Oral TID    aspirin  81 mg Oral Daily    atorvastatin  80 mg Oral Daily    cetirizine  10 mg Oral QHS    clopidogrel  75 mg Oral Daily    duloxetine  30 mg Oral Daily    gabapentin  600 mg Oral TID    metoprolol succinate  25 mg Oral Daily    morphine  15 mg Oral Q12H    polyethylene glycol  17 g Oral Daily    sodium chloride 0.9%  500 mL Intravenous Once    sodium chloride 0.9%  3 mL Intravenous Q8H    tamsulosin  0.4 mg Oral Daily     Continuous:   sodium chloride 0.9% 125 mL/hr at 04/03/17 8338  "    PRN:  acetaminophen, dextrose 50%, dextrose 50%, glucagon (human recombinant), glucose, glucose, insulin aspart, nitroGLYCERIN, oxycodone, oxycodone, senna-docusate 8.6-50 mg, zolpidem      OBJECTIVE:     VITALS  Most Recent Range (Last 24H)   /60 (BP Location: Left arm, Patient Position: Lying, BP Method: Automatic)  Pulse 75  Temp 98.1 °F (36.7 °C) (Oral)   Resp 16  Ht 5' 10" (1.778 m)  Wt 79.6 kg (175 lb 7.8 oz)  SpO2 97%  BMI 25.18 kg/m2 Temp:  [97.9 °F (36.6 °C)-98.8 °F (37.1 °C)]   Pulse:  []   Resp:  [16-18]   BP: ()/(54-61)   SpO2:  [95 %-99 %]      Intake and Output  BMI     Intake/Output Summary (Last 24 hours) at 04/04/17 0809  Last data filed at 04/04/17 0600   Gross per 24 hour   Intake          1983.89 ml   Output             3700 ml   Net         -1716.11 ml       Net I/O since admission: Body mass index is 25.18 kg/(m^2).        PHYSICAL EXAM  General: AAOx3, NAD;  Neck: Supple; Trachea midline; No JVD; No bruits;   Cardiac: RRR, +S1 & S2; No M/R/G; PMI non-displaced; No thrills or heave   Pulmonary: CTAB; No W/R/R;  Abdominal: Soft, NT/ND +Normoactive BS; No organomegaly  Extremities: No clubbing, cyanosis, or edema      LABS  CBC/Anemia Labs Coag Labs     Recent Labs  Lab 04/02/17  0626 04/03/17  0510 04/04/17  0550   WBC 7.14 7.54 6.70   HGB 9.7* 9.8* 9.7*   HCT 29.9* 30.9* 29.6*   MCV 90 93 90    231 214    Lab Results   Component Value Date    INR 1.3 (H) 03/28/2017    INR 1.1 02/11/2016    INR 1.0 10/09/2015        BMP     Recent Labs  Lab 04/02/17  0626 04/03/17  0510 04/04/17  0550   GLU 89 86 78   * 138 136   K 5.1 5.2* 5.1    102 103   CO2 24 29 24   BUN 15 14 17   CREATININE 0.8 1.0 0.9   CALCIUM 8.3* 8.7 8.4*      Mag & Phos LFTs     Recent Labs  Lab 04/02/17  0626 04/03/17  0510 04/04/17  0550   MG 2.2 2.2 1.9   PHOS 4.0 4.3 3.8      Recent Labs  Lab 04/02/17  0626 04/03/17  0510 04/04/17  0550   PROT 6.2 6.2 6.0   BILITOT 0.5 0.6 0.4 "   ALKPHOS 74 87 89   AST 58* 57* 51*   ALT 94* 94* 72*             Cardiac Enzymes Ejection Fractions/BNP     Recent Labs  Lab 04/01/17  0508  04/02/17  0047 04/02/17  0626 04/02/17  1210   CPK 71  --   --   --   --    TROPONINI 1.652*  < > 1.663* 1.713* 1.423*   CPKMB 2.7  --   --   --   --    MB 3.8  --   --   --   --    < > = values in this interval not displayed. EF   Date Value Ref Range Status   03/29/2017 40 (A) 55 - 65    02/12/2016 50 55 - 65    10/08/2015 63 55 - 65        Recent Labs  Lab 03/28/17  2259   *        Lab Results   Component Value Date    HGBA1C 5.5 02/20/2017         Microbiology Results (last 7 days)     Procedure Component Value Units Date/Time    Blood culture [[id number]] Collected:  03/29/17 0322    Order Status:  Completed Specimen:  Blood from Peripheral, Hand, Right Updated:  04/03/17 0612     Blood Culture, Routine No growth after 5 days.    Blood culture [[id number]] Collected:  03/29/17 0314    Order Status:  Completed Specimen:  Blood from Peripheral, Antecubital, Right Updated:  04/03/17 0612     Blood Culture, Routine No growth after 5 days.    Respiratory Viral Panel by PCR [[id number]] Collected:  03/29/17 0350    Order Status:  Completed Updated:  03/31/17 0429     Respiratory Virus Panel, source NP     RVP - Adenovirus Not Detected      Respiratory Viral Panel is a product of [employer].  It has been approved or cleared by the U.S. Food and Drug  Administration for in vitro diagnostic use.  Results should be  used in conjunction with clinical findings, and should not form  the sole basis for a diagnosis or treatment decision.  Negative results do not preclude respiratory virus infection  and should not be used as the sole basis for diagnosis,   treatment, or other management decisions.  Positive results do not rule out bacterial infection, or  co-infection with other viruses.  The agent detected may not  be the definitive cause of the disease. The use of  additional   laboratory testing (e.g. bacterial culture, immunofluorescence,  radiography) and clinical presentation must be taken into  consideration in order to obtain the final diagnosis of   respiratory viral infection.  The RVP assay cannot adequately detect Adenovirus species C,  or serotypes 7a and 41.  The RVP primers for detection of   rhinovirus have been shown to cross-react with enterovirus.  A rhinovirus reactive result should be confirmed by an   alternative method (e.g. cell culture).  The  of the Respiratory Viral Panel has   recommmended that specimens found to be negative for  Adenovirus be confirmed by an alternative method.  The  of the Respiratory Viral Panel has  recommended that specimens found to be negative for   Influenza be confirmed by an alternative method.          Enterovirus Not Detected      Cross-reactivity has been observed between certain Rhinovirus  strains and the Enterovirus assay.          Human Bocavirus Not Detected     Human Coronavirus Not Detected      The Human Coronavirus assay detects Human coronavirus types  229E, OC43,NL63 and HKO1.          RVP - Human Metapneumovirus (hMPV) Not Detected     RVP - Influenza A Not Detected     Influenza A - K4S4-41 Not Detected     RVP - Influenza B Not Detected     Parainfluenza Not Detected     Respiratory Syncytial VirusVirus (RSV) A Not Detected      The Respiratory Syncytial Viral assay detects types A and B,  however it does not distinguish between the two.          RVP - Rhinovirus Not Detected    Urine culture [[id number]] Collected:  03/29/17 0350    Order Status:  Completed Specimen:  Urine from Catheterized Updated:  03/30/17 0804     Urine Culture, Routine No growth    Narrative:       Add on per Dr Bhakta order #[id number] 03/29/2017  05:05 cxurn    Respiratory virus antigens panel [[id number]]     Order Status:  Canceled Specimen:  Respiratory from Nasopharyngeal Wash     Urine culture [[id number]]      Order Status:  Completed Specimen:  Urine from Urine, Catheterized     Urine culture [[id number]]     Order Status:  Canceled Specimen:  Urine     Respiratory Viral Panel by PCR Nasal Swab [[id number]] Collected:  03/29/17 0350    Order Status:  Canceled Specimen:  Respiratory Updated:  03/29/17 0351    Narrative:       Respiratory Viral Panel by PCR was cancelled on 03/29/2017 at 04:02   by [email or web address]; MOVED TO [id number]          INVESTIGATION RESULTS    Imaging Results         X-Ray Chest 1 View (Final result) Result time:  03/30/17 09:18:58    Final result by Ubaldo rOo MD (03/30/17 09:18:58)    Impression:     See above      Electronically signed by: Ubaldo Oro MD  Date:     03/30/17  Time:    09:18     Narrative:    Ill-defined diffuse patchy airspace consolidation in the right lung identified and better appreciated on the chest CT scan.  Heart size is normal            CTA Chest Non-Coronary - PE Study (Final result) Result time:  03/29/17 01:55:37    Final result by Atilio Loev MD (03/29/17 01:55:37)    Impression:      1.  No evidence of pulmonary thromboembolus on this adequate study.     2.  Interval development of extensive bilateral centrilobular opacities throughout both lungs, more prominent on the right, nonspecific and may represent pneumonia, aspiration, or noninfectious inflammation with underlying metastatic disease not excluded.  Close followup is recommended with repeat chest CT in 8-12 weeks.    3.  Interval increase in size of right renal mass.    4.  Interval increase in size of osseous metastatic lesion to the left posterior 12th rib.    5.  Moderate bilateral pleural effusions.    6.  Significant coronary artery atherosclerosis and dense calcification of the aortic valve.    ______________________________________     Electronically signed by resident: ATILIO LOVE MD  Date:     03/29/17  Time:    01:50            As the supervising and teaching physician, I personally reviewed the  images and resident's interpretation and I agree with the findings.          Electronically signed by: URIEL DAY MD  Date:     03/29/17  Time:    01:55     Narrative:    Comparison: CT abdomen/pelvis 3/8/17.    Indication: Shortness of breath; history of renal cancer with osseous metastatic disease.    Technique:   During intravenous bolus injection of contrast medium, the chest was surveyed from the apices to the costophrenic angles.  Data was reconstructed for thin multiplanar images as well as maximum intensity projection images in the axial, sagittal and coronal planes. 75mL of Omnipaque 350 was administered for opacification of the pulmonary arteries.      Findings:  The structures at the base of the neck are unremarkable.      There is a left-sided aortic arch with 3 branch vessels.  The thoracic aorta maintains normal caliber and course.  There is dense calcific atherosclerosis of the coronary arteries, and the thoracic aorta demonstrates mild calcific atherosclerosis.  There is dense calcification of the aortic valve.    The pulmonary arteries distributed normally without filling defect to suggest pulmonary thromboembolism.  Systemic and pulmonary venoatrial connections are concordant.    There is a moderate volume of dependent pleural fluid bilaterally. No significant pericardial fluid is identified.  The heart is not enlarged.  There is reflux of contrast into the hepatic IVC, which may represent right-sided heart dysfunction.    No mediastinal, hilar, or axillary lymphadenopathy is identified.    The esophagus maintains normal caliber and course.  There is a 3.2 cm mass within the superior pole the right kidney slightly increased in size from prior study, previously measuring 2.8 cm.  No acute abnormality is identified within the upper abdomen.    The superficial soft tissues are unremarkable.    The osseous structures again demonstrate a metastatic lesion within the posterior 12th rib on the left,  which appears increased in size measuring 7.7 cm.  No additional aggressive osseous lesions are identified.    The trachea and large proximal airways are patent.    The lungs demonstrate extensive bilateral centrilobular consolidative opacities throughout both lungs, more prominent on the right.  These findings were not identified on prior CT  Additionally, there is a prominent opacity within the right lower lobe measured 3.6 cm.  Findings are nonspecific and may represent pulmonary edema, pneumonia, aspiration or metastatic disease.            X-Ray Chest 1 View (Final result) Result time:  03/29/17 00:02:19    Final result by Geoffrey Singh MD (03/29/17 00:02:19)    Impression:        Central perihilar airspace infiltrate in the right lung with partial silhouetting of the right heart border that appears to involve the upper and middle lobes. Possible pneumonia or aspiration.        Electronically signed by: GEOFFREY SINGH MD  Date:     03/29/17  Time:    00:02     Narrative:    Chest AP portable    Indication:.    Comparison:July 23, 2016.    Findings:         The cardiomediastinal silhouette appear stable.  There is no pleural effusion.  The trachea is midline.  The lungs are symmetrically expanded bilaterally with central perihilar airspace infiltrate in the right lung. Left lung is clear.  There is no pneumothorax.  The osseous structures appear unchanged.                ASSESSMENT/PLAN:     Current Problems List:  Active Hospital Problems    Diagnosis  POA    *Sepsis with acute organ dysfunction [A41.9, R65.20]  Yes    Chronic retention of urine [R33.9]  Yes     Patient does self cath at home.      Pneumonia of right lower lobe due to infectious organism [J18.1]  Yes    NSTEMI, initial episode of care [I21.4]  No    Acute respiratory failure with hypoxia [J96.01]  Yes    Chronic diastolic heart failure [I50.32]  Yes    Benign non-nodular prostatic hyperplasia with lower urinary tract symptoms  [N40.1]  Yes    Type 2 diabetes mellitus with diabetic polyneuropathy, without long-term current use of insulin [E11.42]  Yes     Chronic    Renal cell carcinoma of right kidney [C64.1]  Yes    Severe aortic stenosis [I35.0]  Yes    Coronary artery disease involving native coronary artery of native heart without angina pectoris [I25.10]  Yes    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 [C79.51, C64.9]  Yes     Chronic    Essential hypertension [I10]  Yes    Type 2 diabetes mellitus with stage 2 chronic kidney disease, without long-term current use of insulin [E11.22, N18.2]  Yes      Resolved Hospital Problems    Diagnosis Date Resolved POA    Hyponatremia [E87.1] 04/01/2017 Yes    HERNANDEZ (acute kidney injury) [N17.9] 03/31/2017 Yes    Shortness of breath [R06.02] 03/31/2017 Yes        ASSESSMENT:     - 73 yo M with medical history significant for DM- diet controlled, HTN, HLD, severe AS (NAMAN 0.8, PV 4, MG 44- not a TAVR candidate per Dr Ramirez), NSTEMI (10/9-10/9/15) s/p LM BMS x 1, mRCA BMS x 2, and dRCA BMS x 1, Diastolic Dysfunction with preserved EF 50% (2/12/16) AND metastatic renal cell carcinoma undergoing chemotherapy admitted for acute hypoxic respiratory failure. Cardiology consulted for NSTEMI. S/p LHC yesterday without PCI.      PLAN:    NSTEMI / Heart failure / AS:   --- Heparin infusion stopped after LHC  --- continue ASA 81 mg daily  --- Continue plavix 75 mg daily  --- Continue high dose statins, atorvastatin 80 mg daily  --- LHC 4/3/17 with mid LAD stenosis (50%). No PCI. At this point, will continue with medical management. Patient on B Blocker, statin, ASA and Plavix. Recommend holding off addition of ACEi or ARB prior to discharge as BP on lower end and patient has severe AS and may not tolerate afterload reduction.   -- Will place ambulatory referral for Cardiology follow up outpatient.    - Other ongoing concerns are AS/HF  --- moderate AS with regards to mean gradient of 34 mmHg, despite  NAMAN < 1 cm2  --- IC recs of not a surgical candidate   --- EF reduction from 50 % to 40 %  --- Continue toprol 25 mg daily    Staff attestation to follow.     Thank you for this consult.  We will sign off.      Amber Burgos MD

## 2017-04-04 NOTE — ASSESSMENT & PLAN NOTE
-patient presented with acute decompensated respiratory failure- will reassess need for oxygen as clinical presentation is much improved  -weaned oxygen as tolerated; treated underlying lobar PNA  -now stable

## 2017-04-04 NOTE — PLAN OF CARE
Problem: Patient Care Overview  Goal: Plan of Care Review  Outcome: Ongoing (interventions implemented as appropriate)  No acute events.  Patient AAOx4, calm and in no distress.  AVSS.  Patient NPO for most of day preparing for right heart cath.  BG checks WDL.  Heparin infusion titrated in a.m. Due to low anti-Xa, maintained at noon check, and then turned off prior to procedure.  Patient returned to unit, AVSS on NS 125mL/hr.  Right groin site WDL (see nursing note).  Patient remained free of falls.  Skin remains intact.  Standard precautions maintained and patient remains afebrile.  Siderails up x2, call light in reach.  Will continue to monitor.

## 2017-04-04 NOTE — SUBJECTIVE & OBJECTIVE
Interval History: patient went to cath lab, but no intervention was preformed as patent LM and RCA stents on 4/3/17 procedure was well tolerated. Pt complains of minimal pain in his R groin, but very well tolerated.  Pt denies any CP, SOB, Leg swelling.,  Patient is eager to be discharged home to start chemo for RCC. Awaiting cards recommendations regarding dc    Review of Systems   Constitutional: Negative.  Negative for chills, fever and unexpected weight change.   HENT: Negative for postnasal drip.    Eyes: Negative for redness.   Respiratory: Positive for shortness of breath. Negative for cough, chest tightness and wheezing.    Cardiovascular: Negative for chest pain, palpitations and leg swelling.   Gastrointestinal: Negative for abdominal pain, diarrhea, nausea and vomiting.   Endocrine: Positive for cold intolerance.   Genitourinary: Negative for dysuria and hematuria.   Musculoskeletal: Positive for back pain and myalgias.        Mild R groin pain   Skin: Negative for color change and rash.   Neurological: Negative for dizziness and light-headedness.   Psychiatric/Behavioral: Negative for agitation and confusion.     Objective:     Vital Signs (Most Recent):  Temp: 97.9 °F (36.6 °C) (04/04/17 0000)  Pulse: 75 (04/04/17 0657)  Resp: 18 (04/04/17 0000)  BP: (!) 103/58 (04/04/17 0000)  SpO2: 97 % (04/04/17 0657) Vital Signs (24h Range):  Temp:  [97.9 °F (36.6 °C)-98.8 °F (37.1 °C)] 97.9 °F (36.6 °C)  Pulse:  [] 75  Resp:  [16-18] 18  SpO2:  [95 %-99 %] 97 %  BP: ()/(52-61) 103/58     Weight: 79.6 kg (175 lb 7.8 oz)  Body mass index is 25.18 kg/(m^2).    Intake/Output Summary (Last 24 hours) at 04/04/17 0716  Last data filed at 04/03/17 2300   Gross per 24 hour   Intake           858.89 ml   Output             2400 ml   Net         -1541.11 ml      Physical Exam   Constitutional: He is oriented to person, place, and time. He appears well-developed and well-nourished.   HENT:   Mouth/Throat:  Oropharynx is clear and moist. No oropharyngeal exudate.   Eyes: Conjunctivae and EOM are normal.   Neck: Normal range of motion. No tracheal deviation present. No thyromegaly present.   Cardiovascular: Normal rate and regular rhythm.    Murmur heard.   Systolic murmur is present   Pulmonary/Chest: No respiratory distress.   Decreased breath sounds bibasilar, slight crackles mid-lung on right   Abdominal: Soft. Bowel sounds are normal. He exhibits no distension. There is no tenderness.   Musculoskeletal: He exhibits no edema.   Lymphadenopathy:     He has no cervical adenopathy.   Neurological: He is alert and oriented to person, place, and time. No cranial nerve deficit.       Significant Labs:   CBC:   Recent Labs  Lab 04/03/17  0510 04/04/17  0550   WBC 7.54 6.70   HGB 9.8* 9.7*   HCT 30.9* 29.6*    214     CMP:   Recent Labs  Lab 04/03/17  0510      K 5.2*      CO2 29   GLU 86   BUN 14   CREATININE 1.0   CALCIUM 8.7   PROT 6.2   ALBUMIN 2.1*   BILITOT 0.6   ALKPHOS 87   AST 57*   ALT 94*   ANIONGAP 7*   EGFRNONAA >60.0       Significant Imaging: no imaging to review

## 2017-04-04 NOTE — NURSING
Preparing patient for discharge.  Instructions include post-Gardner removal monitoring (pt retains urine due to BPH and straight-caths at home), S/S of complications, when to seek medical attention, F/U appointments, changes in medications, verification of pharmacy and prescriptions.  Patient and wife verbalized understanding.  Fluids stopped and peripheral IV removed.  Gardner catheter removed. Right groin site from heart cath yesterday clean dry and intact with no redness or swelling.  Patient complains of no discomfort to the site.  AVS distributed and awaiting wheelchair transport to family vehicle.

## 2017-04-04 NOTE — ASSESSMENT & PLAN NOTE
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 Continue axitinib.   - Pt stated he has not been taking his axitinib due to running out of prescription and not being able to afford refill  - Will defer to oncologist for further treatment

## 2017-04-06 NOTE — PLAN OF CARE
Pt d/c to home.     04/06/17 1608   Final Note   Assessment Type Final Discharge Note   Discharge Disposition Home   Discharge planning education complete? Yes   What phone number can be called within the next 1-3 days to see how you are doing after discharge? [phone]   Hospital Follow Up  Appt(s) scheduled? Yes   Discharge/Hospital Encounter Summary to (non-[facility]) PCP n/a   Referral to / orders for Home Health Complete? n/a   Right [facility] Referral Info   Post Acute Recommendation No Care

## 2017-04-06 NOTE — PROGRESS NOTES
C3 nurse attempted to contact patient. The following occurred:   C3 nurse attempted to contact Lex for a TCC post hospital discharge follow up call. The patient is unable to conduct the call @ this time. The patient requested a callback.    The patient has a scheduled HOSFU appointment with [facility] on 04/11/17 @ 1400. Message sent to Physician staff.

## 2017-04-06 NOTE — PATIENT INSTRUCTIONS
Discharge Instructions for Cardiac Ablation  You have had a procedure called cardiac ablation, which was used to treat an abnormal heartbeat or rhythm. This procedure destroyed (ablated) the cells in your heart that were causing your heart rhythm problem. During the procedure, a thin, flexible wire (called a catheter) was inserted into a blood vessel in your upper thigh and threaded up to the heart.  Home care  Recommendations for care at home include the following:   · No one can drive home from a procedure after having sedation. You will need to make arrangements for a ride. Healthcare providers typically advise that you not drive for 24 hours after the procedure.  · Avoid heavy physical activity for several days after the procedure to allow your body to heal.  · Ask your healthcare provider when you can expect to return to work.  · Take your temperature and check your incision for signs of infection (redness, swelling, drainage, or warmth) every day for a week. It is normal to have a small bruise or lump where the catheter was inserted.  · Take your medicines exactly as directed. Dont skip doses. There may be changes to your medicines as a result of the ablation procedure, so be sure to go over your medicine instructions with your healthcare provider before you are discharged.  · Learn to take your own pulse. Keep a record of your results. Ask your healthcare provider which readings mean that you need medical attention.  · Avoid lifting heavy objects for a period of time after your ablation. Ask your healthcare provider for specific recommendations.  Follow-up care  Make a follow-up appointment as directed by your healthcare provider.  When to call your healthcare provider  Call your healthcare provider right away if you have any of the following:  · Redness, pain, swelling, bleeding, or drainage from your incision  · Chest pain, shortness of breath, or dizziness  · Temperature of 100.4°F (38.0°C) or higher,  or as directed by your healthcare provider   · Sudden coldness, pain, or numbness in the leg or arm with the insertion site  · Nausea or vomiting  Note: Ask your healthcare provider what to expect about your heartbeat. Sometimes the irregularity goes away immediately after the procedure. Other times it may take longer to subside.   Date Last Reviewed: 10/1/2016  © 0454-6341 [employer]. [address] [place] [address], [place] [address]. All rights reserved. This information is not intended as a substitute for professional medical care. Always follow your healthcare professional's instructions.

## 2017-04-08 PROBLEM — R09.02 HYPOXIA: Status: ACTIVE | Noted: 2017-01-01

## 2017-04-08 NOTE — PROVIDER PROGRESS NOTES - EMERGENCY DEPT.
Encounter Date: 4/8/2017    ED Physician Progress Notes       SCRIBE NOTE: I, Robert Montes, am scribing for, and in the presence of,  Dr. Kothari .  Physician Statement: I, Dr. Kothari , personally performed the services described in this documentation as scribed by Robert Montes in my presence, and it is both accurate and complete.      EKG - STEMI Decision  Initial Reading: No STEMI present.

## 2017-04-08 NOTE — ED PROVIDER NOTES
Encounter Date: 4/8/2017    SCRIBE #1 NOTE: I, Marlon Greene, am scribing for, and in the presence of,  Farzad Molina MD. I have scribed the following portions of the note - the Resident attestation.       History     Chief Complaint   Patient presents with    Shortness of Breath     Review of patient's allergies indicates:   Allergen Reactions    No known drug allergies      HPI Comments: 71 yo male with PMHx of DM, HTN, CAD s/p stents, CHF (EF 40%), severe aortic stenosis, metastatic RCC on oral chemotherapy presents with increased SOB. Patient was discharge 4/5/2017 for similar. At that time was in severe respiratory distress and required ICU admission. Was found to have PNA, tropinemia, and elevated BNP. Had a PE study as well as a LHC with no intervention done at stents patent. IC was consulted for severe aortic stenosis but he was not a surgical candidate at this time. Patient reports feeling better at discharge but last night developing SOB again. Denies fever, changes in cough, wheezing, increase in orthopnea, increase in weight, history of PE, pleurisy, chest pain, hemoptysis, n/v/lightheadedness/diaphoresis. Reports difficultly sleeping last night 2/2 SOB. Denies leg swelling, was on heparin during hospitalization. SOB worsens with exertion and improves with rest.     The history is provided by the patient, the spouse and medical records. No  was used.     Past Medical History:   Diagnosis Date    Acute on chronic congestive heart failure     Arthritis     Chronic kidney disease, stage II (mild) 10/20/2016    Chronic retention of urine 4/3/2017    Patient does self cath at home.   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 left shoulder    TOTAL KNEE ARTHROPLASTY      left side    VASECTOMY       Family History   Problem Relation Age of Onset    Heart disease Father     ALS Father     Cancer Brother      esophageal    Rheum arthritis Mother     Migraines Daughter     Asthma Son     Diabetes Neg Hx     Colon polyps Neg Hx      Social History   Substance Use Topics    Smoking status: Never Smoker    Smokeless tobacco: Never Used    Alcohol use No      Comment: Heavy in past, quit about 20 years ago     Review of Systems   Constitutional: Negative for chills, diaphoresis and fever.   HENT: Negative for sore throat.    Eyes: Negative for photophobia and visual disturbance.   Respiratory: Positive for shortness of breath. Negative for cough and wheezing.    Cardiovascular: Negative for chest pain and leg swelling.   Gastrointestinal: Negative for abdominal pain, blood in stool, constipation, diarrhea, nausea and vomiting.   Genitourinary: Negative for dysuria, frequency, hematuria and urgency.   Musculoskeletal: Negative for neck pain and neck stiffness.   Skin: Negative for rash and wound.   Neurological: Negative for weakness, light-headedness, numbness and headaches.   Hematological: Does not bruise/bleed easily.   Psychiatric/Behavioral: Negative for confusion and  suicidal ideas.       Physical Exam   Initial Vitals   BP Pulse Resp Temp SpO2   04/08/17 1550 04/08/17 1550 04/08/17 1550 04/08/17 1550 04/08/17 1550   98/51 90 16 98.2 °F (36.8 °C) 95 %     Physical Exam    Nursing note and vitals reviewed.  Constitutional: He appears well-developed and well-nourished. He is not diaphoretic. No distress.   Nontoxic, in no apparent distress   HENT:   Head: Normocephalic and atraumatic.   Right Ear: External ear normal.   Left Ear: External ear normal.   Mouth/Throat: Oropharynx is clear and moist. No oropharyngeal exudate.   MMM, appears clinically euvolemic   Eyes: Conjunctivae and EOM are normal. Pupils are equal, round, and reactive to light. Right eye exhibits no discharge. Left eye exhibits no discharge.   Neck: Normal range of motion. Neck supple. No JVD present.   Cardiovascular: Normal rate, regular rhythm and intact distal pulses. Exam reveals no gallop and no friction rub.    Murmur heard.  98% O2 saturations on RA   Pulmonary/Chest: No respiratory distress. He has no wheezes. He has rhonchi. He has no rales.   Slight tachypnea, rhonchi to right middle lung zone.    Abdominal: Soft. Bowel sounds are normal. He exhibits no distension. There is no tenderness. There is no rebound and no guarding.   Musculoskeletal: Normal range of motion. He exhibits no edema or tenderness.   Lymphadenopathy:     He has no cervical adenopathy.   Neurological: He is alert and oriented to person, place, and time. No cranial nerve deficit.   Skin: Skin is warm and dry.   Psychiatric: He has a normal mood and affect. Thought content normal.         ED Course   Procedures  Labs Reviewed - No data to display  EKG Readings: (Independently Interpreted)   Initial Reading: No STEMI. Previous EKG: Compared with most recent EKG Rhythm: Normal Sinus Rhythm. Heart Rate: 92. T Waves Flipped: II, III, AVF, V1, V2 and V3. Axis: Right Axis Deviation.          Medical Decision Making:   History:   Old Medical  Records: I decided to obtain old medical records.  Clinical Tests:   Lab Tests: Reviewed and Ordered  Radiological Study: Reviewed and Ordered  Medical Tests: Reviewed and Ordered       APC / Resident Notes:   71 yo male with complicated PMHx presents with increase SOB. Recent admission with LHC, CTA PE, echo. Patient found to have slightly decreased EF and severe aortic stenosis as well as lobar PNA. Not a surgical candidate. On exam mild tachypnea, able to speak in full sentences, in no acute respiratory distress. Rhonchi to right middle lung zone, appears euvolemic. DDx includes but is not limited to worsening PNA, aortic stenosis, CHF exacerbation, metastatic lung disease, less likely given recent negative stress ACS, PE--recent negative PE study (risk factors: cancer, recent hospitalization). Will obtain cardiac workup and monitor.   Dia Palacios MD HO-2  6:09 PM 4/8/2017    6:54 PM  Patient developed my tachypnea, and hypoxia. Satting 86% with adequate wave form on 2L, increased to 4L with 95% oxygen saturation. Awaiting labs and imaging.   Dia Palacios MD HO-2  6:55 PM 4/8/2017    8:25 PM  Labs with BNP double baseline at 1000, will give 40 mg IV lasix. Troponin decrease from previous. CO2 low at 19, doubling of WBC from discharge to 12.7 with neutrophil predominance. As patient with these lab abnormalities and requiring increase oxygen requirement will consult medicine for admission. Will discuss with them CTA PE vs bedside echo given the clinical picture and recent CTA PE.   Dia Palacios MD HO-2  8:27 PM 4/8/2017         Scribe Attestation:   Scribe #1: I performed the above scribed service and the documentation accurately describes the services I performed. I attest to the accuracy of the note.    Attending Attestation:   Physician Attestation Statement for Resident:  As the supervising MD   Physician Attestation Statement: I have personally seen and examined this patient.   I agree with the above  history. -:   As the supervising MD I agree with the above PE.    As the supervising MD I agree with the above treatment, course, plan, and disposition.   -: This is an emergent evaluation of a 72 y.o.male patient with presentation of SOB. He has a history of aortic stenosis, CHF, and recent admission for pneumonia.    Initial differentials include but are not limited to: recurrent pneumonia, CHF, ACS, pleural effusion, PE.   Plan: per resident. We will consider CTA if workup is unremarkable. Noted that pt was hypoxic with good wave form on supplemental O2. We will admit to hospital medicine even if no diagnosis is made initially.           Physician Attestation for Scribe:  Physician Attestation Statement for Scribe #1: I, Farzad Molina MD, reviewed documentation, as scribed by Marlon Greene in my presence, and it is both accurate and complete.                 ED Course     Clinical Impression:   The primary encounter diagnosis was Hypoxia. Diagnoses of Chest pain, Elevated brain natriuretic peptide (BNP) level, and Leukocytosis, unspecified type were also pertinent to this visit.          Dia Palacios MD  Resident  04/08/17 [id number]

## 2017-04-08 NOTE — TELEPHONE ENCOUNTER
Reason for Disposition   [1] MODERATE difficulty breathing (e.g., speaks in phrases, SOB even at rest, pulse 100-120) AND [2] NEW-onset or WORSE than normal    Protocols used: ST BREATHING DIFFICULTY-A-AH

## 2017-04-08 NOTE — IP AVS SNAPSHOT
[facility]  [address]  [facility] [address]  Phone: [phone]           Patient Discharge Instructions   Our goal is to set you up for success. This packet includes information on your condition, medications, and your home care.  It will help you care for yourself to prevent having to return to the hospital.     Please ask your nurse if you have any questions.      There are many details to remember when preparing to leave the hospital. Here is what you will need to do:    1. Take your medicine. If you are prescribed medications, review your Medication List on the following pages. You may have new medications to  at the pharmacy and others that you'll need to stop taking. Review the instructions for how and when to take your medications. Talk with your doctor or nurses if you are unsure of what to do.     2. Go to your follow-up appointments. Specific follow-up information is listed in the following pages. Your may be contacted by a nurse or clinical provider about future appointments. Be sure we have all of the phone numbers to reach you. Please contact your provider's office if you are unable to make an appointment.     3. Watch for warning signs. Your doctor or nurse will give you detailed warning signs to watch for and when to call for assistance. These instructions may also include educational information about your condition. If you experience any of warning signs to your health, call your doctor.           [facility]  Unless otherwise directed by your provider, please   contact [facility], our nurse care line   that is available for 24/7 assistance.     [phone] (toll-free)     Registered nurses in the [facility]   provide: appointment scheduling, clinical advisement, health education, and other advisory services.                  ** Verify the list of medication(s) below is accurate and up to date. Carry this with you in case of  emergency. If your medications have changed, please notify your healthcare provider.             Medication List      START taking these medications        Additional Info                      lisinopril 5 MG tablet   Commonly known as:  PRINIVIL,ZESTRIL   Quantity:  90 tablet   Refills:  3   Dose:  5 mg    Start Date:  4/12/2017   Instructions:  Take 1 tablet (5 mg total) by mouth once daily.     Begin Date    AM    Noon    PM    Bedtime         CHANGE how you take these medications        Additional Info                      atorvastatin 40 MG tablet   Commonly known as:  LIPITOR   Quantity:  90 tablet   Refills:  3   What changed:  See the new instructions.    Last time this was given:  80 mg on 4/11/2017  9:04 AM   Instructions:  TAKE 1 TABLET ONE TIME DAILY     Begin Date    AM    Noon    PM    Bedtime       blood sugar diagnostic Strp   Quantity:  90 each   Refills:  3   Dose:  1 each   What changed:    - when to take this  - additional instructions    Instructions:  1 each by Misc.(Non-Drug; Combo Route) route 3 (three) times daily. Free style freedom lite test strips and lancets     Begin Date    AM    Noon    PM    Bedtime       fexofenadine 180 MG tablet   Commonly known as:  ALLEGRA   Quantity:  90 tablet   Refills:  3   What changed:  See the new instructions.    Instructions:  TAKE 1 TABLET ONE TIME DAILY     Begin Date    AM    Noon    PM    Bedtime       metformin 500 MG tablet   Commonly known as:  GLUCOPHAGE   Quantity:  60 tablet   Refills:  5   What changed:  See the new instructions.    Instructions:  TAKE 1 TABLET BY MOUTH TWO TIMES A DAY WITH MEALS     Begin Date    AM    Noon    PM    Bedtime       metoprolol succinate 50 MG 24 hr tablet   Commonly known as:  TOPROL-XL   Quantity:  90 tablet   Refills:  3   Dose:  50 mg   What changed:    - medication strength  - how much to take    Start Date:  4/12/2017   Last time this was given:  25 mg on 4/11/2017  9:04 AM   Instructions:  Take 1 tablet  (50 mg total) by mouth once daily.     Begin Date    AM    Noon    PM    Bedtime       zolpidem 5 MG Tab   Commonly known as:  AMBIEN   Quantity:  30 tablet   Refills:  3   Dose:  5 mg   What changed:  reasons to take this    Last time this was given:  5 mg on 4/10/2017 10:04 PM   Instructions:  Take 1 tablet (5 mg total) by mouth nightly as needed.     Begin Date    AM    Noon    PM    Bedtime         CONTINUE taking these medications        Additional Info                      aspirin 81 MG EC tablet   Commonly known as:  ECOTRIN   Refills:  0   Dose:  81 mg    Last time this was given:  81 mg on 4/11/2017  9:04 AM   Instructions:  Take 81 mg by mouth once daily.     Begin Date    AM    Noon    PM    Bedtime       axitinib 5 mg Tab   Commonly known as:  INLYTA   Quantity:  60 tablet   Refills:  3   Dose:  1 tablet    Instructions:  Take 1 tablet by mouth 2 (two) times daily.     Begin Date    AM    Noon    PM    Bedtime       bethanechol 50 MG tablet   Commonly known as:  URECHOLINE   Quantity:  90 tablet   Refills:  11   Dose:  50 mg    Instructions:  Take 1 tablet (50 mg total) by mouth 3 (three) times daily.     Begin Date    AM    Noon    PM    Bedtime       blood-glucose meter Misc   Commonly known as:  TRUE METRIX AIR GLUCOSE METER   Quantity:  1 each   Refills:  0    Instructions:  Use as directed     Begin Date    AM    Noon    PM    Bedtime       brimonidine 0.1% 0.1 % Drop   Commonly known as:  ALPHAGAN P   Refills:  0   Dose:  1 drop    Instructions:  Place 1 drop into both eyes 3 (three) times daily.     Begin Date    AM    Noon    PM    Bedtime       CALCIUM CARBONATE-VITAMIN D3 ORAL   Refills:  0   Dose:  1 tablet    Instructions:  Take 1 tablet by mouth every morning. Calcium carbonate 1000mg vitamin d3 1600 units per patient     Begin Date    AM    Noon    PM    Bedtime       clopidogrel 75 mg tablet   Commonly known as:  PLAVIX   Quantity:  90 tablet   Refills:  3   Dose:  75 mg    Last time this  was given:  75 mg on 4/11/2017  9:04 AM   Instructions:  Take 1 tablet (75 mg total) by mouth once daily.     Begin Date    AM    Noon    PM    Bedtime       duloxetine 30 MG capsule   Commonly known as:  CYMBALTA   Quantity:  30 capsule   Refills:  0   Dose:  30 mg    Last time this was given:  30 mg on 4/11/2017  9:04 AM   Instructions:  Take 1 capsule (30 mg total) by mouth once daily.     Begin Date    AM    Noon    PM    Bedtime       furosemide 40 MG tablet   Commonly known as:  LASIX   Refills:  0   Dose:  40 mg    Instructions:  Take 40 mg by mouth once daily.     Begin Date    AM    Noon    PM    Bedtime       gabapentin 300 MG capsule   Commonly known as:  NEURONTIN   Quantity:  540 capsule   Refills:  3   Dose:  600 mg    Last time this was given:  600 mg on 4/11/2017  4:12 PM   Instructions:  Take 2 capsules (600 mg total) by mouth 3 (three) times daily.     Begin Date    AM    Noon    PM    Bedtime       lancets 28 gauge Misc   Quantity:  300 each   Refills:  3   Dose:  1 lancet    Instructions:  1 lancet by Misc.(Non-Drug; Combo Route) route 3 (three) times daily.     Begin Date    AM    Noon    PM    Bedtime       morphine 15 MG 12 hr tablet   Commonly known as:  MS CONTIN   Quantity:  90 tablet   Refills:  0   Dose:  15 mg    Instructions:  Take 1 tablet (15 mg total) by mouth every 8 (eight) hours as needed for Pain.     Begin Date    AM    Noon    PM    Bedtime       nitroGLYCERIN 0.4 MG SL tablet   Commonly known as:  NITROSTAT   Quantity:  25 tablet   Refills:  4   Dose:  0.4 mg    Instructions:  Place 1 tablet (0.4 mg total) under the tongue every 5 (five) minutes as needed for Chest pain.     Begin Date    AM    Noon    PM    Bedtime       potassium chloride SA 20 MEQ tablet   Commonly known as:  K-DUR,KLOR-CON   Quantity:  90 tablet   Refills:  4   Dose:  20 mEq    Instructions:  Take 1 tablet (20 mEq total) by mouth once daily.     Begin Date    AM    Noon    PM    Bedtime       STOOL  SOFTENER ORAL   Refills:  0    Instructions:  Take by mouth once as needed (for constipation).     Begin Date    AM    Noon    PM    Bedtime       tamsulosin 0.4 mg Cp24   Commonly known as:  FLOMAX   Quantity:  30 capsule   Refills:  11   Dose:  0.4 mg    Last time this was given:  0.4 mg on 4/11/2017  9:04 AM   Instructions:  Take 1 capsule (0.4 mg total) by mouth once daily.     Begin Date    AM    Noon    PM    Bedtime         STOP taking these medications     verapamil 240 MG CR tablet   Commonly known as:  CALAN-SR            Where to Get Your Medications      These medications were sent to [facility] - [place], [place] - [address] [facility] [address] [facility]  [address] [facility], [facility] [address]     Phone:  [phone]     lisinopril 5 MG tablet    metoprolol succinate 50 MG 24 hr tablet                  Please bring to all follow up appointments:    1. A copy of your discharge instructions.  2. All medicines you are currently taking in their original bottles.  3. Identification and insurance card.    Please arrive 15 minutes ahead of scheduled appointment time.    Please call 24 hours in advance if you must reschedule your appointment and/or time.        Your Scheduled Appointments     Apr 17, 2017  2:00 PM CDT   Hospital Follow Up with PHYSICIAN, [facility]   [facility] ([facility])    [address]  [place] [address]   [phone]            Apr 20, 2017 11:30 AM CDT   Non-Fasting Lab with LAB, HEMONC CANCER BLDG   [facility] ([facility])    [address]  [place] [address]   [phone]            Apr 20, 2017  1:00 PM CDT   Established Patient Visit with MD Yonis Betts - Hematology Oncology ([facility])    [address]  [place] [address]   [phone]            Apr 20, 2017  2:00 PM CDT   Infusion 060 Min with [facility], CHEMO   [facility] ([facility])    [address]  [facility] [id number] [phone]            May 17, 2017 11:30 AM CDT   Established Patient Visit with Christian Monreal MD   [facility] - Neurosurgery 7th Fl ([facility] [address] )    [address]  [facility] [id number] [phone]              Follow-up Information     Follow up with [facility] On 4/17/2017.    Why:  Monday 4/17 @ 2pm    Contact information:    [address]  [facility] [address]  [phone]        Referrals     Future Orders    Ambulatory Referral to Cardiology         Discharge Instructions     Future Orders    Activity as tolerated     Call MD for:  difficulty breathing or increased cough     Call MD for:  increased confusion or weakness     Call MD for:  persistent dizziness, light-headedness, or visual disturbances     Call MD for:  persistent nausea and vomiting or diarrhea     Call MD for:  redness, tenderness, or signs of infection (pain, swelling, redness, odor or green/yellow discharge around incision site)     Call MD for:  severe persistent headache     Call MD for:  severe uncontrolled pain     Call MD for:  temperature >100.4     Call MD for:  worsening rash     Diet general     Questions:    Total calories:      Fat restriction, if any:      Protein restriction, if any:      Na restriction, if any:      Fluid restriction:      Additional restrictions:          Primary Diagnosis     Your primary diagnosis was:  Respiratory Insufficiency      Admission Information     Date & Time Provider Department [facility]    4/8/2017  5:17 PM Maria Luisa Eastman MD [facility] [id number]      Care Providers     Provider Role Specialty Primary office phone    Maria Luisa Eastman MD Attending Provider Hospitalist [phone]    Maria Luisa Eastman MD Team Attending  Hospitalist [phone]      Important Medicare Message          Most Recent Value    Important Message from  "Medicare Regarding Discharge Appeal Rights  Given to patient/caregiver, Explained to patient/caregiver, Signed/date by patient/caregiver yes 04/10/2017 1500      Your Vitals Were     BP Pulse Temp Resp Height Weight    102/65 (BP Location: Left arm, Patient Position: Lying, BP Method: Automatic) 95 98.1 °F (36.7 °C) (Oral) 16 5' 10" (1.778 m) 77.1 kg (170 lb)    SpO2 BMI             98% 24.39 kg/m2         Recent Lab Values        7/31/2014 2/11/2015 10/7/2015 11/2/2015 12/3/2015 2/1/2016 10/20/2016 2/20/2017      2:45 PM  8:34 AM 12:40 PM  7:50 AM  1:42 PM  4:00 AM 10:10 AM  9:25 AM    A1C 5.9 4.9 5.7 6.2 7.2 (H) 6.3 (H) 5.7 5.5       5.7         Comment for A1C at 10:10 AM on 10/20/2016:  According to ADA guidelines, hemoglobin A1C <7.0% represents  optimal control in non-pregnant diabetic patients.  Different  metrics may apply to specific populations.   Standards of Medical Care in Diabetes - 2016.  For the purpose of screening for the presence of diabetes:  <5.7%     Consistent with the absence of diabetes  5.7-6.4%  Consistent with increasing risk for diabetes   (prediabetes)  >or=6.5%  Consistent with diabetes  Currently no consensus exists for use of hemoglobin A1C  for diagnosis of diabetes for children.      Comment for A1C at  9:25 AM on 2/20/2017:  According to ADA guidelines, hemoglobin A1C <7.0% represents  optimal control in non-pregnant diabetic patients.  Different  metrics may apply to specific populations.   Standards of Medical Care in Diabetes - 2016.  For the purpose of screening for the presence of diabetes:  <5.7%     Consistent with the absence of diabetes  5.7-6.4%  Consistent with increasing risk for diabetes   (prediabetes)  >or=6.5%  Consistent with diabetes  Currently no consensus exists for use of hemoglobin A1C  for diagnosis of diabetes for children.        Allergies as of 4/11/2017        Reactions    No Known Drug Allergies       Advance Directives     An advance directive is a " document which, in the event you are no longer able to make decisions for yourself, tells your healthcare team what kind of treatment you do or do not want to receive, or who you would like to make those decisions for you.  If you do not currently have an advance directive, [facility] encourages you to create one.  For more information call:  [phone]), [phone] ([phone]),  or log on to [email or web address].        Language Assistance Services     ATTENTION: Language assistance services are available, free of charge. Please call [phone].      ATENCIÓN: Si habla español, tiene a johnston disposición servicios gratuitos de asistencia lingüística. Llame al [phone].     CHÚ Ý: N?u b?n nói Ti?ng Vi?t, có các d?ch v? h? tr? ngôn ng? mi?n phí dành cho b?n. G?i s? [phone].        Heart Failure Education       Heart Failure: Being Active  You have a condition called heart failure. Being active doesnt mean that you have to wear yourself out. Even a little movement each day helps to strengthen your heart. If you cant get out to exercise, you can do simple stretching and strengthening exercises at home. These are good ways to keep you well-conditioned and prevent you and your heart from becoming excessively weak.    Ideas to get you started  · Add a little movement to things you do now. Walk to mail letters. Park your car at the far end of the parking lot and walk to the store. Walk up a flight of stairs instead of taking the elevator.  · Choose activities you enjoy. You might walk, swim, or ride an exercise bike. Things like gardening and washing the car count, too. Other possibilities include: washing dishes, walking the dog, walking around the mall, and doing aerobic activities with friends.  · Join a group exercise program at a [facility], a senior center, or a community center. Or look into a hospital cardiac rehabilitation program. Ask your doctor if you qualify.  Tips to  keep you going  · Get up and get dressed each day. Go to a coffee shop and read a newspaper or go somewhere that you'll be in the presence of other active people. Youll feel more like being active.  · Make a plan. Choose one or more activities that you enjoy and that you can easily do. Then plan to do at least one each day. You might write your plan on a calendar.  · Go with a friend or a group if you like company. This can help you feel supported and stay motivated, too.  · Plan social events that you enjoy. This will keep you mentally engaged as well as physically motivated to do things you find pleasure in.  For your safety  · Talk with your healthcare provider before starting an exercise program.  · Exercise indoors when its too hot or too cold outside, or when the air quality is poor. Try walking at a shopping mall.  · Wear socks and sturdy shoes to maintain your balance and prevent falls.  · Start slowly. Do a few minutes several times a day at first. Increase your time and speed little by little.  · Stop and rest whenever you feel tired or get short of breath.  · Dont push yourself on days when you dont feel well.  Date Last Reviewed: 3/20/2016  © 8848-1396 [employer]. [address] [place] [address], [place] [address]. All rights reserved. This information is not intended as a substitute for professional medical care. Always follow your healthcare professional's instructions.              Heart Failure: Evaluating Your Heart  You have a condition called heart failure. To evaluate your condition, your doctor will examine you, ask questions, and do some tests. Along with looking for signs of heart failure, the doctor looks for any other health problems that may have led to heart failure. The results of your evaluation will help your doctor form a treatment plan.  Health history and physical exam  Your visit will start with a health history. Tell the doctor about any symptoms youve noticed and about  all medicines you take. Then youll have a physical exam. This includes listening to your heartbeat and breathing. Youll also be checked for swelling (edema) in your legs and neck. When you have fluid buildup or fluid in the lungs, it may be called congestive heart failure.  Diagnosing heart failure     During an echocardiogram, sound waves bounce off the heart. These are converted into a picture on the screen.   The following may be done to help your doctor form a diagnosis:  · X-rays show the size and shape of your heart. These pictures can also show fluid in your lungs.  · An electrocardiogram (ECG or EKG) shows the pattern of your heartbeat. Small pads (electrodes) are placed on your chest, arms, and legs. Wires connect the pads to the ECG machine, which records your hearts electrical signals. This can give the doctor information about heart function.  · An echocardiogram uses ultrasound waves to show the structure and movement of your heart muscle. This shows how well the heart pumps. It also shows the thickness of the heart walls, and if the heart is enlarged. It is one of the most useful, non-invasive tests as it provides information about the heart's general function. This helps your doctor make treatment decisions.  · Lab tests evaluate small amounts of blood or urine for signs of problems. A BNP lab test can help diagnose and evaluate heart failure. BNP stands for B-type natriuretic peptide. The ventricles secrete more BNP when heart failure worsens. Lab tests can also provide information about metabolic dysfunction or heart dysfunction.  Your treatment plan  Based on the results of your evaluation and tests, your doctor will develop a treatment plan. This plan is designed to relieve some of your heart failure symptoms and help make you more comfortable. Your treatment plan may include:  · Medicine to help your heart work better and improve your quality of life  · Changes in what you eat and drink to  help prevent fluid from backing up in your body  · Daily monitoring of your weight and heart failure symptoms to see how well your treatment plan is working  · Exercise to help you stay healthy  · Help with quitting smoking  · Emotional and psychological support to help adjust to the changes  · Referrals to other specialists to make sure you are being treated comprehensively  Date Last Reviewed: 3/21/2016  © 9330-8732 [employer]. [address] [place] [address], [place] [address]. All rights reserved. This information is not intended as a substitute for professional medical care. Always follow your healthcare professional's instructions.              Heart Failure: Making Changes to Your Diet  You have a condition called heart failure. When you have heart failure, excess fluid is more likely to build up in your body because your heart isn't working well. This makes the heart work harder to pump blood. Fluid buildup causes symptoms such as shortness of breath and swelling (edema). This is often referred to as congestive heart failure or CHF. Controlling the amount of salt (sodium) you eat may help stop fluid from building up. Your doctor may also tell you to reduce the amount of fluid you drink.  Reading food labels    Your healthcare provider will tell you how much sodium you can eat each day. Read food labels to keep track. Keep in mind that certain foods are high in salt. These include canned, frozen, and processed foods. Check the amount of sodium in each serving. Watch out for high-sodium ingredients. These include MSG (monosodium glutamate), baking soda, and sodium phosphate.   Eating less salt  Give yourself time to get used to eating less salt. It may take a little while. Here are some tips to help:  · Take the saltshaker off the table. Replace it with salt-free herb mixes and spices.  · Eat fresh or plain frozen vegetables. These have much less salt than canned vegetables.  · Choose low-sodium snacks  like sodium-free pretzels, crackers, or air-popped popcorn.  · Dont add salt to your food when youre cooking. Instead, season your foods with pepper, lemon, garlic, or onion.  · When you eat out, ask that your food be cooked without added salt.  · Avoid eating fried foods as these often have a great deal of salt.  If youre told to limit fluids  You may need to limit how much fluid you have to help prevent swelling. This includes anything that is liquid at room temperature, such as ice cream and soup. If your doctor tells you to limit fluid, try these tips:  · Measure drinks in a measuring cup before you drink them. This will help you meet daily goals.  · Chill drinks to make them more refreshing.  · Suck on frozen lemon wedges to quench thirst.  · Only drink when youre thirsty.  · Chew sugarless gum or suck on hard candy to keep your mouth moist.  · Weigh yourself daily to know if your body's fluid content is rising.  My sodium goal  Your healthcare provider may give you a sodium goal to meet each day. This includes sodium found in food as well as salt that you add. My goal is to eat no more than ___________ mg of sodium per day.     When to call your doctor  Call your doctor right away if you have any symptoms of worsening heart failure. These can include:  · Sudden weight gain  · Increased swelling of your legs or ankles  · Trouble breathing when youre resting or at night  · Increase in the number of pillows you have to sleep on  · Chest pain, pressure, discomfort, or pain in the jaw, neck, or back   Date Last Reviewed: 3/21/2016  © 8981-6490 [employer]. [address] [place] [address], [place] [address]. All rights reserved. This information is not intended as a substitute for professional medical care. Always follow your healthcare professional's instructions.              Heart Failure: Medicines to Help Your Heart    You have a condition called heart failure (also known as congestive heart failure,  or CHF). Your doctor will likely prescribe medicines for heart failure and any underlying health problems you have. Most heart failure patients take one or more types of medicinen. Your healthcare provider will work to find the combination of medicines that works best for you.  Heart failure medicines  Here are the most common heart failure medicines:  · ACE inhibitors lower blood pressure and decrease strain on the heart. This makes it easier for the heart to pump. Angiotensin receptor blockers have similar effects. These are prescribed for some patients instead of ACE inhibitors.  · Beta-blockers relieve stress on the heart. They also improve symptoms. They may also improve the heart's pumping action over time.  · Diuretics (also called water pills) help rid your body of excess water. This can help rid your body of swelling (edema). Having less fluid to pump means your heart doesnt have to work as hard. Some diuretics make your body lose a mineral called potassium. Your doctor will tell you if you need to take supplements or eat more foods high in potassium.  · Digoxin helps your heart pump with more strength. This helps your heart pump more blood with each beat. So, more oxygen-rich blood travels to the rest of the body.  · Aldosterone antagonists help alter hormones and decrease strain on the heart.  · Hydralazine and nitrates are two separate medicines used together to treat heart failure. They may come in one combination pill. They lower blood pressure and decrease how hard the heart has to pump.  Medicines for related conditions  Controlling other heart problems helps keep heart failure under control, too. Depending on other heart problems you have, medicines may be prescribed to:  · Lower blood pressure (antihypertensives).  · Lower cholesterol levels (statins).  · Prevent blood clots (anticoagulants or aspirin).  · Keep the heartbeat steady (antiarrhythmics).  Date Last Reviewed: 3/5/2016  © 3650-2034  [employer]. [address] [place] [address], [place] [address]. All rights reserved. This information is not intended as a substitute for professional medical care. Always follow your healthcare professional's instructions.              Heart Failure: Procedures That May Help    The heart is a muscle that pumps oxygen-rich blood to all parts of the body. When you have heart failure, the heart is not able to pump as well as it should. Blood and fluid may back up into the lungs (congestive heart failure), and some parts of the body dont get enough oxygen-rich blood to work normally. These problems lead to the symptoms of heart failure.     Certain procedures may help the heart pump better in some cases of heart failure. Some procedures are done to treat health problems that may have caused the heart failure such as coronary artery disease or heart rhythm problems. For more serious heart failure, other options are available.  Treating artery and valve problems  If you have coronary artery disease or valve disease, procedures may be done to improve blood flow. This helps the heart pump better, which can improve heart failure symptoms. First, your doctor may do a cardiac catheterization to help detect clogged blood vessels or valve damage. During this procedure, a  thin tube (catheter) in inserted into a blood vessel and guided to the heart. There a dye is injected and a special type of X-ray (angiogram) is taken of the blood vessels. Procedures to open a blocked artery or fix damaged valves can also be done using catheterization.  · Angioplasty uses a balloon-tipped instrument at the end of the catheter. The balloon is inflated to widen the narrowed artery. In many cases, a stent is expanded to further support the narrowed artery. A stent is a metal mesh tube.  · Valve surgery repairs or replacement of faulty valves can also be done during catheterization so blood can flow properly through the chambers of the  heart.  Bypass surgery is another option to help treat blocked arteries. It uses a healthy blood vessel from elsewhere in the body. The healthy blood vessel is attached above and below the blocked area so that blood can flow around the blocked artery.  Treating heart rhythm problems  A device may be placed in the chest to help a weak heart maintain a healthy, heartbeat so the heart can pump more effectively:  · Pacemaker. A pacemaker is an implanted device that regulates your heartbeat electronically. It monitors your heart's rhythm and generates a painless electric impulse that helps the heart beat in a regular rhythm. A pacemaker is programmed to meet your specific heart rhythm needs.  · Biventricular pacing/cardiac resynchronization therapy. A type of pacemaker that paces both pumping chambers of the heart at the same time to coordinate contractions and to improve the heart's function. Some people with heart failure are candidates for this therapy.  · Implantable cardioverter defibrillator. A device similar to a pacemaker that senses when the heart is beating too fast and delivers an electrical shock to convert the fast rhythm to a normal rhythm. This can be a life saving device.  In severe cases  In more serious cases of heart failure when other treatments no longer work, other options may include:  · Ventricular assist devices (VADs). These are mechanical devices used to take over the pumping function for one or both of the heart's ventricles, or pumping chambers. A VAD may be necessary when heart failure progresses to the point that medicines and other treatments no longer help. In some cases, a VAD may be used as a bridge to transplant.  · Heart transplant. This is replacing the diseased heart with a healthy one from a donor. This is an option for a few people who are very sick. A heart transplant is very serious and not an option for all patients. Your doctor can tell you more.  Date Last Reviewed:  3/20/2016  © 0550-4487 [employer]. [address] [place] [address], [place] [address]. All rights reserved. This information is not intended as a substitute for professional medical care. Always follow your healthcare professional's instructions.              Heart Failure: Tracking Your Weight  You have a condition called heart failure. When you have heart failure, a sudden weight gain or a steady rise in weight is a warning sign that your body is retaining too much water and salt. This could mean your heart failure is getting worse. If left untreated, it can cause problems for your lungs and result in shortness of breath. Weighing yourself each day is the best way to know if youre retaining water. If your weight goes up quickly, call your doctor. You will be given instructions on how to get rid of the excess water. You will likely need medicines and to avoid salt. This will help your heart work better.  Call your doctor if you gain more than 2 pounds in 1 day, more than 5 pounds in 1 week, or whatever weight gain you were told to report by your doctor. This is often a sign of worsening heart failure and needs to be evaluated and treated. Your doctor will tell you what to do next.   Tips for weighing yourself    · Weigh yourself at the same time each morning, wearing the same clothes. Weigh yourself after urinating and before eating.  · Use the same scale each day. Make sure the numbers are easy to read. Put the scale on a flat, hard surface -- not on a rug or carpet.  · Do not stop weighing yourself. If you forget one day, weigh again the next morning.  How to use your weight chart  · Keep your weight chart near the scale. Write your weight on the chart as soon as you get off the scale.  · Fill in the month and the start date on the chart. Then write down your weight each day. Your chart will look like this:    · If you miss a day, leave the space blank. Weigh yourself the next day and write your weight in  the next space.  · Take your weight chart with you when you go to see your doctor.  Date Last Reviewed: 3/20/2016  © 9558-8394 [employer]. [address] [place] [address], [place] [address]. All rights reserved. This information is not intended as a substitute for professional medical care. Always follow your healthcare professional's instructions.              Heart Failure: Warning Signs of a Flare-Up  You have a condition called heart failure. Once you have heart failure, flare-ups can happen. Below are signs that can mean your heart failure is getting worse. If you notice any of these warning signs, call your healthcare provider.  Swelling    · Your feet, ankles, or lower legs get puffier.  · You notice skin changes on your lower legs.  · Your shoes feel too tight.  · Your clothes are tighter in the waist.  · You have trouble getting rings on or off your fingers.  Shortness of breath  · You have to breathe harder even when youre doing your normal activities or when youre resting.  · You are short of breath walking up stairs or even short distances.  · You wake up at night short of breath or coughing.  · You need to use more pillows or sit up to sleep.  · You wake up tired or restless.  Other warning signs  · You feel weaker, dizzy, or more tired.  · You have chest pain or changes in your heartbeat.  · You have a cough that wont go away.  · You cant remember things or dont feel like eating.  Tracking your weight  Gaining weight is often the first warning sign that heart failure is getting worse. Gaining even a few pounds can be a sign that your body is retaining excess water and salt. Weighing yourself each day in the morning after you urinate and before you eat, is the best way to know if you're retaining water. Get a scale that is easy to read and make sure you wear the same clothes and use the same scale every time you weigh. Your healthcare provider will show you how to track your weight. Call your  doctor if you gain more than 2 pounds in 1 day, 5 pounds in 1 week, or whatever weight gain you were told to report by your doctor. This is often a sign of worsening heart failure and needs to be evaluated and treated before it compromises your breathing. Your doctor will tell you what to do next.    Date Last Reviewed: 3/15/2016  © 6504-0471 [employer]. [address] [place] [address], [place] [address]. All rights reserved. This information is not intended as a substitute for professional medical care. Always follow your healthcare professional's instructions.              Pneumonmia Discharge Instructions                Chronic Kindey Disease Education             Diabetes Discharge Instructions                                    [facility] complies with applicable Federal civil rights laws and does not discriminate on the basis of race, color, national origin, age, disability, or sex.

## 2017-04-08 NOTE — ED NOTES
Pt presented to the ED c/o sob and mid- sternal chest pressure since yesterday. Pt denies radiation of the pain. Pt was d/c from the hospital with recent sepsis and pneumonia. Pt afebrile. Pt c/o cough. Pt states his cough is non- productive. Pt has a hx of kidney cancer. Pt is currently undergoing radiation and oral chemo.

## 2017-04-08 NOTE — ED NOTES
Pt identifiers Lex Billy were checked and correct  LOC: The patient is awake, alert, aware of environment with an appropriate affect. Oriented x3, speaking appropriately  APPEARANCE: Pt resting comfortably, in no acute distress, pt is clean and well groomed, clothing properly fastened  SKIN: Skin warm, dry and intact, normal skin turgor, moist mucus membranes  RESPIRATORY: Airway is open and patent, respirations are spontaneous, even and unlabored, normal effort and rate. Pt c/o non productive cough. Pt c/o sob.   CARDIAC: Normal rate and rhythm, no peripheral edema noted, capillary refill < 3 seconds, bilateral radial pulses 2+. Pt c/o mid- sternal chest pressure.   ABDOMEN: Soft, non tender, non distended. Bowel sounds present x 4 quadrants.   NEUROLOGIC: PERRLA, facial expression is symmetrical, patient moving all extremities spontaneously, normal sensation in all extremities when touched with a finger.  Follows all commands appropriately  MUSCULOSKELETAL: No obvious deformities.

## 2017-04-09 NOTE — SUBJECTIVE & OBJECTIVE
Interval History:NAEON.  Patient notes that his SOB has improved.  He denies any fevers, chills, CP or diarrhea.  Patient is scheduled for V/Q scan and Echo this AM.     Review of Systems   Constitutional: Negative for chills, diaphoresis, fatigue and fever.   HENT: Negative for rhinorrhea, sinus pressure, sore throat and tinnitus.    Eyes: Negative for photophobia.   Respiratory: Positive for shortness of breath. Negative for cough, choking, chest tightness, wheezing and stridor.    Cardiovascular: Negative for palpitations and leg swelling.   Gastrointestinal: Negative for abdominal pain, constipation, diarrhea and nausea.   Endocrine: Negative for polyuria.   Genitourinary: Negative for dysuria, flank pain and frequency.   Musculoskeletal: Negative for back pain, gait problem and joint swelling.   Neurological: Negative for seizures, syncope, weakness, light-headedness and numbness.   Psychiatric/Behavioral: Negative for agitation, behavioral problems and confusion.     Objective:     Vital Signs (Most Recent):  Temp: 97.7 °F (36.5 °C) (04/09/17 0400)  Pulse: 78 (04/09/17 0700)  Resp: 16 (04/09/17 0400)  BP: 104/61 (04/09/17 0400)  SpO2: 96 % (04/09/17 0400) Vital Signs (24h Range):  Temp:  [97.7 °F (36.5 °C)-99 °F (37.2 °C)] 97.7 °F (36.5 °C)  Pulse:  [74-92] 78  Resp:  [16-30] 16  SpO2:  [91 %-97 %] 96 %  BP: ()/(51-66) 104/61     Weight: 77.1 kg (170 lb)  Body mass index is 24.39 kg/(m^2).    Intake/Output Summary (Last 24 hours) at 04/09/17 0735  Last data filed at 04/09/17 0500   Gross per 24 hour   Intake              260 ml   Output             1350 ml   Net            -1090 ml      Physical Exam   Constitutional: He is oriented to person, place, and time. He appears well-developed and well-nourished. No distress.   HENT:   Head: Normocephalic and atraumatic.   Eyes: EOM are normal. Pupils are equal, round, and reactive to light.   Neck: Normal range of motion. Neck supple. No JVD present.    Cardiovascular: Normal rate and regular rhythm.  Exam reveals no gallop and no friction rub.    Murmur heard.  3/6 holosystolic murmur   Pulmonary/Chest: No respiratory distress. He has no wheezes.   Mild Rhonchi bilaterally R>L   Abdominal: Soft. Bowel sounds are normal. He exhibits no distension. There is no tenderness.   Musculoskeletal: Normal range of motion. He exhibits no edema.   Neurological: He is alert and oriented to person, place, and time. No cranial nerve deficit.   Skin: Skin is warm and dry. He is not diaphoretic.   Psychiatric: He has a normal mood and affect. His behavior is normal.       Significant Labs:   CBC:   Recent Labs  Lab 04/08/17  1758 04/09/17  0444   WBC 12.78* 9.00   HGB 11.5* 10.1*   HCT 34.5* 30.2*    272     CMP:   Recent Labs  Lab 04/08/17  1923 04/09/17  0444   * 136   K 4.8 3.8    102   CO2 19* 22*   * 102   BUN 21 20   CREATININE 1.2 1.2   CALCIUM 8.9 8.8   PROT 7.4 6.7   ALBUMIN 2.5* 2.3*   BILITOT 0.8 0.9   ALKPHOS 95 86   AST 28 37   ALT 44 46*   ANIONGAP 12 12   EGFRNONAA >60.0 >60.0       Significant Imaging: I have reviewed all pertinent imaging results/findings within the past 24 hours.

## 2017-04-09 NOTE — ASSESSMENT & PLAN NOTE
- patient presented with acute decompensated respiratory failure; improved to 95% with 3L NC in the ED   - presentation is much improved; patient reports SOB not as bad as last admission  - CXR 4/8: no interval change; no pleural effusions or PTX  - WBC normal; no fevers or chills

## 2017-04-09 NOTE — ASSESSMENT & PLAN NOTE
--- Repeated 2D ECHO showed EF 40% with severe aortic stenosis, relatively unchanged  --- Not a TAVR candidate due to RCC   --- moderate AS with regards to mean gradient of 34 mmHg, despite NAMAN < 1 cm2  --- IC recs of not a surgical candidate   --- EF reduction from 50 % to 40 %  --- Increase toprol 12.5 to 25 mg OD

## 2017-04-09 NOTE — PROGRESS NOTES
Patient transferred to 826 from ED via stretcher. Patient ambulated with stand by assistance to bed. On 3L NC- pulse ox 95%. SOB on exertion noted. Patient reports he does not use oxygen at home. Gardner catheter in place- patient states he has been self catheterizing for the last month. No complaints of pain or discomfort. No swelling or skin breakdown noted. 1800 ADA diet. Blood glucose monitoring ACHS. Cardiac monitoring continued - NSR. Oriented patient to room. Call bell within reach. Will continue to monitor.

## 2017-04-09 NOTE — PROGRESS NOTES
[facility] Hospital Medicine  Progress Note    Patient Name: Lex Billy  MRN: [id number]  Patient Class: IP- Inpatient   Admission Date: 4/8/2017  Length of Stay: 1 days  Attending Physician: Maria Luisa Eastman MD  Primary Care Provider: Everette Rowan MD, MD    Hospital Medicine Team: [place] 4 Laury Chauhan MD    Subjective:     Principal Problem:Acute respiratory failure with hypoxia    HPI:  Mr. Natanael Billy is a 71 yo M here with worsening SOB. He was recently hospitalized from 3/28-4/4 for similar symptoms of worsening dyspnea. He has PMH significant for metastatic renal cell carcinoma on chemotherapy, DM2, HTN, CAD s/p stents, CHF (EF 40%), and severe AS. Since being discharged patient reports dyspnea beginning last night worse than his baseline resulting in a poor sleep. He denies fever, changes in cough, wheezing, chest pain, hemoptysis, lightheadedness, or change in bowel habits. He is not on home oxygen. In the ED he was found to hypoxic to the mid-80s; improved to 95% SpO2 on 3-4L NC. Labs showed increase of BNP at 1068 from 474 on 3/28. He was subsequently given 40 mg IV lasix x1. Troponin decreased from previous. No CTA was performed in the ED due to recent negative, improvement on oxygen, and increased interval BNP. Patient with no clinical signs of heart failure or volume overload. CXR negative for pleural effusions or PNA.     Per last admission notes patient underwent a CTA in the ED which was negative for PE; ; troponin 0.127. He was subsequently admitted to the ICU on 3/29/17 for hypoxic respiratory failure. Antibiotics were started to cover for pneumonia (vanc and cefepime; cetirizine) and HFNC for hypoxia. Patient tolerated weaning to nasal canula while maintaining saturations. He was later transitioned to 5 day course of azithromycin/augmentin (completed course on 4/4/17). On 3/31, patient stepped down to hospital medicine team. He was then noted to have  worseing troponins (1.6-1.7) and a new q wave on EKG.  Cardiology was consulted for persistent tropenemia; NSTEMI and patient was placed on ACS protocol. On 4/3/17 he underwent LHC which found patent stents; no intervention was needed. He was then cleared for discharge to follow up as an outpatient. At that time his dyspnea had returned to baseline.       Hospital Course:       Interval History:NAEON.  Patient notes that his SOB has improved.  He denies any fevers, chills, CP or diarrhea.  Patient is scheduled for V/Q scan and Echo this AM.     Review of Systems   Constitutional: Negative for chills, diaphoresis, fatigue and fever.   HENT: Negative for rhinorrhea, sinus pressure, sore throat and tinnitus.    Eyes: Negative for photophobia.   Respiratory: Positive for shortness of breath. Negative for cough, choking, chest tightness, wheezing and stridor.    Cardiovascular: Negative for palpitations and leg swelling.   Gastrointestinal: Negative for abdominal pain, constipation, diarrhea and nausea.   Endocrine: Negative for polyuria.   Genitourinary: Negative for dysuria, flank pain and frequency.   Musculoskeletal: Negative for back pain, gait problem and joint swelling.   Neurological: Negative for seizures, syncope, weakness, light-headedness and numbness.   Psychiatric/Behavioral: Negative for agitation, behavioral problems and confusion.     Objective:     Vital Signs (Most Recent):  Temp: 97.7 °F (36.5 °C) (04/09/17 0400)  Pulse: 78 (04/09/17 0700)  Resp: 16 (04/09/17 0400)  BP: 104/61 (04/09/17 0400)  SpO2: 96 % (04/09/17 0400) Vital Signs (24h Range):  Temp:  [97.7 °F (36.5 °C)-99 °F (37.2 °C)] 97.7 °F (36.5 °C)  Pulse:  [74-92] 78  Resp:  [16-30] 16  SpO2:  [91 %-97 %] 96 %  BP: ()/(51-66) 104/61     Weight: 77.1 kg (170 lb)  Body mass index is 24.39 kg/(m^2).    Intake/Output Summary (Last 24 hours) at 04/09/17 0735  Last data filed at 04/09/17 0500   Gross per 24 hour   Intake              260 ml    Output             1350 ml   Net            -1090 ml      Physical Exam   Constitutional: He is oriented to person, place, and time. He appears well-developed and well-nourished. No distress.   HENT:   Head: Normocephalic and atraumatic.   Eyes: EOM are normal. Pupils are equal, round, and reactive to light.   Neck: Normal range of motion. Neck supple. No JVD present.   Cardiovascular: Normal rate and regular rhythm.  Exam reveals no gallop and no friction rub.    Murmur heard.  3/6 holosystolic murmur   Pulmonary/Chest: No respiratory distress. He has no wheezes.   Mild Rhonchi bilaterally R>L   Abdominal: Soft. Bowel sounds are normal. He exhibits no distension. There is no tenderness.   Musculoskeletal: Normal range of motion. He exhibits no edema.   Neurological: He is alert and oriented to person, place, and time. No cranial nerve deficit.   Skin: Skin is warm and dry. He is not diaphoretic.   Psychiatric: He has a normal mood and affect. His behavior is normal.       Significant Labs:   CBC:   Recent Labs  Lab 04/08/17  1758 04/09/17  0444   WBC 12.78* 9.00   HGB 11.5* 10.1*   HCT 34.5* 30.2*    272     CMP:   Recent Labs  Lab 04/08/17  1923 04/09/17  0444   * 136   K 4.8 3.8    102   CO2 19* 22*   * 102   BUN 21 20   CREATININE 1.2 1.2   CALCIUM 8.9 8.8   PROT 7.4 6.7   ALBUMIN 2.5* 2.3*   BILITOT 0.8 0.9   ALKPHOS 95 86   AST 28 37   ALT 44 46*   ANIONGAP 12 12   EGFRNONAA >60.0 >60.0       Significant Imaging: I have reviewed all pertinent imaging results/findings within the past 24 hours.    Assessment/Plan:      * Acute respiratory failure with hypoxia  - patient presented with acute decompensated respiratory failure; improved to 95% with 3L NC in the ED   - presentation is much improved; patient reports SOB not as bad as last admission  - CXR 4/8: no interval change; no pleural effusions or PTX  - WBC normal; no fevers or chills      Essential hypertension  -Toprol XL 25mg  "QD           Type 2 diabetes mellitus with stage 2 chronic kidney disease, without long-term current use of insulin  - patient reports control with diet; SSI mild         Coronary artery disease involving native coronary artery of native heart without angina pectoris  - s/p PCI in 2015; continue DAPT, BB, statin   - patient underwent LHC previously but no intervention was preformed as patent LM and RCA stents 4/3/17  - per previous admission from a cardiology standpoint, the patient is free to go home with follow up (he sees Dr. Lind in clinic)         Severe aortic stenosis  --- Repeated 2D ECHO showed EF 40% with severe aortic stenosis, relatively unchanged  --- Not a TAVR candidate due to RCC   --- moderate AS with regards to mean gradient of 34 mmHg, despite NAMAN < 1 cm2  --- IC recs of not a surgical candidate   --- EF reduction from 50 % to 40 %  --- Increase toprol 12.5 to 25 mg OD  ---As per hem/onc, "His performance status remains excellent- would proceed with appropriate cardiac intervention as life expectancy still expected > 1 year as can be best approximated"  ---pending V/Q scan results, elver consult interventional cardiology for potential TAVR vs BAV    Renal cell carcinoma of right kidney  - Metastasis to left posterior 12th rib and right sacrum. Continue axitinib.   - Pt stated he has not been taking his axitinib due to running out of prescription and not being able to afford refill  - Will defer to oncologist for further treatment          Chronic diastolic heart failure  - possible decompensation based on BNP however does not correlate to clinical picture  - admission BNP 1068; previous admission 474 3/28   - 2D echo EF 40; PA 43.05; severe aortic stenosis  - no signs of volume overload; no JVD or pleural effusion  - s/p furosemide 40 mg IV in the ED; will hold off for now  - continue home ASA/statin/BB   - will repeat bedside 2D echo in light of BNP increase and no other source for increased " SOB    Benign non-nodular prostatic hyperplasia with lower urinary tract symptoms  -continued home tamsulosin    NSTEMI, initial episode of care  --- continued ASA 81 mg QD  --- Continue plavix 75 mg QD  --- Continue high dose statins, atorvastatin 80 mg QD  --- patient went to cath lab, but no intervention was preformed as patent LM and RCA stents 4/3/17      VTE Risk Mitigation         Ordered     Medium Risk of VTE  Once      04/08/17 2044          Laury Chauhan MD  Department of Hospital Medicine   [facility]

## 2017-04-09 NOTE — MEDICAL/APP STUDENT
Progress Note  Hospital Medicine    Team: [facility] 4 Jimbo Almeida  Admit Date: 4/8/2017   Length of Stay:  LOS: 1 day    Principal Problem:  Acute respiratory failure with hypoxia  Admission Chief  Complaint: Shortness of Breath       SUBJECTIVE:   Lex Billy is a 72 y.o. male who has a past medical history of metastatic RCC with good prognosis, T2DM, HF with reduced EF, and recent hospital admitting d/c 4.4, presenting with SOB.      Hospital Course:   Previous Admit 3.28 to 4.4 for hypoxic respiratory failure with pneumonia, pt had an NSTEMI in hospital and was found to have patent stents.     Pt readmitted 4.8 for SOB, V/Q scan being done today, and repeat ECHO ordered. Pt doing well on oxygen.    Interval History:   ***    Subjective      PMH, social hx, and family history are unchanged from 4/8/2017    OBJECTIVE:       Intake/Output Summary (Last 24 hours) at 04/09/17 0828  Last data filed at 04/09/17 0500   Gross per 24 hour   Intake              260 ml   Output             1350 ml   Net            -1090 ml       Objective    Diagnostics:  Significant Labs:   Cardiac Markers:   Recent Labs  Lab 04/08/17  1758   BNP 1068*       Significant Imaging:  CT: I have reviewed all pertinent results/findings within the past 24 hours and my personal findings are:  Normal chest Xray      ASSESSMENT/PLAN:     Problem List:  Active Hospital Problems    Diagnosis  POA    *Acute respiratory failure with hypoxia [J96.01]  Yes    NSTEMI, initial episode of care [I21.4]  Yes    Chronic diastolic heart failure [I50.32]  Yes    Benign non-nodular prostatic hyperplasia with lower urinary tract symptoms [N40.1]  Yes    Renal cell carcinoma of right kidney [C64.1]  Yes    Severe aortic stenosis [I35.0]  Yes    Coronary artery disease involving native coronary artery of native heart without angina pectoris [I25.10]  Yes    Essential hypertension [I10]  Yes    Type 2 diabetes mellitus with stage 2 chronic  kidney disease, without long-term current use of insulin [E11.22, N18.2]  Yes      Resolved Hospital Problems    Diagnosis Date Resolved POA   No resolved problems to display.       Assessment:  Lex Billy is a 72 y.o. male who has a past medical history of severe AS, metastatic RCC with good prognosis, T2DM, HF with reduced EF, and recent hospital admitting d/c 4.4, presenting with SOB. Pt had an elevated BNP and there is concern for PE given recent hospital admittion. Pt has documented severe AS which could account for symptomology. Troponins not elevated this admit    Plan:  1. SOB  - HFNC to maintain saturation above 90%  - Pt being worked up for PE with a VQ scan today  - ECHO ordered  - Consult cardiology for valve replacement    2. Metastatic RCC  - Pt to follow up with oncologist next week        VTE Risk Mitigation         Ordered     Medium Risk of VTE  Once      04/08/17 2044            Jimbo Almeida  Medical Student  Department of Hospital Medicine

## 2017-04-09 NOTE — ASSESSMENT & PLAN NOTE
- possible decompensation based on BNP however does not correlate to clinical picture  - admission BNP 1068; previous admission 474 3/28   - 2D echo EF 40; PA 43.05; severe aortic stenosis  - no signs of volume overload; no JVD or pleural effusion  - s/p furosemide 40 mg IV in the ED; will hold off for now  - continue home ASA/statin/BB   - will repeat bedside 2D echo in light of BNP increase and no other source for increased SOB

## 2017-04-09 NOTE — PROGRESS NOTES
Notified team - patient has an 11 beat run of VTach and went back into NSR; team at bedside when notified; patient noted in bed with no distress at this time

## 2017-04-09 NOTE — SUBJECTIVE & OBJECTIVE
Past Medical History:   Diagnosis Date    Acute on chronic congestive heart failure     Arthritis     Chronic kidney disease, stage II (mild) 10/20/2016    Chronic retention of urine 4/3/2017    Patient does self cath at home.   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 left shoulder    TOTAL KNEE ARTHROPLASTY      left side    VASECTOMY         Review of patient's allergies indicates:   Allergen Reactions    No known drug allergies        No current facility-administered medications on file prior to encounter.      Current Outpatient Prescriptions on File Prior to Encounter   Medication Sig    aspirin (ECOTRIN) 81 MG EC tablet Take 81 mg by mouth once daily.    atorvastatin (LIPITOR) 40 MG tablet TAKE 1 TABLET ONE TIME DAILY (Patient taking differently: TAKE 1 TABLET BY MOUTH ONE TIME DAILY)    axitinib (INLYTA) 5 mg Tab Take 1 tablet by mouth 2 (two) times daily.    bethanechol (URECHOLINE) 50 MG tablet Take 1 tablet (50 mg total) by mouth 3 (three) times daily.    blood sugar  diagnostic Strp 1 each by Misc.(Non-Drug; Combo Route) route 3 (three) times daily. Free style freedom lite test strips and lancets    blood-glucose meter (TRUE METRIX AIR GLUCOSE METER) Misc Use as directed    brimonidine 0.1% (ALPHAGAN P) 0.1 % Drop Place 1 drop into both eyes 3 (three) times daily.    CALCIUM CARBONATE-VITAMIN D3 ORAL Take 1 tablet by mouth every morning. Calcium carbonate 1000mg vitamin d3 1600 units per patient    clopidogrel (PLAVIX) 75 mg tablet Take 1 tablet (75 mg total) by mouth once daily.    duloxetine (CYMBALTA) 30 MG capsule Take 1 capsule (30 mg total) by mouth once daily.    fexofenadine (ALLEGRA) 180 MG tablet TAKE 1 TABLET ONE TIME DAILY (Patient taking differently: TAKE 1 TABLET BY MOUTH ONE TIME DAILY)    furosemide (LASIX) 40 MG tablet Take 40 mg by mouth once daily.    gabapentin (NEURONTIN) 300 MG capsule Take 2 capsules (600 mg total) by mouth 3 (three) times daily.    lancets 28 gauge Misc 1 lancet by Misc.(Non-Drug; Combo Route) route 3 (three) times daily.    metformin (GLUCOPHAGE) 500 MG tablet TAKE 1 TABLET BY MOUTH TWO TIMES A DAY WITH MEALS (Patient taking differently: TAKE 1 TABLET BY MOUTH ONE TIME A DAY WITH MEALS)    metoprolol succinate (TOPROL-XL) 25 MG 24 hr tablet Take 1 tablet (25 mg total) by mouth once daily.    morphine (MS CONTIN) 15 MG 12 hr tablet Take 1 tablet (15 mg total) by mouth every 8 (eight) hours as needed for Pain.    nitroGLYCERIN (NITROSTAT) 0.4 MG SL tablet Place 1 tablet (0.4 mg total) under the tongue every 5 (five) minutes as needed for Chest pain.    potassium chloride SA (K-DUR,KLOR-CON) 20 MEQ tablet Take 1 tablet (20 mEq total) by mouth once daily.    tamsulosin (FLOMAX) 0.4 mg Cp24 Take 1 capsule (0.4 mg total) by mouth once daily.    verapamil (CALAN-SR) 240 MG CR tablet Take 1 tablet (240 mg total) by mouth once daily.    zolpidem (AMBIEN) 5 MG Tab Take 1 tablet (5 mg total) by mouth nightly as needed. (Patient  taking differently: Take 5 mg by mouth nightly as needed (for sleep). )     Family History     Problem Relation (Age of Onset)    ALS Father    Asthma Son    Cancer Brother    Heart disease Father    Migraines Daughter    Rheum arthritis Mother        Social History Main Topics    Smoking status: Never Smoker    Smokeless tobacco: Never Used    Alcohol use No      Comment: Heavy in past, quit about 20 years ago    Drug use: No    Sexual activity: Not Currently     Partners: Female     Review of Systems   Constitutional: Negative for chills, diaphoresis, fatigue and fever.   HENT: Negative for rhinorrhea, sinus pressure, sore throat and tinnitus.    Eyes: Negative for photophobia.   Respiratory: Positive for shortness of breath. Negative for cough, choking, chest tightness, wheezing and stridor.    Cardiovascular: Negative for palpitations and leg swelling.   Gastrointestinal: Negative for abdominal pain, constipation, diarrhea and nausea.   Endocrine: Negative for polyuria.   Genitourinary: Negative for dysuria, flank pain and frequency.   Musculoskeletal: Negative for back pain, gait problem and joint swelling.   Neurological: Negative for seizures, syncope, weakness, light-headedness and numbness.   Psychiatric/Behavioral: Negative for agitation, behavioral problems and confusion.     Objective:     Vital Signs (Most Recent):  Temp: 98.2 °F (36.8 °C) (04/08/17 1550)  Pulse: 80 (04/08/17 2201)  Resp: 18 (04/08/17 2201)  BP: 111/66 (04/08/17 2201)  SpO2: (!) 94 % (04/08/17 2201) Vital Signs (24h Range):  Temp:  [98.2 °F (36.8 °C)] 98.2 °F (36.8 °C)  Pulse:  [80-92] 80  Resp:  [16-30] 18  SpO2:  [91 %-97 %] 94 %  BP: ()/(51-66) 111/66     Weight: 77.1 kg (170 lb)  Body mass index is 24.39 kg/(m^2).    Physical Exam   Constitutional: He is oriented to person, place, and time. He appears well-developed and well-nourished. No distress.   HENT:   Head: Normocephalic and atraumatic.   Eyes: EOM are normal.  Pupils are equal, round, and reactive to light.   Neck: Normal range of motion. Neck supple. No JVD present.   Cardiovascular: Normal rate and regular rhythm.  Exam reveals no gallop and no friction rub.    Murmur heard.  3/6 holosystolic murmur   Pulmonary/Chest: No respiratory distress. He has no wheezes.   Mild Rhonchi bilaterally R>L   Abdominal: Soft. Bowel sounds are normal. He exhibits no distension. There is no tenderness.   Musculoskeletal: Normal range of motion. He exhibits no edema.   Neurological: He is alert and oriented to person, place, and time. No cranial nerve deficit.   Skin: Skin is warm and dry. He is not diaphoretic.   Psychiatric: He has a normal mood and affect. His behavior is normal.        Significant Labs:   ABGs: No results for input(s): PH, PCO2, HCO3, POCSATURATED, BE in the last 48 hours.  BMP:   Recent Labs  Lab 04/08/17  1923   *   *   K 4.8      CO2 19*   BUN 21   CREATININE 1.2   CALCIUM 8.9     CBC:   Recent Labs  Lab 04/08/17  1758   WBC 12.78*   HGB 11.5*   HCT 34.5*        Cardiac Markers:   Recent Labs  Lab 04/08/17  1758   BNP 1068*     Lactic Acid: No results for input(s): LACTATE in the last 48 hours.    Significant Imaging: I have reviewed all pertinent imaging results/findings within the past 24 hours.

## 2017-04-09 NOTE — PLAN OF CARE
Problem: Patient Care Overview  Goal: Plan of Care Review  Pt arrived appx. 2300. Gardner in place. Gave one dose of Enoxaparin. Communicates clearly. Scheduled for lung ventilation imaging and 2D echo  Sun. AM. On tele. No acute events occurred overnight. Vital signs stable. Will continue to monitor.

## 2017-04-09 NOTE — PLAN OF CARE
Problem: Patient Care Overview  Goal: Plan of Care Review  Outcome: Ongoing (interventions implemented as appropriate)  Plan of care review with patient; patient verbalize understanding. Patient will ambulate three times today, report pain and verbalized results of interventions provided. Pt does not co pain today. Pt denies nausea during shift. Incision clean, dry, and edges approximated during shift. Pt tolerating diet. Pt up in room independently, no distress noted. Pt remains free of falls, injuries, and trauma. Plan to wean off of oxygen today while monitoring closely. No distress noted will continue to monitor.

## 2017-04-09 NOTE — ASSESSMENT & PLAN NOTE
- s/p PCI in 2015; continue DAPT, BB, statin   - patient underwent LHC previously but no intervention was preformed as patent LM and RCA stents 4/3/17  - per previous admission from a cardiology standpoint, the patient is free to go home with follow up (he sees Dr. Lind in clinic)

## 2017-04-09 NOTE — ASSESSMENT & PLAN NOTE
--- continued ASA 81 mg QD  --- Continue plavix 75 mg QD  --- Continue high dose statins, atorvastatin 80 mg QD  --- patient went to cath lab, but no intervention was preformed as patent LM and RCA stents 4/3/17

## 2017-04-09 NOTE — H&P
[facility] Hospital Medicine  History & Physical    Patient Name: Lex Billy  MRN: [id number]  Admission Date: 4/8/2017  Attending Physician: Dr. Jaren MD  Primary Care Provider: Everette Rowan MD, MD    Hospital Medicine Team: [place] [id number] Vini Feldman MD     Patient information was obtained from patient, past medical records and ER records.     Subjective:     Principal Problem:Acute respiratory failure with hypoxia    Chief Complaint:   Chief Complaint   Patient presents with    Shortness of Breath        HPI: Mr. Natanael Billy is a 73 yo M here with worsening SOB. He was recently hospitalized from 3/28-4/4 for similar symptoms of worsening dyspnea. He has PMH significant for metastatic renal cell carcinoma on chemotherapy, DM2, HTN, CAD s/p stents, CHF (EF 40%), and severe AS. Since being discharged patient reports dyspnea beginning last night worse than his baseline resulting in a poor sleep. He denies fever, changes in cough, wheezing, chest pain, hemoptysis, lightheadedness, or change in bowel habits. He is not on home oxygen. In the ED he was found to hypoxic to the mid-80s; improved to 95% SpO2 on 3-4L NC. Labs showed increase of BNP at 1068 from 474 on 3/28. He was subsequently given 40 mg IV lasix x1. Troponin decreased from previous. No CTA was performed in the ED due to recent negative, improvement on oxygen, and increased interval BNP. Patient with no clinical signs of heart failure or volume overload. CXR negative for pleural effusions or PNA.     Per last admission notes patient underwent a CTA in the ED which was negative for PE; ; troponin 0.127. He was subsequently admitted to the ICU on 3/29/17 for hypoxic respiratory failure. Antibiotics were started to cover for pneumonia (vanc and cefepime; cetirizine) and HFNC for hypoxia. Patient tolerated weaning to nasal canula while maintaining saturations. He was later transitioned to 5 day course of  azithromycin/augmentin (completed course on 4/4/17). On 3/31, patient stepped down to hospital medicine team. He was then noted to have worseing troponins (1.6-1.7) and a new q wave on EKG.  Cardiology was consulted for persistent tropenemia; NSTEMI and patient was placed on ACS protocol. On 4/3/17 he underwent LHC which found patent stents; no intervention was needed. He was then cleared for discharge to follow up as an outpatient. At that time his dyspnea had returned to baseline.       Past Medical History:   Diagnosis Date    Acute on chronic congestive heart failure     Arthritis     Chronic kidney disease, stage II (mild) 10/20/2016    Chronic retention of urine 4/3/2017    Patient does self cath at home.   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 left shoulder    TOTAL KNEE ARTHROPLASTY      left side    VASECTOMY         Review of patient's allergies indicates:   Allergen Reactions    No known drug allergies        No current facility-administered medications on file prior to  encounter.      Current Outpatient Prescriptions on File Prior to Encounter   Medication Sig    aspirin (ECOTRIN) 81 MG EC tablet Take 81 mg by mouth once daily.    atorvastatin (LIPITOR) 40 MG tablet TAKE 1 TABLET ONE TIME DAILY (Patient taking differently: TAKE 1 TABLET BY MOUTH ONE TIME DAILY)    axitinib (INLYTA) 5 mg Tab Take 1 tablet by mouth 2 (two) times daily.    bethanechol (URECHOLINE) 50 MG tablet Take 1 tablet (50 mg total) by mouth 3 (three) times daily.    blood sugar diagnostic Strp 1 each by Misc.(Non-Drug; Combo Route) route 3 (three) times daily. Free style freedom lite test strips and lancets    blood-glucose meter (TRUE METRIX AIR GLUCOSE METER) Misc Use as directed    brimonidine 0.1% (ALPHAGAN P) 0.1 % Drop Place 1 drop into both eyes 3 (three) times daily.    CALCIUM CARBONATE-VITAMIN D3 ORAL Take 1 tablet by mouth every morning. Calcium carbonate 1000mg vitamin d3 1600 units per patient    clopidogrel (PLAVIX) 75 mg tablet Take 1 tablet (75 mg total) by mouth once daily.    duloxetine (CYMBALTA) 30 MG capsule Take 1 capsule (30 mg total) by mouth once daily.    fexofenadine (ALLEGRA) 180 MG tablet TAKE 1 TABLET ONE TIME DAILY (Patient taking differently: TAKE 1 TABLET BY MOUTH ONE TIME DAILY)    furosemide (LASIX) 40 MG tablet Take 40 mg by mouth once daily.    gabapentin (NEURONTIN) 300 MG capsule Take 2 capsules (600 mg total) by mouth 3 (three) times daily.    lancets 28 gauge Misc 1 lancet by Misc.(Non-Drug; Combo Route) route 3 (three) times daily.    metformin (GLUCOPHAGE) 500 MG tablet TAKE 1 TABLET BY MOUTH TWO TIMES A DAY WITH MEALS (Patient taking differently: TAKE 1 TABLET BY MOUTH ONE TIME A DAY WITH MEALS)    metoprolol succinate (TOPROL-XL) 25 MG 24 hr tablet Take 1 tablet (25 mg total) by mouth once daily.    morphine (MS CONTIN) 15 MG 12 hr tablet Take 1 tablet (15 mg total) by mouth every 8 (eight) hours as needed for Pain.    nitroGLYCERIN (NITROSTAT) 0.4  MG SL tablet Place 1 tablet (0.4 mg total) under the tongue every 5 (five) minutes as needed for Chest pain.    potassium chloride SA (K-DUR,KLOR-CON) 20 MEQ tablet Take 1 tablet (20 mEq total) by mouth once daily.    tamsulosin (FLOMAX) 0.4 mg Cp24 Take 1 capsule (0.4 mg total) by mouth once daily.    verapamil (CALAN-SR) 240 MG CR tablet Take 1 tablet (240 mg total) by mouth once daily.    zolpidem (AMBIEN) 5 MG Tab Take 1 tablet (5 mg total) by mouth nightly as needed. (Patient taking differently: Take 5 mg by mouth nightly as needed (for sleep). )     Family History     Problem Relation (Age of Onset)    ALS Father    Asthma Son    Cancer Brother    Heart disease Father    Migraines Daughter    Rheum arthritis Mother        Social History Main Topics    Smoking status: Never Smoker    Smokeless tobacco: Never Used    Alcohol use No      Comment: Heavy in past, quit about 20 years ago    Drug use: No    Sexual activity: Not Currently     Partners: Female     Review of Systems   Constitutional: Negative for chills, diaphoresis, fatigue and fever.   HENT: Negative for rhinorrhea, sinus pressure, sore throat and tinnitus.    Eyes: Negative for photophobia.   Respiratory: Positive for shortness of breath. Negative for cough, choking, chest tightness, wheezing and stridor.    Cardiovascular: Negative for palpitations and leg swelling.   Gastrointestinal: Negative for abdominal pain, constipation, diarrhea and nausea.   Endocrine: Negative for polyuria.   Genitourinary: Negative for dysuria, flank pain and frequency.   Musculoskeletal: Negative for back pain, gait problem and joint swelling.   Neurological: Negative for seizures, syncope, weakness, light-headedness and numbness.   Psychiatric/Behavioral: Negative for agitation, behavioral problems and confusion.     Objective:     Vital Signs (Most Recent):  Temp: 98.2 °F (36.8 °C) (04/08/17 1550)  Pulse: 80 (04/08/17 2201)  Resp: 18 (04/08/17 2201)  BP:  111/66 (04/08/17 2201)  SpO2: (!) 94 % (04/08/17 2201) Vital Signs (24h Range):  Temp:  [98.2 °F (36.8 °C)] 98.2 °F (36.8 °C)  Pulse:  [80-92] 80  Resp:  [16-30] 18  SpO2:  [91 %-97 %] 94 %  BP: ()/(51-66) 111/66     Weight: 77.1 kg (170 lb)  Body mass index is 24.39 kg/(m^2).    Physical Exam   Constitutional: He is oriented to person, place, and time. He appears well-developed and well-nourished. No distress.   HENT:   Head: Normocephalic and atraumatic.   Eyes: EOM are normal. Pupils are equal, round, and reactive to light.   Neck: Normal range of motion. Neck supple. No JVD present.   Cardiovascular: Normal rate and regular rhythm.  Exam reveals no gallop and no friction rub.    Murmur heard.  3/6 holosystolic murmur   Pulmonary/Chest: No respiratory distress. He has no wheezes.   Mild Rhonchi bilaterally R>L   Abdominal: Soft. Bowel sounds are normal. He exhibits no distension. There is no tenderness.   Musculoskeletal: Normal range of motion. He exhibits no edema.   Neurological: He is alert and oriented to person, place, and time. No cranial nerve deficit.   Skin: Skin is warm and dry. He is not diaphoretic.   Psychiatric: He has a normal mood and affect. His behavior is normal.        Significant Labs:   ABGs: No results for input(s): PH, PCO2, HCO3, POCSATURATED, BE in the last 48 hours.  BMP:   Recent Labs  Lab 04/08/17  1923   *   *   K 4.8      CO2 19*   BUN 21   CREATININE 1.2   CALCIUM 8.9     CBC:   Recent Labs  Lab 04/08/17  1758   WBC 12.78*   HGB 11.5*   HCT 34.5*        Cardiac Markers:   Recent Labs  Lab 04/08/17  1758   BNP 1068*     Lactic Acid: No results for input(s): LACTATE in the last 48 hours.    Significant Imaging: I have reviewed all pertinent imaging results/findings within the past 24 hours.    Assessment/Plan:     * Acute respiratory failure with hypoxia  - patient presented with acute decompensated respiratory failure; improved to 95% with 3L NC in  the ED   - presentation is much improved; patient reports SOB not as bad as last admission  - CXR 4/8: no interval change; no pleural effusions or PTX  - WBC normal; no fevers or chills  - Defer V/Q until tomorrow; will give 80 mg enoxaparin x1 tonight for 24 hours coverage and reassess in the morning       Essential hypertension  -Toprol XL 25mg QD           Type 2 diabetes mellitus with stage 2 chronic kidney disease, without long-term current use of insulin  - patient reports control with diet; SSI mild         Coronary artery disease involving native coronary artery of native heart without angina pectoris  - s/p PCI in 2015; continue DAPT, BB, statin   - patient underwent LHC previously but no intervention was preformed as patent LM and RCA stents 4/3/17  - per previous admission from a cardiology standpoint, the patient is free to go home with follow up (he sees Dr. Lind in clinic)         Severe aortic stenosis  --- Repeated 2D ECHO showed EF 40% with severe aortic stenosis, relatively unchanged  --- Not a TAVR candidate due to RCC   --- moderate AS with regards to mean gradient of 34 mmHg, despite NAMAN < 1 cm2  --- IC recs of not a surgical candidate   --- EF reduction from 50 % to 40 %  --- Increase toprol 12.5 to 25 mg OD    Renal cell carcinoma of right kidney  - Metastasis to left posterior 12th rib and right sacrum. Continue axitinib.   - Pt stated he has not been taking his axitinib due to running out of prescription and not being able to afford refill  - Will defer to oncologist for further treatment          Chronic diastolic heart failure  - possible decompensation based on BNP however does not correlate to clinical picture  - admission BNP 1068; previous admission 474 3/28   - 2D echo EF 40; PA 43.05; severe aortic stenosis  - no signs of volume overload; no JVD or pleural effusion  - s/p furosemide 40 mg IV in the ED; will hold off for now  - continue home ASA/statin/BB   - will repeat bedside 2D  echo in light of BNP increase and no other source for increased SOB    Benign non-nodular prostatic hyperplasia with lower urinary tract symptoms  -continued home tamsulosin    NSTEMI, initial episode of care  --- continued ASA 81 mg QD  --- Continue plavix 75 mg QD  --- Continue high dose statins, atorvastatin 80 mg QD  --- patient went to cath lab, but no intervention was preformed as patent LM and RCA stents 4/3/17      VTE Risk Mitigation         Ordered     Medium Risk of VTE  Once      04/08/17 2044        Vini Feldman MD  Department of Hospital Medicine   [facility]

## 2017-04-09 NOTE — ASSESSMENT & PLAN NOTE
"--- Repeated 2D ECHO showed EF 40% with severe aortic stenosis, relatively unchanged  --- Not a TAVR candidate due to RCC   --- moderate AS with regards to mean gradient of 34 mmHg, despite NAMAN < 1 cm2  --- IC recs of not a surgical candidate   --- EF reduction from 50 % to 40 %  --- Increase toprol 12.5 to 25 mg OD  ---As per hem/onc, "His performance status remains excellent- would proceed with appropriate cardiac intervention as life expectancy still expected > 1 year as can be best approximated"  ---pending V/Q scan results, elver consult interventional cardiology for potential TAVR vs BAV  "

## 2017-04-10 NOTE — ASSESSMENT & PLAN NOTE
- s/p PCI in 2015; continue DAPT, BB, statin   - patient underwent LHC previously but no intervention was preformed as patent LM and RCA stents 4/3/17

## 2017-04-10 NOTE — ASSESSMENT & PLAN NOTE
- possible decompensation based on BNP however does not correlate to clinical picture  - admission BNP 1068; previous admission 474 3/28   - 2D echo EF 40; PA 43.05; severe aortic stenosis  - no signs of volume overload; no JVD or pleural effusion  - s/p furosemide 40 mg IV in the ED; will hold off for now  - continue home ASA/statin/BB

## 2017-04-10 NOTE — ASSESSMENT & PLAN NOTE
- patient presented with acute decompensated respiratory failure; improved to 95% with 3L NC in the ED   - presentation is much improved; patient reports SOB not as bad as last admission  - CXR 4/8: no interval change; no pleural effusions or PTX  - WBC normal; no fevers or chills  -likely 2/2 to AS  -awaiting VQ scan to r/o PE

## 2017-04-10 NOTE — PLAN OF CARE
"CM to bedside - pt provided assessment info. CM familiar w/ pt - recent d/c. Pt is independent w/ no DME in place, lives w/ spouse. Pt will likely d/c home w/ no needs although pt may benefit from h/h. Pt is awaiting recs from Cardiology r/t valve replacement surgery.     CM provided patient anticipated KUMAR which will be update by nursing staff. Patient was provided a Blue "My Health Packet" for d/c planning and health tool at last admit. Patient verbalized understanding.    Future Appointments  Date Time Provider Department Center   4/17/2017 2:00 PM PHYSICIAN, PRIORITY CLINIC [facility] IMPRICL [facility] PCW   4/20/2017 11:30 AM LAB, HEMONC CANCER BLDG [facility] LAB HO [place]   4/20/2017 1:00 PM Janine Valdez MD [facility] HEM ONC [place]   4/20/2017 2:00 PM NOM, CHEMO [facility] CHEMO [place]   5/17/2017 10:15 AM [facility] CT1 [id number]- LIMIT 450 LBS [facility] CT SCAN [facility]   5/17/2017 11:30 AM Christian Monreal MD [facility] NEUROS7 [facility]        04/10/17 1604   Discharge Assessment   Assessment Type Discharge Planning Assessment   Confirmed/corrected address and phone number on facesheet? Yes   Assessment information obtained from? Patient;Medical Record   Expected Length of Stay (days) 3   Communicated expected length of stay with patient/caregiver yes   Prior to hospitilization cognitive status: Alert/Oriented   Prior to hospitalization functional status: Independent   Current cognitive status: Alert/Oriented   Current Functional Status: Independent   Arrived From home or self-care   Lives With spouse   Able to Return to Prior Arrangements yes   Is patient able to care for self after discharge? Yes   Does the patient have family/friends to help with healtcare needs after discharge? yes   How many people do you have in your home that can help with your care after discharge? 1   Who are your caregiver(s) and their phone number(s)? spouse - [place] [phone]   Patient's perception of discharge disposition home or selfcare "   Readmission Within The Last 30 Days unable to assess   Patient currently being followed by outpatient case management? No   Patient currently receives home health services? No   Does the patient currently use HME? No   Patient currently receives private duty nursing? No   Patient currently receives any other outside agency services? No   Do you have any problems affording any of your prescribed medications? No   Is the patient taking medications as prescribed? yes   Do you have any financial concerns preventing you from receiving the healthcare you need? No   Does the patient have transportation to healthcare appointments? Yes   Transportation Available family or friend will provide   On Dialysis? No   Does the patient receive services at the Coumadin Clinic? No   Are there any open cases? No   Discharge Plan A Home with family   Discharge Plan B Home with family;Home Health   Patient/Family In Agreement With Plan yes

## 2017-04-10 NOTE — ASSESSMENT & PLAN NOTE
"--- Repeated 2D ECHO showed EF 40% with severe aortic stenosis, relatively unchanged  --- Not a TAVR candidate due to RCC   --- moderate AS with regards to mean gradient of 34 mmHg, despite NAMAN < 1 cm2  --- IC recs of not a surgical candidate   --- EF reduction from 50 % to 40 %  --- Increase toprol 12.5 to 25 mg OD  ---As per hem/onc, "His performance status remains excellent- would proceed with appropriate cardiac intervention as life expectancy still expected > 1 year as can be best approximated"  ---pending V/Q scan results, elver consult interventional cardiology for potential TAVR vs BAV  ---40 of lasix this AM  "

## 2017-04-10 NOTE — PLAN OF CARE
Problem: Patient Care Overview  Goal: Plan of Care Review  Outcome: Ongoing (interventions implemented as appropriate)  Patient remained free from falls throughout shift, call bell within reach. Weaned off O2 during the day yesterday -  Vitals stable all night. No complaints of pain or discomfort. Will continue to monitor.

## 2017-04-10 NOTE — PROGRESS NOTES
[facility] Hospital Medicine  Progress Note    Patient Name: Lex Billy  MRN: [id number]  Patient Class: IP- Inpatient   Admission Date: 4/8/2017  Length of Stay: 2 days  Attending Physician: Maria Luisa Eastman MD  Primary Care Provider: Everette Rowan MD, MD    Hospital Medicine Team: [facility] [place] 4 Laury Chauhan MD    Subjective:     Principal Problem:Acute respiratory failure with hypoxia    HPI:  Mr. Natanael Billy is a 73 yo M here with worsening SOB. He was recently hospitalized from 3/28-4/4 for similar symptoms of worsening dyspnea. He has PMH significant for metastatic renal cell carcinoma on chemotherapy, DM2, HTN, CAD s/p stents, CHF (EF 40%), and severe AS. Since being discharged patient reports dyspnea beginning last night worse than his baseline resulting in a poor sleep. He denies fever, changes in cough, wheezing, chest pain, hemoptysis, lightheadedness, or change in bowel habits. He is not on home oxygen. In the ED he was found to hypoxic to the mid-80s; improved to 95% SpO2 on 3-4L NC. Labs showed increase of BNP at 1068 from 474 on 3/28. He was subsequently given 40 mg IV lasix x1. Troponin decreased from previous. No CTA was performed in the ED due to recent negative, improvement on oxygen, and increased interval BNP. Patient with no clinical signs of heart failure or volume overload. CXR negative for pleural effusions or PNA.     Per last admission notes patient underwent a CTA in the ED which was negative for PE; ; troponin 0.127. He was subsequently admitted to the ICU on 3/29/17 for hypoxic respiratory failure. Antibiotics were started to cover for pneumonia (vanc and cefepime; cetirizine) and HFNC for hypoxia. Patient tolerated weaning to nasal canula while maintaining saturations. He was later transitioned to 5 day course of azithromycin/augmentin (completed course on 4/4/17). On 3/31, patient stepped down to hospital medicine team. He was then noted to have  worseing troponins (1.6-1.7) and a new q wave on EKG.  Cardiology was consulted for persistent tropenemia; NSTEMI and patient was placed on ACS protocol. On 4/3/17 he underwent LHC which found patent stents; no intervention was needed. He was then cleared for discharge to follow up as an outpatient. At that time his dyspnea had returned to baseline.       Hospital Course:  Patient admitted to 4.  VQ scan ordered to r/o PE.     Interval History:NAEON. Pt is denying any CP, SOB, abdomenal pain.  Patient states that he is feeling much better.  He is eager to be discharged.  VQ still pending.     Review of Systems   Constitutional: Negative for chills, diaphoresis, fatigue and fever.   HENT: Negative for rhinorrhea, sinus pressure, sore throat and tinnitus.    Eyes: Negative for photophobia.   Respiratory: Negative for cough, choking, chest tightness, shortness of breath, wheezing and stridor.    Cardiovascular: Negative for palpitations and leg swelling.   Gastrointestinal: Negative for abdominal pain, constipation, diarrhea and nausea.   Endocrine: Negative for polyphagia and polyuria.   Genitourinary: Negative for dysuria, flank pain and frequency.   Musculoskeletal: Negative for back pain, gait problem and joint swelling.   Skin: Negative for rash and wound.   Allergic/Immunologic: Negative for food allergies and immunocompromised state.   Neurological: Negative for seizures, syncope, weakness, light-headedness and numbness.   Psychiatric/Behavioral: Negative for agitation, behavioral problems and confusion.     Objective:     Vital Signs (Most Recent):  Temp: 98.3 °F (36.8 °C) (04/10/17 0331)  Pulse: 103 (04/10/17 0914)  Resp: 18 (04/10/17 0331)  BP: 112/72 (04/10/17 0914)  SpO2: 95 % (04/10/17 0332) Vital Signs (24h Range):  Temp:  [97.8 °F (36.6 °C)-99.3 °F (37.4 °C)] 98.3 °F (36.8 °C)  Pulse:  [] 103  Resp:  [18] 18  SpO2:  [88 %-98 %] 95 %  BP: ()/(60-78) 112/72     Weight: 77.1 kg (170 lb)  Body  mass index is 24.39 kg/(m^2).    Intake/Output Summary (Last 24 hours) at 04/10/17 0949  Last data filed at 04/10/17 0900   Gross per 24 hour   Intake              710 ml   Output             1375 ml   Net             -665 ml      Physical Exam   Constitutional: He is oriented to person, place, and time. He appears well-developed and well-nourished. No distress.   HENT:   Head: Normocephalic and atraumatic.   Eyes: EOM are normal. Pupils are equal, round, and reactive to light.   Neck: Normal range of motion. Neck supple. No JVD present.   Cardiovascular: Normal rate and regular rhythm.  Exam reveals no gallop and no friction rub.    Murmur heard.  3/6 holosystolic murmur   Pulmonary/Chest: No respiratory distress. He has no wheezes.   Mild Rhonchi bilaterally R>L   Abdominal: Soft. Bowel sounds are normal. He exhibits no distension. There is no tenderness.   Musculoskeletal: Normal range of motion. He exhibits no edema.   Neurological: He is alert and oriented to person, place, and time. No cranial nerve deficit.   Skin: Skin is warm and dry. He is not diaphoretic.   Psychiatric: He has a normal mood and affect. His behavior is normal.       Significant Labs:   CBC:   Recent Labs  Lab 04/08/17  1758 04/09/17  0444 04/10/17  0452   WBC 12.78* 9.00 8.86   HGB 11.5* 10.1* 10.4*   HCT 34.5* 30.2* 31.9*    272 261     CMP:   Recent Labs  Lab 04/08/17  1923 04/09/17  0444 04/10/17  0452   * 136 136   K 4.8 3.8 3.9    102 102   CO2 19* 22* 24   * 102 83   BUN 21 20 16   CREATININE 1.2 1.2 1.1   CALCIUM 8.9 8.8 8.5*   PROT 7.4 6.7 6.5   ALBUMIN 2.5* 2.3* 2.2*   BILITOT 0.8 0.9 0.8   ALKPHOS 95 86 83   AST 28 37 59*   ALT 44 46* 76*   ANIONGAP 12 12 10   EGFRNONAA >60.0 >60.0 >60.0       Significant Imaging: I have reviewed all pertinent imaging results/findings within the past 24 hours.    Assessment/Plan:      * Acute respiratory failure with hypoxia  - patient presented with acute decompensated  "respiratory failure; improved to 95% with 3L NC in the ED   - presentation is much improved; patient reports SOB not as bad as last admission  - CXR 4/8: no interval change; no pleural effusions or PTX  - WBC normal; no fevers or chills  -likely 2/2 to AS  -awaiting VQ scan to r/o PE      Essential hypertension  -Toprol XL 25mg QD           Type 2 diabetes mellitus with stage 2 chronic kidney disease, without long-term current use of insulin  - patient reports control with diet; SSI mild         Coronary artery disease involving native coronary artery of native heart without angina pectoris  - s/p PCI in 2015; continue DAPT, BB, statin   - patient underwent LHC previously but no intervention was preformed as patent LM and RCA stents 4/3/17      Severe aortic stenosis  --- Repeated 2D ECHO showed EF 40% with severe aortic stenosis, relatively unchanged  --- Not a TAVR candidate due to RCC   --- moderate AS with regards to mean gradient of 34 mmHg, despite NAMAN < 1 cm2  --- IC recs of not a surgical candidate   --- EF reduction from 50 % to 40 %  --- Increase toprol 12.5 to 25 mg OD  ---As per hem/onc, "His performance status remains excellent- would proceed with appropriate cardiac intervention as life expectancy still expected > 1 year as can be best approximated"  ---pending V/Q scan results, elver consult interventional cardiology for potential TAVR vs BAV    Renal cell carcinoma of right kidney  - Metastasis to left posterior 12th rib and right sacrum. Continue axitinib.   - Pt stated he has not been taking his axitinib due to running out of prescription and not being able to afford refill  - Will defer to oncologist for further treatment          Chronic diastolic heart failure  - possible decompensation based on BNP however does not correlate to clinical picture  - admission BNP 1068; previous admission 474 3/28   - 2D echo EF 40; PA 43.05; severe aortic stenosis  - no signs of volume overload; no JVD or pleural " effusion  - s/p furosemide 40 mg IV in the ED; will hold off for now  - continue home ASA/statin/BB       Benign non-nodular prostatic hyperplasia with lower urinary tract symptoms  -continued home tamsulosin    NSTEMI, initial episode of care  --- continued ASA 81 mg QD  --- Continue plavix 75 mg QD  --- Continue high dose statins, atorvastatin 80 mg QD  --- patient went to cath lab, but no intervention was preformed as patent LM and RCA stents 4/3/17      VTE Risk Mitigation         Ordered     Medium Risk of VTE  Once      04/08/17 2044          Laury Chauhan MD  Department of Hospital Medicine   [facility]

## 2017-04-10 NOTE — NURSING
Dr. De Leon notified of diarrheal stools x 4 today, will send next sample for C diff, patient informed,

## 2017-04-10 NOTE — NURSING
Transferred to 1126 per w/c accompanied by RN, nursing student and wife.Nurse Etta notified by unit secretary of patient's arrival.

## 2017-04-10 NOTE — NURSING
Phone report called to Etta PHIPPS on IMTA, patient informed of pending trasnfer, wife to visit and packing personal belongings..

## 2017-04-10 NOTE — SUBJECTIVE & OBJECTIVE
Interval History:NAEON. Pt is denying any CP, SOB, abdomenal pain.  Patient states that he is feeling much better.  He is eager to be discharged.  VQ still pending.     Review of Systems   Constitutional: Negative for chills, diaphoresis, fatigue and fever.   HENT: Negative for rhinorrhea, sinus pressure, sore throat and tinnitus.    Eyes: Negative for photophobia.   Respiratory: Negative for cough, choking, chest tightness, shortness of breath, wheezing and stridor.    Cardiovascular: Negative for palpitations and leg swelling.   Gastrointestinal: Negative for abdominal pain, constipation, diarrhea and nausea.   Endocrine: Negative for polyphagia and polyuria.   Genitourinary: Negative for dysuria, flank pain and frequency.   Musculoskeletal: Negative for back pain, gait problem and joint swelling.   Skin: Negative for rash and wound.   Allergic/Immunologic: Negative for food allergies and immunocompromised state.   Neurological: Negative for seizures, syncope, weakness, light-headedness and numbness.   Psychiatric/Behavioral: Negative for agitation, behavioral problems and confusion.     Objective:     Vital Signs (Most Recent):  Temp: 98.3 °F (36.8 °C) (04/10/17 0331)  Pulse: 103 (04/10/17 0914)  Resp: 18 (04/10/17 0331)  BP: 112/72 (04/10/17 0914)  SpO2: 95 % (04/10/17 0332) Vital Signs (24h Range):  Temp:  [97.8 °F (36.6 °C)-99.3 °F (37.4 °C)] 98.3 °F (36.8 °C)  Pulse:  [] 103  Resp:  [18] 18  SpO2:  [88 %-98 %] 95 %  BP: ()/(60-78) 112/72     Weight: 77.1 kg (170 lb)  Body mass index is 24.39 kg/(m^2).    Intake/Output Summary (Last 24 hours) at 04/10/17 0949  Last data filed at 04/10/17 0900   Gross per 24 hour   Intake              710 ml   Output             1375 ml   Net             -665 ml      Physical Exam   Constitutional: He is oriented to person, place, and time. He appears well-developed and well-nourished. No distress.   HENT:   Head: Normocephalic and atraumatic.   Eyes: EOM are  normal. Pupils are equal, round, and reactive to light.   Neck: Normal range of motion. Neck supple. No JVD present.   Cardiovascular: Normal rate and regular rhythm.  Exam reveals no gallop and no friction rub.    Murmur heard.  3/6 holosystolic murmur   Pulmonary/Chest: No respiratory distress. He has no wheezes.   Mild Rhonchi bilaterally R>L   Abdominal: Soft. Bowel sounds are normal. He exhibits no distension. There is no tenderness.   Musculoskeletal: Normal range of motion. He exhibits no edema.   Neurological: He is alert and oriented to person, place, and time. No cranial nerve deficit.   Skin: Skin is warm and dry. He is not diaphoretic.   Psychiatric: He has a normal mood and affect. His behavior is normal.       Significant Labs:   CBC:   Recent Labs  Lab 04/08/17  1758 04/09/17 0444 04/10/17  0452   WBC 12.78* 9.00 8.86   HGB 11.5* 10.1* 10.4*   HCT 34.5* 30.2* 31.9*    272 261     CMP:   Recent Labs  Lab 04/08/17  1923 04/09/17 0444 04/10/17  0452   * 136 136   K 4.8 3.8 3.9    102 102   CO2 19* 22* 24   * 102 83   BUN 21 20 16   CREATININE 1.2 1.2 1.1   CALCIUM 8.9 8.8 8.5*   PROT 7.4 6.7 6.5   ALBUMIN 2.5* 2.3* 2.2*   BILITOT 0.8 0.9 0.8   ALKPHOS 95 86 83   AST 28 37 59*   ALT 44 46* 76*   ANIONGAP 12 12 10   EGFRNONAA >60.0 >60.0 >60.0       Significant Imaging: I have reviewed all pertinent imaging results/findings within the past 24 hours.

## 2017-04-10 NOTE — PLAN OF CARE
IMM discussed & signed. Copy of IMM left at bedside w/ [employer] # circled.     04/10/17 1500   Medicare Message   Important Message from Medicare regarding Discharge Appeal Rights Given to patient/caregiver;Explained to patient/caregiver;Signed/date by patient/caregiver   Date IMM was signed 04/10/17   Time IMM was signed 1500

## 2017-04-10 NOTE — PHARMACY MED REC
"Admission Medication Reconciliation - Pharmacy Consult Note    The home medication history was taken by Angely Norris, Pharmacy Tech.     You may go to "Admission" then "Reconcile Home Medications" tabs to review and/or act upon these items. Based on information gathered and subsequent review by the clinical pharmacist, the items below may need attention.    Potentially problematic discrepancies with current MAR  o Patient IS taking the following which was not ordered upon admit  o Axitinib (non-formulary, patient would need to provide their home medication to continue while inpatient)  o Brimonidine eye gtts 1 gtt into both eyes TID  o Verapamil CR 240mg PO daily  o Morphine 12h tabs 15mg PO TID      Renee Hassan, PharmD  [id number]      Patient's prior to admission medication regimen was as follows:  Medication Sig    aspirin (ECOTRIN) 81 MG EC tablet Take 81 mg by mouth once daily.    atorvastatin (LIPITOR) 40 MG tablet TAKE 1 TABLET ONE TIME DAILY (Patient taking differently: TAKE 1 TABLET BY MOUTH ONE TIME DAILY)    axitinib (INLYTA) 5 mg Tab Take 1 tablet by mouth 2 (two) times daily.    bethanechol (URECHOLINE) 50 MG tablet Take 1 tablet (50 mg total) by mouth 3 (three) times daily.    blood sugar diagnostic Strp 1 each by Misc.(Non-Drug; Combo Route) route 3 (three) times daily. Free style freedom lite test strips and lancets (Patient taking differently: 1 each by Misc.(Non-Drug; Combo Route) route. Test blood sugar twice daily every other day)    blood-glucose meter (TRUE METRIX AIR GLUCOSE METER) Misc Use as directed    brimonidine 0.1% (ALPHAGAN P) 0.1 % Drop Place 1 drop into both eyes 3 (three) times daily.    CALCIUM CARBONATE-VITAMIN D3 ORAL Take 1 tablet by mouth every morning. Calcium carbonate 1000mg vitamin d3 1600 units per patient    clopidogrel (PLAVIX) 75 mg tablet Take 1 tablet (75 mg total) by mouth once daily.    DOCUSATE CALCIUM (STOOL SOFTENER ORAL) Take by mouth once as needed (for " constipation).    duloxetine (CYMBALTA) 30 MG capsule Take 1 capsule (30 mg total) by mouth once daily.    fexofenadine (ALLEGRA) 180 MG tablet TAKE 1 TABLET ONE TIME DAILY (Patient taking differently: TAKE 1 TABLET BY MOUTH ONE TIME DAILY)    furosemide (LASIX) 40 MG tablet Take 40 mg by mouth once daily.    gabapentin (NEURONTIN) 300 MG capsule Take 2 capsules (600 mg total) by mouth 3 (three) times daily.    lancets 28 gauge Misc 1 lancet by Misc.(Non-Drug; Combo Route) route 3 (three) times daily.    metformin (GLUCOPHAGE) 500 MG tablet TAKE 1 TABLET BY MOUTH TWO TIMES A DAY WITH MEALS (Patient taking differently: TAKE 1 TABLET BY MOUTH ONE TIME A DAY WITH MEALS)    metoprolol succinate (TOPROL-XL) 25 MG 24 hr tablet Take 1 tablet (25 mg total) by mouth once daily.    nitroGLYCERIN (NITROSTAT) 0.4 MG SL tablet Place 1 tablet (0.4 mg total) under the tongue every 5 (five) minutes as needed for Chest pain.    potassium chloride SA (K-DUR,KLOR-CON) 20 MEQ tablet Take 1 tablet (20 mEq total) by mouth once daily.    tamsulosin (FLOMAX) 0.4 mg Cp24 Take 1 capsule (0.4 mg total) by mouth once daily.    verapamil (CALAN-SR) 240 MG CR tablet Take 1 tablet (240 mg total) by mouth once daily.    zolpidem (AMBIEN) 5 MG Tab Take 1 tablet (5 mg total) by mouth nightly as needed. (Patient taking differently: Take 5 mg by mouth nightly as needed (for sleep). )    morphine (MS CONTIN) 15 MG 12 hr tablet Take 1 tablet (15 mg total) by mouth every 8 (eight) hours as needed for Pain.         Please add appropriate    SmartPhrase below:

## 2017-04-10 NOTE — MEDICAL/APP STUDENT
"Progress Note  Hospital Medicine    Team: [facility] 4 Jimbo Almeida  Admit Date: 4/8/2017   Length of Stay:  LOS: 2 days    Principal Problem:  Acute respiratory failure with hypoxia  Admission Chief  Complaint: Shortness of Breath       SUBJECTIVE:   Lex Billy is a 72 y.o. male who has a past medical history of metastatic RCC with good prognosis, T2DM, HF with reduced EF, and recent hospital admitting d/c 4.4, presenting with SOB.     Hospital Course:   Previous Admit 3.28 to 4.4 for hypoxic respiratory failure with pneumonia, pt had an NSTEMI in hospital and was found to have patent stents.      Pt readmitted 4.8 for SOB, V/Q scheduled for 6:30 am 4/10, and repeat ECHO ordered. Pt doing well on oxygen.    Interval History:   NICHOL, Pt feels well. He is annoyed because he wants something done about his aortic stenosis and does not want to be in the hospital continuously.     Subjective:  Symptoms:  Stable.  No shortness of breath, malaise, cough, chest pain or chest pressure.    Diet:  Adequate intake.    Activity level: Normal.    Pain:  He reports no pain.      PMH, social hx, and family history are unchanged from 4/8/2017    OBJECTIVE:   Objective:  General Appearance:  Comfortable, well-appearing and in no acute distress.    Vital signs: (most recent): Blood pressure 112/72, pulse 103, temperature 98.3 °F (36.8 °C), temperature source Oral, resp. rate 18, height 5' 10" (1.778 m), weight 77.1 kg (170 lb), SpO2 95 %.  Vital signs are normal.    Output: Producing urine and producing stool.    Lungs:  Normal respiratory rate and normal effort.    Heart: Normal rate.  Regular rhythm.  Positive for murmur.  (Aortic stenosis 4/6)  Abdomen: Abdomen is soft.        Intake/Output Summary (Last 24 hours) at 04/10/17 0922  Last data filed at 04/10/17 0900   Gross per 24 hour   Intake              710 ml   Output             1375 ml   Net             -665 ml       ASSESSMENT/PLAN:     Problem " List:  Active Hospital Problems    Diagnosis  POA    *Acute respiratory failure with hypoxia [J96.01]  Yes    NSTEMI, initial episode of care [I21.4]  Yes    Chronic diastolic heart failure [I50.32]  Yes    Benign non-nodular prostatic hyperplasia with lower urinary tract symptoms [N40.1]  Yes    Renal cell carcinoma of right kidney [C64.1]  Yes    Severe aortic stenosis [I35.0]  Yes    Coronary artery disease involving native coronary artery of native heart without angina pectoris [I25.10]  Yes    Essential hypertension [I10]  Yes    Type 2 diabetes mellitus with stage 2 chronic kidney disease, without long-term current use of insulin [E11.22, N18.2]  Yes      Resolved Hospital Problems    Diagnosis Date Resolved POA   No resolved problems to display.       Assessment:  Lex Billy is a 72 y.o. male who has a past medical history of metastatic RCC with good prognosis, T2DM, HF with reduced EF, and recent hospital admitting d/c 4.4, presenting with SOB with elevated BNP. Pt is awaiting VQ scan today, ECHO, and cardiology consult for replacement of the valve. Previously was not a candidate due to a poor RCC prognosis, but pt has outlived original prognosis and has a good prognosis for another year.       Plan:  1. SOB  - 2L NC is adequate to maintain sats  - VQ Scan scheduled today  - ECHO ordered to assess for damage from NSTEMI    - Continue ASA 81mg QD   - Continue Plavix 75 QD   - Continue statin  - Consult cardiology per TAVR    2 HTN  - continue toprol XL 25 QD    3 T2DM  - Diet controlled    4 RCC  - Continue axitinib per heme/onc        VTE Risk Mitigation         Ordered     Medium Risk of VTE  Once      04/08/17 2044            Dispostion:   Giuseppe VQ scan, ECHO, cardiology consult today for TAVR      Jimbo Almeida  Medical Student  Department of Hospital Medicine

## 2017-04-11 NOTE — PLAN OF CARE
Problem: Patient Care Overview  Goal: Plan of Care Review  Outcome: Ongoing (interventions implemented as appropriate)    04/11/17 0967   Coping/Psychosocial   Plan Of Care Reviewed With Aortic stenosis patient   Patiet awaiting I.R. Consult in the am. No diarrhea reported so far this shift. Patient aware of needed specimen. Gardner cath. In tact for urinary retention.

## 2017-04-11 NOTE — ASSESSMENT & PLAN NOTE
- possible decompensation based on BNP however does not correlate to clinical picture  - admission BNP 1068; previous admission 474 3/28   - 2D echo EF 40; PA 43.05; severe aortic stenosis  - no signs of volume overload; no JVD or pleural effusion  - gave a dose of furosemide 40 mg IV, tolerated well  - continue home ASA/statin/BB

## 2017-04-11 NOTE — PROGRESS NOTES
[facility] Hospital Medicine  Progress Note    Patient Name: Lex Billy  MRN: [id number]  Patient Class: IP- Inpatient   Admission Date: 4/8/2017  Length of Stay: 3 days  Attending Physician: Maria Luisa Eastman MD  Primary Care Provider: Everette Rowan MD, MD    Hospital Medicine Team: [facility] 4 Laury Chauhan MD    Subjective:     Principal Problem:Acute respiratory failure with hypoxia    HPI:  Mr. Natanael Billy is a 73 yo M here with worsening SOB. He was recently hospitalized from 3/28-4/4 for similar symptoms of worsening dyspnea. He has PMH significant for metastatic renal cell carcinoma on chemotherapy, DM2, HTN, CAD s/p stents, CHF (EF 40%), and severe AS. Since being discharged patient reports dyspnea beginning last night worse than his baseline resulting in a poor sleep. He denies fever, changes in cough, wheezing, chest pain, hemoptysis, lightheadedness, or change in bowel habits. He is not on home oxygen. In the ED he was found to hypoxic to the mid-80s; improved to 95% SpO2 on 3-4L NC. Labs showed increase of BNP at 1068 from 474 on 3/28. He was subsequently given 40 mg IV lasix x1. Troponin decreased from previous. No CTA was performed in the ED due to recent negative, improvement on oxygen, and increased interval BNP. Patient with no clinical signs of heart failure or volume overload. CXR negative for pleural effusions or PNA.     Per last admission notes patient underwent a CTA in the ED which was negative for PE; ; troponin 0.127. He was subsequently admitted to the ICU on 3/29/17 for hypoxic respiratory failure. Antibiotics were started to cover for pneumonia (vanc and cefepime; cetirizine) and HFNC for hypoxia. Patient tolerated weaning to nasal canula while maintaining saturations. He was later transitioned to 5 day course of azithromycin/augmentin (completed course on 4/4/17). On 3/31, patient stepped down to hospital medicine team. He was then noted to have  worseing troponins (1.6-1.7) and a new q wave on EKG.  Cardiology was consulted for persistent tropenemia; NSTEMI and patient was placed on ACS protocol. On 4/3/17 he underwent LHC which found patent stents; no intervention was needed. He was then cleared for discharge to follow up as an outpatient. At that time his dyspnea had returned to baseline.       Hospital Course:  Patient admitted to 4.  VQ scan ordered to r/o PE.     Interval History: NAEON. Pt has no complaints.  He is denying any CP, SOB.  He states that he had some watery diarrhea. Interventional cards is going to come see the patient today.       Review of Systems   Constitutional: Negative for chills, diaphoresis, fatigue and fever.   HENT: Negative for rhinorrhea, sinus pressure, sore throat and tinnitus.    Eyes: Negative for photophobia.   Respiratory: Negative for cough, choking, chest tightness, shortness of breath, wheezing and stridor.    Cardiovascular: Negative for palpitations and leg swelling.   Gastrointestinal: Negative for abdominal pain, constipation, diarrhea and nausea.   Endocrine: Negative for polyphagia and polyuria.   Genitourinary: Positive for difficulty urinating. Negative for dysuria, flank pain and frequency.   Musculoskeletal: Negative for back pain, gait problem and joint swelling.   Skin: Negative for rash and wound.   Allergic/Immunologic: Negative for food allergies and immunocompromised state.   Neurological: Negative for seizures, syncope, weakness, light-headedness and numbness.   Psychiatric/Behavioral: Negative for agitation, behavioral problems and confusion.     Objective:     Vital Signs (Most Recent):  Temp: 98 °F (36.7 °C) (04/11/17 0559)  Pulse: 94 (04/11/17 0700)  Resp: 18 (04/11/17 0030)  BP: 100/60 (04/11/17 0030)  SpO2: 97 % (04/11/17 0030) Vital Signs (24h Range):  Temp:  [97.7 °F (36.5 °C)-99.1 °F (37.3 °C)] 98 °F (36.7 °C)  Pulse:  [] 94  Resp:  [18] 18  SpO2:  [95 %-99 %] 97 %  BP:  ()/(53-72) 100/60     Weight: 77.1 kg (170 lb)  Body mass index is 24.39 kg/(m^2).    Intake/Output Summary (Last 24 hours) at 04/11/17 0742  Last data filed at 04/11/17 0600   Gross per 24 hour   Intake              150 ml   Output             2050 ml   Net            -1900 ml      Physical Exam   Constitutional: He is oriented to person, place, and time. He appears well-developed and well-nourished. No distress.   HENT:   Head: Normocephalic and atraumatic.   Eyes: EOM are normal. Pupils are equal, round, and reactive to light.   Neck: Normal range of motion. Neck supple. No JVD present.   Cardiovascular: Normal rate and regular rhythm.  Exam reveals no gallop and no friction rub.    Murmur heard.  3/6 holosystolic murmur   Pulmonary/Chest: No respiratory distress. He has no wheezes.   Mild Rhonchi bilaterally R>L   Abdominal: Soft. Bowel sounds are normal. He exhibits no distension. There is no tenderness.   Musculoskeletal: Normal range of motion. He exhibits no edema.   Neurological: He is alert and oriented to person, place, and time. No cranial nerve deficit.   Skin: Skin is warm and dry. He is not diaphoretic.   Psychiatric: He has a normal mood and affect. His behavior is normal.       Significant Labs:   CBC:   Recent Labs  Lab 04/10/17  0452   WBC 8.86   HGB 10.4*   HCT 31.9*        CMP:   Recent Labs  Lab 04/10/17  0452 04/11/17  0454    138   K 3.9 4.5    103   CO2 24 25   GLU 83 85   BUN 16 20   CREATININE 1.1 1.0   CALCIUM 8.5* 8.3*   PROT 6.5 6.1   ALBUMIN 2.2* 2.4*   BILITOT 0.8 0.4   ALKPHOS 83 88   AST 59* 48*   ALT 76* 72*   ANIONGAP 10 10   EGFRNONAA >60.0 >60.0       Significant Imaging: I have reviewed all pertinent imaging results/findings within the past 24 hours.    Assessment/Plan:      * Acute respiratory failure with hypoxia  - patient presented with acute decompensated respiratory failure; improved to 95% with 3L NC in the ED   - presentation is much improved;  "patient reports SOB not as bad as last admission  - CXR 4/8: no interval change; no pleural effusions or PTX  - WBC normal; no fevers or chills  -likely 2/2 to AS  -VQ scan shows low probability of PE  -ECHO showed similar results to previous echo  -Interventional cardiology consulted to discuss TAVR vs BAV for his symptomatic AS      Essential hypertension  -Toprol XL 25mg QD           Type 2 diabetes mellitus with stage 2 chronic kidney disease, without long-term current use of insulin  - patient reports control with diet; SSI mild         Severe aortic stenosis  --- Repeated 2D ECHO showed EF 40% with severe aortic stenosis, relatively unchanged  --- Not a TAVR candidate due to RCC   --- moderate AS with regards to mean gradient of 34 mmHg, despite NAMAN < 1 cm2  --- IC recs of not a surgical candidate   --- EF reduction from 50 % to 40 %  --- Increase toprol 12.5 to 25 mg OD  ---As per hem/onc, "His performance status remains excellent- would proceed with appropriate cardiac intervention as life expectancy still expected > 1 year as can be best approximated"  ---pending V/Q scan results, elver consult interventional cardiology for potential TAVR vs BAV  ---40 of lasix this AM  -interventional cardiology will see patient this AM    Renal cell carcinoma of right kidney  - Metastasis to left posterior 12th rib and right sacrum. Continue axitinib.   - Pt stated he has not been taking his axitinib due to running out of prescription and not being able to afford refill  - Will defer to oncologist for further treatment          Chronic diastolic heart failure  - possible decompensation based on BNP however does not correlate to clinical picture  - admission BNP 1068; previous admission 474 3/28   - 2D echo EF 40; PA 43.05; severe aortic stenosis  - no signs of volume overload; no JVD or pleural effusion  - gave a dose of furosemide 40 mg IV, tolerated well  - continue home ASA/statin/BB       Benign non-nodular prostatic " hyperplasia with lower urinary tract symptoms  -continued home tamsulosin    NSTEMI, initial episode of care  --- continued ASA 81 mg QD  --- Continue plavix 75 mg QD  --- Continue high dose statins, atorvastatin 80 mg QD  --- patient went to cath lab, but no intervention was preformed as patent LM and RCA stents 4/3/17      VTE Risk Mitigation         Ordered     Medium Risk of VTE  Once      04/08/17 2044          Laury Chauhan MD  Department of Hospital Medicine   [facility]

## 2017-04-11 NOTE — ASSESSMENT & PLAN NOTE
"--- Repeated 2D ECHO showed EF 40% with severe aortic stenosis, relatively unchanged  --- Not a TAVR candidate due to RCC   --- moderate AS with regards to mean gradient of 34 mmHg, despite NAMAN < 1 cm2  --- IC recs of not a surgical candidate   --- EF reduction from 50 % to 40 %  --- Increase toprol 12.5 to 25 mg OD and then 50mg before dc  ---As per hem/onc, "His performance status remains excellent- would proceed with appropriate cardiac intervention as life expectancy still expected > 1 year as can be best approximated"  ---V/Q scan results show low probability of PE, Echo shows results unchanged from previous echo  ---40 of lasix this AM  -interventional cardiology will see patient this AM and state that he will be medically managed (final recs include metoprolol 50mg, lasix 40mg and lisinopril 5mg)  -patient will have follow up with cards  "

## 2017-04-11 NOTE — SUBJECTIVE & OBJECTIVE
Interval History: NAEON. Pt has no complaints.  He is denying any CP, SOB.  He states that he had some watery diarrhea. Interventional cards is going to come see the patient today.       Review of Systems   Constitutional: Negative for chills, diaphoresis, fatigue and fever.   HENT: Negative for rhinorrhea, sinus pressure, sore throat and tinnitus.    Eyes: Negative for photophobia.   Respiratory: Negative for cough, choking, chest tightness, shortness of breath, wheezing and stridor.    Cardiovascular: Negative for palpitations and leg swelling.   Gastrointestinal: Negative for abdominal pain, constipation, diarrhea and nausea.   Endocrine: Negative for polyphagia and polyuria.   Genitourinary: Positive for difficulty urinating. Negative for dysuria, flank pain and frequency.   Musculoskeletal: Negative for back pain, gait problem and joint swelling.   Skin: Negative for rash and wound.   Allergic/Immunologic: Negative for food allergies and immunocompromised state.   Neurological: Negative for seizures, syncope, weakness, light-headedness and numbness.   Psychiatric/Behavioral: Negative for agitation, behavioral problems and confusion.     Objective:     Vital Signs (Most Recent):  Temp: 98 °F (36.7 °C) (04/11/17 0559)  Pulse: 94 (04/11/17 0700)  Resp: 18 (04/11/17 0030)  BP: 100/60 (04/11/17 0030)  SpO2: 97 % (04/11/17 0030) Vital Signs (24h Range):  Temp:  [97.7 °F (36.5 °C)-99.1 °F (37.3 °C)] 98 °F (36.7 °C)  Pulse:  [] 94  Resp:  [18] 18  SpO2:  [95 %-99 %] 97 %  BP: ()/(53-72) 100/60     Weight: 77.1 kg (170 lb)  Body mass index is 24.39 kg/(m^2).    Intake/Output Summary (Last 24 hours) at 04/11/17 0742  Last data filed at 04/11/17 0600   Gross per 24 hour   Intake              150 ml   Output             2050 ml   Net            -1900 ml      Physical Exam   Constitutional: He is oriented to person, place, and time. He appears well-developed and well-nourished. No distress.   HENT:   Head:  Normocephalic and atraumatic.   Eyes: EOM are normal. Pupils are equal, round, and reactive to light.   Neck: Normal range of motion. Neck supple. No JVD present.   Cardiovascular: Normal rate and regular rhythm.  Exam reveals no gallop and no friction rub.    Murmur heard.  3/6 holosystolic murmur   Pulmonary/Chest: No respiratory distress. He has no wheezes.   Mild Rhonchi bilaterally R>L   Abdominal: Soft. Bowel sounds are normal. He exhibits no distension. There is no tenderness.   Musculoskeletal: Normal range of motion. He exhibits no edema.   Neurological: He is alert and oriented to person, place, and time. No cranial nerve deficit.   Skin: Skin is warm and dry. He is not diaphoretic.   Psychiatric: He has a normal mood and affect. His behavior is normal.       Significant Labs:   CBC:   Recent Labs  Lab 04/10/17  0452   WBC 8.86   HGB 10.4*   HCT 31.9*        CMP:   Recent Labs  Lab 04/10/17  0452 04/11/17  0454    138   K 3.9 4.5    103   CO2 24 25   GLU 83 85   BUN 16 20   CREATININE 1.1 1.0   CALCIUM 8.5* 8.3*   PROT 6.5 6.1   ALBUMIN 2.2* 2.4*   BILITOT 0.8 0.4   ALKPHOS 83 88   AST 59* 48*   ALT 76* 72*   ANIONGAP 10 10   EGFRNONAA >60.0 >60.0       Significant Imaging: I have reviewed all pertinent imaging results/findings within the past 24 hours.

## 2017-04-11 NOTE — ASSESSMENT & PLAN NOTE
-Toprol XL 25mg QD on admit but was titrated up to 50mg.    -patient will be discharged on this medication

## 2017-04-11 NOTE — PLAN OF CARE
Problem: Fall Risk (Adult)  Goal: Absence of Falls  Patient will demonstrate the desired outcomes by discharge/transition of care.   Patient maintained free of falls during hospital stay. .Bekah Nance RN

## 2017-04-11 NOTE — DISCHARGE SUMMARY
[facility] Hospital Medicine  Discharge Summary      Patient Name: Lex Billy  MRN: [id number]  Admission Date: 4/8/2017  Hospital Length of Stay: 3 days  Discharge Date and Time:  04/11/2017 4:14 PM  Attending Physician: Maria Luisa Eastman MD   Discharging Provider: Laury Chauhan MD  Primary Care Provider: Everette Rowan MD, MD  Hospital Medicine Team: [facility] 4 Laury Chauhan MD    HPI:   Mr. Natanael Billy is a 73 yo M here with worsening SOB. He was recently hospitalized from 3/28-4/4 for similar symptoms of worsening dyspnea. He has PMH significant for metastatic renal cell carcinoma on chemotherapy, DM2, HTN, CAD s/p stents, CHF (EF 40%), and severe AS. Since being discharged patient reports dyspnea beginning last night worse than his baseline resulting in a poor sleep. He denies fever, changes in cough, wheezing, chest pain, hemoptysis, lightheadedness, or change in bowel habits. He is not on home oxygen. In the ED he was found to hypoxic to the mid-80s; improved to 95% SpO2 on 3-4L NC. Labs showed increase of BNP at 1068 from 474 on 3/28. He was subsequently given 40 mg IV lasix x1. Troponin decreased from previous. No CTA was performed in the ED due to recent negative, improvement on oxygen, and increased interval BNP. Patient with no clinical signs of heart failure or volume overload. CXR negative for pleural effusions or PNA.     Per last admission notes patient underwent a CTA in the ED which was negative for PE; ; troponin 0.127. He was subsequently admitted to the ICU on 3/29/17 for hypoxic respiratory failure. Antibiotics were started to cover for pneumonia (vanc and cefepime; cetirizine) and HFNC for hypoxia. Patient tolerated weaning to nasal canula while maintaining saturations. He was later transitioned to 5 day course of azithromycin/augmentin (completed course on 4/4/17). On 3/31, patient stepped down to hospital medicine team. He was then noted to have  worseing troponins (1.6-1.7) and a new q wave on EKG.  Cardiology was consulted for persistent tropenemia; NSTEMI and patient was placed on ACS protocol. On 4/3/17 he underwent LHC which found patent stents; no intervention was needed. He was then cleared for discharge to follow up as an outpatient. At that time his dyspnea had returned to baseline.       * No surgery found *      Indwelling Lines/Drains at time of discharge:   Lines/Drains/Airways     Drain                 Urethral Catheter 04/08/17 2044 Straight-tip 16 Fr. 2 days              Hospital Course:   Patient came in for SOB and admitted to 4.  Primary differential included AS, but we wanted to r/o PE.  VQ scan ordered to r/o PE which showed low probability.  Echo did not significantly change since last ECHO.  Interventional cardiology was consulted and stated that he is not a TAVR candidiate but would consider discussing BAV outpatient but for now he would be medically managed.  Medical management is lisinopril 5mg, metoprolol 50mg and continuation of lasix 40mg PO.  Patient does not wish to have Home Health but will be set up with a cardiologist outpatient.      Consults:   Consults         Status Ordering Provider     Inpatient consult to Interventional Cardiology  Once     Provider:  (Not yet assigned)    Completed XIAO BURNS          Significant Diagnostic Studies: Labs:   CMP   Recent Labs  Lab 04/10/17  0452 04/11/17  0454    138   K 3.9 4.5    103   CO2 24 25   GLU 83 85   BUN 16 20   CREATININE 1.1 1.0   CALCIUM 8.5* 8.3*   PROT 6.5 6.1   ALBUMIN 2.2* 2.4*   BILITOT 0.8 0.4   ALKPHOS 83 88   AST 59* 48*   ALT 76* 72*   ANIONGAP 10 10   ESTGFRAFRICA >60.0 >60.0   EGFRNONAA >60.0 >60.0    and CBC   Recent Labs  Lab 04/10/17  0452 04/11/17  0454   WBC 8.86 9.41   HGB 10.4* 10.7*   HCT 31.9* 34.2*    305     Microbiology:   Urine Culture    Lab Results   Component Value Date    LABURIN No growth 03/29/2017       Pending  Diagnostic Studies:     None        Final Active Diagnoses:    Diagnosis Date Noted POA    PRINCIPAL PROBLEM:  Acute respiratory failure with hypoxia [J96.01] 03/29/2017 Yes    NSTEMI, initial episode of care [I21.4] 03/31/2017 Yes    Chronic diastolic heart failure [I50.32] 03/29/2017 Yes    Benign non-nodular prostatic hyperplasia with lower urinary tract symptoms [N40.1] 03/29/2017 Yes    Renal cell carcinoma of right kidney [C64.1] 02/11/2016 Yes    Severe aortic stenosis [I35.0] 01/31/2016 Yes    Coronary artery disease involving native coronary artery of native heart without angina pectoris [I25.10] 10/22/2015 Yes    Essential hypertension [I10] 08/06/2012 Yes    Type 2 diabetes mellitus with stage 2 chronic kidney disease, without long-term current use of insulin [E11.22, N18.2] 08/06/2012 Yes      Problems Resolved During this Admission:    Diagnosis Date Noted Date Resolved POA      * Acute respiratory failure with hypoxia  - patient presented with acute decompensated respiratory failure; improved to 95% with 3L NC in the ED   - presentation is much improved; patient reports SOB not as bad as last admission  - CXR 4/8: no interval change; no pleural effusions or PTX  - WBC normal; no fevers or chills  -likely 2/2 to AS  -VQ scan shows low probability of PE  -ECHO showed similar results to previous echo  -Interventional cardiology consulted; medical management for now with potential BAV in the future for his symptomatic AS      Essential hypertension  -Toprol XL 25mg QD on admit but was titrated up to 50mg.    -patient will be discharged on this medication           Type 2 diabetes mellitus with stage 2 chronic kidney disease, without long-term current use of insulin  - patient reports control with diet; SSI mild   -      Coronary artery disease involving native coronary artery of native heart without angina pectoris  - s/p PCI in 2015; continue DAPT, BB, statin   - patient underwent LHC previously  "but no intervention was preformed as patent LM and RCA stents 4/3/17      Severe aortic stenosis  --- Repeated 2D ECHO showed EF 40% with severe aortic stenosis, relatively unchanged  --- Not a TAVR candidate due to RCC   --- moderate AS with regards to mean gradient of 34 mmHg, despite NAMAN < 1 cm2  --- IC recs of not a surgical candidate   --- EF reduction from 50 % to 40 %  --- Increase toprol 12.5 to 25 mg OD and then 50mg before dc  ---As per hem/onc, "His performance status remains excellent- would proceed with appropriate cardiac intervention as life expectancy still expected > 1 year as can be best approximated"  ---V/Q scan results show low probability of PE, Echo shows results unchanged from previous echo  ---40 of lasix this AM  -interventional cardiology will see patient this AM and state that he will be medically managed (final recs include metoprolol 50mg, lasix 40mg and lisinopril 5mg)  -patient will have follow up with cards    Renal cell carcinoma of right kidney  - Metastasis to left posterior 12th rib and right sacrum. Continue axitinib.   - Pt stated he has not been taking his axitinib due to running out of prescription and not being able to afford refill  -pt will follow up with oncologist outpatient         Chronic diastolic heart failure  - possible decompensation based on BNP however does not correlate to clinical picture  - admission BNP 1068; previous admission 474 3/28   - 2D echo EF 40; PA 43.05; severe aortic stenosis  - no signs of volume overload; no JVD or pleural effusion  - continued on furosemide 40 mg IV, tolerated well  - continued home ASA/statin  -increased his BB from 12.5 to 25 and then to 50mg on dc      Benign non-nodular prostatic hyperplasia with lower urinary tract symptoms  -continued home tamsulosin    NSTEMI, initial episode of care  --- continued ASA 81 mg QD  --- Continued plavix 75 mg QD  --- Continued high dose statins, atorvastatin 80 mg QD  --- patient went to " cath lab on last admit, but no intervention was preformed as patent LM and RCA stents 4/3/17        Discharged Condition: good    Disposition:     Follow Up:  Follow-up Information     Follow up with [facility] On 4/17/2017.    Why:  Monday 4/17 @ 2pm    Contact information:    [address]  [place] [address]  [phone]          Patient Instructions:     Ambulatory Referral to Cardiology   Referral Priority: Routine Referral Type: Consultation   Referral Reason: Specialty Services Required    Requested Specialty: Cardiology    Number of Visits Requested: 1        Medications:  Reconciled Home Medications:   Current Discharge Medication List      START taking these medications    Details   lisinopril (PRINIVIL,ZESTRIL) 5 MG tablet Take 1 tablet (5 mg total) by mouth once daily.  Qty: 90 tablet, Refills: 3         CONTINUE these medications which have CHANGED    Details   metoprolol succinate (TOPROL-XL) 50 MG 24 hr tablet Take 1 tablet (50 mg total) by mouth once daily.  Qty: 90 tablet, Refills: 3         CONTINUE these medications which have NOT CHANGED    Details   aspirin (ECOTRIN) 81 MG EC tablet Take 81 mg by mouth once daily.      atorvastatin (LIPITOR) 40 MG tablet TAKE 1 TABLET ONE TIME DAILY  Qty: 90 tablet, Refills: 3    Associated Diagnoses: Hyperlipidemia      axitinib (INLYTA) 5 mg Tab Take 1 tablet by mouth 2 (two) times daily.  Qty: 60 tablet, Refills: 3    Associated Diagnoses: 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      bethanechol (URECHOLINE) 50 MG tablet Take 1 tablet (50 mg total) by mouth 3 (three) times daily.  Qty: 90 tablet, Refills: 11    Associated Diagnoses: Urinary retention      blood sugar diagnostic Strp 1 each by Misc.(Non-Drug; Combo Route) route 3 (three) times daily. Free style freedom lite test strips and lancets  Qty: 90 each, Refills: 3      blood-glucose meter (TRUE METRIX AIR GLUCOSE METER) Misc Use as directed  Qty: 1 each, Refills: 0       brimonidine 0.1% (ALPHAGAN P) 0.1 % Drop Place 1 drop into both eyes 3 (three) times daily.      CALCIUM CARBONATE-VITAMIN D3 ORAL Take 1 tablet by mouth every morning. Calcium carbonate 1000mg vitamin d3 1600 units per patient      clopidogrel (PLAVIX) 75 mg tablet Take 1 tablet (75 mg total) by mouth once daily.  Qty: 90 tablet, Refills: 3      DOCUSATE CALCIUM (STOOL SOFTENER ORAL) Take by mouth once as needed (for constipation).      duloxetine (CYMBALTA) 30 MG capsule Take 1 capsule (30 mg total) by mouth once daily.  Qty: 30 capsule, Refills: 0      fexofenadine (ALLEGRA) 180 MG tablet TAKE 1 TABLET ONE TIME DAILY  Qty: 90 tablet, Refills: 3    Associated Diagnoses: Environmental allergies      furosemide (LASIX) 40 MG tablet Take 40 mg by mouth once daily.      gabapentin (NEURONTIN) 300 MG capsule Take 2 capsules (600 mg total) by mouth 3 (three) times daily.  Qty: 540 capsule, Refills: 3      lancets 28 gauge Misc 1 lancet by Misc.(Non-Drug; Combo Route) route 3 (three) times daily.  Qty: 300 each, Refills: 3      metformin (GLUCOPHAGE) 500 MG tablet TAKE 1 TABLET BY MOUTH TWO TIMES A DAY WITH MEALS  Qty: 60 tablet, Refills: 5      nitroGLYCERIN (NITROSTAT) 0.4 MG SL tablet Place 1 tablet (0.4 mg total) under the tongue every 5 (five) minutes as needed for Chest pain.  Qty: 25 tablet, Refills: 4      potassium chloride SA (K-DUR,KLOR-CON) 20 MEQ tablet Take 1 tablet (20 mEq total) by mouth once daily.  Qty: 90 tablet, Refills: 4      tamsulosin (FLOMAX) 0.4 mg Cp24 Take 1 capsule (0.4 mg total) by mouth once daily.  Qty: 30 capsule, Refills: 11    Associated Diagnoses: Urinary retention      zolpidem (AMBIEN) 5 MG Tab Take 1 tablet (5 mg total) by mouth nightly as needed.  Qty: 30 tablet, Refills: 3    Associated Diagnoses: Insomnia      morphine (MS CONTIN) 15 MG 12 hr tablet Take 1 tablet (15 mg total) by mouth every 8 (eight) hours as needed for Pain.  Qty: 90 tablet, Refills: 0    Associated  Diagnoses: Pain         STOP taking these medications       verapamil (CALAN-SR) 240 MG CR tablet Comments:   Reason for Stopping:             Time spent on the discharge of patient: 60 minutes    Laury Chauhan MD  Department of Hospital Medicine  [facility]

## 2017-04-11 NOTE — CONSULTS
Interventional Cardiology Consult Note    Consulting physician: Dr. Chauhan, Dr. Valdez (oncologist)  Reason for consult: Severe AS    HPI  Patient is a 71 y/o male with hx of severe AS (NAMAN 0.72, MG 43), HTN, HLD, DM, renal clear cell CA with mets yo left posterior rib and sacrum with increase in size of mets and renal mass (no nephrectomy bc low utility due to mets), whom we are consulted to reevaluate for severe AS.   As per patient he was admitted this hospital stay for SOB, which resolved with lasix IV x 1.  Denies CP, SOB/MAHARAJ, orthopnea, PND, syncope. At baseline can mow lawn without stopping.     4/10/2017    1 - Mildly to moderately depressed left ventricular systolic function (EF 40-45%).     2 - Normal right ventricular systolic function .     3 - Left ventricular diastolic dysfunction.     4 - Mild mitral regurgitation.     5 - Mild aortic regurgitation.     6 - Severe aortic stenosis, NAMNA = 0.72 cm2, peak velocity = 4.3 m/s, mean gradient = 43.0 mmHg.     7 - Increased central venous pressure.     Lex Billy is a 72 y.o. male referred by Dr Chauhan for evaluation of severe AS (NYHA Class II sx).    The patient has undergone the following TAVR work-up:   ECHO (Date 4/1//2017): NAMAN= 0.72 cm2, MG= 43mmHg, Peak Brant= 4.3 m/s, EF= 40%.   LHC (Date 4/3/2017): non-obstructive CAD, mid LAD 50% lesion  STS: 3.13%   Frailty:pending  Iliacs are pending  LVOT area by pending  Incidental findings on CT: Interval increase in size of right renal mass, Interval increase in size of osseous metastatic lesion to the left posterior 12th rib, Moderate bilateral pleural effusions.      Lex Billy is a not a TAVR candidate.         Review of Systems   Constitution: Negative for weakness, malaise/fatigue, weight gain and weight loss.   HENT: Negative for congestion, headaches, nosebleeds and sore throat.   Eyes: Negative for blurred vision, double vision, pain and visual disturbance.   Cardiovascular: Negative  "for chest pain, claudication, cyanosis, dyspnea on exertion, irregular heartbeat, leg swelling, near-syncope, orthopnea, palpitations, paroxysmal nocturnal dyspnea and syncope.   Respiratory: Negative for cough, hemoptysis, shortness of breath, snoring, sputum production and wheezing.   Endocrine: Negative for polydipsia and polyuria.   Hematologic/Lymphatic: Negative for bleeding problem. Does not bruise/bleed easily.   Skin: Negative for color change, itching, poor wound healing and rash.   Musculoskeletal: Negative for arthritis, back pain, joint pain, muscle weakness, myalgias and neck pain.   Gastrointestinal: Negative for abdominal pain, anorexia, constipation, diarrhea, heartburn, hematemesis, hematochezia, hemorrhoids, jaundice, melena, nausea and vomiting.   Neurological: Negative for excessive daytime sleepiness, dizziness, focal weakness, light-headedness, loss of balance, numbness, paresthesias, seizures, sensory change, tremors and vertigo.         Objective:  BP (!) 99/59 (BP Location: Left arm, Patient Position: Lying, BP Method: Automatic)  Pulse 84  Temp 97.4 °F (36.3 °C) (Oral)   Resp 18  Ht 5' 10" (1.778 m)  Wt 77.1 kg (170 lb)  SpO2 95%  BMI 24.39 kg/m2      Physical Exam   Constitutional: He is oriented to person, place, and time. He appears well-developed and well-nourished. No distress.   HENT:   Head: Normocephalic and atraumatic.   Eyes: Conjunctivae and EOM are normal. Pupils are equal, round, and reactive to light. No scleral icterus.   Neck: Normal range of motion. Neck supple. No JVD present. No tracheal deviation present. No thyromegaly present.   Cardiovascular: Normal rate, regular rhythm and normal pulses. PMI is not displaced. Exam reveals no gallop, no S3, no S4 and no friction rub.   Murmur heard.  Harsh midsystolic murmur is present with a grade of 3/6 at the upper right sternal border radiating to the neck  Pulses:  Carotid pulses are 2+ on the right side, and 2+ on the " left side.  Radial pulses are 2+ on the right side, and 2+ on the left side.   Femoral pulses are 2+ on the right side, and 2+ on the left side.  Dorsalis pedis pulses are 2+ on the right side, and 2+ on the left side.   Posterior tibial pulses are 2+ on the right side, and 2+ on the left side.   Pulmonary/Chest: Effort normal and breath sounds normal. He has no wheezes. He has no rales.   Abdominal: Soft. Bowel sounds are normal. He exhibits no distension and no mass. There is no tenderness.   Musculoskeletal: Normal range of motion. He exhibits no edema or tenderness.   Neurological: He is alert and oriented to person, place, and time.   Skin: Skin is warm and dry. No rash noted. No erythema.   Psychiatric: He has a normal mood and affect. His behavior is normal. Judgment and thought content normal.          Assessment:       1. AS (aortic stenosis) -   Severe - Not a TAVR candidate.  As per current guidelines  TAVR is not recommended in patients in whom existing comorbidities would preclude the expected benefit from correction.  Class III, no benefit, Level of Evidence B.  If patient fails medical therapy and has recurrent HF symptoms may consider palliative balloon valvuloplasty at that point in time.   - restart lasix 40mg PO daily   - untitrate BB to toprol XL 50mg daily   - adequate afterload reduction with ace/arb   2. HTN (hypertension) - controlled   3. HLD (hyperlipidemia) - on lipitor   4. DM (diabetes mellitus)    5. Renal cell adenocarcinoma, - with mets enlarging - following with uorolgy and heme/onc (Dr Valdez)        Plan:       -Continue medical management  - No role for TAVR. Would do BAV in the future for severe, symptomatic AS as long as the patient is a full code.   - TAVR is not recommended in patients in whom existing comorbidities would preclude the expected benefit from correction.  Class III, no benefit, Level of Evidence B.     Cookie Mcfarland MD  Interventional Cardiology    Staff:  This  patient was admitted with his first episode of heart failure and had a non-diagnostic angiogram (underfilled LCA with 15FPS).  If he has recurrent CHF I would recommend he have and angiogram and possible PCI of the LAD.  If he doesn't have LAD disease or has recurrent CHF refractory to medical therapy from presumed AS, would offer BAV as palliation.  No role for TAVR because of metastatic CA.

## 2017-04-11 NOTE — PROGRESS NOTES
Patient and wife received discharge instructions.  Patient and wife understands instructions. Removed jauregui catheter and IV. Patient tolerated well. Patient will follow up with appointments. Patient is currently waiting on wheelchair.   .Bekah Nance RN .4/11/2017  .5:43 PM

## 2017-04-11 NOTE — ASSESSMENT & PLAN NOTE
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 Continue axitinib.   - Pt stated he has not been taking his axitinib due to running out of prescription and not being able to afford refill  -pt will follow up with oncologist outpatient

## 2017-04-11 NOTE — ASSESSMENT & PLAN NOTE
"--- Repeated 2D ECHO showed EF 40% with severe aortic stenosis, relatively unchanged  --- Not a TAVR candidate due to RCC   --- moderate AS with regards to mean gradient of 34 mmHg, despite NAMAN < 1 cm2  --- IC recs of not a surgical candidate   --- EF reduction from 50 % to 40 %  --- Increase toprol 12.5 to 25 mg OD  ---As per hem/onc, "His performance status remains excellent- would proceed with appropriate cardiac intervention as life expectancy still expected > 1 year as can be best approximated"  ---pending V/Q scan results, elver consult interventional cardiology for potential TAVR vs BAV  ---40 of lasix this AM  -interventional cardiology will see patient this AM  "

## 2017-04-11 NOTE — ASSESSMENT & PLAN NOTE
- patient presented with acute decompensated respiratory failure; improved to 95% with 3L NC in the ED   - presentation is much improved; patient reports SOB not as bad as last admission  - CXR 4/8: no interval change; no pleural effusions or PTX  - WBC normal; no fevers or chills  -likely 2/2 to AS  -VQ scan shows low probability of PE  -ECHO showed similar results to previous echo  -Interventional cardiology consulted; medical management for now with potential BAV in the future for his symptomatic AS

## 2017-04-11 NOTE — ASSESSMENT & PLAN NOTE
--- continued ASA 81 mg QD  --- Continued plavix 75 mg QD  --- Continued high dose statins, atorvastatin 80 mg QD  --- patient went to cath lab on last admit, but no intervention was preformed as patent LM and RCA stents 4/3/17

## 2017-04-11 NOTE — ASSESSMENT & PLAN NOTE
- patient presented with acute decompensated respiratory failure; improved to 95% with 3L NC in the ED   - presentation is much improved; patient reports SOB not as bad as last admission  - CXR 4/8: no interval change; no pleural effusions or PTX  - WBC normal; no fevers or chills  -likely 2/2 to AS  -VQ scan shows low probability of PE  -ECHO showed similar results to previous echo  -Interventional cardiology consulted to discuss TAVR vs BAV for his symptomatic AS

## 2017-04-11 NOTE — PT/OT/SLP DISCHARGE
Occupational Therapy Discharge Summary    Lex Billy  MRN: [id number]   Sepsis with acute organ dysfunction   Patient Discharged from acute Occupational Therapy on 4/7/17.     Assessment:   Patient was discharge unexpectedly.  Information required to complete and accurate discharge summary is unknown.  Refer to therapy initial evaluation and last progress note for initial and most recent functional status and goal achievement.  Recommendations made may be found in medical record.  GOALS:   Occupational Therapy Goals        Problem: Occupational Therapy Goal    Goal Priority Disciplines Outcome Interventions   Occupational Therapy Goal     OT, PT/OT Ongoing (interventions implemented as appropriate)    Description:  Goals to be met by: 4/5/17     Patient will increase functional independence with ADLs by performing:    Feeding with [place].  UE Dressing with Modified [place].  LE Dressing with Modified [place].  Grooming while standing at sink with Modified [place].  Toileting from toilet with Modified [place] for hygiene and clothing management.   Toilet transfer to toilet with Modified [place].              Reasons for Discontinuation of Therapy Services  Transfer to alternate level of care.      Plan:  Patient Discharged to: Home with Home Health Service.

## 2017-04-11 NOTE — ASSESSMENT & PLAN NOTE
- possible decompensation based on BNP however does not correlate to clinical picture  - admission BNP 1068; previous admission 474 3/28   - 2D echo EF 40; PA 43.05; severe aortic stenosis  - no signs of volume overload; no JVD or pleural effusion  - continued on furosemide 40 mg IV, tolerated well  - continued home ASA/statin  -increased his BB from 12.5 to 25 and then to 50mg on dc

## 2017-04-17 PROBLEM — I21.4 NSTEMI, INITIAL EPISODE OF CARE: Status: RESOLVED | Noted: 2017-01-01 | Resolved: 2017-01-01

## 2017-04-17 PROBLEM — J96.01 ACUTE RESPIRATORY FAILURE WITH HYPOXIA: Status: RESOLVED | Noted: 2017-01-01 | Resolved: 2017-01-01

## 2017-04-17 PROBLEM — R09.02 HYPOXIA: Status: RESOLVED | Noted: 2017-01-01 | Resolved: 2017-01-01

## 2017-04-17 PROBLEM — R65.20 SEPSIS WITH ACUTE ORGAN DYSFUNCTION: Status: RESOLVED | Noted: 2017-01-01 | Resolved: 2017-01-01

## 2017-04-17 PROBLEM — A41.9 SEPSIS WITH ACUTE ORGAN DYSFUNCTION: Status: RESOLVED | Noted: 2017-01-01 | Resolved: 2017-01-01

## 2017-04-17 PROBLEM — J18.9 PNEUMONIA OF RIGHT LOWER LOBE DUE TO INFECTIOUS ORGANISM: Status: RESOLVED | Noted: 2017-01-01 | Resolved: 2017-01-01

## 2017-04-17 PROBLEM — I50.42 CHRONIC COMBINED SYSTOLIC AND DIASTOLIC HEART FAILURE: Chronic | Status: ACTIVE | Noted: 2017-01-01

## 2017-04-17 NOTE — TELEPHONE ENCOUNTER
----- Message from Marie Gorman sent at 4/17/2017 10:49 AM CDT -----  Contact: Call Pt [phone]  RX request - refill or new RX.  Is this a refill or new RX:  New  RX name and strength: Metformin, 500 mg  Directions: 1 T BID  Is this a 30 day or 90 day RX:  90 days  Pharmacy name and phone #: [facility], [phone]  Comments:

## 2017-04-17 NOTE — MR AVS SNAPSHOT
[facility]  [address]  [facility] [address]  Phone: [phone]                  Lex Billy   2017 2:00 PM   Office Visit    Description:  Male : 1945   Provider:  [facility]   Department:  [facility]           Reason for Visit     Hospital Follow Up           Diagnoses this Visit        Comments    Aortic atherosclerosis    -  Primary     Severe aortic stenosis         Chronic combined systolic and diastolic heart failure         Coronary artery disease involving native coronary artery of native heart without angina pectoris         Essential hypertension         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         Renal cell carcinoma of right kidney         Type 2 diabetes mellitus with diabetic polyneuropathy, without long-term current use of insulin         Type 2 diabetes mellitus with stage 2 chronic kidney disease, without long-term current use of insulin         Benign non-nodular prostatic hyperplasia with lower urinary tract symptoms         Chronic retention of urine                To Do List           Future Appointments        Provider Department Dept Phone    2017 2:00 PM PH[facility]ICIAN[facility] [phone]    2017 11:30 AM LAB, HEMONC CANCER BLDG [facility] [phone]    2017 1:00 PM MD Yonis Betts - Hematology Oncology [phone]    2017 2:00 PM N[facility], CHEMO [facility] [phone]    2017 10:15 AM [facility] CT1 64- LIMIT 450 LBS [facility] [phone]      Goals (5 Years of Data)     None       These Medications        Disp Refills Start End    metformin (GLUCOPHAGE) 500 MG tablet 90 tablet 5 2017     Take 1 tablet (500 mg total) by mouth daily as needed (if blood sugar > 150). - Oral    Pharmacy: [facility] - [place], [place] - [address] Ph #:  [phone]       lisinopril (PRINIVIL,ZESTRIL) 2.5 MG tablet 90 tablet 3 4/17/2017     Take 1 tablet (2.5 mg total) by mouth every evening. - Oral    Pharmacy: [facility] - [facility] [address] [facility] [address] [address] [facility] Ph #: [phone]       potassium chloride SA (K-DUR,KLOR-CON) 20 MEQ tablet 90 tablet 4 4/17/2017     Take 1 tablet (20 mEq total) by mouth once daily. - Oral    Pharmacy: [facility] - [facility] [address] [facility] [address] [facility] Ph #: [phone]         [facility]     [facility] - 24/7 Assistance  Unless otherwise directed by your provider, please contact [facility], our nurse care line that is available for 24/7 assistance.     Registered nurses in the [facility] provide: appointment scheduling, clinical advisement, health education, and other advisory services.  Call: [phone] (toll free)               Medications           Message regarding Medications     Verify the changes and/or additions to your medication regime listed below are the same as discussed with your clinician today.  If any of these changes or additions are incorrect, please notify your healthcare provider.        START taking these NEW medications        Refills    lisinopril (PRINIVIL,ZESTRIL) 2.5 MG tablet 3    Sig: Take 1 tablet (2.5 mg total) by mouth every evening.    Class: Normal    Route: Oral      CHANGE how you are taking these medications     Start Taking Instead of    metformin (GLUCOPHAGE) 500 MG tablet metformin (GLUCOPHAGE) 500 MG tablet    Dosage:  Take 1 tablet (500 mg total) by mouth daily as needed (if blood sugar > 150). Dosage:  TAKE 1 TABLET BY MOUTH TWO TIMES A DAY WITH MEALS    Reason for Change:  Reorder            Verify that the below list of medications is an accurate representation of the medications you are currently taking.  If none reported, the list may be blank. If incorrect, please contact your healthcare provider. Carry  this list with you in case of emergency.           Current Medications     aspirin (ECOTRIN) 81 MG EC tablet Take 81 mg by mouth once daily.    atorvastatin (LIPITOR) 40 MG tablet TAKE 1 TABLET ONE TIME DAILY    axitinib (INLYTA) 5 mg Tab Take 1 tablet by mouth 2 (two) times daily.    bethanechol (URECHOLINE) 50 MG tablet Take 1 tablet (50 mg total) by mouth 3 (three) times daily.    blood sugar diagnostic Strp 1 each by Misc.(Non-Drug; Combo Route) route 3 (three) times daily. Free style freedom lite test strips and lancets    blood-glucose meter (TRUE METRIX AIR GLUCOSE METER) Misc Use as directed    brimonidine 0.1% (ALPHAGAN P) 0.1 % Drop Place 1 drop into both eyes 3 (three) times daily.    CALCIUM CARBONATE-VITAMIN D3 ORAL Take 1 tablet by mouth every morning. Calcium carbonate 1000mg vitamin d3 1600 units per patient    clopidogrel (PLAVIX) 75 mg tablet Take 1 tablet (75 mg total) by mouth once daily.    DOCUSATE CALCIUM (STOOL SOFTENER ORAL) Take by mouth once as needed (for constipation).    duloxetine (CYMBALTA) 30 MG capsule Take 1 capsule (30 mg total) by mouth once daily.    fexofenadine (ALLEGRA) 180 MG tablet TAKE 1 TABLET ONE TIME DAILY    furosemide (LASIX) 40 MG tablet Take 40 mg by mouth once daily.    gabapentin (NEURONTIN) 300 MG capsule Take 2 capsules (600 mg total) by mouth 3 (three) times daily.    lancets 28 gauge Misc 1 lancet by Misc.(Non-Drug; Combo Route) route 3 (three) times daily.    lisinopril (PRINIVIL,ZESTRIL) 2.5 MG tablet Take 1 tablet (2.5 mg total) by mouth every evening.    metformin (GLUCOPHAGE) 500 MG tablet Take 1 tablet (500 mg total) by mouth daily as needed (if blood sugar > 150).    metoprolol succinate (TOPROL-XL) 50 MG 24 hr tablet Take 1 tablet (50 mg total) by mouth once daily.    morphine (MS CONTIN) 15 MG 12 hr tablet Take 1 tablet (15 mg total) by mouth every 8 (eight) hours as needed for Pain.    nitroGLYCERIN (NITROSTAT) 0.4 MG SL tablet Place 1 tablet (0.4 mg  "total) under the tongue every 5 (five) minutes as needed for Chest pain.    potassium chloride SA (K-DUR,KLOR-CON) 20 MEQ tablet Take 1 tablet (20 mEq total) by mouth once daily.    tamsulosin (FLOMAX) 0.4 mg Cp24 Take 1 capsule (0.4 mg total) by mouth once daily.    zolpidem (AMBIEN) 5 MG Tab Take 1 tablet (5 mg total) by mouth nightly as needed.           Clinical Reference Information           Your Vitals Were     BP Pulse Temp Height Weight SpO2    82/53 (BP Location: Right arm, Patient Position: Sitting, BP Method: Automatic) 71 97.8 °F (36.6 °C) (Oral) 5' 10" (1.778 m) 71.9 kg (158 lb 8.2 oz) 97%    BMI                22.74 kg/m2          Blood Pressure          Most Recent Value    BP  (!)  82/53      Allergies as of 4/17/2017     No Known Drug Allergies      Immunizations Administered on Date of Encounter - 4/17/2017     None      Language Assistance Services     ATTENTION: Language assistance services are available, free of charge. Please call [phone].      ATENCIÓN: Si sonali ponce, tiene a johnston disposición servicios gratuitos de asistencia lingüística. Llame al [phone].     NATALIE Ý: N?u b?n nói Ti?ng Vi?t, có các d?ch v? h? tr? ngôn ng? mi?n phí dành cho b?n. G?i s? [phone].         [facility] complies with applicable Federal civil rights laws and does not discriminate on the basis of race, color, national origin, age, disability, or sex.        "

## 2017-04-17 NOTE — Clinical Note
[facility] Visit (Post Discharge Follow-up) Today:  - Our clinic physicians and nurses plan to follow the patient up for any medical issues in the [facility] for 30 days post discharge.  Future Appointments: 4/20/2017  11:30 AM   LAB, HEMONC CANCER BLDG    [facility] LAB HO    Yonis Livingston  4/20/2017  1:00 PM    Janine Valdez MD          [facility] HEM ONC   Yonis Livingston  4/20/2017  2:00 PM    NOM, CHEMO                [facility] CHEMO     Yonis Livingston  5/17/2017  10:15 AM   [facility] CT1 64- LIMIT * [facility] CT SCAN   [facility] 5/17/2017  11:30 AM   Christian Monreal MD         [facility] [id number]   [facility] 5/17/2017  1:40 PM    Everette Rowan MD               [facility]        [place]

## 2017-04-17 NOTE — PROGRESS NOTES
[facility]  New Visit Progress Note   Recent Hospital Discharge     PRESENTING HISTORY     Chief Complaint/Reason for Visit:  Follow up Hospital Discharge   Chief Complaint   Patient presents with    Hospital Follow Up     PCP: Everette Rowan MD, MD    History of Present Illness: Mr. Lex Billy is a 72 y.o. male who was recently admitted to the hospital.    Admission Date: 4/8/2017  Hospital Length of Stay: 3 days  Discharge Date and Time:  04/11/2017 4:14 PM  Attending Physician: Maria Luisa Eastman MD   Discharging Provider: Laury Chauhan MD  Primary Care Provider: Everette Rowan MD, MD  Hospital Medicine Team: [facility] 4 Laury Chauhan MD     HPI:   Mr. Natanael Billy is a 71 yo M here with worsening SOB. He was recently hospitalized from 3/28-4/4 for similar symptoms of worsening dyspnea. He has PMH significant for metastatic renal cell carcinoma on chemotherapy, DM2, HTN, CAD s/p stents, CHF (EF 40%), and severe AS. Since being discharged patient reports dyspnea beginning last night worse than his baseline resulting in a poor sleep. He denies fever, changes in cough, wheezing, chest pain, hemoptysis, lightheadedness, or change in bowel habits. He is not on home oxygen. In the ED he was found to hypoxic to the mid-80s; improved to 95% SpO2 on 3-4L NC. Labs showed increase of BNP at 1068 from 474 on 3/28. He was subsequently given 40 mg IV lasix x1. Troponin decreased from previous. No CTA was performed in the ED due to recent negative, improvement on oxygen, and increased interval BNP. Patient with no clinical signs of heart failure or volume overload. CXR negative for pleural effusions or PNA.      Per last admission notes patient underwent a CTA in the ED which was negative for PE; ; troponin 0.127. He was subsequently admitted to the ICU on 3/29/17 for hypoxic respiratory failure. Antibiotics were started to cover for pneumonia (vanc and cefepime; cetirizine) and HFNC for hypoxia. Patient  tolerated weaning to nasal canula while maintaining saturations. He was later transitioned to 5 day course of azithromycin/augmentin (completed course on 4/4/17). On 3/31, patient stepped down to hospital medicine team. He was then noted to have worseing troponins (1.6-1.7) and a new q wave on EKG. Cardiology was consulted for persistent tropenemia; NSTEMI and patient was placed on ACS protocol. On 4/3/17 he underwent LHC which found patent stents; no intervention was needed. He was then cleared for discharge to follow up as an outpatient. At that time his dyspnea had returned to baseline.     Hospital Course:   Patient came in for SOB and admitted to 4. Primary differential included AS, but we wanted to r/o PE. VQ scan ordered to r/o PE which showed low probability. Echo did not significantly change since last ECHO. Interventional cardiology was consulted and stated that he is not a TAVR candidiate but would consider discussing BAV outpatient but for now he would be medically managed. Medical management is lisinopril 5mg, metoprolol 50mg and continuation of lasix 40mg PO. Patient does not wish to have Home Health but will be set up with a cardiologist outpatient.              Final Active Diagnoses:     Diagnosis Date Noted POA    PRINCIPAL PROBLEM: Acute respiratory failure with hypoxia [J96.01] 03/29/2017 Yes    NSTEMI, initial episode of care [I21.4] 03/31/2017 Yes    Chronic diastolic heart failure [I50.32] 03/29/2017 Yes    Benign non-nodular prostatic hyperplasia with lower urinary tract symptoms [N40.1] 03/29/2017 Yes    Renal cell carcinoma of right kidney [C64.1] 02/11/2016 Yes    Severe aortic stenosis [I35.0] 01/31/2016 Yes    Coronary artery disease involving native coronary artery of native heart without angina pectoris [I25.10] 10/22/2015 Yes    Essential hypertension [I10] 08/06/2012 Yes    Type 2 diabetes mellitus with stage 2 chronic kidney disease, without long-term current use of  "insulin [E11.22, N18.2] 08/06/2012 Yes       Problems Resolved During this Admission:     Diagnosis Date Noted Date Resolved POA      * Acute respiratory failure with hypoxia  - patient presented with acute decompensated respiratory failure; improved to 95% with 3L NC in the ED   - presentation is much improved; patient reports SOB not as bad as last admission  - CXR 4/8: no interval change; no pleural effusions or PTX  - WBC normal; no fevers or chills  -likely 2/2 to AS  -VQ scan shows low probability of PE  -ECHO showed similar results to previous echo  -Interventional cardiology consulted; medical management for now with potential BAV in the future for his symptomatic AS     Essential hypertension  -Toprol XL 25mg QD on admit but was titrated up to 50mg.   -patient will be discharged on this medication     Type 2 diabetes mellitus with stage 2 chronic kidney disease, without long-term current use of insulin  - patient reports control with diet; SSI mild     Coronary artery disease involving native coronary artery of native heart without angina pectoris  - s/p PCI in 2015; continue DAPT, BB, statin   - patient underwent LHC previously but no intervention was preformed as patent LM and RCA stents 4/3/17    Severe aortic stenosis  --- Repeated 2D ECHO showed EF 40% with severe aortic stenosis, relatively unchanged  --- Not a TAVR candidate due to RCC   --- moderate AS with regards to mean gradient of 34 mmHg, despite NAMAN < 1 cm2  --- IC recs of not a surgical candidate   --- EF reduction from 50 % to 40 %  --- Increase toprol 12.5 to 25 mg OD and then 50mg before dc  ---As per hem/onc, "His performance status remains excellent- would proceed with appropriate cardiac intervention as life expectancy still expected > 1 year as can be best approximated"  ---V/Q scan results show low probability of PE, Echo shows results unchanged from previous echo  ---40 of lasix this AM  -interventional cardiology will see patient " this AM and state that he will be medically managed (final recs include metoprolol 50mg, lasix 40mg and lisinopril 5mg)  -patient will have follow up with cards     Renal cell carcinoma of right kidney  - Metastasis to left posterior 12th rib and right sacrum. Continue axitinib.   - Pt stated he has not been taking his axitinib due to running out of prescription and not being able to afford refill  -pt will follow up with oncologist outpatient    Chronic diastolic heart failure  - possible decompensation based on BNP however does not correlate to clinical picture  - admission BNP 1068; previous admission 474 3/28   - 2D echo EF 40; PA 43.05; severe aortic stenosis  - no signs of volume overload; no JVD or pleural effusion  - continued on furosemide 40 mg IV, tolerated well  - continued home ASA/statin  -increased his BB from 12.5 to 25 and then to 50mg on dc     Benign non-nodular prostatic hyperplasia with lower urinary tract symptoms  -continued home tamsulosin     NSTEMI, initial episode of care  --- continued ASA 81 mg QD  --- Continued plavix 75 mg QD  --- Continued high dose statins, atorvastatin 80 mg QD  --- patient went to cath lab on last admit, but no intervention was preformed as patent LM and RCA stents 4/3/17     __________________________________________________________________    Today:  He is feeling better. No leg swelling. No chest pain.  Lost 4 lbs from diuresis.    Review of Systems:  Review of Systems   Constitutional: Negative for fever and malaise/fatigue.   Respiratory: Positive for cough (Occasional). Negative for shortness of breath.    Cardiovascular: Negative for chest pain and leg swelling.   Gastrointestinal: Negative for constipation, diarrhea, heartburn, nausea and vomiting.   Genitourinary: Positive for frequency (Does self cath twice daily).   Musculoskeletal: Positive for back pain (Lower back).   Skin: Negative for rash.   Neurological: Negative for dizziness, loss of  consciousness and headaches.       PAST HISTORY:     Past Medical History:   Diagnosis Date    Aortic atherosclerosis 10/20/2016    Benign non-nodular prostatic hyperplasia with lower urinary tract symptoms 3/29/2017    Chronic combined systolic and diastolic heart failure 3/29/2017    4-10-17   1 - Mildly to moderately depressed left ventricular systolic function (EF 40-45%).    2 - Normal right ventricular systolic function .    3 - Left ventricular diastolic dysfunction.    4 - Mild mitral regurgitation.    5 - Mild aortic regurgitation.    6 - Severe aortic stenosis, NAMAN = 0.72 cm2, peak velocity = 4.3 m/s, mean gradient = 43.0 mmHg.    7 - Increased central venous pressure.    Chronic retention of urine 4/3/2017    Patient does self cath at home.   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    Severe aortic stenosis 1/31/2016    --- Repeated 2D ECHO showed EF 40% with severe aortic stenosis, relatively unchanged --- Not a TAVR candidate due to RCC  --- moderate AS with regards to mean gradient of 34 mmHg, despite NAMAN < 1 cm2 --- IC recs of not a surgical candidate  --- EF reduction from 50 % to 40 %    Stroke     right eye    Type 2 diabetes mellitus with diabetic polyneuropathy, without long-term current use of insulin 10/20/2016    Type 2 diabetes mellitus with stage 2 chronic kidney disease, without long-term current use of insulin 8/6/2012        Past Surgical History:   Procedure Laterality Date    BACK SURGERY      cervical disc replacement    CARPAL TUNNEL RELEASE      left    CORONARY ANGIOPLASTY WITH STENT PLACEMENT  10/2015    4 stents    EYE SURGERY      laser surgery in right eye    JOINT REPLACEMENT      orthoscopic surgery on knee      ROTATOR CUFF REPAIR      right side    SINUS SURGERY      TONSILLECTOMY      torn ligament      repair. left shoulder    TOTAL KNEE ARTHROPLASTY      left side    VASECTOMY         Family History   Problem Relation Age of Onset    Heart disease Father     ALS Father     Cancer Brother      esophageal    Rheum arthritis Mother     Migraines Daughter     Asthma Son     Diabetes Neg Hx     Colon polyps Neg Hx        Social History     Social History    Marital status:      Spouse name: Belkys    Number of children: N/A    Years of education: N/A     Occupational History    Retired Not [employer]     Social History Main Topics    Smoking status: Never Smoker    Smokeless tobacco: Never Used    Alcohol use No      Comment: Heavy in past, quit about 20 years ago    Drug use: No    Sexual activity: Not Currently     Partners: Female     Other Topics Concern    None     Social History Narrative    , previous longshoreman.    Wife OK has dm2.    2 kids, 1 son RT, 1 dtr .    Has not been able to exercise       MEDICATIONS & ALLERGIES:     Current Outpatient Prescriptions on File Prior to Visit   Medication Sig Dispense Refill    aspirin (ECOTRIN) 81 MG EC tablet Take 81 mg by mouth once daily.      atorvastatin (LIPITOR) 40 MG tablet TAKE 1 TABLET ONE TIME DAILY (Patient taking differently: TAKE 1 TABLET BY MOUTH ONE TIME DAILY) 90 tablet 3    axitinib (INLYTA) 5 mg Tab Take 1 tablet by mouth 2 (two) times daily. 60 tablet 3    bethanechol (URECHOLINE) 50 MG tablet Take 1 tablet (50 mg total) by mouth 3 (three) times daily. 90 tablet 11    blood  sugar diagnostic Strp 1 each by Misc.(Non-Drug; Combo Route) route 3 (three) times daily. Free style freedom lite test strips and lancets (Patient taking differently: 1 each by Misc.(Non-Drug; Combo Route) route. Test blood sugar twice daily every other day) 90 each 3    blood-glucose meter (TRUE METRIX AIR GLUCOSE METER) Misc Use as directed 1 each 0    brimonidine 0.1% (ALPHAGAN P) 0.1 % Drop Place 1 drop into both eyes 3 (three) times daily.      CALCIUM CARBONATE-VITAMIN D3 ORAL Take 1 tablet by mouth every morning. Calcium carbonate 1000mg vitamin d3 1600 units per patient      clopidogrel (PLAVIX) 75 mg tablet Take 1 tablet (75 mg total) by mouth once daily. 90 tablet 3    DOCUSATE CALCIUM (STOOL SOFTENER ORAL) Take by mouth once as needed (for constipation).      duloxetine (CYMBALTA) 30 MG capsule Take 1 capsule (30 mg total) by mouth once daily. 30 capsule 0    fexofenadine (ALLEGRA) 180 MG tablet TAKE 1 TABLET ONE TIME DAILY (Patient taking differently: TAKE 1 TABLET BY MOUTH ONE TIME DAILY) 90 tablet 3    furosemide (LASIX) 40 MG tablet Take 40 mg by mouth once daily.      gabapentin (NEURONTIN) 300 MG capsule Take 2 capsules (600 mg total) by mouth 3 (three) times daily. 540 capsule 3    lancets 28 gauge Misc 1 lancet by Misc.(Non-Drug; Combo Route) route 3 (three) times daily. 300 each 3    lisinopril (PRINIVIL,ZESTRIL) 5 MG tablet Take 1 tablet (5 mg total) by mouth once daily. 90 tablet 3    metoprolol succinate (TOPROL-XL) 50 MG 24 hr tablet Take 1 tablet (50 mg total) by mouth once daily. 90 tablet 3    morphine (MS CONTIN) 15 MG 12 hr tablet Take 1 tablet (15 mg total) by mouth every 8 (eight) hours as needed for Pain. 90 tablet 0    nitroGLYCERIN (NITROSTAT) 0.4 MG SL tablet Place 1 tablet (0.4 mg total) under the tongue every 5 (five) minutes as needed for Chest pain. 25 tablet 4    potassium chloride SA (K-DUR,KLOR-CON) 20 MEQ tablet Take 1 tablet (20 mEq total) by mouth once  daily. 90 tablet 4    tamsulosin (FLOMAX) 0.4 mg Cp24 Take 1 capsule (0.4 mg total) by mouth once daily. 30 capsule 11    zolpidem (AMBIEN) 5 MG Tab Take 1 tablet (5 mg total) by mouth nightly as needed. (Patient taking differently: Take 5 mg by mouth nightly as needed (for sleep). ) 30 tablet 3            No current facility-administered medications on file prior to visit.         Review of patient's allergies indicates:   Allergen Reactions    No known drug allergies        OBJECTIVE:     Vital Signs:  Vitals:    04/17/17 1332   BP: (!) 82/53   Pulse: 71   Temp: 97.8 °F (36.6 °C)     Wt Readings from Last 1 Encounters:   04/17/17 1332 71.9 kg (158 lb 8.2 oz)     Body mass index is 22.74 kg/(m^2).     Physical Exam:  General: Well developed, well nourished. No distress.  HEENT: Head is normocephalic, atraumatic; ears are normal.    Eyes: Clear conjunctiva.  Neck: Supple, symmetrical neck; trachea midline.  Lungs: Clear to auscultation bilaterally and normal respiratory effort.  Cardiovascular: Heart with regular rate and rhythm.  S1S2 with systolic murmur.  Extremities: No LE edema.   Abdomen: Abdomen is soft, non-tender non-distended with normal bowel sounds.  Skin: Skin color, texture, turgor normal. No rashes.  Musculoskeletal: Normal gait.   Psychiatric: Not depressed.    Laboratory  Lab Results   Component Value Date    WBC 9.41 04/11/2017    HGB 10.7 (L) 04/11/2017    HCT 34.2 (L) 04/11/2017    MCV 91 04/11/2017     04/11/2017     BMP  Lab Results   Component Value Date     04/11/2017    K 4.5 04/11/2017     04/11/2017    CO2 25 04/11/2017    BUN 20 04/11/2017    CREATININE 1.0 04/11/2017    CALCIUM 8.3 (L) 04/11/2017    ANIONGAP 10 04/11/2017    ESTGFRAFRICA >60.0 04/11/2017    EGFRNONAA >60.0 04/11/2017     Lab Results   Component Value Date    ALT 72 (H) 04/11/2017    AST 48 (H) 04/11/2017    ALKPHOS 88 04/11/2017    BILITOT 0.4 04/11/2017     Lab Results   Component Value Date    INR  1.3 (H) 03/28/2017    INR 1.1 02/11/2016    INR 1.0 10/09/2015     Lab Results   Component Value Date    HGBA1C 5.5 02/20/2017       TRANSITION OF CARE:     Transition of Care Visit:     I have reviewed and updated the history and problem list.  I have reconciled the medication list.  I have discussed the hospitalization and current medical issues, prognosis and plans with the patient/family.  I  spent more than 50% of time discussing the care with the patient/family.  Total Encounter in the [facility]: 60 minutes.    Medications Reconciliation:   I have reconciled the patient's home medications and discharge medications with the patient/family. I have updated all changes.  Refer to After-Visit Medication List.    ASSESSMENT & PLAN:     Severe aortic stenosis  Aortic atherosclerosis  - Cause of acute pulmonary edema requiring hospitalization.    Currently compensated.  - 2D ECHO showed EF 40% with severe aortic stenosis, relatively unchanged  - Not a TAVR candidate due to RCC   - moderate AS with regards to mean gradient of 34 mmHg, despite NAMAN < 1 cm2    Chronic combined systolic and diastolic heart failure  - Continue Lasix 40 meq daily. He monitors his weight daily.    Rx:  -     potassium chloride SA (K-DUR,KLOR-CON) 20 MEQ tablet; Take 1 tablet (20 mEq total) by mouth once daily.  Dispense: 90 tablet; Refill: 4    Coronary artery disease involving native coronary artery of native heart without angina pectoris  Essential hypertension  - On Aspirin and Plavix. Recent LHC showed intact stents.    Plan: Decrease Lisinopril from 5 mg to 2.5 mg daily.  BP too low.    Rx:  -     lisinopril (PRINIVIL,ZESTRIL) 2.5 MG tablet; Take 1 tablet (2.5 mg total) by mouth every evening.  Dispense: 90 tablet; Refill: 3    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  Renal cell carcinoma of right kidney  - On treatment per Oncology.    Type 2 diabetes mellitus with diabetic polyneuropathy, without long-term current use of  insulin  Type 2 diabetes mellitus with stage 2 chronic kidney disease, without long-term current use of insulin  - A1c 5.5 on metformin 500 mg daily.  Blood sugar < 100, mostly 60s.     Will change metformin to PRN to avoid hypotension.  -     metformin (GLUCOPHAGE) 500 MG tablet; Take 1 tablet (500 mg total) by mouth daily as needed (if blood sugar > 150).  Dispense: 90 tablet; Refill: 5    Benign non-nodular prostatic hyperplasia with lower urinary tract symptoms  Chronic retention of urine  - Self cath at home twice daily.    Scheduled Follow-up :  Future Appointments  Date Time Provider Department Center   4/17/2017 2:00 PM PHYSICIAN, [facility] IMPRICL [facility] PCW   4/20/2017 11:30 AM LAB, HEMONC CANCER BLDG [facility] LAB HO [place]   4/20/2017 1:00 PM Janine Valdez MD [facility] HEM ONC [place]   4/20/2017 2:00 PM [facility], CHEMO [facility] CHEMO [place]   5/17/2017 10:15 AM [facility] CT1 64- LIMIT 450 LBS [facility] CT SCAN [facility]   5/17/2017 11:30 AM Christian Monreal MD [facility] NEUROS7 [facility]   5/17/2017 1:40 PM Everette Rowan MD [facility] IM [place]       After Visit Medication List :     Medication List          This list is accurate as of: 4/17/17  1:57 PM.  Always use your most recent med list.                     aspirin 81 MG EC tablet   Commonly known as:  ECOTRIN       atorvastatin 40 MG tablet   Commonly known as:  LIPITOR   TAKE 1 TABLET ONE TIME DAILY       axitinib 5 mg Tab   Commonly known as:  INLYTA   Take 1 tablet by mouth 2 (two) times daily.       bethanechol 50 MG tablet   Commonly known as:  URECHOLINE   Take 1 tablet (50 mg total) by mouth 3 (three) times daily.       blood sugar diagnostic Strp   1 each by Misc.(Non-Drug; Combo Route) route 3 (three) times daily. Free style freedom lite test strips and lancets       blood-glucose meter Misc   Commonly known as:  TRUE METRIX AIR GLUCOSE METER   Use as directed       brimonidine 0.1% 0.1 % Drop   Commonly known as:  ALPHAGAN P       CALCIUM  CARBONATE-VITAMIN D3 ORAL       clopidogrel 75 mg tablet   Commonly known as:  PLAVIX   Take 1 tablet (75 mg total) by mouth once daily.       duloxetine 30 MG capsule   Commonly known as:  CYMBALTA   Take 1 capsule (30 mg total) by mouth once daily.       fexofenadine 180 MG tablet   Commonly known as:  ALLEGRA   TAKE 1 TABLET ONE TIME DAILY       furosemide 40 MG tablet   Commonly known as:  LASIX       gabapentin 300 MG capsule   Commonly known as:  NEURONTIN   Take 2 capsules (600 mg total) by mouth 3 (three) times daily.       lancets 28 gauge Misc   1 lancet by Misc.(Non-Drug; Combo Route) route 3 (three) times daily.       lisinopril 2.5 MG tablet   Commonly known as:  PRINIVIL,ZESTRIL   Take 1 tablet (2.5 mg total) by mouth every evening.       metformin 500 MG tablet   Commonly known as:  GLUCOPHAGE   Take 1 tablet (500 mg total) by mouth daily as needed (if blood sugar > 150).       metoprolol succinate 50 MG 24 hr tablet   Commonly known as:  TOPROL-XL   Take 1 tablet (50 mg total) by mouth once daily.       morphine 15 MG 12 hr tablet   Commonly known as:  MS CONTIN   Take 1 tablet (15 mg total) by mouth every 8 (eight) hours as needed for Pain.       nitroGLYCERIN 0.4 MG SL tablet   Commonly known as:  NITROSTAT   Place 1 tablet (0.4 mg total) under the tongue every 5 (five) minutes as needed for Chest pain.       potassium chloride SA 20 MEQ tablet   Commonly known as:  K-DUR,KLOR-CON   Take 1 tablet (20 mEq total) by mouth once daily.       STOOL SOFTENER ORAL       tamsulosin 0.4 mg Cp24   Commonly known as:  FLOMAX   Take 1 capsule (0.4 mg total) by mouth once daily.       zolpidem 5 MG Tab   Commonly known as:  AMBIEN   Take 1 tablet (5 mg total) by mouth nightly as needed.            Where to Get Your Medications      These medications were sent to [facility] - [place], [place] - [address]  [address] [facility] [address]     Phone:  [phone]     lisinopril  2.5 MG tablet    metformin 500 MG tablet    potassium chloride SA 20 MEQ tablet             Signing Physician:  Jc Rowan MD

## 2017-04-20 NOTE — MR AVS SNAPSHOT
Patient Information     Patient Name Sex     Lex Billy Male 1945      Visit Information        Provider Department Dept Phone Center    2017 2:00 PM [facility], CHEMO [facility] Chemotherapy Infusion [phone] [place]      Patient Instructions     None      Your Current Medications Are     aspirin (ECOTRIN) 81 MG EC tablet    atorvastatin (LIPITOR) 40 MG tablet    axitinib (INLYTA) 5 mg Tab    bethanechol (URECHOLINE) 50 MG tablet    blood sugar diagnostic Strp    blood-glucose meter (TRUE METRIX AIR GLUCOSE METER) Misc    brimonidine 0.1% (ALPHAGAN P) 0.1 % Drop    CALCIUM CARBONATE-VITAMIN D3 ORAL    clopidogrel (PLAVIX) 75 mg tablet    DOCUSATE CALCIUM (STOOL SOFTENER ORAL)    duloxetine (CYMBALTA) 30 MG capsule    fexofenadine (ALLEGRA) 180 MG tablet    furosemide (LASIX) 40 MG tablet    gabapentin (NEURONTIN) 300 MG capsule    lancets 28 gauge Misc    lisinopril (PRINIVIL,ZESTRIL) 2.5 MG tablet    metoprolol succinate (TOPROL-XL) 50 MG 24 hr tablet    morphine (MS CONTIN) 15 MG 12 hr tablet    nitroGLYCERIN (NITROSTAT) 0.4 MG SL tablet    potassium chloride SA (K-DUR,KLOR-CON) 20 MEQ tablet    tamsulosin (FLOMAX) 0.4 mg Cp24    zolpidem (AMBIEN) 5 MG Tab    metformin (GLUCOPHAGE) 500 MG tablet      Facility-Administered Medications     sodium chloride 0.9% 100 mL flush bag    sodium chloride 0.9% flush 10 mL    zoledronic acid (ZOMETA) 4 mg in sodium chloride 0.9% 100 mL IVPB      Appointments for Next Year     2017 10:15 AM CT ABD PEL W CONTRAST (15 min.) [facility] CT1 64- LIMIT 450 LBS    You must  fast four (4) hours prior to your appointment. Arrive 2 hours early for your appointment to drink the exam prep.    2nd floor    2017 11:30 AM ESTABLISHED PATIENT (15 min.) [facility] - Neuro[facility] 7th Fl Christian Monreal MD    Arrive at check-in approximately 15 minutes before your scheduled appointment time. Bring all outside medical records and imaging,  along with a list of your current medications and insurance card.    7th Floor    5/17/2017  1:40 PM ESTABLISHED PATIENT (20 min.) [facility] - Internal Medicine Everette Rowan MD    Arrive at check-in approximately 15 minutes before your scheduled appointment time. Bring all outside medical records and imaging, along with a list of your current medications and insurance card.    6th Floor: Dr. Aquino, Dr. Castorena, Dr. Bolton, Dr. Bennett, Dr. Mccray, Dr. Morales,                  Dr. Donis, Dr. Luis & Dr. Gonsales 7th Floor: Dr. Rowan, Dr. Wilde, Dr. Ott & Dr. Freitas         Default Flowsheet Data (last 24 hours)      Amb Complex Vitals [facility]        04/20/17 1356 04/20/17 1253             Measurements    Weight  75.6 kg (166 lb 10.7 oz)       BP (!)  101/55 (!)  99/54       Temp 99.1 °F (37.3 °C) 98.8 °F (37.1 °C)       Pulse 63 68       Resp 18 20       SpO2  99 %       Pain Assessment    Pain Score Six Six       Pain Frequency 2 Intermittent        Pain Loc BACK BACK               Allergies     No Known Drug Allergies       Medications You Received from 04/19/2017 1459 to 04/20/2017 1459        Date/Time Order Dose Route Action     04/20/2017 1412 sodium chloride 0.9% 100 mL flush bag   Intravenous New Bag     04/20/2017 1418 zoledronic acid (ZOMETA) 4 mg in sodium chloride 0.9% 100 mL IVPB 4 mg Intravenous New Bag      Current Discharge Medication List     Cannot display discharge medications since this is not an admission.

## 2017-04-20 NOTE — PLAN OF CARE
Problem: Patient Care Overview  Goal: Plan of Care Review  Outcome: Ongoing (interventions implemented as appropriate)  Tolerated Zometa treatment well.  Advised to call MD for any problems or concerns.  AVS given.  NAD noted upon discharge.

## 2017-04-20 NOTE — PROGRESS NOTES
Subjective:       Patient ID: Lex Billy is a 72 y.o. male.    Chief Complaint: Follow-up    HPI     Reports breathing much better.  Recent hospitalization after presenting with shortness of breath-  In the ED he was found to hypoxic to the mid-80s; improved to 95% SpO2 on 3-4L NC.   Labs revealed an elevated BNP and he responded to Lasix.  CTA performed negative for PE- results below   He was subsequently admitted to the ICU on 3/29/17 for hypoxic respiratory failure. Antibiotics were started to cover for pneumonia (vanc and cefepime; cetirizine) and HFNC for hypoxia. Patient tolerated weaning to nasal canula while maintaining saturations. He was later transitioned to 5 day course of azithromycin/augmentin (completed course on 4/4/17). On 3/31, patient stepped down to hospital medicine team. He was then noted to have worseing troponins (1.6-1.7) and a new q wave on EKG. Cardiology was consulted for persistent tropenemia; NSTEMI and patient was placed on ACS protocol. On 4/3/17 he underwent LHC which found patent stents; no intervention was needed.     Pain overall better  Eating well  No fevers or chills  Wants to be more active    CTA scan:  Findings:  The structures at the base of the neck are unremarkable.      There is a left-sided aortic arch with 3 branch vessels.  The thoracic aorta maintains normal caliber and course.  There is dense calcific atherosclerosis of the coronary arteries, and the thoracic aorta demonstrates mild calcific atherosclerosis.  There is dense calcification of the aortic valve.    The pulmonary arteries distributed normally without filling defect to suggest pulmonary thromboembolism.  Systemic and pulmonary venoatrial connections are concordant.    There is a moderate volume of dependent pleural fluid bilaterally. No significant pericardial fluid is identified.  The heart is not enlarged.  There is reflux of contrast into the hepatic IVC, which may represent right-sided heart  dysfunction.    No mediastinal, hilar, or axillary lymphadenopathy is identified.    The esophagus maintains normal caliber and course.  There is a 3.2 cm mass within the superior pole the right kidney slightly increased in size from prior study, previously measuring 2.8 cm.  No acute abnormality is identified within the upper abdomen.    The superficial soft tissues are unremarkable.    The osseous structures again demonstrate a metastatic lesion within the posterior 12th rib on the left, which appears increased in size measuring 7.7 cm.  No additional aggressive osseous lesions are identified.    The trachea and large proximal airways are patent.    The lungs demonstrate extensive bilateral centrilobular consolidative opacities throughout both lungs, more prominent on the right.  These findings were not identified on prior CT  Additionally, there is a prominent opacity within the right lower lobe measured 3.6 cm.  Findings are nonspecific and may represent pulmonary edema, pneumonia, aspiration or metastatic disease.   Impression     1.  No evidence of pulmonary thromboembolus on this adequate study.     2.  Interval development of extensive bilateral centrilobular opacities throughout both lungs, more prominent on the right, nonspecific and may represent pneumonia, aspiration, or noninfectious inflammation with underlying metastatic disease not excluded.  Close followup is recommended with repeat chest CT in 8-12 weeks.    3.  Interval increase in size of right renal mass.    4.  Interval increase in size of osseous metastatic lesion to the left posterior 12th rib.    5.  Moderate bilateral pleural effusions.    6.  Significant coronary artery atherosclerosis and dense calcification of the aortic valve.     Doing better on reduced dose Inlyta- now taking at night and tolerated feeling better.      This is a 72 year old male with metastatic RCC, currently on Inlyta reduced dose.     CT C/A/P from 10/14/16:  1.   Slightly decreased size of cystic renal neoplasm the superior pole of the right kidney, now measuring 2.8 x 2.8 cm, previously 3.8 x 3.3 cm and the cystic component.  The solid component is not well defined on the current examination.  2.  Resolution of previously enlarged enhancing left axillary lymph node.  3.  Marked decrease in the size of the left 12th rib lesion, with essential complete resolution of the expansile soft tissue component.  3.  Unchanged size of right sacral lesion.  4.  Unchanged nonspecific index right lower lobe pulmonary micronodule.  5.  Coronary atherosclerosis and abundant aortic valve calcification.      Oncology History:  Initially treated on Nexavar therapy  Prior treatment with Sutent discontinued with side effects.  Completed XRT to sacrum and reports no pain. Still with some residual numbness in right buttock area.  Biopsy confirmed clear cell RCC  Then placed on Torisel and now with mild progression in known areas  Changed to Votrient- about to start 4th month  Zometa continued     Review of Systems   Constitutional: Positive for fatigue (slowly improving). Negative for activity change, appetite change, chills, fever and unexpected weight change.   HENT: Negative for congestion, dental problem, ear pain, hearing loss, mouth sores, postnasal drip, rhinorrhea, sinus pressure, sneezing, sore throat, trouble swallowing and voice change.    Eyes: Negative for redness and visual disturbance (rt eye decreased prior stroke).   Respiratory: Negative for cough, chest tightness, shortness of breath and wheezing.    Cardiovascular: Negative for chest pain, palpitations and leg swelling.   Gastrointestinal: Negative for abdominal distention, abdominal pain, blood in stool, constipation, diarrhea, nausea, rectal pain and vomiting.   Genitourinary: Negative for decreased urine volume, difficulty urinating, dysuria, hematuria and urgency.        Increased urination on lasix   Musculoskeletal:  Negative for arthralgias, back pain, gait problem, myalgias and neck pain.   Skin: Negative for color change, pallor, rash and wound.   Neurological: Negative for dizziness, weakness, numbness and headaches.   Hematological: Negative for adenopathy. Does not bruise/bleed easily.   Psychiatric/Behavioral: Negative for dysphoric mood and suicidal ideas. The patient is not nervous/anxious.        Objective:      Physical Exam   Constitutional: He is oriented to person, place, and time. He appears well-developed and well-nourished. No distress.   Presents alone   HENT:   Head: Normocephalic and atraumatic.   Right Ear: External ear normal.   Left Ear: External ear normal.   Nose: Nose normal.   Mouth/Throat: Oropharynx is clear and moist. No oropharyngeal exudate.   Eyes: Conjunctivae and EOM are normal. Pupils are equal, round, and reactive to light. Right eye exhibits no discharge. Left eye exhibits no discharge. No scleral icterus.   Neck: Normal range of motion. Neck supple. No tracheal deviation present. No thyromegaly present.   Cardiovascular: Normal rate, regular rhythm and intact distal pulses.  Exam reveals no gallop and no friction rub.    Murmur (known AS) heard.  Pulmonary/Chest: Effort normal and breath sounds normal. No respiratory distress. He has no wheezes. He has no rales. He exhibits no tenderness.   Abdominal: Soft. Bowel sounds are normal. He exhibits no distension and no mass. There is no tenderness. There is no rebound and no guarding.   No organomegaly   Musculoskeletal: Normal range of motion. He exhibits no edema or tenderness.   Lymphadenopathy:     He has no cervical adenopathy.   Neurological: He is alert and oriented to person, place, and time. No cranial nerve deficit. He exhibits normal muscle tone. Coordination normal.   Skin: Skin is warm and dry. No rash noted. He is not diaphoretic. No erythema. No pallor.   Dry, no rash   Psychiatric: He has a normal mood and affect. His behavior is  normal. Judgment and thought content normal.   Nursing note and vitals reviewed.    Labs- reviewed  Assessment:       1. 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 We discussed most recent scans (CTA) had different measurement cuts and while they suggest progression are not an accurate comparison- change does not meet RECIST criteria in known areas and no new areas seen  Continue Inlyta until next scans  Zometa today

## 2017-04-24 NOTE — TELEPHONE ENCOUNTER
"Pt called stating that Janes called him requesting a "return fax info form" for potassium chloride and lisinopril. Janes was called to verify what was needed for the medication to be sent to the pt and Janes stated that the meds were mailed on 4/19/17. Pt was informed that meds were sent and Janes didn't need any further information.   "

## 2017-05-04 NOTE — TELEPHONE ENCOUNTER
----- Message from Audrey Hammonds sent at 5/4/2017 11:42 AM CDT -----  Contact: [phone]  RX request - refill or new RX.  Is this a refill or new RX:  New Rx  RX name and strength: furosemide (LASIX) 40 MG tablet  Directions:   Is this a 30 day or 90 day RX:  90  Pharmacy name and phone #: [facility] mail order [phone] (Phone)  [phone] (Fax)  Comments:

## 2017-05-11 NOTE — TELEPHONE ENCOUNTER
i checked with dr alejandro.  He said fine to [facility]  i called and [facility]  Lasix generic 40mg once daily  Patient advised this was done.

## 2017-05-11 NOTE — TELEPHONE ENCOUNTER
----- Message from Katia Sesay sent at 5/11/2017  2:48 PM CDT -----  Contact: patient [phone]   Pt ran out of medication/ furosemide 40mg and is waiting for his order to come from [facility]. Please order a 2 week supply from [facility] [phone] Pt needs this for shortness of breath.

## 2017-05-17 NOTE — PLAN OF CARE
Problem: Chemotherapy Effects (Adult)  Goal: Signs and Symptoms of Listed Potential Problems Will be Absent, Minimized or Managed (Chemotherapy Effects)  Signs and symptoms of listed potential problems will be absent, minimized or managed by discharge/transition of care (reference Chemotherapy Effects (Adult) CPG).   Outcome: Ongoing (interventions implemented as appropriate)  Pt. Here today for IV Fluids as post hydration from CT. Dr. Valdez gave verbal order via Serena Lopez RN to give 1 liter NS over 1 hour. VSS. Peripheral IV started, blood return present. Infusing without difficulty. No questions at this time.

## 2017-05-17 NOTE — PROGRESS NOTES
Subjective:    I, Dia Schafer, am scribing for, and in the presence of, Dr. Christian Monrela.     Patient ID: Lex Billy is a 72 y.o. male.    Chief Complaint: No chief complaint on file.    HPI This is a 72-year-old male with metastatic renal cell carcinoma with mets to the spine who presents today for follow up. He complains of continued low back pain since last office visit. Reports that he recently had a urinary tract infection, which he is still taking medication for treatment. He has had radiation for the sacral tumor. The pt has recently noticed the tumor on the left rib cage. He did not receive radiation for the rib tumor.     Review of Systems   Constitutional: Negative for activity change, fatigue and fever.   HENT: Negative for facial swelling.    Eyes: Negative.    Respiratory: Negative.    Cardiovascular: Negative.    Gastrointestinal: Negative for diarrhea, nausea and vomiting.   Genitourinary: Negative.    Musculoskeletal: Positive for back pain. Negative for joint swelling and myalgias.   Neurological: Negative for seizures, weakness, numbness and headaches.   Psychiatric/Behavioral: Negative.        Past Medical History:   Diagnosis Date    Aortic atherosclerosis 10/20/2016    Benign non-nodular prostatic hyperplasia with lower urinary tract symptoms 3/29/2017    Chronic combined systolic and diastolic heart failure 3/29/2017    4-10-17   1 - Mildly to moderately depressed left ventricular systolic function (EF 40-45%).    2 - Normal right ventricular systolic function .    3 - Left ventricular diastolic dysfunction.    4 - Mild mitral regurgitation.    5 - Mild aortic regurgitation.    6 - Severe aortic stenosis, NAMAN = 0.72 cm2, peak velocity = 4.3 m/s, mean gradient = 43.0 mmHg.    7 - Increased central venous pressure.    Chronic retention of urine 4/3/2017    Patient does self cath at home.   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    Severe aortic stenosis 1/31/2016    --- Repeated 2D ECHO showed EF 40% with severe aortic stenosis, relatively unchanged --- Not a TAVR candidate due to RCC  --- moderate AS with regards to mean gradient of 34 mmHg, despite NAMAN < 1 cm2 --- IC recs of not a surgical candidate  --- EF reduction from 50 % to 40 %    Stroke     right eye    Type 2 diabetes mellitus with diabetic polyneuropathy, without long-term current use of insulin 10/20/2016    Type 2 diabetes mellitus with stage 2 chronic kidney disease, without long-term current use of insulin 8/6/2012     Objective:     There were no vitals taken for this visit.  Physical Exam   Constitutional: He is oriented to person, place, and time. He appears well-developed and well-nourished.   HENT:   Head: Normocephalic and atraumatic.   Neck: Neck supple.   Neurological: He is alert and oriented to person, place, and time. No cranial nerve deficit. He displays a negative Romberg sign. GCS eye subscore is 4. GCS verbal subscore is 5. GCS motor subscore is 6.       Imaging:  CT chest, abdomen, pelvis, dated 5/17/2017, shows a stable large sacral tumor with primary involvement of S2. There is involvement of the right S2 nerve root. This is slightly smaller compared to prior CT.    I have personally reviewed the images with the pt.      I, Dr. Christian Monreal, personally performed the services described in this documentation as scribed by Dia Schafer in my  presence, and it is both accurate and complete.  Assessment:       1. Renal Cell CA  2. Spinal metastasis.    Plan:   I have reviewed the CT chest, abdomen, pelvis with the patient, which shows the large sacral tumor with primary involvement of S2 and the right S2 nerve root. This is slightly small compared to prior CT. He can follow up with me as needed for any new symptoms or concerns.

## 2017-05-17 NOTE — PLAN OF CARE
Problem: Patient Care Overview  Goal: Plan of Care Review  Outcome: Ongoing (interventions implemented as appropriate)  Pt. Tolerated infusion of 1 liter NS. VSS. Peripheral IV Flushed and removed. No questions at this time.

## 2017-05-17 NOTE — LETTER
May 18, 2017      Everette Rowan MD  [address]  [place] [address]           [facility] - Neurosurgery 7th Fl  [address] [place] [address]  Phone: [phone]          Patient: Lex Billy   MR Number: [id number]   YOB: 1945   Date of Visit: 5/17/2017       Dear Dr. Everette Rowan:    Thank you for referring Lex Billy to me for evaluation. Attached you will find relevant portions of my assessment and plan of care.    If you have questions, please do not hesitate to call me. I look forward to following Lex Billy along with you.    Sincerely,    Christian Monreal MD    Enclosure  CC:  No Recipients    If you would like to receive this communication electronically, please contact [email or web address] or [phone] to request more information on Skype Link access.    For providers and/or their staff who would like to refer a patient to [facility], please contact us through our one-stop-shop provider referral line, [facility], at [phone].    If you feel you have received this communication in error or would no longer like to receive these types of communications, please e-mail [email or web address]

## 2017-05-17 NOTE — PROGRESS NOTES
Subjective:       Patient ID: eLx Billy is a 72 y.o. male.    Chief Complaint: Follow-up    HPI    72-year-old male here for follow-up.  He couldn't breath and was admitted for fluid.  He went home and felt good for a day or so and went back to the hospital for another week or so.   His aortic valve cause volume overload.  He was told that the TARV would not be a good idea.  Possibly a balloon to open the aortic valve.  He has severe AS.  He has been feeling ok since seeing [facility].  He reports that he is doing ok at home and is able to do his ADLs.  He is on lasix 40 mg daily. He has been weighing himself daily.  It ranges between 162-165#.  He found that he was tachycardic when he checked his BP.  He felt nothing about this.    Review of Systems    Objective:      Physical Exam   Constitutional: He is oriented to person, place, and time. He appears well-developed and well-nourished.   HENT:   Head: Normocephalic and atraumatic.   Mouth/Throat: No oropharyngeal exudate.   Eyes: EOM are normal. Pupils are equal, round, and reactive to light. Right eye exhibits no discharge. Left eye exhibits no discharge. No scleral icterus.   Neck: Normal range of motion. Neck supple. No tracheal deviation present. No thyromegaly present.   Cardiovascular: Normal rate and regular rhythm.  Exam reveals no gallop and no friction rub.    Murmur heard.   Crescendo decrescendo systolic murmur is present with a grade of 3/6   Pulmonary/Chest: Effort normal and breath sounds normal. No respiratory distress. He has no wheezes. He has no rales. He exhibits no tenderness.   Abdominal: Soft. Bowel sounds are normal. He exhibits no distension and no mass. There is no tenderness. There is no rebound and no guarding.   Musculoskeletal: Normal range of motion. He exhibits no edema or tenderness.   Neurological: He is alert and oriented to person, place, and time.   Skin: Skin is warm and dry. No rash noted. No erythema.  No pallor.   Psychiatric: He has a normal mood and affect. His behavior is normal.   Vitals reviewed.      Assessment:       1. Severe aortic stenosis    2. Aortic atherosclerosis    3. Chronic combined systolic and diastolic heart failure    4. Tachycardia        Plan:       1/2/3.  Continue with Lasix 40 mrem as prescribed.  Patient to continue checking weights daily.  If weight increases by 3 pounds in a day, call to adjust Lasix dose.  Would tell patient to increase Lasix to twice a day for 3-4 days.  If patient become symptomatic in this time frame, or fails to respond to increase Lasix, advised he would need to go to emergency room.  4.  Check Holter monitor to ensure that patient is not looking into an abnormal rhythm.

## 2017-05-19 NOTE — PROGRESS NOTES
Verified pt name and .  Pt signed consent stated that the monitor will be returned to 8th floor Cardiology at the [facility].  Pt prepped and EKG leads placed.  Pt to be on holter monitor for 48 hours.  Instructions, diary and extra EKG pads provided.

## 2017-05-22 NOTE — PROGRESS NOTES
Subjective:       Patient ID: Lex Billy is a 72 y.o. male.    Chief Complaint: Follow-up    HPI     Reports breathing much better.  Recent hospitalization after presenting with shortness of breath-  In the ED he was found to hypoxic to the mid-80s; improved to 95% SpO2 on 3-4L NC.   Labs revealed an elevated BNP and he responded to Lasix.  CTA performed negative for PE- results below   He was subsequently admitted to the ICU on 3/29/17 for hypoxic respiratory failure. Antibiotics were started to cover for pneumonia (vanc and cefepime; cetirizine) and HFNC for hypoxia. Patient tolerated weaning to nasal canula while maintaining saturations. He was later transitioned to 5 day course of azithromycin/augmentin (completed course on 4/4/17). On 3/31, patient stepped down to hospital medicine team. He was then noted to have worseing troponins (1.6-1.7) and a new q wave on EKG. Cardiology was consulted for persistent tropenemia; NSTEMI and patient was placed on ACS protocol. On 4/3/17 he underwent LHC which found patent stents; no intervention was needed.      Pain overall better  Eating well  No fevers or chills  Wants to be more active    Right renal solid and cystic lesion compatible with patient's known renal cell carcinoma, similar to prior.    Osseous metastatic disease with increase in size of left 12th rib and right sacral lesions. No definite new osseous lesion identified.    Interval development of a new left adrenal nodule concerning for additional metastatic spread of disease.    RECIST SUMMARY - comparison 3/8/17:  Right renal lesion: 3.5 cm, series 2 image 64 (previously 3.3 cm)  Left 12th rib: 8.1 cm, series 2 image 67 (previously 6.3 cm)  Right sacrum: 5.2 cm, series 2 image 105 (previously 4.2 cm)  Left adrenal gland: 2.2 cm, series 2 image 59 (not present on prior)       CTA scan:  Findings:  The structures at the base of the neck are unremarkable.      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  The thoracic aorta maintains normal caliber and course.  There is dense calcific atherosclerosis of the coronary arteries, and the thoracic aorta demonstrates mild calcific atherosclerosis.  There is dense calcification of the aortic valve.    The pulmonary arteries distributed normally without filling defect to suggest pulmonary thromboembolism.  Systemic and pulmonary venoatrial connections are concordant.    There is a moderate volume of dependent pleural fluid bilaterally. No significant pericardial fluid is identified.  The heart is not enlarged.  There is reflux of contrast into the hepatic IVC, which may represent right-sided heart dysfunction.    No mediastinal, hilar, or axillary lymphadenopathy is identified.    The esophagus maintains normal caliber and course.  There is a 3.2 cm mass within the superior pole the right kidney slightly increased in size from prior study, previously measuring 2.8 cm.  No acute abnormality is identified within the upper abdomen.    The superficial soft tissues are unremarkable.    The osseous structures again demonstrate a metastatic lesion within the posterior 12th rib on the left, which appears increased in size measuring 7.7 cm.  No additional aggressive osseous lesions are identified.    The trachea and large proximal airways are patent.    The lungs demonstrate extensive bilateral centrilobular consolidative opacities throughout both lungs, more prominent on the right.  These findings were not identified on prior CT  Additionally, there is a prominent opacity within the right lower lobe measured 3.6 cm.  Findings are nonspecific and may represent pulmonary edema, pneumonia, aspiration or metastatic disease.   Impression     1.  No evidence of pulmonary thromboembolus on this adequate study.     2.  Interval development of extensive bilateral centrilobular opacities throughout both lungs, more prominent on the right, nonspecific and may represent  pneumonia, aspiration, or noninfectious inflammation with underlying metastatic disease not excluded.  Close followup is recommended with repeat chest CT in 8-12 weeks.    3.  Interval increase in size of right renal mass.    4.  Interval increase in size of osseous metastatic lesion to the left posterior 12th rib.    5.  Moderate bilateral pleural effusions.    6.  Significant coronary artery atherosclerosis and dense calcification of the aortic valve.      Doing better on reduced dose Inlyta- now taking at night and tolerated feeling better.     This is a 72 year old male with metastatic RCC, currently on Inlyta reduced dose.     CT C/A/P from 10/14/16:  1.  Slightly decreased size of cystic renal neoplasm the superior pole of the right kidney, now measuring 2.8 x 2.8 cm, previously 3.8 x 3.3 cm and the cystic component.  The solid component is not well defined on the current examination.  2.  Resolution of previously enlarged enhancing left axillary lymph node.  3.  Marked decrease in the size of the left 12th rib lesion, with essential complete resolution of the expansile soft tissue component.  3.  Unchanged size of right sacral lesion.  4.  Unchanged nonspecific index right lower lobe pulmonary micronodule.  5.  Coronary atherosclerosis and abundant aortic valve calcification.     Oncology History:  Initially treated on Nexavar therapy  Prior treatment with Sutent discontinued with side effects.  Completed XRT to sacrum and reports no pain. Still with some residual numbness in right buttock area.  Biopsy confirmed clear cell RCC  Then placed on Torisel and now with mild progression in known areas  Changed to Votrient- about to start 4th month  Zometa continued         Review of Systems    Objective:      Physical Exam    Assessment:       1. 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

## 2017-05-22 NOTE — PROGRESS NOTES
Subjective:       Patient ID: Lex Billy is a 72 y.o. male.    Chief Complaint: Follow-up    HPI     Returns for follow-up after scans  Has been feeling well overall  Has noted a new growth below left lateral rib cage- no pain unless puts pressure on area  Weight stable  No pain    CT C/A/P 5/17/17:  Right renal solid and cystic lesion compatible with patient's known renal cell carcinoma, similar to prior.  Osseous metastatic disease with increase in size of left 12th rib and right sacral lesions. No definite new osseous lesion identified.  Interval development of a new left adrenal nodule concerning for additional metastatic spread of disease.    RECIST SUMMARY - comparison 3/8/17:  Right renal lesion: 3.5 cm, series 2 image 64 (previously 3.3 cm)  Left 12th rib: 8.1 cm, series 2 image 67 (previously 6.3 cm)  Right sacrum: 5.2 cm, series 2 image 105 (previously 4.2 cm)  Left adrenal gland: 2.2 cm, series 2 image 59 (not present on prior)    Oncology History:  Initially treated on Nexavar therapy  Prior treatment with Sutent discontinued with side effects.  Completed XRT to sacrum and reports no pain. Still with some residual numbness in right buttock area.  Biopsy confirmed clear cell RCC  Then placed on Torisel and now with mild progression in known areas  Changed to Votrient  Currently on Inlyta reduced dose.     CT C/A/P from 10/14/16     Zometa continued    Review of Systems   Constitutional: Positive for fatigue. Negative for activity change, appetite change, chills, fever and unexpected weight change.   HENT: Negative for congestion, dental problem, ear pain, hearing loss, mouth sores, postnasal drip, rhinorrhea, sinus pressure, sneezing, sore throat, trouble swallowing and voice change.    Eyes: Negative for redness and visual disturbance (rt eye decreased prior stroke).   Respiratory: Negative for cough, chest tightness, shortness of breath and wheezing.    Cardiovascular: Negative for chest pain,  palpitations and leg swelling.   Gastrointestinal: Negative for abdominal distention, abdominal pain, blood in stool, constipation, diarrhea, nausea, rectal pain and vomiting.   Genitourinary: Negative for decreased urine volume, difficulty urinating, dysuria, hematuria and urgency.        Increased urination on lasix   Musculoskeletal: Negative for arthralgias, back pain, gait problem, myalgias and neck pain.   Skin: Negative for color change, pallor, rash and wound.   Neurological: Negative for dizziness, weakness, numbness and headaches.   Hematological: Negative for adenopathy. Does not bruise/bleed easily.   Psychiatric/Behavioral: Negative for dysphoric mood and suicidal ideas. The patient is not nervous/anxious.        Objective:      Physical Exam   Constitutional: He is oriented to person, place, and time. He appears well-developed and well-nourished. No distress.   Presents alone   HENT:   Head: Normocephalic and atraumatic.   Right Ear: External ear normal.   Left Ear: External ear normal.   Nose: Nose normal.   Mouth/Throat: Oropharynx is clear and moist. No oropharyngeal exudate.   Eyes: Conjunctivae and EOM are normal. Pupils are equal, round, and reactive to light. Right eye exhibits no discharge. Left eye exhibits no discharge. No scleral icterus.   Neck: Normal range of motion. Neck supple. No tracheal deviation present. No thyromegaly present.   Cardiovascular: Normal rate, regular rhythm and intact distal pulses.  Exam reveals no gallop and no friction rub.    Murmur (known AS) heard.  Pulmonary/Chest: Effort normal and breath sounds normal. No respiratory distress. He has no wheezes. He has no rales. He exhibits no tenderness.   Abdominal: Soft. Bowel sounds are normal. He exhibits no distension and no mass. There is no tenderness. There is no rebound and no guarding.   No organomegaly   Musculoskeletal: Normal range of motion. He exhibits no edema or tenderness.   Lymphadenopathy:     He has no  cervical adenopathy.   Neurological: He is alert and oriented to person, place, and time. No cranial nerve deficit. He exhibits normal muscle tone. Coordination normal.   Skin: Skin is warm and dry. No rash noted. He is not diaphoretic. No erythema. No pallor.   Dry, no rash   Psychiatric: He has a normal mood and affect. His behavior is normal. Judgment and thought content normal.   Nursing note and vitals reviewed.    Labs- reviewed  Imaging- reviewed  Assessment:       1. 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 Discussed disease progression  Due for Zometa  Will change to Opdivo  Needs to stop Inlyta and wait 2-3 weeks before initiation  Counseled on drug and side effects

## 2017-06-01 NOTE — TELEPHONE ENCOUNTER
"Returned call to pt.   Pt stated he received a call from [employer] and they informed him an "outpatient procedure" ordered by Dr. Valdez was approved.   Pt stated he is unaware of any procedures.   I informed him only thing Dr. Valdez had stated was new start Opdivo, which is a chemo, and has to be authorized by insurance and that this was what they most likely were referring to (as auth was approved in Epic for Opdivo).   Pt verbalized understanding.       ----- Message from Tim Gonsales sent at 6/1/2017  9:27 AM CDT -----  Contact: self  Pt states he received a call from [employer] regarding a outpatient surgery, pt states he's not aware of this procedure and would like to discuss with nurse.  Contact number [phone]    "

## 2017-06-01 NOTE — TELEPHONE ENCOUNTER
----- Message from Janine Valdez MD sent at 5/22/2017  9:59 AM CDT -----  - start new Opdivo in 3 - 4 weeks, EP prior for consent

## 2017-06-19 NOTE — Clinical Note
- needs to come early tomorrow for TSH and T4 - can we add Zometa to treatment tomorrow as well? - RTC 2 weeks with labs EP and chemo (me or Pippa)

## 2017-06-19 NOTE — PROGRESS NOTES
Subjective:       Patient ID: Lex Billy is a 72 y.o. male.    Chief Complaint: No chief complaint on file.    HPI     Returns for follow-up after scans  Has been feeling well overall  Weight stable  No pain other than bilateral knees where known DJD  Active  Eating well  New growth below left posterior lateral rib cage- no pain unless puts pressure on area     CT C/A/P 5/17/17:  Right renal solid and cystic lesion compatible with patient's known renal cell carcinoma, similar to prior.  Osseous metastatic disease with increase in size of left 12th rib and right sacral lesions. No definite new osseous lesion identified.  Interval development of a new left adrenal nodule concerning for additional metastatic spread of disease.    RECIST SUMMARY - comparison 3/8/17:  Right renal lesion: 3.5 cm, series 2 image 64 (previously 3.3 cm)  Left 12th rib: 8.1 cm, series 2 image 67 (previously 6.3 cm)  Right sacrum: 5.2 cm, series 2 image 105 (previously 4.2 cm)  Left adrenal gland: 2.2 cm, series 2 image 59 (not present on prior)     Oncology History:  Initially treated on Nexavar therapy  Prior treatment with Sutent discontinued with side effects.  Completed XRT to sacrum and reports no pain. Still with some residual numbness in right buttock area.  Biopsy confirmed clear cell RCC  Then placed on Torisel and now with mild progression in known areas  Changed to Votrient  Most recently on Inlyta reduced dose.        Zometa continued    Review of Systems   Constitutional: Positive for fatigue. Negative for activity change, appetite change, chills, fever and unexpected weight change.   HENT: Negative for congestion, dental problem, ear pain, hearing loss, mouth sores, postnasal drip, rhinorrhea, sinus pressure, sneezing, sore throat, trouble swallowing and voice change.    Eyes: Negative for redness and visual disturbance (rt eye decreased prior stroke).   Respiratory: Negative for cough, chest tightness, shortness of  breath and wheezing.    Cardiovascular: Negative for chest pain, palpitations and leg swelling.   Gastrointestinal: Negative for abdominal distention, abdominal pain, blood in stool, constipation, diarrhea, nausea, rectal pain and vomiting.   Genitourinary: Negative for decreased urine volume, difficulty urinating, dysuria, hematuria and urgency.        Increased urination on lasix   Musculoskeletal: Negative for arthralgias, back pain, gait problem, myalgias and neck pain.   Skin: Negative for color change, pallor, rash and wound.   Neurological: Negative for dizziness, weakness, numbness and headaches.   Hematological: Negative for adenopathy. Does not bruise/bleed easily.   Psychiatric/Behavioral: Negative for dysphoric mood and suicidal ideas. The patient is not nervous/anxious.        Objective:       ECOG SCORE    0 - Fully active-able to carry on all pre-disease performance without restriction        Physical Exam   Constitutional: He is oriented to person, place, and time. He appears well-developed and well-nourished. No distress.   Presents alone   HENT:   Head: Normocephalic and atraumatic.   Right Ear: External ear normal.   Left Ear: External ear normal.   Nose: Nose normal.   Mouth/Throat: Oropharynx is clear and moist. No oropharyngeal exudate.   Eyes: Conjunctivae and EOM are normal. Pupils are equal, round, and reactive to light. Right eye exhibits no discharge. Left eye exhibits no discharge. No scleral icterus.   Neck: Normal range of motion. Neck supple. No tracheal deviation present. No thyromegaly present.   Cardiovascular: Normal rate, regular rhythm and intact distal pulses.  Exam reveals no gallop and no friction rub.    Murmur (known AS) heard.  Pulmonary/Chest: Effort normal and breath sounds normal. No respiratory distress. He has no wheezes. He has no rales. He exhibits no tenderness.   Abdominal: Soft. Bowel sounds are normal. He exhibits no distension and no mass. There is no  tenderness. There is no rebound and no guarding.   No organomegaly   Musculoskeletal: Normal range of motion. He exhibits no edema or tenderness.   Lymphadenopathy:     He has no cervical adenopathy.   Neurological: He is alert and oriented to person, place, and time. No cranial nerve deficit. He exhibits normal muscle tone. Coordination normal.   Skin: Skin is warm and dry. No rash noted. He is not diaphoretic. No erythema. No pallor.   Dry, no rash   Psychiatric: He has a normal mood and affect. His behavior is normal. Judgment and thought content normal.   Nursing note and vitals reviewed.    Labs- reviewed  Assessment:       1. 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 Starts Opdivo tomorrow  Counseled on drug, mechanism of action and side effects  Consented today    Knows to call for any issues  Needs to restart Zometa- held with recent hospitalization

## 2017-06-20 NOTE — PLAN OF CARE
Problem: Chemotherapy Effects (Adult)  Goal: Signs and Symptoms of Listed Potential Problems Will be Absent, Minimized or Managed (Chemotherapy Effects)  Signs and symptoms of listed potential problems will be absent, minimized or managed by discharge/transition of care (reference Chemotherapy Effects (Adult) CPG).   Outcome: Ongoing (interventions implemented as appropriate)  Patient here for C1D1 of Opdivo after receiving other therapies.  Assessment complete and labs reviewed.  VSS.  Educated patient and wife on Opdivio mechanism of action, side effects, and when to seek medical attention.  Patient verbalized understanding.  Chair reclined and blanket offered.  No needs expressed at this time.  Will continue to monitor.

## 2017-06-20 NOTE — PLAN OF CARE
Problem: Patient Care Overview  Goal: Plan of Care Review  Outcome: Ongoing (interventions implemented as appropriate)  Patient tolerated infusion well.  No reaction suspected.  No questions or concerns.  AVS given to patient.  Patient ambulated off unit unassisted.

## 2017-07-03 NOTE — PROGRESS NOTES
Subjective:       Patient ID: Lex Billy is a 72 y.o. male.    Chief Complaint: No chief complaint on file.    Dr. Valdez's patient    72 year old male with metastatic RCC, who recently initiated treatment with Opdivo after restaging scans showed progression in mid-May.    Patient states that since receiving Opdivo he has felt extremely fatigued.  He also has had a number of falls which he attributes to weakness, no LOC or AMS.  No dizziness. No headaches or visual changes.  Using a walker to ambulate. No chest pain or shortness of breath. Reports no fluid overload with small about of saline given with infusion. No orthopnea.  Appetite is fair.  Pain associated with mass at left back, taking oxycodone at night as well as ambien, notes increased sedation with this.  He has fallen several times at night while trying to go to the bathroom.      CT C/A/P 5/17/17:  Right renal solid and cystic lesion compatible with patient's known renal cell carcinoma, similar to prior.  Osseous metastatic disease with increase in size of left 12th rib and right sacral lesions. No definite new osseous lesion identified.  Interval development of a new left adrenal nodule concerning for additional metastatic spread of disease.    RECIST SUMMARY - comparison 3/8/17:  Right renal lesion: 3.5 cm, series 2 image 64 (previously 3.3 cm)  Left 12th rib: 8.1 cm, series 2 image 67 (previously 6.3 cm)  Right sacrum: 5.2 cm, series 2 image 105 (previously 4.2 cm)  Left adrenal gland: 2.2 cm, series 2 image 59 (not present on prior)     Oncology History:  Initially treated on Nexavar therapy  Prior treatment with Sutent discontinued with side effects.  Completed XRT to sacrum and reports no pain. Still with some residual numbness in right buttock area.  Biopsy confirmed clear cell RCC  Then placed on Torisel and now with mild progression in known areas  Changed to Votrient  Most recently on Inlyta reduced dose.        Zometa  continued      Review of Systems   Constitutional: Positive for activity change, appetite change and fatigue. Negative for unexpected weight change.   HENT: Negative for congestion and trouble swallowing.    Eyes: Negative for visual disturbance.   Respiratory: Negative for cough, chest tightness and shortness of breath.    Cardiovascular: Negative for chest pain, palpitations and leg swelling.   Gastrointestinal: Negative for abdominal pain, constipation, diarrhea and vomiting.   Genitourinary: Negative for dysuria and hematuria.   Musculoskeletal: Negative for arthralgias and gait problem.   Skin: Negative for pallor and rash.   Neurological: Positive for weakness. Negative for dizziness, speech difficulty and light-headedness.   Hematological: Negative for adenopathy. Does not bruise/bleed easily.   Psychiatric/Behavioral: Negative for decreased concentration. The patient is not nervous/anxious.        Objective:      Physical Exam   Constitutional: He is oriented to person, place, and time. He appears well-developed and well-nourished. No distress.   Presents with wife  ECOG 2   HENT:   Head: Normocephalic.   Mouth/Throat: Oropharynx is clear and moist. No oropharyngeal exudate.   Eyes: Conjunctivae are normal. Pupils are equal, round, and reactive to light. No scleral icterus.   Neck: Normal range of motion. Neck supple. No thyromegaly present.   Cardiovascular: Normal rate, regular rhythm and intact distal pulses.    Murmur (blowing holosystolic murmur, radiates to back) heard.  Pulmonary/Chest: Effort normal and breath sounds normal. No respiratory distress.   Abdominal: Soft. Bowel sounds are normal. He exhibits no distension and no mass. There is no tenderness.   Musculoskeletal: Normal range of motion. He exhibits no edema.   No spinal or paraspinal tenderness to palpation  Large mass at left mid-back, no tenderness to palpation       Lymphadenopathy:     He has no cervical adenopathy.   Neurological: He  is alert and oriented to person, place, and time. No cranial nerve deficit.   Skin: Skin is warm and dry. No rash noted. No erythema. No pallor.   Psychiatric: He has a normal mood and affect. His behavior is normal. Thought content normal.   Vitals reviewed.      Assessment:       1. Renal cell carcinoma of right kidney    2. 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 Fall, initial encounter    4. Chronic combined systolic and diastolic heart failure        Plan:       Hold Opdivo on 7/5 due to worsening performance status and recent falls.  MRI brain to be scheduled this week.  Weakness/falls also potentially related to cardiac issues namely known aortic stenosis and CHF.  Return to see Dr. Valdez post MRI.

## 2017-07-03 NOTE — Clinical Note
Can we see if we can get him in for a MRI this week?  (any day except 7/6) and he needs to f/u with [place] after that, let me know what you find, thanks! He will need to return to see her with a cbc/cmp

## 2017-07-05 NOTE — TELEPHONE ENCOUNTER
----- Message from Pippa White PA-C sent at 7/3/2017  9:32 AM CDT -----  Can we see if we can get him in for a MRI this week?  (any day except 7/6) and he needs to f/u with [facility] after that, let me know what you find, thanks! He will need to return to see her with a cbc/cmp

## 2017-07-07 NOTE — PROGRESS NOTES
For your information:    The following patient has been assigned to Ghazala Loomis RN with [facility] for high risk screening.    Reason: High Risk    Please contact [facility] at [phone] with any questions.    Thank you,  Madeleine Be

## 2017-07-10 PROBLEM — C79.31 BRAIN METASTASES: Status: ACTIVE | Noted: 2017-01-01

## 2017-07-10 NOTE — Clinical Note
-  Needs a follow-[facility] 3 weeks, labs and Opdivo

## 2017-07-10 NOTE — PROGRESS NOTES
First attempt to perform initial assessment for OPCM; left voice message to return call.  SYD Loomis, OPCM-RN

## 2017-07-10 NOTE — PROGRESS NOTES
Subjective:       Patient ID: Lex Billy is a 72 y.o. male.    Chief Complaint: Follow-up    HPI     Falls x 3.  All occurred in middle of the night when he got up to go to the restromm  Hit head x 2  No headaches  Dizziness  Returns for follow-up after MRI which did confirm metastatic disease to brain  Also, still notes new growth below left posterior lateral rib cage- no pain unless puts pressure on area     CT C/A/P 5/17/17:  Right renal solid and cystic lesion compatible with patient's known renal cell carcinoma, similar to prior.  Osseous metastatic disease with increase in size of left 12th rib and right sacral lesions. No definite new osseous lesion identified.  Interval development of a new left adrenal nodule concerning for additional metastatic spread of disease.    RECIST SUMMARY - comparison 3/8/17:  Right renal lesion: 3.5 cm, series 2 image 64 (previously 3.3 cm)  Left 12th rib: 8.1 cm, series 2 image 67 (previously 6.3 cm)  Right sacrum: 5.2 cm, series 2 image 105 (previously 4.2 cm)  Left adrenal gland: 2.2 cm, series 2 image 59 (not present on prior)     MRI Brain 7/7/17:  Two foci of enhancement identified in the left temporal and right occipital lobe concerning for metastatic disease in this patient with a history of metastatic RCC.    Oncology History:  Initially treated on Nexavar therapy  Prior treatment with Sutent discontinued with side effects.  Completed XRT to sacrum and reports no pain. Still with some residual numbness in right buttock area.  Biopsy confirmed clear cell RCC  Then placed on Torisel and now with mild progression in known areas  Changed to Votrient  Most recently on Inlyta reduced dose.        Zometa continued    Review of Systems   Constitutional: Positive for activity change, fatigue and unexpected weight change. Negative for appetite change, chills and fever.   HENT: Negative for congestion, dental problem, ear pain, hearing loss, mouth sores, postnasal drip,  rhinorrhea, sinus pressure, sneezing, sore throat, trouble swallowing and voice change.    Eyes: Negative for redness and visual disturbance (rt eye decreased prior stroke).   Respiratory: Negative for cough, chest tightness, shortness of breath and wheezing.    Cardiovascular: Negative for chest pain, palpitations and leg swelling.   Gastrointestinal: Negative for abdominal distention, abdominal pain, blood in stool, constipation, diarrhea, nausea, rectal pain and vomiting.   Genitourinary: Negative for decreased urine volume, difficulty urinating, dysuria, hematuria and urgency.        Increased urination on lasix   Musculoskeletal: Positive for arthralgias. Negative for back pain, gait problem, myalgias and neck pain.   Skin: Negative for color change, pallor, rash and wound.   Neurological: Positive for dizziness, weakness and headaches. Negative for numbness.   Hematological: Negative for adenopathy. Bruises/bleeds easily.   Psychiatric/Behavioral: Negative for dysphoric mood and suicidal ideas. The patient is not nervous/anxious.        Objective:      Physical Exam   Constitutional: He is oriented to person, place, and time. He appears well-developed and well-nourished. No distress.   Presents alone  Using walker   HENT:   Head: Normocephalic and atraumatic.   Right Ear: External ear normal.   Left Ear: External ear normal.   Nose: Nose normal.   Mouth/Throat: Oropharynx is clear and moist. No oropharyngeal exudate.   Eyes: Conjunctivae and EOM are normal. Pupils are equal, round, and reactive to light. Right eye exhibits no discharge. Left eye exhibits no discharge. No scleral icterus.   Neck: Normal range of motion. Neck supple. No tracheal deviation present. No thyromegaly present.   Cardiovascular: Normal rate, regular rhythm and intact distal pulses.  Exam reveals no gallop and no friction rub.    Murmur (known AS) heard.  Pulmonary/Chest: Effort normal and breath sounds normal. No respiratory distress.  He has no wheezes. He has no rales. He exhibits no tenderness.   Abdominal: Soft. Bowel sounds are normal. He exhibits no distension and no mass. There is no tenderness. There is no rebound and no guarding.   No organomegaly   Musculoskeletal: Normal range of motion. He exhibits no edema or tenderness.   Lymphadenopathy:     He has no cervical adenopathy.   Neurological: He is alert and oriented to person, place, and time. No cranial nerve deficit. He exhibits normal muscle tone. Coordination normal.   Skin: Skin is warm and dry. No rash noted. He is not diaphoretic. No erythema. No pallor.   Dry, no rash   Psychiatric: He has a normal mood and affect. His behavior is normal. Judgment and thought content normal.   Nursing note and vitals reviewed.    Labs- reviewed  Imaging- reviewed  Assessment:       1. 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 Brain metastases        Plan:     1-2. Discussed progression to CNS  Will refer to see Dr. Tolliver  Discussed hold of Opdivo for now though will restart post  We will ask Dr. Tolliver about XRT for subcutaneous lesion as well

## 2017-07-11 NOTE — TELEPHONE ENCOUNTER
----- Message from Fabiana Paige RN sent at 7/11/2017 11:14 AM CDT -----  appt made  ----- Message -----  From: Serena Lopez  Sent: 7/10/2017   3:20 PM  To: Huong Cat MA, Tawanda Car    [facility]!  Dr. Valdez would like Dr. Tolliver to f/u with pt asap.   Thanks!

## 2017-07-12 NOTE — PROGRESS NOTES
Second attempt to perform initial assessment for OPCM.  Patient stated that at this time all of his needs are met and has declined OPCM.  Encouraged patient to call this RN if having any questions/concerns.  Case closed.  SYD Loomis, OPCM-RN

## 2017-07-19 NOTE — PROGRESS NOTES
Subjective:       Patient ID: Lex Billy is a 72 y.o. male.    Chief Complaint: No chief complaint on file.    This patient presents for evaluation of a new problem    Mr. Billy has a history of Stage IV renal cell carcinoma. He initially presented in June of 2014 for evaluation of pain in the Rt. leg.  An MRI revealed two suspicious right kidney lesions measuring 1.6 cm and 3 cm. Follow up CT scan of the abdomen and pelvis on 7/9/14 again revealed the two Rt. renal lesions with evidence of arterial enhancement. There was osseous metastatic disease to the Rt. sacrum and Lt. 12 th rib.  Biopsy of the sacral lesion revealed metastatic clear cell carcinoma of the kidney.  The patient was referred to our department and completed a course of palliative radiotherapy to the sacrum in August of 2014.  He received 24 Gy in 4 Gy fractions.  Following radiottherapy, he was initially treated on Nexavar this was discontinued secondary to side effects.  He was placed on Torisel but there was evidence of disease progression. Repeat MRI of the sacrum on 2/17/17 revealed the sacral mass has increased in size to the prior CT. There is probable involvement of the right S3 nerve root and possibly the right S4 nerve root and right piriformis. He was referred to our department and completed 30 Gy to a Rt. sacral mass in March of this year.  The patient returns for follow up and to discuss further therapy.  Recent MRI of the brain revealed two small areas consistent with metastatic disease.  The patient is continues to complain of pain from the lesion in the sacrum and also has pain secondary to a large Lt. posterior flank mass.       Review of Systems   Constitutional: Negative for activity change, appetite change, chills and fever.   Respiratory: Negative for cough and shortness of breath.    Gastrointestinal: Negative for abdominal pain, constipation and diarrhea.   Genitourinary: Negative for difficulty urinating,  dysuria and frequency.   Musculoskeletal: Positive for back pain and gait problem. Negative for neck pain and neck stiffness.       Objective:      Physical Exam   Constitutional: He is oriented to person, place, and time. He appears well-developed and well-nourished. No distress.   Musculoskeletal:        Arms:  Neurological: He is alert and oriented to person, place, and time. No cranial nerve deficit. Coordination normal.         Radiology I personally reviewed the images of his most recent CT of the abdomen and MRI of the brain with the patient and his wife.  Assessment:       Metastatic Renal cell carcinoma   Plan:       Discussed the options of further radiotherapy with stereotactic radiosurgery to the small lesions in the brain and palliative therapy to the other areas.  Discussed the procedures, risks and benefits of therapy.  Will plan simulation.

## 2017-07-31 NOTE — Clinical Note
Patient formerly seen by Dr. Palmer in cardiology (I think Estela left)- patient is due for f/u there and also with hypotension, can you see if we can get him in there to be seen in the next few days? Thanks.

## 2017-07-31 NOTE — Clinical Note
Michael- patient says he finishes up xrt to back this Wednesday- when do you all anticipate doing the stereotactic to the brain lesions? Just trying to figure out his f/u for opdivo.  Thanks!

## 2017-07-31 NOTE — PROGRESS NOTES
Subjective:       Patient ID: Lex Billy is a 72 y.o. male.    Chief Complaint: No chief complaint on file.    Dr. Valdez's patient    72 year old male with metastatic RCC, on Opdivo. Opdivo currently held as he is undergoing radiation to left flank mass and anticipated XRT to new brain lesions.  He is tolerating radiation well without significant side effects. His energy level is fair, appetite is poor. No fever, chills, or shortness of breath.  He does note some nausea in the mornings after breakfast, not currently taking any anti-emetics.   No recent falls.         CT C/A/P 5/17/17:  Right renal solid and cystic lesion compatible with patient's known renal cell carcinoma, similar to prior.  Osseous metastatic disease with increase in size of left 12th rib and right sacral lesions. No definite new osseous lesion identified.  Interval development of a new left adrenal nodule concerning for additional metastatic spread of disease.    RECIST SUMMARY - comparison 3/8/17:  Right renal lesion: 3.5 cm, series 2 image 64 (previously 3.3 cm)  Left 12th rib: 8.1 cm, series 2 image 67 (previously 6.3 cm)  Right sacrum: 5.2 cm, series 2 image 105 (previously 4.2 cm)  Left adrenal gland: 2.2 cm, series 2 image 59 (not present on prior)     MRI Brain 7/7/17:  Two foci of enhancement identified in the left temporal and right occipital lobe concerning for metastatic disease in this patient with a history of metastatic RCC.     Oncology History:  Initially treated on Nexavar therapy  Prior treatment with Sutent discontinued with side effects.  Completed XRT to sacrum and reports no pain. Still with some residual numbness in right buttock area.  Biopsy confirmed clear cell RCC  Then placed on Torisel and now with mild progression in known areas  Changed to Votrient  Most recently on Inlyta reduced dose.        Zometa continued      Review of Systems   Constitutional: Positive for activity change, fatigue and unexpected  weight change. Negative for appetite change, chills and fever.   HENT: Negative for dental problem, ear pain, hearing loss, mouth sores, sore throat and trouble swallowing.    Eyes: Negative for visual disturbance (rt eye decreased prior stroke).   Respiratory: Negative for cough, chest tightness, shortness of breath and wheezing.    Cardiovascular: Negative for chest pain, palpitations and leg swelling.   Gastrointestinal: Negative for abdominal distention, abdominal pain, blood in stool, constipation, diarrhea, nausea, rectal pain and vomiting.   Genitourinary: Negative for decreased urine volume, difficulty urinating, dysuria, hematuria and urgency.        Increased urination on lasix   Musculoskeletal: Positive for arthralgias. Negative for back pain, gait problem, myalgias and neck pain.   Skin: Negative for color change, pallor, rash and wound.   Neurological: Positive for dizziness and weakness. Negative for numbness and headaches.   Hematological: Negative for adenopathy. Bruises/bleeds easily.   Psychiatric/Behavioral: Negative for dysphoric mood and suicidal ideas. The patient is not nervous/anxious.        Objective:      Physical Exam   Constitutional: He is oriented to person, place, and time. He appears well-developed and well-nourished. No distress.   Presents with wife  ECOG 1-2   HENT:   Head: Normocephalic and atraumatic.   Right Ear: External ear normal.   Left Ear: External ear normal.   Nose: Nose normal.   Mouth/Throat: Oropharynx is clear and moist. No oropharyngeal exudate.   Eyes: Conjunctivae and EOM are normal. Pupils are equal, round, and reactive to light. Right eye exhibits no discharge. Left eye exhibits no discharge. No scleral icterus.   Neck: Normal range of motion. Neck supple. No tracheal deviation present. No thyromegaly present.   Cardiovascular: Normal rate, regular rhythm and intact distal pulses.  Exam reveals no gallop and no friction rub.    Murmur (known AS)  heard.  Pulmonary/Chest: Effort normal and breath sounds normal. No respiratory distress. He has no wheezes. He has no rales. He exhibits no tenderness.   Abdominal: Soft. Bowel sounds are normal. He exhibits no distension and no mass. There is no tenderness. There is no rebound and no guarding.   No organomegaly   Musculoskeletal: Normal range of motion. He exhibits no edema or tenderness.   Lymphadenopathy:     He has no cervical adenopathy.   Neurological: He is alert and oriented to person, place, and time. No cranial nerve deficit. He exhibits normal muscle tone. Coordination normal.   Skin: Skin is warm and dry. No rash noted. He is not diaphoretic. No erythema. No pallor.   Dry, no rash   Psychiatric: He has a normal mood and affect. His behavior is normal. Judgment and thought content normal.   Nursing note and vitals reviewed.      Assessment:       1. 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 Brain metastases    3. Nausea and vomiting, intractability of vomiting not specified, unspecified vomiting type    4. Hypotension, unspecified hypotension type        Plan:       Continue radiation to left flank and then treatment of new brain metastasis. Will plan to have the patient return to re-initiate Opdivo following that radiation.  RX for zofran sent to patient's pharmacy; encouraged him to take it in the morning so that he will hopefully have more of an appetite/increased caloric intake.   Refer back to cardiology for Hypotension (no acute change and VSS)- patient also due for q 6 month follow up there; some fatigue could be due to this

## 2017-08-01 NOTE — PLAN OF CARE
Problem: Patient Care Overview  Goal: Plan of Care Review  Outcome: Ongoing (interventions implemented as appropriate)  Day 5 of radiation to the left flank denies pain

## 2017-08-02 NOTE — TELEPHONE ENCOUNTER
Returned call to pt.   Pt stated he needs his nausea medication called into [facility] on [address].   Informed pt would fwd message to Pippa to refill.   Pt verbalized understanding.       ----- Message from Aida Enriquez sent at 8/1/2017  3:41 PM CDT -----  Contact: Pt  Pt states a medication was to be sent to the pharmacy, as of right now its hasn't  in       Pt contact number [phone]  Thanks

## 2017-08-07 NOTE — PLAN OF CARE
Problem: Patient Care Overview  Goal: Plan of Care Review  Outcome: Outcome(s) achieved Date Met: 08/07/17  Radiation to the left flank completed on 8/3/17 f/u appt made

## 2017-08-11 NOTE — PROGRESS NOTES
CC: Metastatic RCC     is a 72 year old male with metastatic RCC, on Opdivo. Opdivo was held during his radiation therapy to left flank mass and anticipated XRT to new brain lesions.  He  tolerated radiation well without significant side effects.         CT C/A/P 5/17/17:    Right renal solid and cystic lesion compatible with patient's known renal cell carcinoma, similar to prior.  Osseous metastatic disease with increase in size of left 12th rib and right sacral lesions. No definite new osseous lesion identified.  Interval development of a new left adrenal nodule concerning for additional metastatic spread of disease.     RECIST SUMMARY - comparison 3/8/17:  Right renal lesion: 3.5 cm, series 2 image 64 (previously 3.3 cm)  Left 12th rib: 8.1 cm, series 2 image 67 (previously 6.3 cm)  Right sacrum: 5.2 cm, series 2 image 105 (previously 4.2 cm)  Left adrenal gland: 2.2 cm, series 2 image 59 (not present on prior)     MRI Brain 7/7/17:    Two foci of enhancement identified in the left temporal and right occipital lobe concerning for metastatic disease in this patient with a history of metastatic RCC.     Oncology History:    Initially treated on Nexavar therapy  Prior treatment with Sutent discontinued with side effects.  Completed XRT to sacrum and reports no pain. Still with some residual numbness in right buttock area.  Biopsy confirmed clear cell RCC  Then placed on Torisel and now with mild progression in known areas  Changed to Votrient  Most recently on Inlyta reduced dose.        Zometa continued    Review of Systems   Constitutional: Positive for malaise/fatigue. Negative for chills and fever.   HENT: Negative for congestion and nosebleeds.    Eyes: Negative for blurred vision, double vision and photophobia.   Respiratory: Negative for cough, hemoptysis, sputum production and shortness of breath.    Cardiovascular: Negative for chest pain, palpitations, leg swelling and PND.   Gastrointestinal:  Negative for abdominal pain, diarrhea, heartburn, nausea and vomiting.   Genitourinary: Negative for dysuria, hematuria and urgency.   Musculoskeletal: Positive for falls (he had 2 falls last week at home). Negative for myalgias.   Skin: Negative for rash.   Neurological: Negative for dizziness, tingling, sensory change, speech change and headaches.   Endo/Heme/Allergies: Does not bruise/bleed easily.   Psychiatric/Behavioral: Negative for depression.   All other systems reviewed and are negative.            Current Outpatient Prescriptions   Medication Sig    aspirin (ECOTRIN) 81 MG EC tablet Take 81 mg by mouth once daily.    atorvastatin (LIPITOR) 40 MG tablet TAKE 1 TABLET ONE TIME DAILY (Patient taking differently: TAKE 1 TABLET BY MOUTH ONE TIME DAILY)    axitinib (INLYTA) 5 mg Tab Take 1 tablet by mouth 2 (two) times daily.    bethanechol (URECHOLINE) 50 MG tablet Take 1 tablet (50 mg total) by mouth 3 (three) times daily.    blood sugar diagnostic Strp 1 each by Misc.(Non-Drug; Combo Route) route 3 (three) times daily. Free style freedom lite test strips and lancets (Patient taking differently: 1 each by Misc.(Non-Drug; Combo Route) route. Test blood sugar twice daily every other day)    blood-glucose meter (TRUE METRIX AIR GLUCOSE METER) Misc Use as directed    brimonidine 0.1% (ALPHAGAN P) 0.1 % Drop Place 1 drop into both eyes 3 (three) times daily.    CALCIUM CARBONATE-VITAMIN D3 ORAL Take 1 tablet by mouth every morning. Calcium carbonate 1000mg vitamin d3 1600 units per patient    clopidogrel (PLAVIX) 75 mg tablet Take 1 tablet (75 mg total) by mouth once daily.    DOCUSATE CALCIUM (STOOL SOFTENER ORAL) Take by mouth once as needed (for constipation).    duloxetine (CYMBALTA) 30 MG capsule Take 1 capsule (30 mg total) by mouth once daily.    fexofenadine (ALLEGRA) 180 MG tablet TAKE 1 TABLET ONE TIME DAILY    furosemide (LASIX) 40 MG tablet Take 1 tablet (40 mg total) by mouth once daily.     gabapentin (NEURONTIN) 300 MG capsule     lancets 28 gauge Misc 1 lancet by Misc.(Non-Drug; Combo Route) route 3 (three) times daily.    lisinopril (PRINIVIL,ZESTRIL) 2.5 MG tablet Take 1 tablet (2.5 mg total) by mouth every evening.    metformin (GLUCOPHAGE) 500 MG tablet Take 1 tablet (500 mg total) by mouth daily as needed (if blood sugar > 150).    metoprolol succinate (TOPROL-XL) 50 MG 24 hr tablet Take 1 tablet (50 mg total) by mouth once daily.    morphine (MS CONTIN) 15 MG 12 hr tablet Take 1 tablet (15 mg total) by mouth every 8 (eight) hours as needed for Pain.    nitroGLYCERIN (NITROSTAT) 0.4 MG SL tablet Place 1 tablet (0.4 mg total) under the tongue every 5 (five) minutes as needed for Chest pain.    ondansetron (ZOFRAN-ODT) 4 MG TbDL Take 1 tablet (4 mg total) by mouth every 4 to 6 hours as needed. Dissolve one tablet under tongue every 4-6 hours as needed for nausea.    potassium chloride SA (K-DUR,KLOR-CON) 20 MEQ tablet Take 1 tablet (20 mEq total) by mouth once daily.    tamsulosin (FLOMAX) 0.4 mg Cp24 Take 1 capsule (0.4 mg total) by mouth once daily.    zolpidem (AMBIEN) 5 MG Tab Take 1 tablet (5 mg total) by mouth nightly as needed. (Patient taking differently: Take 5 mg by mouth nightly as needed (for sleep). )     No current facility-administered medications for this visit.        Vitals:    08/14/17 1349   BP: (!) 93/53   Pulse: 82   Resp: 18   Temp: 98.3 °F (36.8 °C)       Physical Exam   Constitutional: He is oriented to person, place, and time. He appears well-developed.   HENT:   Head: Normocephalic and atraumatic.   Eyes: Pupils are equal, round, and reactive to light.   Neck: Normal range of motion.   Cardiovascular: Normal rate and regular rhythm.    Murmur heard.  Pulmonary/Chest: Effort normal. No respiratory distress. He has no rales. He exhibits no tenderness.   Abdominal: Soft. He exhibits no distension and no mass. There is no rebound and no guarding.    Musculoskeletal: He exhibits no edema.   Lymphadenopathy:     He has no cervical adenopathy.   Neurological: He is alert and oriented to person, place, and time. No cranial nerve deficit.   Skin: Skin is warm. No rash noted. No erythema.   Psychiatric: He has a normal mood and affect.     Component      Latest Ref Rng & Units 8/14/2017   Sodium      136 - 145 mmol/L 134 (L)   Potassium      3.5 - 5.1 mmol/L 3.9   Chloride      95 - 110 mmol/L 99   CO2      23 - 29 mmol/L 29   Glucose      70 - 110 mg/dL 99   BUN, Bld      8 - 23 mg/dL 11   Creatinine      0.5 - 1.4 mg/dL 0.8   Calcium      8.7 - 10.5 mg/dL 8.8   Total Protein      6.0 - 8.4 g/dL 6.6   Albumin      3.5 - 5.2 g/dL 2.1 (L)   Total Bilirubin      0.1 - 1.0 mg/dL 1.1 (H)   Alkaline Phosphatase      55 - 135 U/L 103   AST      10 - 40 U/L 17   ALT      10 - 44 U/L 16   Anion Gap      8 - 16 mmol/L 6 (L)   eGFR if African American      >60 mL/min/1.73 m:2 >60.0   eGFR if non African American      >60 mL/min/1.73 m:2 >60.0   WBC      3.90 - 12.70 K/uL 7.41   RBC      4.60 - 6.20 M/uL 3.72 (L)   Hemoglobin      14.0 - 18.0 g/dL 9.1 (L)   Hematocrit      40.0 - 54.0 % 29.2 (L)   MCV      82 - 98 fL 79 (L)   MCH      27.0 - 31.0 pg 24.5 (L)   MCHC      32.0 - 36.0 g/dL 31.2 (L)   RDW      11.5 - 14.5 % 21.7 (H)   Platelets      150 - 350 K/uL 234   MPV      9.2 - 12.9 fL 8.9 (L)   Gran #      1.8 - 7.7 K/uL 6.1   Free T4      0.71 - 1.51 ng/dL 0.85   TSH      0.400 - 4.000 uIU/mL 2.549     Assessment:    1. 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 Brain metastases    3. Nausea and vomiting, intractability of vomiting not specified, unspecified vomiting type    4. Hypotension, unspecified hypotension type    5 Hypochromic, microcytic anemia       Plan:    1,2 . He has completed Rt to brain. He will resume resume Nivo cycle 2 on 8/15/17.  3. Nausea well controlled  4. Possible orthostatic component co-exists. Recommend compression stockings. Advised to  hold anti-HT medication today.   5. No overt bleeding. Iron panel testing in 2 weeks

## 2017-08-15 NOTE — PROGRESS NOTES
Cardiology Clinic Note  Reason for Visit: Aortic stenosis, Coronary Artery Disease (6 month f/u )    HPI:   Mr. Billy returns today for f/u care for severe AS and CAD.    Mr. Billy is a 72 y.o. gentleman with history of PMH of NIDDM, HTN, HLD, severe AS (NAMAN 0.72, PV 4.3, MG 43)- not a TAVR candidate due to metastatic R renal cell carcinoma (mets to ribs, sacrum, newly dx brain mets), CAD s/p NSTEMI 10/2015 s/p LM BMS x 1, mRCA BMS x 2, and dRCA BMS x 1, patent stents LHC 4/2017 here for follow up. He was recently diagnosed with brain metastases with plans for upcoming XRT. On chemotherapy, s/p XRT to bony lesions. Since his last clinic visit, he was hospitalized in 4/2017 with pneumonia and respiratory failure with subsequent CHF exacerbation. A LHC performed at that time for persistent troponinemia showed patent stents and no new lesions. Interventional saw him at that time and again advised that he is not a TAVR candidate with his Stage IV RCC though palliative BAV may be an option for him if he has recurrent symptomatic AS.    Today, he denies any chest pain, orthopnea, PND, presyncope/syncope, dizziness/lightheadedness. He notes worsened MAHARAJ at about  feet over the last several months after his hospitalization. Previously able to work in his yard without breaks and is most concerned about decline in ability to do things he was once able to do. Able to perform all ADLs, but feels limited with other activities around the house. Also notes chronic fatigue, worse in last several months. BB has also been uptitrated during this time from 25 to 50.    ROS:    Constitution: negative for - fever, chills, weight loss or weight gain  HENT: negative for - sore throat, rhinorrhea, or headache  Eyes: negative for - blurred or double vision  Cardiovascular: negative  Pulmonary: negative  Gastrointestinal: negative for - abdominal pain, nausea, vomiting, or diarrhea  : negative for -  dysuria  Neurological: negative for - focal weakness or sensory changes  PMH:     Past Medical History:   Diagnosis Date    Aortic atherosclerosis 10/20/2016    Benign non-nodular prostatic hyperplasia with lower urinary tract symptoms 3/29/2017    Chronic combined systolic and diastolic heart failure 3/29/2017    4-10-17   1 - Mildly to moderately depressed left ventricular systolic function (EF 40-45%).    2 - Normal right ventricular systolic function .    3 - Left ventricular diastolic dysfunction.    4 - Mild mitral regurgitation.    5 - Mild aortic regurgitation.    6 - Severe aortic stenosis, NAMAN = 0.72 cm2, peak velocity = 4.3 m/s, mean gradient = 43.0 mmHg.    7 - Increased central venous pressure.    Chronic retention of urine 4/3/2017    Patient does self cath at home.   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    Severe aortic stenosis 1/31/2016    --- Repeated 2D ECHO showed EF 40% with severe aortic stenosis, relatively unchanged --- Not a TAVR candidate due to RCC  --- moderate AS with regards to mean gradient of 34 mmHg, despite NAMAN < 1 cm2 --- IC recs of not a surgical candidate  --- EF reduction from 50 % to 40 %    Stroke     right eye    Type 2 diabetes mellitus with diabetic polyneuropathy, without long-term current use of insulin 10/20/2016    Type 2 diabetes mellitus with stage 2 chronic kidney disease, without long-term current  use of insulin 8/6/2012     Past Surgical History:   Procedure Laterality Date    BACK SURGERY      cervical disc replacement    CARPAL TUNNEL RELEASE      left    CORONARY ANGIOPLASTY WITH STENT PLACEMENT  10/2015    4 stents    EYE SURGERY      laser surgery in right eye    JOINT REPLACEMENT      orthoscopic surgery on knee      ROTATOR CUFF REPAIR      right side    SINUS SURGERY      TONSILLECTOMY      torn ligament      repair. left shoulder    TOTAL KNEE ARTHROPLASTY      left side    VASECTOMY       Allergies:     Review of patient's allergies indicates:   Allergen Reactions    No known drug allergies      Medications:     Current Outpatient Prescriptions on File Prior to Visit   Medication Sig Dispense Refill    aspirin (ECOTRIN) 81 MG EC tablet Take 81 mg by mouth once daily.      atorvastatin (LIPITOR) 40 MG tablet TAKE 1 TABLET ONE TIME DAILY (Patient taking differently: TAKE 1 TABLET BY MOUTH ONE TIME DAILY) 90 tablet 3    axitinib (INLYTA) 5 mg Tab Take 1 tablet by mouth 2 (two) times daily. 60 tablet 3    bethanechol (URECHOLINE) 50 MG tablet Take 1 tablet (50 mg total) by mouth 3 (three) times daily. 90 tablet 11    blood sugar diagnostic Strp 1 each by Misc.(Non-Drug; Combo Route) route 3 (three) times daily. Free style freedom lite test strips and lancets (Patient taking differently: 1 each by Misc.(Non-Drug; Combo Route) route. Test blood sugar twice daily every other day) 90 each 3    blood-glucose meter (TRUE METRIX AIR GLUCOSE METER) Misc Use as directed 1 each 0    brimonidine 0.1% (ALPHAGAN P) 0.1 % Drop Place 1 drop into both eyes 3 (three) times daily.      CALCIUM CARBONATE-VITAMIN D3 ORAL Take 1 tablet by mouth every morning. Calcium carbonate 1000mg vitamin d3 1600 units per patient      clopidogrel (PLAVIX) 75 mg tablet Take 1 tablet (75 mg total) by mouth once daily. 90 tablet 3    DOCUSATE CALCIUM (STOOL SOFTENER ORAL) Take by mouth once as needed (for  constipation).      duloxetine (CYMBALTA) 30 MG capsule Take 1 capsule (30 mg total) by mouth once daily. 30 capsule 0    fexofenadine (ALLEGRA) 180 MG tablet TAKE 1 TABLET ONE TIME DAILY 90 tablet 3    furosemide (LASIX) 40 MG tablet Take 1 tablet (40 mg total) by mouth once daily. 90 tablet 3    gabapentin (NEURONTIN) 300 MG capsule       lancets 28 gauge Misc 1 lancet by Misc.(Non-Drug; Combo Route) route 3 (three) times daily. 300 each 3    lisinopril (PRINIVIL,ZESTRIL) 2.5 MG tablet Take 1 tablet (2.5 mg total) by mouth every evening. 90 tablet 3    metoprolol succinate (TOPROL-XL) 50 MG 24 hr tablet Take 1 tablet (50 mg total) by mouth once daily. 90 tablet 3    nitroGLYCERIN (NITROSTAT) 0.4 MG SL tablet Place 1 tablet (0.4 mg total) under the tongue every 5 (five) minutes as needed for Chest pain. 25 tablet 4    ondansetron (ZOFRAN-ODT) 4 MG TbDL Take 1 tablet (4 mg total) by mouth every 4 to 6 hours as needed. Dissolve one tablet under tongue every 4-6 hours as needed for nausea. 30 tablet 1    potassium chloride SA (K-DUR,KLOR-CON) 20 MEQ tablet Take 1 tablet (20 mEq total) by mouth once daily. 90 tablet 4    tamsulosin (FLOMAX) 0.4 mg Cp24 Take 1 capsule (0.4 mg total) by mouth once daily. 30 capsule 11    zolpidem (AMBIEN) 5 MG Tab Take 1 tablet (5 mg total) by mouth nightly as needed. (Patient taking differently: Take 5 mg by mouth nightly as needed (for sleep). ) 30 tablet 3    [DISCONTINUED] metformin (GLUCOPHAGE) 500 MG tablet Take 1 tablet (500 mg total) by mouth daily as needed (if blood sugar > 150). 90 tablet 5    [DISCONTINUED] morphine (MS CONTIN) 15 MG 12 hr tablet Take 1 tablet (15 mg total) by mouth every 8 (eight) hours as needed for Pain. 90 tablet 0     No current facility-administered medications on file prior to visit.      Social History:     Social History   Substance Use Topics    Smoking status: Never Smoker    Smokeless tobacco: Never Used    Alcohol use No       "Comment: Heavy in past, quit about 20 years ago     Family History:     Family History   Problem Relation Age of Onset    Heart disease Father     ALS Father     Cancer Brother      esophageal    Rheum arthritis Mother     Migraines Daughter     Asthma Son     Diabetes Neg Hx     Colon polyps Neg Hx      Physical Exam:     BP (!) 86/51 (BP Location: Left arm, Patient Position: Sitting, BP Method: Large (Automatic))   Pulse 75   Ht 5' 9" (1.753 m)   Wt 71.9 kg (158 lb 8.2 oz)   SpO2 99%   BMI 23.41 kg/m²    Sittin/50 73 bpm Standin/50 79 bpm  Constitutional: NAD, conversant  HEENT: Sclera anicteric, PERRLA, EOMI  Neck: No JVD,   CV: RRR, 3/6 late peaking systolic ejection murmur with bilateral carotid radiation, normal S1/S2  Pulm: CTAB, no wheezes, rales, or ronchi  GI: Abdomen soft, NTND, +BS  Extremities: 1-2+ pitting edema to above ankles, warm and well perfused  Skin: No ecchymosis, erythema, or ulcers  Psych: AOx3, appropriate affect  Neuro: CNII-XII intact, no focal deficits    Labs:     Lab Results   Component Value Date     (L) 2017    K 3.9 2017    CL 99 2017    CO2 29 2017    BUN 11 2017    CREATININE 0.8 2017    ANIONGAP 6 (L) 2017     Lab Results   Component Value Date    HGBA1C 5.5 2017     Lab Results   Component Value Date    BNP 1,068 (H) 2017     (H) 2017     (H) 2016    Lab Results   Component Value Date    WBC 7.41 2017    HGB 9.1 (L) 2017    HCT 29.2 (L) 2017    HCT 35 (L) 2017     2017    GRAN 6.1 2017     Lab Results   Component Value Date    CHOL 100 (L) 2017    HDL 28 (L) 2017    LDLCALC 59.8 (L) 2017    TRIG 61 2017          Imaging:   TTE: 2017  CONCLUSIONS     1 - Mildly to moderately depressed left ventricular systolic function (EF 40-45%).     2 - Normal right ventricular systolic function .     3 - Left " ventricular diastolic dysfunction.     4 - Mild mitral regurgitation.     5 - Mild aortic regurgitation.     6 - Severe aortic stenosis, NAMAN = 0.72 cm2, peak velocity = 4.3 m/s, mean gradient = 43.0 mmHg.     7 - Increased central venous pressure.     TTE 3/2017  CONCLUSIONS     1 - Mildly to moderately depressed left ventricular systolic function (EF 40-45%).     2 - Normal right ventricular systolic function .     3 - Left ventricular diastolic dysfunction.     4 - Pulmonary hypertension. The estimated PA systolic pressure is 43 mmHg.     5 - Mild left atrial enlargement.     6 - Severe aortic stenosis, NAMAN = 0.89 cm2, peak velocity = 3.7 m/s, mean gradient = 34.0 mmHg. AV not seen well in SAX, in LAX views it appears moderate to severely stenotic. LVOT flow is borderline low (SVi = 32 ml/m2). This may  be low flow low   gradient severe AS.     7 - Trivial to mild aortic regurgitation.     8 - Moderate mitral regurgitation.     9 - Trivial to mild tricuspid regurgitation.     10 - Intermediate central venous pressure.     TTE: 2/2016  CONCLUSIONS     1 - Low normal to mildly depressed left ventricular systolic function (EF 50-55%).     2 - Normal right ventricular systolic function .     3 - Left ventricular diastolic dysfunction.     4 - Mild mitral regurgitation.     5 - Severe aortic stenosis, NAMAN = 0.8 cm2, peak velocity = 4.0 m/s, mean gradient = 44 mmHg.     EF   Date Value Ref Range Status   04/10/2017 40 (A) 55 - 65    03/29/2017 40 (A) 55 - 65    02/12/2016 50 55 - 65      Diagnostic [facility] 4/2017:        Patient has a right dominant coronary artery.      - Left Main Coronary Artery:             The LM is patent within the stent. There is GALEN 3 flow.     - Left Anterior Descending Artery:             The mid LAD has a 50% stenosis. There is GALEN 3 flow.     - Left Circumflex Artery:             The LCX is normal. There is GALEN 3 flow.     - Right Coronary Artery:             The mid RCA is patent within the  stent. There is GALEN 3 flow.             The distal RCA is patent within the stent. There is GALEN 3 flow.    Assessment:    72 y.o. gentleman with history of PMH of NIDDM, HTN, HLD, severe AS (NAMAN 0.72, PV 4.3, MG 43)- not a TAVR candidate due to metastatic R renal cell carcinoma (mets to ribs, sacrum, newly dx brain mets), CAD s/p NSTEMI 10/2015 s/p LM BMS x 1, mRCA BMS x 2, and dRCA BMS x 1, patent stents and 50% LAD lesion LHC 4/2017 here for follow up.  Plan:   Lex was seen today for shortness of breath and aortic stenosis, severe.    Diagnoses and all orders for this visit:    Aortic stenosis, severe  - 1 episode CHF in 4/2017, no further exacerbations  - per repeat IC evaluation at that time, not a candidate for TAVR given metastatic RCC  - could consider palliative BAV if more symptomatic depending on life expectancy and new brain metastases noted  - suspect dyspnea likely secondary to AS though no further other CHF sx/exacerbations, no angina or presynope/syncope  - discussed option of palliative BAV, given usual length of benefit of 6 months, patient not interested at this time. Without overt congestive sx and given his poor prognosis, may not be a candidate for BAV. Would need further discussion with interventional team  - if referred, would also need repeat LHC per Dr. Ramirez's last note as concern for nondiagnostic cath with underfilled LC thus would repeat LHC prior  - decreased metoprolol from 50 mg to 25 mg given fatigue, hypotension, EF only mildly low. Will assess for sx improvement on lower dose  -     metoprolol succinate (TOPROL-XL) 25 MG 24 hr tablet; Take 1 tablet (25 mg total) by mouth once daily.          Chronic systolic heart failure  -discontinue lisinopril 2.5 as unlikely to benefit and given asymptomatic hypotension  -decrease metoprolol succinate from 50 to 25 mg given fatigue and hypotension  -     metoprolol succinate (TOPROL-XL) 25 MG 24 hr tablet; Take 1 tablet (25 mg total) by  mouth once daily.    Coronary artery disease, angina presence unspecified, unspecified vessel or lesion type, unspecified whether native or transplanted heart  - asymptomatic  - continue asa/plavix/statin  - last coronary angiogram as above    Discussed with Dr. Tripp  RTC 3 months    Signed:    Tien Jade MD  Cardiology Fellow, PGY-4  Pager: [phone]  8/15/2017

## 2017-08-15 NOTE — PLAN OF CARE
Problem: Patient Care Overview  Goal: Plan of Care Review  Outcome: Ongoing (interventions implemented as appropriate)  Patient tolerated infusion well.  No reaction suspected.  AVS given to patient.  No questions or concerns.  Patient ambulated off unit accompanied by his wife.

## 2017-08-15 NOTE — PLAN OF CARE
Problem: Chemotherapy Effects (Adult)  Goal: Signs and Symptoms of Listed Potential Problems Will be Absent, Minimized or Managed (Chemotherapy Effects)  Signs and symptoms of listed potential problems will be absent, minimized or managed by discharge/transition of care (reference Chemotherapy Effects (Adult) CPG).   Outcome: Ongoing (interventions implemented as appropriate)  Patient here for Opdivo.  Assessment complete and labs reviewed.  VSS.  Chair reclined and blanket offered.  No needs expressed at this time.  Will continue to monitor.

## 2017-08-16 PROBLEM — A41.9 SEPSIS: Status: ACTIVE | Noted: 2017-01-01

## 2017-08-16 PROBLEM — R79.89 ELEVATED LACTIC ACID LEVEL: Status: ACTIVE | Noted: 2017-01-01

## 2017-08-16 PROBLEM — D72.829 LEUKOCYTOSIS: Status: ACTIVE | Noted: 2017-01-01

## 2017-08-16 NOTE — ED PROVIDER NOTES
Encounter Date: 8/15/2017    SCRIBE #1 NOTE: I, Kika Blake, am scribing for, and in the presence of,  Dr. Koehler. I have scribed the entire note.       History     Chief Complaint   Patient presents with    Fever Post Chemo     Chemo today. 101.1 at home. Iman dunn PTA     Time seen by provider: 10:58 PM    This is a 72 y.o. male with history of CAD, stroke, diabetes type II, HTN, and renal cell carcinoma on chemo who presents with complaint of acute fever of 101.1 and chills x 4 hours ago. Pt's wife notes he had his second dose of IV chemotherapy today at 3 pm. Pt endorses mild cough. Pt denies congestion, sore throat, ear ache, headache, abdominal pain, nausea, vomiting, diarrhea, dysuria, and rash. Pt's wife notes that he has had low blood pressure for the past year.       The history is provided by the patient and the spouse.     Review of patient's allergies indicates:   Allergen Reactions    No known drug allergies      Past Medical History:   Diagnosis Date    Aortic atherosclerosis 10/20/2016    Benign non-nodular prostatic hyperplasia with lower urinary tract symptoms 3/29/2017    Chronic combined systolic and diastolic heart failure 3/29/2017    4-10-17   1 - Mildly to moderately depressed left ventricular systolic function (EF 40-45%).    2 - Normal right ventricular systolic function .    3 - Left ventricular diastolic dysfunction.    4 - Mild mitral regurgitation.    5 - Mild aortic regurgitation.    6 - Severe aortic stenosis, NAMAN = 0.72 cm2, peak velocity = 4.3 m/s, mean gradient = 43.0 mmHg.    7 - Increased central venous pressure.    Chronic retention of urine 4/3/2017    Patient does self cath at home.   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    Severe aortic stenosis 1/31/2016    --- Repeated 2D ECHO showed EF 40% with severe aortic stenosis, relatively unchanged --- Not a TAVR candidate due to RCC  --- moderate AS with regards to mean gradient of 34 mmHg, despite NAMAN < 1 cm2 --- IC recs of not a surgical candidate  --- EF reduction from 50 % to 40 %    Stroke     right eye    Type 2 diabetes mellitus with diabetic polyneuropathy, without long-term current use of insulin 10/20/2016    Type 2 diabetes mellitus with stage 2 chronic kidney disease, without long-term current use of insulin 8/6/2012     Past Surgical History:   Procedure Laterality Date    BACK SURGERY      cervical disc replacement    CARPAL TUNNEL RELEASE      left    CORONARY ANGIOPLASTY WITH STENT PLACEMENT  10/2015    4 stents    EYE SURGERY      laser surgery in right eye    JOINT REPLACEMENT      orthoscopic surgery on knee      ROTATOR CUFF REPAIR      right side    SINUS SURGERY      TONSILLECTOMY      torn ligament      repair. left shoulder    TOTAL KNEE ARTHROPLASTY      left side    VASECTOMY       Family History   Problem Relation Age of Onset    Heart disease Father     ALS Father     Cancer Brother      esophageal    Rheum arthritis Mother     Migraines Daughter     Asthma Son     Diabetes Neg Hx     Colon polyps Neg Hx      Social History   Substance Use Topics    Smoking status: Never Smoker    Smokeless tobacco: Never Used    Alcohol use No      Comment: Heavy in past, quit about 20 years ago     Review of Systems   Constitutional: Positive for chills and fever.   HENT: Negative for congestion, ear pain and sore throat.     Respiratory: Positive for cough (Mild).    Cardiovascular: Negative for chest pain.   Gastrointestinal: Negative for abdominal pain, diarrhea, nausea and vomiting.   Genitourinary: Negative for dysuria.   Musculoskeletal: Negative for back pain.   Skin: Negative for rash.   Neurological: Negative for headaches.       Physical Exam     Initial Vitals [08/15/17 2248]   BP Pulse Resp Temp SpO2   (!) 96/50 (!) 126 20 98.3 °F (36.8 °C) 99 %      MAP       65.33         Physical Exam    Nursing note and vitals reviewed.  Constitutional: He appears well-developed and well-nourished. He is not diaphoretic. He appears distressed (mild).   HENT:   Head: Normocephalic and atraumatic.   Eyes: EOM are normal. Pupils are equal, round, and reactive to light.   Neck: Normal range of motion. Neck supple.   Cardiovascular: Regular rhythm. Tachycardia present.  Exam reveals no gallop and no friction rub.    Murmur heard.   Systolic murmur is present with a grade of 2/6   Pulmonary/Chest: Breath sounds normal. No respiratory distress. He has no wheezes. He has no rhonchi. He has no rales.   Abdominal: Soft. He exhibits no distension. There is no tenderness. There is no rebound and no guarding.   Musculoskeletal: Normal range of motion. He exhibits no edema or tenderness.   Neurological: He is alert and oriented to person, place, and time. He has normal strength. No cranial nerve deficit or sensory deficit.   Skin: Skin is warm and dry.   Psychiatric: He has a normal mood and affect. His behavior is normal. Judgment and thought content normal.         ED Course   Critical Care  Date/Time: 8/16/2017 3:02 AM  Performed by: RYANN DAWSON III  Authorized by: RYANN DAWSON III   Direct patient critical care time: 10 minutes  Additional history critical care time: 5 minutes  Ordering / reviewing critical care time: 5 minutes  Documentation critical care time: 5 minutes  Consulting other physicians critical care time: 10 minutes  Total  critical care time (exclusive of procedural time) : 35 minutes  Critical care was necessary to treat or prevent imminent or life-threatening deterioration of the following conditions: sepsis and shock.  Critical care was time spent personally by me on the following activities: discussions with consultants, evaluation of patient's response to treatment, obtaining history from patient or surrogate, ordering and review of laboratory studies, pulse oximetry, review of old charts, re-evaluation of patient's condition, ordering and review of radiographic studies, ordering and performing treatments and interventions, examination of patient and development of treatment plan with patient or surrogate.        Labs Reviewed   CBC W/ AUTO DIFFERENTIAL - Abnormal; Notable for the following:        Result Value    WBC 13.19 (*)     RBC 3.78 (*)     Hemoglobin 9.4 (*)     Hematocrit 30.3 (*)     MCV 80 (*)     MCH 24.9 (*)     MCHC 31.0 (*)     RDW 21.7 (*)     MPV 8.9 (*)     Gran # 12.1 (*)     Lymph # 0.3 (*)     Gran% 92.3 (*)     Lymph% 2.2 (*)     All other components within normal limits   COMPREHENSIVE METABOLIC PANEL - Abnormal; Notable for the following:     Sodium 134 (*)     Albumin 2.3 (*)     Total Bilirubin 1.2 (*)     All other components within normal limits   LACTIC ACID, PLASMA - Abnormal; Notable for the following:     Lactate (Lactic Acid) 4.7 (*)     All other components within normal limits   URINALYSIS, REFLEX TO URINE CULTURE - Abnormal; Notable for the following:     Protein, UA 1+ (*)     All other components within normal limits    Narrative:     Preferred Collection Type->Urine, Clean Catch   CULTURE, BLOOD   CULTURE, BLOOD   CULTURE, URINE   URINALYSIS MICROSCOPIC    Narrative:     Preferred Collection Type->Urine, Clean Catch   PROCALCITONIN   PROCALCITONIN    Narrative:     ADD ON TEST PROCALCITONIN PER DR RYANN DAWSON ORDER #[id number]   00:57  08/16/2017    LACTIC ACID, PLASMA     EKG Readings:  (Independently Interpreted)   Sinus tachycardia. Left axis deviation. LVH. T-wave inversions anteriorly and laterally. Changed from prior.        X-Rays:   Independently Interpreted Readings:   Chest X-Ray: No acute process      Medical Decision Making:   History:   Old Medical Records: I decided to obtain old medical records.  Initial Assessment:   Differential includes neutropenic fever, sepsis, PNA, and UTI. Pt with fevers s/p chemotherapy. Will plan culture and discuss with hematology oncology.   Independently Interpreted Test(s):   I have ordered and independently interpreted X-rays - see prior notes.  I have ordered and independently interpreted EKG Reading(s) - see prior notes  Clinical Tests:   Lab Tests: Ordered and Reviewed  Radiological Study: Ordered and Reviewed  Medical Tests: Ordered and Reviewed  Other:   I have discussed this case with another health care provider.       <> Summary of the Discussion: Hematology oncology.   Critical care            Scribe Attestation:   Scribe #1: I performed the above scribed service and the documentation accurately describes the services I performed. I attest to the accuracy of the note.    Attending Attestation:           Physician Attestation for Scribe:  Physician Attestation Statement for Scribe #1: I, Dr. Koehler, reviewed documentation, as scribed by Kika Blake in my presence, and it is both accurate and complete.         Attending ED Notes:   1:47 AM   Pt with early sepsis. Seen by critical care medicine and they feel he is safe to go to the qi. Will admit to hematology oncology for IV antibiotics.           ED Course     Clinical Impression:   The primary encounter diagnosis was Sepsis, due to unspecified organism. A diagnosis of Fever was also pertinent to this visit.    Disposition:   Disposition: Admitted  Hematology oncology                        Cristino Koehler III, MD  08/16/17 6167

## 2017-08-16 NOTE — TELEPHONE ENCOUNTER
"    Reason for Disposition   [1] Neutropenia known or suspected (e.g., recent cancer chemotherapy) AND [2] fever > 100.4 F (38.0 C)     Lex, with renal cell carcinoma with bone mets, received targeted therapy today. Now with fever of 101.1 and shaking with chills for over 2 hours.  Called Dr Janine Valdez [facility] for patient, and she recommended bringing him to the ED now, as his fever is likely not related to the targeted therapy received today, she said.  Message to Dr Valdez, and Everette Rowan MD, pcp.  Please contact caller directly with any additional care advice.    Answer Assessment - Initial Assessment Questions  1. TEMPERATURE: "What is the most recent temperature?"  "How was it measured?"       101.1 at 2138    2. ONSET: "When did the fever start?"       2100; was 100.3 then.    3. SYMPTOMS: "Do you have any other symptoms besides the fever?"  (e.g., sore throat, earache, runny nose, cough, rash, diarrhea, vomiting, abdominal pain, painful urination)      Severe chills.    4. CONTACTS: "Does anyone else in the family have an infection?"      No .    5. FEVER TREATMENT: "What have you done so far to treat this fever?" (e.g., medications)      Nothing.    6. CANCER: "What type of cancer do you have?"      Renal cell carcinoma    7. CANCER - TREATMENT: "What cancer treatments have you received?" "When did you last receive them?" (e.g., recent surgery, radiation, or chemotherapy). If triager has access to the patient's medical record, triager should review treatments and administration dates.      Targeted therapy today.    8. CANCER - NEUTROPENIA RISK: "Have you received chemotherapy recently? If Yes, "When was it and what was your last CBC and ANC (absolute neutrophil count)?" "Were you told that your white cell count was low?" If triager has access to the patient's medical record, triager should review most recent labs. An ANC less than 1,000 - 1,500 means that the neutrophils are low and the immune system is " weak.      Targeted therapy was today. Renal cell carcinoma with metastasis to bone.    Protocols used:  CANCER - [facility]-

## 2017-08-16 NOTE — PLAN OF CARE
Problem: Patient Care Overview  Goal: Plan of Care Review  Outcome: Ongoing (interventions implemented as appropriate)  Patient AAOx4. VSS and afebrile. Patient baseline BP 91/50, asymptomatic, MD aware. Patient reports not feeling dizzy and always running low BP. IV antibiotics initiated. IV fluids maintained. Patient states that he can get up independently, but nurse advises he call before getting up. Patient wearing non-skid socks, bed low and locked, call bell within reach, side rails up x2. Social work consulted for advanced directive. No complaints of pain or nausea. Will continue to monitor.

## 2017-08-16 NOTE — PROGRESS NOTES
I have personally seen and examined the patient. All labs and studies were reviewed. I agree with the fellows findings and plan as above.

## 2017-08-16 NOTE — HPI
This a pleasant 72 year old male with hx of mRCC s/p XRT currently on chemotherapy, CAD, severe AS, HTN and DM type II who presented to the ED today after having chills at home and a temperature of 101.1. He had received his second round of chemotherapy today with Opdivo the completed at 4:30 pm. He was released home and by 7 pm, he developed the symptoms listed above. His wife gave him 400mg of Aleve and his symptoms had abated by the time they got into their car coming to the ED. The patient endorses intermittent dry cough and recently worsening MAHARAJ. The patient denies any symptoms of runny nose, ear aches, HA, productive cough, abdominal pain, n/v/d, dysuria, rash, and night sweats. The patient endorses having a SBP that averages in the 80's when he checks it every morning for the last month or so. With this, he denies any symptoms of lightheadedness, dizziness, fatigue, syncope or presyncope.     He was seen earlier today in cardiology clinic and was taken off of lisinopril and his ToprolXL dose was reduced to 25mg.

## 2017-08-16 NOTE — H&P
[facility] Medical Oncology  H&P    Patient Name: Lex Billy  MRN: [id number]  Admission Date: 8/15/2017  Code Status: Full Code   Attending Provider: Cristino Koehler III, MD  Primary Care Physician: Everette Rowan MD, MD  Principal Problem:Sepsis    Subjective:     HPI: Pt is a 72 year old male with history including metastatic RCC s/p XRT currently on chemotherapy, CAD, severe AS (not TAVR candidate), HTN and DM type II who presented to the ED today after having chills at home and a temperature of 101.1. He had received his second round of chemotherapy today with Opdivo, and was released home. By approximately 7 pm, he developed the fevers and chills. His wife gave him 400 mg of Aleve and his symptoms had abated by the time they got into their car coming to the ED. The patient endorses intermittent dry cough and recently worsening MAHARAJ. However, he otherwise says he feels well and denies CP, nausea, vomiting, diarrhea, abdominal pain, skin changes, or urinary discomfort. The patient says he has a SBP that averages in the 80's when he checks it every morning for the last month or so. Despite this, he denies any symptoms of lightheadedness, dizziness, fatigue, syncope or presyncope. He was seen earlier today in cardiology clinic and was taken off of lisinopril and his Toprol XL dose was reduced to 25mg. In the ED, patient was tachycardic up to 126, BP 86/51 on arrival and similar at time of exam, normal respiratory rate, saturating well on room air. WBC 13, lactic acid 4.7. Blood cultures were sent prior to patient receiving dose of vancomycin.     Oncology History:     Mariajose is a 72 year old male with metastatic RCC, on Opdivo. Opdivo was held during his radiation therapy to left flank mass and anticipated XRT to new brain lesions.  He  tolerated radiation well without significant side effects.         CT C/A/P 5/17/17:     Right renal solid and cystic lesion compatible with patient's known  renal cell carcinoma, similar to prior.  Osseous metastatic disease with increase in size of left 12th rib and right sacral lesions. No definite new osseous lesion identified.  Interval development of a new left adrenal nodule concerning for additional metastatic spread of disease.     RECIST SUMMARY - comparison 3/8/17:  Right renal lesion: 3.5 cm, series 2 image 64 (previously 3.3 cm)  Left 12th rib: 8.1 cm, series 2 image 67 (previously 6.3 cm)  Right sacrum: 5.2 cm, series 2 image 105 (previously 4.2 cm)  Left adrenal gland: 2.2 cm, series 2 image 59 (not present on prior)     MRI Brain 7/7/17:     Two foci of enhancement identified in the left temporal and right occipital lobe concerning for metastatic disease in this patient with a history of metastatic RCC.     Oncology History:     Initially treated on Nexavar therapy  Prior treatment with Sutent discontinued with side effects.  Completed XRT to sacrum and reports no pain. Still with some residual numbness in right buttock area.  Biopsy confirmed clear cell RCC  Then placed on Torisel and now with mild progression in known areas  Changed to Votrient  Most recently on Inlyta reduced dose.        Zometa continued    Oncology Treatment Plan:   OP RCC NIVOLUMAB    Medications:  Continuous Infusions:   sodium chloride 0.9%       Scheduled Meds:   vancomycin (VANCOCIN) IVPB  20 mg/kg Intravenous ED 1 Time     PRN Meds:     Review of patient's allergies indicates:   Allergen Reactions    No known drug allergies         Past Medical History:   Diagnosis Date    Aortic atherosclerosis 10/20/2016    Benign non-nodular prostatic hyperplasia with lower urinary tract symptoms 3/29/2017    Chronic combined systolic and diastolic heart failure 3/29/2017    4-10-17   1 - Mildly to moderately depressed left ventricular systolic function (EF 40-45%).    2 - Normal right ventricular systolic function .    3 - Left ventricular diastolic dysfunction.    4 - Mild mitral  regurgitation.    5 - Mild aortic regurgitation.    6 - Severe aortic stenosis, NAMAN = 0.72 cm2, peak velocity = 4.3 m/s, mean gradient = 43.0 mmHg.    7 - Increased central venous pressure.    Chronic retention of urine 4/3/2017    Patient does self cath at home.   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    Severe aortic stenosis 1/31/2016    --- Repeated 2D ECHO showed EF 40% with severe aortic stenosis, relatively unchanged --- Not a TAVR candidate due to RCC  --- moderate AS with regards to mean gradient of 34 mmHg, despite NAMAN < 1 cm2 --- IC recs of not a surgical candidate  --- EF reduction from 50 % to 40 %    Stroke     right eye    Type 2 diabetes mellitus with diabetic polyneuropathy, without long-term current use of insulin 10/20/2016    Type 2 diabetes mellitus with stage 2 chronic kidney disease, without long-term current use of insulin 8/6/2012     Past Surgical History:   Procedure Laterality Date    BACK SURGERY      cervical disc replacement    CARPAL TUNNEL RELEASE      left    CORONARY ANGIOPLASTY WITH STENT PLACEMENT  10/2015    4 stents    EYE SURGERY      laser surgery in right eye    JOINT REPLACEMENT      orthoscopic surgery on knee      ROTATOR CUFF REPAIR      right side    SINUS SURGERY      TONSILLECTOMY      torn ligament      repair. left shoulder    TOTAL KNEE ARTHROPLASTY      left side    VASECTOMY       Family  History     Problem Relation (Age of Onset)    ALS Father    Asthma Son    Cancer Brother    Heart disease Father    Migraines Daughter    Rheum arthritis Mother        Social History Main Topics    Smoking status: Never Smoker    Smokeless tobacco: Never Used    Alcohol use No      Comment: Heavy in past, quit about 20 years ago    Drug use: No    Sexual activity: Not Currently     Partners: Female       Review of Systems   Constitutional: Positive for chills and fever.   HENT: Negative for congestion and rhinorrhea.    Eyes: Negative for photophobia and visual disturbance.   Respiratory: Positive for cough and shortness of breath.    Cardiovascular: Negative for chest pain, palpitations and leg swelling.   Gastrointestinal: Negative for abdominal distention, constipation, diarrhea, nausea and vomiting.   Endocrine: Negative for cold intolerance and heat intolerance.   Genitourinary: Negative for difficulty urinating and hematuria.   Musculoskeletal: Negative for arthralgias and myalgias.   Neurological: Negative for dizziness, weakness and light-headedness.   Hematological: Bruises/bleeds easily.   Psychiatric/Behavioral: Negative for agitation and confusion.     Objective:     Vital Signs (Most Recent):  Temp: 99.4 °F (37.4 °C) (08/16/17 0108)  Pulse: 104 (08/16/17 0146)  Resp: 17 (08/16/17 0146)  BP: (!) 86/53 (08/16/17 0146)  SpO2: 95 % (ra) (08/16/17 0142) Vital Signs (24h Range):  Temp:  [97.7 °F (36.5 °C)-99.4 °F (37.4 °C)] 99.4 °F (37.4 °C)  Pulse:  [] 104  Resp:  [13-22] 17  SpO2:  [95 %-99 %] 95 %  BP: (82-96)/(50-58) 86/53     Weight: 69.9 kg (154 lb)  Body mass index is 22.74 kg/m².  Body surface area is 1.84 meters squared.      Intake/Output Summary (Last 24 hours) at 08/16/17 0220  Last data filed at 08/15/17 2357   Gross per 24 hour   Intake                0 ml   Output              100 ml   Net             -100 ml       Physical Exam   Constitutional: He is oriented to person, place, and  time. No distress.   Sitting up in bed, pleasant, conversational    HENT:   Head: Normocephalic and atraumatic.   Eyes: EOM are normal. Pupils are equal, round, and reactive to light.   Neck: Normal range of motion.   Cardiovascular: Regular rhythm and intact distal pulses.    Murmur heard.  Tachycardic    Pulmonary/Chest: Effort normal and breath sounds normal.   Abdominal: Soft. Bowel sounds are normal.   Musculoskeletal: Normal range of motion.   Neurological: He is alert and oriented to person, place, and time.   Skin: Skin is warm and dry. He is not diaphoretic.   Psychiatric: He has a normal mood and affect. His behavior is normal. Judgment and thought content normal.       Significant Labs:   CBC:   Recent Labs  Lab 08/14/17  1238 08/15/17  2313   WBC 7.41 13.19*   HGB 9.1* 9.4*   HCT 29.2* 30.3*    255    and CMP:   Recent Labs  Lab 08/14/17  1238 08/15/17  2313   * 134*   K 3.9 4.1   CL 99 100   CO2 29 25   GLU 99 106   BUN 11 15   CREATININE 0.8 0.9   CALCIUM 8.8 8.8   PROT 6.6 7.2   ALBUMIN 2.1* 2.3*   BILITOT 1.1* 1.2*   ALKPHOS 103 119   AST 17 23   ALT 16 21   ANIONGAP 6* 9   EGFRNONAA >60.0 >60.0       Diagnostic Results:  I have reviewed and interpreted all pertinent imaging results/findings within the past 24 hours.    Assessment/Plan:     Active Diagnoses:    Diagnosis Date Noted POA    PRINCIPAL PROBLEM:  Sepsis [A41.9] 08/16/2017 Yes    Leukocytosis [D72.829] 08/16/2017 Unknown    Elevated lactic acid level [R79.89] 08/16/2017 Unknown    Brain metastases [C79.31] 07/10/2017 Yes    Chronic retention of urine [R33.9] 04/03/2017 Yes    Chronic combined systolic and diastolic heart failure [I50.42] 03/29/2017 Yes     Chronic    Type 2 diabetes mellitus with diabetic polyneuropathy, without long-term current use of insulin [E11.42] 10/20/2016 Yes     Chronic    Severe aortic stenosis [I35.0] 01/31/2016 Yes    Coronary artery disease involving native coronary artery of native  heart without angina pectoris [I25.10] 10/22/2015 Yes    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 [C79.51, C64.9] 07/15/2014 Yes     Chronic      Problems Resolved During this Admission:    Diagnosis Date Noted Date Resolved POA     Sepsis/Leukocytosis:   - was evaluated by ICU who determined patient to be appropriate for floor   - Tachycardic, WBC 13, lactic acid 4.7, hypotensive in 80s but per pt this is his baseline  - unclear etiology as CXR clear and UA not consistent with UTI  - blood cultures sent, will send urine culture  - will give vancomycin and cefepime for now    Lactic Acidosis:   - 4.7 in ED, will trend  - fluids given, cultures sent, broad spectrum abx though unclear source    Metastatic Renal Cell Carcinoma:   - continue axitinib 1 mg BID; not on formulary so patient may need to supply own from home  - management per oncology team    T2DM:  - SSI     Hypertension:   - In past 24 hours cardiology stopped his lisinopril and reduced Toprol XL dose from 50 mg to 25 mg daily; will hold Toprol for now given further risk of hypotension and in 80s systolic at time of exam    Chronic Diastolic Heart Failure:   - EF 40% as of 4 months ago  - holding metoprolol for now; defer to day team decision to reorder given patient is currently hypotensive     Chronic Urinary Retention:   - continue bethanecol  - holding tamulosin .4 mg for now given hypotension     CAD:   - continue ASA, plavix, statin    Diet: Cardiac diet  Code: Full (discussed with patient in ED)     Dispo: Admit to medical oncology. Discussed plan with Hem/Onc fellow.     Winnie Shaikh MD  IM PGY-3        I have reviewed the notes, assessments, and/or procedures performed by the housestaff, as above.  I have personally interviewed and examined the patient at the beside, and rounded with the daytime housestaff. I concur with her/his assessment and plan and the documentation of Lex Billy.  More than 70 mins were spent during this encounter,  greater than 50% was spent in direct counseling and/or coordination of care.     Raghavendra Ruiz M.D., M.S., F.A.C.P.  Hematology/Oncology Attending  [facility]

## 2017-08-16 NOTE — CONSULTS
[facility]  Critical Care Medicine  Consult Note     Patient Name: Lex Billy  MRN: [id number]  Admission Date: 8/15/2017  Hospital Length of Stay: 0 days  Code Status: Full Code  Attending Physician: Cristino Koehler III, MD   Primary Care Provider: Everette Rowan MD, MD   Principal Problem: Sepsis     Consults  Subjective:      HPI:  This a pleasant 72 year old male with hx of mRCC s/p XRT currently on chemotherapy, CAD, severe AS, HTN and DM type II who presented to the ED today after having chills at home and a temperature of 101.1. He had received his second round of chemotherapy today with Opdivo the completed at 4:30 pm. He was released home and by 7 pm, he developed the symptoms listed above. His wife gave him 400mg of Aleve and his symptoms had abated by the time they got into their car coming to the ED. The patient endorses intermittent dry cough and recently worsening MAHARAJ. The patient denies any symptoms of runny nose, ear aches, HA, productive cough, abdominal pain, n/v/d, dysuria, rash, and night sweats. The patient endorses having a SBP that averages in the 80's when he checks it every morning for the last month or so. With this, he denies any symptoms of lightheadedness, dizziness, fatigue, syncope or presyncope.      He was seen earlier today in cardiology clinic and was taken off of lisinopril and his ToprolXL dose was reduced to 25mg.      Hospital/ICU Course:  No notes on file     Past Medical History:   Diagnosis Date    Aortic atherosclerosis 10/20/2016    Benign non-nodular prostatic hyperplasia with lower urinary tract symptoms 3/29/2017    Chronic combined systolic and diastolic heart failure 3/29/2017     4-10-17   1 - Mildly to moderately depressed left ventricular systolic function (EF 40-45%).    2 - Normal right ventricular systolic function .    3 - Left ventricular diastolic dysfunction.    4 - Mild mitral regurgitation.    5 - Mild aortic regurgitation.    6 -  Severe aortic stenosis, NAMAN = 0.72 cm2, peak velocity = 4.3 m/s, mean gradient = 43.0 mmHg.    7 - Increased central venous pressure.    Chronic retention of urine 4/3/2017     Patient does self cath at home.   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    Severe aortic stenosis 1/31/2016     --- Repeated 2D ECHO showed EF 40% with severe aortic stenosis, relatively unchanged --- Not a TAVR candidate due to RCC  --- moderate AS with regards to mean gradient of 34 mmHg, despite NAMAN < 1 cm2 --- IC recs of not a surgical candidate  --- EF reduction from 50 % to 40 %    Stroke       right eye    Type 2 diabetes mellitus with diabetic polyneuropathy, without long-term current use of insulin 10/20/2016    Type 2 diabetes mellitus with stage 2 chronic kidney disease, without long-term current use of insulin 8/6/2012               Past Surgical History:   Procedure Laterality Date    BACK SURGERY         cervical disc replacement    CARPAL TUNNEL RELEASE         left    CORONARY ANGIOPLASTY WITH STENT PLACEMENT   10/2015     4 stents    EYE SURGERY         laser surgery in right eye    JOINT REPLACEMENT        orthoscopic surgery on knee        ROTATOR CUFF REPAIR         right side    SINUS SURGERY        TONSILLECTOMY        torn ligament         repair. left shoulder    TOTAL KNEE ARTHROPLASTY         left side    VASECTOMY             Review of  patient's allergies indicates:   Allergen Reactions    No known drug allergies                Family History      Problem Relation (Age of Onset)     ALS Father     Asthma Son     Cancer Brother     Heart disease Father     Migraines Daughter     Rheum arthritis Mother                 Social History Main Topics    Smoking status: Never Smoker    Smokeless tobacco: Never Used    Alcohol use No         Comment: Heavy in past, quit about 20 years ago    Drug use: No    Sexual activity: Not Currently       Partners: Female      Review of Systems   Constitutional: + fever of 101.1 and chills   ENT: no nasal congestion or sore throat  Respiratory: + dry cough and MAHARAJ   Cardiovascular: no chest pain or palpitations, + MAHARAJ  Gastrointestinal: no nausea or vomiting, no abdominal pain or abdominal distention   Genitourinary: no hematuria or dysuria  Integument/Breast: no rash or pruritis  Hematologic/Lymphatic: + easy bruising no lymphadenopathy  Musculoskeletal: no arthralgias or myalgias  Neurological: no seizures or tremors  Endocrine: no heat or cold intolerance     Objective:      Vital Signs (Most Recent):  Temp: 99.4 °F (37.4 °C) (08/16/17 0108)  Pulse: 104 (08/16/17 0146)  Resp: 17 (08/16/17 0146)  BP: (!) 86/53 (08/16/17 0146)  SpO2: 95 % (ra) (08/16/17 0142) Vital Signs (24h Range):  Temp:  [97.7 °F (36.5 °C)-99.4 °F (37.4 °C)] 99.4 °F (37.4 °C)  Pulse:  [] 104  Resp:  [13-22] 17  SpO2:  [95 %-99 %] 95 %  BP: (82-96)/(50-58) 86/53   Weight: 69.9 kg (154 lb)  Body mass index is 22.74 kg/m².        Intake/Output Summary (Last 24 hours) at 08/16/17 0150  Last data filed at 08/15/17 2357    Gross per 24 hour   Intake                0 ml   Output              100 ml   Net             -100 ml         Physical Exam  General: well developed, well nourished, appears comfortable in NAD  Eyes: conjunctivae/corneas clear. PERRL. EOMI.  Neck: supple, symmetrical, trachea midline, no JVD, anterior horizontal scar    Cardiovascular: Heart: regular rate and rhythm, S1, S2 normal, + 5/6 crescendo decrescendo systolic murmur, no click, rub or gallop.  Lungs: clear to auscultation bilaterally and normal respiratory effort  Chest Wall: no tenderness  Abdomen/Rectal: Abdomen: Non distended. + BS. No masses. No TTP. No rebound or guarding. Rectal: not examined  Extremities: + trace pitting edema, no redness or tenderness in the calves or thighs. Pulses: 2+ and symmetric  Skin: Skin color, texture, turgor normal. No rashes or lesions.  Musculoskeletal: full range of motion of joints  Lymph Nodes: No cervical or supraclavicular adenopathy  Psych/Behavioral: Normal. Alert and oriented, appropriate affect.     Vents:         Lines/Drains/Airways            Peripheral Intravenous Line                    Peripheral IV - Single Lumen 08/15/17 2314 Right Wrist less than 1 day                   Significant Labs:     CBC/Anemia Profile:     Recent Labs  Lab 08/14/17  1238 08/15/17  2313   WBC 7.41 13.19*   HGB 9.1* 9.4*   HCT 29.2* 30.3*    255   MCV 79* 80*   RDW 21.7* 21.7*         Chemistries:     Recent Labs  Lab 08/14/17  1238 08/15/17  2313   * 134*   K 3.9 4.1   CL 99 100   CO2 29 25   BUN 11 15   CREATININE 0.8 0.9   CALCIUM 8.8 8.8   ALBUMIN 2.1* 2.3*   PROT 6.6 7.2   BILITOT 1.1* 1.2*   ALKPHOS 103 119   ALT 16 21   AST 17 23               Significant Imaging:   CXR wnl     Assessment/Plan:          ID   * Sepsis     Unknown source at this point as UA and CXR are wnl  SIRS 3/4 with temp, WBC of 13.19 and tachycardia.  Agree with blood cultures.  Patient has been having low BP for a month now as documented in his previous visits and per hx.  Agree with broad spectrum abx for now although likely able to de-escalate shortly given no apparent source of infection.  Continue to trend lactic acid levels q4-6 hourly until level has come down.  Agree with volume resuscitation although his BP may not correct rapidly given a chance  of it being low is an iatrogenic etiology due to medications.   Obtain orthostatic vital signs.  Patient is stable for the floor for the time being. Thanks for consulting the ICU. Please call or re-consult with questions.           Case and plan d/w Dr. Bailon, [facility] fellow.     CARLOS Kelly  Critical Care Medicine  [facility]

## 2017-08-16 NOTE — ED TRIAGE NOTES
Pt has hx of renal cell carcinoma, chemo today and 1500. Temp of 100.3 and 30 min later 101.1. He took aleve at 2100. C/o chills and fever which started around 1900. Pt denies pain at this time.

## 2017-08-16 NOTE — ASSESSMENT & PLAN NOTE
Unknown source at this point as UA and CXR are wnl  SIRS 3/4 with temp, WBC of 13.19 and tachycardia.  Agree with blood cultures  Patient has been having low BP for a month now as documented in his previous visits and per hx    Agree with broad spectrum abx for now although likely able to de-escalate shortly given no apparent source of infection  Continue to trend lactic acid levels q4-6 hourly until level has come down  Agree with volume resuscitation although his BP may not correct rapidly given a chance of it being low might be iatrogenic due to medications.   Obtain orthostatic vital signs  Patient is stable for the floor for the time being. Thanks for consulting the ICU. Pleas call or re-consult with questions.

## 2017-08-16 NOTE — SUBJECTIVE & OBJECTIVE
Past Medical History:   Diagnosis Date    Aortic atherosclerosis 10/20/2016    Benign non-nodular prostatic hyperplasia with lower urinary tract symptoms 3/29/2017    Chronic combined systolic and diastolic heart failure 3/29/2017    4-10-17   1 - Mildly to moderately depressed left ventricular systolic function (EF 40-45%).    2 - Normal right ventricular systolic function .    3 - Left ventricular diastolic dysfunction.    4 - Mild mitral regurgitation.    5 - Mild aortic regurgitation.    6 - Severe aortic stenosis, NAMAN = 0.72 cm2, peak velocity = 4.3 m/s, mean gradient = 43.0 mmHg.    7 - Increased central venous pressure.    Chronic retention of urine 4/3/2017    Patient does self cath at home.   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    Severe aortic stenosis 1/31/2016    --- Repeated 2D ECHO showed EF 40% with severe aortic stenosis, relatively unchanged --- Not a TAVR candidate due to RCC  --- moderate AS with regards to mean gradient of 34 mmHg, despite NAMAN < 1 cm2 --- IC recs of not a surgical candidate  --- EF reduction from 50 % to 40 %    Stroke     right eye    Type 2 diabetes mellitus with diabetic polyneuropathy, without long-term current use of insulin 10/20/2016    Type 2 diabetes mellitus with stage 2 chronic kidney disease, without long-term current use of insulin 8/6/2012       Past Surgical History:   Procedure Laterality Date     BACK SURGERY      cervical disc replacement    CARPAL TUNNEL RELEASE      left    CORONARY ANGIOPLASTY WITH STENT PLACEMENT  10/2015    4 stents    EYE SURGERY      laser surgery in right eye    JOINT REPLACEMENT      orthoscopic surgery on knee      ROTATOR CUFF REPAIR      right side    SINUS SURGERY      TONSILLECTOMY      torn ligament      repair. left shoulder    TOTAL KNEE ARTHROPLASTY      left side    VASECTOMY         Review of patient's allergies indicates:   Allergen Reactions    No known drug allergies        Family History     Problem Relation (Age of Onset)    ALS Father    Asthma Son    Cancer Brother    Heart disease Father    Migraines Daughter    Rheum arthritis Mother        Social History Main Topics    Smoking status: Never Smoker    Smokeless tobacco: Never Used    Alcohol use No      Comment: Heavy in past, quit about 20 years ago    Drug use: No    Sexual activity: Not Currently     Partners: Female      Review of Systems   Constitutional: + fever of 101.1 and chills   ENT: no nasal congestion or sore throat  Respiratory: + dry cough and MAHARAJ   Cardiovascular: no chest pain or palpitations, + MAHARAJ  Gastrointestinal: no nausea or vomiting, no abdominal pain or abdominal distention   Genitourinary: no hematuria or dysuria  Integument/Breast: no rash or pruritis  Hematologic/Lymphatic: + easy bruising no lymphadenopathy  Musculoskeletal: no arthralgias or myalgias  Neurological: no seizures or tremors  Endocrine: no heat or cold intolerance    Objective:     Vital Signs (Most Recent):  Temp: 99.4 °F (37.4 °C) (08/16/17 0108)  Pulse: 104 (08/16/17 0146)  Resp: 17 (08/16/17 0146)  BP: (!) 86/53 (08/16/17 0146)  SpO2: 95 % (ra) (08/16/17 0142) Vital Signs (24h Range):  Temp:  [97.7 °F (36.5 °C)-99.4 °F (37.4 °C)] 99.4 °F (37.4 °C)  Pulse:  [] 104  Resp:  [13-22] 17  SpO2:  [95 %-99 %] 95 %  BP: (82-96)/(50-58) 86/53   Weight: 69.9 kg (154 lb)  Body mass index is 22.74  kg/m².      Intake/Output Summary (Last 24 hours) at 08/16/17 0150  Last data filed at 08/15/17 2357   Gross per 24 hour   Intake                0 ml   Output              100 ml   Net             -100 ml       Physical Exam  General: well developed, well nourished, appears comfortable in NAD  Eyes: conjunctivae/corneas clear. PERRL. EOMI.  Neck: supple, symmetrical, trachea midline, no JVD, anterior horizontal scar   Cardiovascular: Heart: regular rate and rhythm, S1, S2 normal, + 5/6 crescendo decrescendo systolic murmur, no click, rub or gallop.  Lungs: clear to auscultation bilaterally and normal respiratory effort  Chest Wall: no tenderness  Abdomen/Rectal: Abdomen: Non distended. + BS. No masses. No TTP. No rebound or guarding. Rectal: not examined  Extremities: + trace pitting edema, no redness or tenderness in the calves or thighs. Pulses: 2+ and symmetric  Skin: Skin color, texture, turgor normal. No rashes or lesions.  Musculoskeletal: full range of motion of joints  Lymph Nodes: No cervical or supraclavicular adenopathy  Psych/Behavioral: Normal. Alert and oriented, appropriate affect.    Vents:     Lines/Drains/Airways     Peripheral Intravenous Line                 Peripheral IV - Single Lumen 08/15/17 2314 Right Wrist less than 1 day              Significant Labs:    CBC/Anemia Profile:    Recent Labs  Lab 08/14/17  1238 08/15/17  2313   WBC 7.41 13.19*   HGB 9.1* 9.4*   HCT 29.2* 30.3*    255   MCV 79* 80*   RDW 21.7* 21.7*        Chemistries:    Recent Labs  Lab 08/14/17  1238 08/15/17  2313   * 134*   K 3.9 4.1   CL 99 100   CO2 29 25   BUN 11 15   CREATININE 0.8 0.9   CALCIUM 8.8 8.8   ALBUMIN 2.1* 2.3*   PROT 6.6 7.2   BILITOT 1.1* 1.2*   ALKPHOS 103 119   ALT 16 21   AST 17 23           Significant Imaging:   CXR wnl

## 2017-08-17 NOTE — PLAN OF CARE
Problem: Patient Care Overview  Goal: Plan of Care Review  Pt is AAO x4 and remained afebrile throughout the night. IV infiltrated and was replaced with a 20g R forearm. NS @ 150/hr, hung cefapime and 2nd dose of vancomycin. Pt did not sleep, in spite of 8mg of prescribed Ramelteon. Pt remained safe and free of injury throughout the night. Bed in low and locked position, 2 bed rails up and call bell within reach.

## 2017-08-17 NOTE — PROGRESS NOTES
Thank you for the referral.   For your information:    The following patient has been assigned to Audrey Marr RN with Outpatient Complex Care Management for high risk screening.    Reason: Sepsis, due to unspecified organism    Please contact [facility] at [phone] with any questions.    Thank you,  Madeleine Be

## 2017-08-17 NOTE — HPI
Duy is a 72 year old male with history including metastatic RCC s/p XRT currently on chemotherapy, CAD, severe AS (not TAVR candidate), HTN and DM type II who presented to the ED today after having chills at home and a temperature of 101.1. He had received his second round of chemotherapy today with Opdivo, and was released home. By approximately 7 pm, he developed the fevers and chills. His wife gave him 400 mg of Aleve and his symptoms had abated by the time they got into their car coming to the ED. The patient endorses intermittent dry cough and recently worsening MAHARAJ. However, he otherwise says he feels well and denies CP, nausea, vomiting, diarrhea, abdominal pain, skin changes, or urinary discomfort. The patient says he has a SBP that averages in the 80's when he checks it every morning for the last month or so. Despite this, he denies any symptoms of lightheadedness, dizziness, fatigue, syncope or presyncope. He was seen earlier today in cardiology clinic and was taken off of lisinopril and his Toprol XL dose was reduced to 25mg. In the ED, patient was tachycardic up to 126, BP 86/51 on arrival and similar at time of exam, normal respiratory rate, saturating well on room air. WBC 13, lactic acid 4.7. Blood cultures were sent prior to patient receiving dose of vancomycin.      Oncology History:      Mariajose is a 72 year old male with metastatic RCC, on Opdivo. Opdivo was held during his radiation therapy to left flank mass and anticipated XRT to new brain lesions.  He  tolerated radiation well without significant side effects.         CT C/A/P 5/17/17:     Right renal solid and cystic lesion compatible with patient's known renal cell carcinoma, similar to prior.  Osseous metastatic disease with increase in size of left 12th rib and right sacral lesions. No definite new osseous lesion identified.  Interval development of a new left adrenal nodule concerning for additional metastatic spread of  disease.     RECIST SUMMARY - comparison 3/8/17:  Right renal lesion: 3.5 cm, series 2 image 64 (previously 3.3 cm)  Left 12th rib: 8.1 cm, series 2 image 67 (previously 6.3 cm)  Right sacrum: 5.2 cm, series 2 image 105 (previously 4.2 cm)  Left adrenal gland: 2.2 cm, series 2 image 59 (not present on prior)     MRI Brain 7/7/17:     Two foci of enhancement identified in the left temporal and right occipital lobe concerning for metastatic disease in this patient with a history of metastatic RCC.     Oncology History:     Initially treated on Nexavar therapy  Prior treatment with Sutent discontinued with side effects.  Completed XRT to sacrum and reports no pain. Still with some residual numbness in right buttock area.  Biopsy confirmed clear cell RCC  Then placed on Torisel and now with mild progression in known areas  Changed to Votrient  Most recently on Inlyta reduced dose.        Zometa continued     Oncology Treatment Plan:   OP RCC NIVOLUMAB

## 2017-08-17 NOTE — PHYSICIAN QUERY
PT Name: Lex Billy  MR #: [id number]     Physician Query Form - Documentation Clarification      CDS/: Juanita Montgomery RN, CCDS               Contact information:  [email or web address]    This form is a permanent document in the medical record.     Query Date: August 17, 2017    By submitting this query, we are merely seeking further clarification of documentation. Please utilize your independent clinical judgment when addressing the question(s) below.    The Medical record reflects the following:    Supporting Clinical Findings Location in Medical Record   Procalcitonin:  0.06    Sepsis  Unknown source at this point as UA and CXR are wnl  SIRS 3/4 with temp, WBC of 13.19 and tachycardia.  Patient has been having low BP for a month now as documented in his previous visits and per hx.  Agree with broad spectrum abx for now although likely able to de-escalate shortly given no apparent source of infection.  Continue to trend lactic acid levels q4-6 hourly until level has come down.  Agree with volume resuscitation although his BP may not correct rapidly given a chance of it being low is an iatrogenic etiology due to medications.     history including metastatic RCC s/p XRT currently on chemotherapy, CAD, severe AS (not TAVR candidate), HTN and DM type II who presented to the ED today after having chills at home and a temperature of 101.1. He had received his second round of chemotherapy today with Opdivo, and was released home. By approximately 7 pm, he developed the fevers and chills.     Sepsis/Leukocytosis:   - was evaluated by ICU who determined patient to be appropriate for floor   - Tachycardic, WBC 13, lactic acid 4.7, hypotensive in 80s but per pt this is his baseline  - unclear etiology as CXR clear and UA not consistent with UTI  - blood cultures sent, will send urine culture  - will give vancomycin and cefepime for now    Lactic Acidosis:   - 4.7 in ED, will trend  - fluids given, cultures sent,  broad spectrum abx though unclear source    Metastatic Renal Cell Carcinoma:   - continue axitinib 1 mg BID; not on formulary so patient may need to supply own from home  - management per oncology team     Elevated lactic acid level  - 4.7 in ED 8/15  - 1.2 8/17  - no source of infection ID'd  - continue fluids    Sepsis  - was evaluated by ICU who determined patient to be appropriate for floor   - Tachycardic, WBC 13, lactic acid 4.7, hypotensive in 80s but per pt this is his baseline (8/16)  - unclear etiology as CXR clear and UA not consistent with UTI  - WBC 9.67 (8/17)  - BCx pending  - UCx pending  - continue Vanc + Cefepime Lab 08/15    Critical Care Medicine consult 08/16                    H & P          H & P              H & P        H & P        Progress note 08/17 8:46 AM            Progress note 08/17 8:46 AM                                                                                        Doctor, Please specify diagnosis or diagnoses associated with above clinical findings.  Please further specify the localized infection that has caused the sepsis.    Provider Use Only                                                                                                                               [ X ] Clinically undetermined

## 2017-08-17 NOTE — DISCHARGE SUMMARY
[facility]  Hematology/Oncology  Discharge Summary      Patient Name: Lex Billy  MRN: [id number]  Admission Date: 8/15/2017  Hospital Length of Stay: 1 days  Discharge Date and Time:  08/17/2017 10:48 AM  Attending Physician: Raghavendra Ruiz MD   Discharging Provider: Chilo Olivia MD  Primary Care Provider: Everette Rowan MD, MD    HPI: Pt is a 72 year old male with history including metastatic RCC s/p XRT currently on chemotherapy, CAD, severe AS (not TAVR candidate), HTN and DM type II who presented to the ED today after having chills at home and a temperature of 101.1. He had received his second round of chemotherapy today with Opdivo, and was released home. By approximately 7 pm, he developed the fevers and chills. His wife gave him 400 mg of Aleve and his symptoms had abated by the time they got into their car coming to the ED. The patient endorses intermittent dry cough and recently worsening MAHARAJ. However, he otherwise says he feels well and denies CP, nausea, vomiting, diarrhea, abdominal pain, skin changes, or urinary discomfort. The patient says he has a SBP that averages in the 80's when he checks it every morning for the last month or so. Despite this, he denies any symptoms of lightheadedness, dizziness, fatigue, syncope or presyncope. He was seen earlier today in cardiology clinic and was taken off of lisinopril and his Toprol XL dose was reduced to 25mg. In the ED, patient was tachycardic up to 126, BP 86/51 on arrival and similar at time of exam, normal respiratory rate, saturating well on room air. WBC 13, lactic acid 4.7. Blood cultures were sent prior to patient receiving dose of vancomycin.      Oncology History:      Mariajose is a 72 year old male with metastatic RCC, on Opdivo. Opdivo was held during his radiation therapy to left flank mass and anticipated XRT to new brain lesions.  He  tolerated radiation well without significant side effects.         CT C/A/P  5/17/17:     Right renal solid and cystic lesion compatible with patient's known renal cell carcinoma, similar to prior.  Osseous metastatic disease with increase in size of left 12th rib and right sacral lesions. No definite new osseous lesion identified.  Interval development of a new left adrenal nodule concerning for additional metastatic spread of disease.     RECIST SUMMARY - comparison 3/8/17:  Right renal lesion: 3.5 cm, series 2 image 64 (previously 3.3 cm)  Left 12th rib: 8.1 cm, series 2 image 67 (previously 6.3 cm)  Right sacrum: 5.2 cm, series 2 image 105 (previously 4.2 cm)  Left adrenal gland: 2.2 cm, series 2 image 59 (not present on prior)     MRI Brain 7/7/17:     Two foci of enhancement identified in the left temporal and right occipital lobe concerning for metastatic disease in this patient with a history of metastatic RCC.     Oncology History:     Initially treated on Nexavar therapy  Prior treatment with Sutent discontinued with side effects.  Completed XRT to sacrum and reports no pain. Still with some residual numbness in right buttock area.  Biopsy confirmed clear cell RCC  Then placed on Torisel and now with mild progression in known areas  Changed to Votrient  Most recently on Inlyta reduced dose.        Zometa continued     Oncology Treatment Plan:   OP RCC NIVOLUMAB    * No surgery found *     Hospital Course: 8/17 D/c Vanc and Cefepime. Started on Augmentin 875 bid. Pt ready for D/C home.    Consults:   Consults         Status Ordering Provider     Inpatient consult to Critical Care Medicine  Once     Provider:  (Not yet assigned)    Completed RYANN DAWSON III     Inpatient consult to Social Work/Case Management  Once     Provider:  (Not yet assigned)    Acknowledged STELLA DODSON          Significant Diagnostic Studies:       Recent Labs  Lab 08/14/17  1238 08/15/17  2313 08/16/17  1039 08/17/17  0525   * 134* 136 137   K 3.9 4.1 4.1 3.9   CL 99 100 108 110   CO2 29 25 24  21*   BUN 11 15 13 13   CREATININE 0.8 0.9 0.7 0.8   CALCIUM 8.8 8.8 7.5* 7.4*   PROT 6.6 7.2  --   --    BILITOT 1.1* 1.2*  --   --    ALKPHOS 103 119  --   --    ALT 16 21  --   --    AST 17 23  --   --        Recent Labs  Lab 08/15/17  2313 08/16/17  1039 08/17/17  0525   WBC 13.19* 13.36* 9.67   HGB 9.4* 8.2* 7.6*   HCT 30.3* 27.1* 24.2*    165 155           Pending Diagnostic Studies:     None        Final Active Diagnoses:    Diagnosis Date Noted POA    PRINCIPAL PROBLEM:  Sepsis [A41.9] 08/16/2017 Yes    Elevated lactic acid level [R79.89] 08/16/2017 Unknown    Brain metastases [C79.31] 07/10/2017 Yes    Chronic retention of urine [R33.9] 04/03/2017 Yes    Chronic combined systolic and diastolic heart failure [I50.42] 03/29/2017 Yes     Chronic    Type 2 diabetes mellitus with diabetic polyneuropathy, without long-term current use of insulin [E11.42] 10/20/2016 Yes     Chronic    Severe aortic stenosis [I35.0] 01/31/2016 Yes    Coronary artery disease involving native coronary artery of native heart without angina pectoris [I25.10] 10/22/2015 Yes    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 [C79.51, C64.9] 07/15/2014 Yes     Chronic      Problems Resolved During this Admission:    Diagnosis Date Noted Date Resolved POA      Discharged Condition: good    Disposition: Pt has been medically optimized and is ready for discharge home.    Follow Up: PCP    Patient Instructions:     Ambulatory referral to Outpatient Case Management   Referral Priority: Routine Referral Type: Consultation   Referral Reason: Specialty Services Required    Number of Visits Requested: 1        Medications:  Reconciled Home Medications:   Current Discharge Medication List      START taking these medications    Details   amoxicillin-clavulanate 875-125mg (AUGMENTIN) 875-125 mg per tablet Take 1 tablet by mouth every 12 (twelve) hours.  Qty: 14 tablet, Refills: 0         CONTINUE these medications which have NOT CHANGED     Details   aspirin (ECOTRIN) 81 MG EC tablet Take 81 mg by mouth once daily.      atorvastatin (LIPITOR) 40 MG tablet TAKE 1 TABLET ONE TIME DAILY  Qty: 90 tablet, Refills: 3    Associated Diagnoses: Hyperlipidemia      bethanechol (URECHOLINE) 50 MG tablet Take 1 tablet (50 mg total) by mouth 3 (three) times daily.  Qty: 90 tablet, Refills: 11    Associated Diagnoses: Urinary retention      brimonidine 0.1% (ALPHAGAN P) 0.1 % Drop Place 1 drop into both eyes 3 (three) times daily.      CALCIUM CARBONATE-VITAMIN D3 ORAL Take 1 tablet by mouth every morning. Calcium carbonate 1000mg vitamin d3 1600 units per patient      clopidogrel (PLAVIX) 75 mg tablet Take 1 tablet (75 mg total) by mouth once daily.  Qty: 90 tablet, Refills: 3      duloxetine (CYMBALTA) 30 MG capsule Take 1 capsule (30 mg total) by mouth once daily.  Qty: 30 capsule, Refills: 0      fexofenadine (ALLEGRA) 180 MG tablet TAKE 1 TABLET ONE TIME DAILY  Qty: 90 tablet, Refills: 3    Associated Diagnoses: Environmental allergies      furosemide (LASIX) 40 MG tablet Take 1 tablet (40 mg total) by mouth once daily.  Qty: 90 tablet, Refills: 3      gabapentin (NEURONTIN) 300 MG capsule       metoprolol succinate (TOPROL-XL) 25 MG 24 hr tablet Take 1 tablet (25 mg total) by mouth once daily.  Qty: 90 tablet, Refills: 3    Associated Diagnoses: Aortic stenosis, severe; Chronic systolic heart failure      potassium chloride SA (K-DUR,KLOR-CON) 20 MEQ tablet Take 1 tablet (20 mEq total) by mouth once daily.  Qty: 90 tablet, Refills: 4    Associated Diagnoses: Chronic combined systolic and diastolic heart failure      tamsulosin (FLOMAX) 0.4 mg Cp24 Take 1 capsule (0.4 mg total) by mouth once daily.  Qty: 30 capsule, Refills: 11    Associated Diagnoses: Urinary retention      blood sugar diagnostic Strp 1 each by Misc.(Non-Drug; Combo Route) route 3 (three) times daily. Free style freedom lite test strips and lancets  Qty: 90 each, Refills: 3      blood-glucose  meter (TRUE METRIX AIR GLUCOSE METER) Misc Use as directed  Qty: 1 each, Refills: 0      DOCUSATE CALCIUM (STOOL SOFTENER ORAL) Take by mouth once as needed (for constipation).      lancets 28 gauge Misc 1 lancet by Misc.(Non-Drug; Combo Route) route 3 (three) times daily.  Qty: 300 each, Refills: 3      nitroGLYCERIN (NITROSTAT) 0.4 MG SL tablet Place 1 tablet (0.4 mg total) under the tongue every 5 (five) minutes as needed for Chest pain.  Qty: 25 tablet, Refills: 4      ondansetron (ZOFRAN-ODT) 4 MG TbDL Take 1 tablet (4 mg total) by mouth every 4 to 6 hours as needed. Dissolve one tablet under tongue every 4-6 hours as needed for nausea.  Qty: 30 tablet, Refills: 1    Associated Diagnoses: Nausea and vomiting, intractability of vomiting not specified, unspecified vomiting type         STOP taking these medications       zolpidem (AMBIEN) 5 MG Tab Comments:   Reason for Stopping:               Chilo Olivia MD  Hematology/Oncology  [facility]

## 2017-08-17 NOTE — SUBJECTIVE & OBJECTIVE
Interval History: Pt states that he did not sleep well despite Ramelteon 8mg. Pt states that he feels great today. Pt's WBC 9.67 (13.36 8/6), afebrile, normotensive. Blood and urine cultures both NGTD. Pt has no medical complaints at this time.    Oncology Treatment Plan:   OP RCC NIVOLUMAB    Medications:  Continuous Infusions:   sodium chloride 0.9% 150 mL/hr at 08/17/17 0548     Scheduled Meds:   atorvastatin  40 mg Oral Daily    bethanechol  50 mg Oral TID    ceFEPime (MAXIPIME) IVPB  2 g Intravenous Q8H    clopidogrel  75 mg Oral Daily    duloxetine  30 mg Oral Daily    vancomycin (VANCOCIN) IVPB  15 mg/kg Intravenous Q12H     PRN Meds:ondansetron, zolpidem     Review of Systems   Constitutional: Negative for chills, fatigue and fever.   HENT: Negative for congestion, rhinorrhea and sore throat.    Eyes: Negative for photophobia and visual disturbance.   Respiratory: Negative for cough and shortness of breath.    Cardiovascular: Negative for chest pain, palpitations and leg swelling.   Gastrointestinal: Positive for constipation. Negative for abdominal distention, abdominal pain, blood in stool, diarrhea, nausea and vomiting.   Endocrine: Negative for polydipsia and polyuria.   Genitourinary: Negative for dysuria and hematuria.   Musculoskeletal: Negative for arthralgias and myalgias.   Neurological: Negative for dizziness, weakness, numbness and headaches.   Psychiatric/Behavioral: Negative for confusion.     Objective:     Vital Signs (Most Recent):  Temp: 98.6 °F (37 °C) (08/17/17 0704)  Pulse: 94 (08/17/17 0711)  Resp: 16 (08/17/17 0149)  BP: 103/62 (08/17/17 0704)  SpO2: 98 % (08/17/17 0704) Vital Signs (24h Range):  Temp:  [97.5 °F (36.4 °C)-98.6 °F (37 °C)] 98.6 °F (37 °C)  Pulse:  [] 94  Resp:  [16-18] 16  SpO2:  [90 %-100 %] 98 %  BP: ()/(40-62) 103/62     Weight: 69.9 kg (154 lb)  Body mass index is 22.74 kg/m².  Body surface area is 1.84 meters squared.      Intake/Output Summary  (Last 24 hours) at 08/17/17 0834  Last data filed at 08/17/17 0325   Gross per 24 hour   Intake             2225 ml   Output              325 ml   Net             1900 ml       Physical Exam    Significant Labs:     Recent Labs  Lab 08/14/17  1238 08/15/17  2313 08/16/17  1039 08/17/17  0525   * 134* 136 137   K 3.9 4.1 4.1 3.9   CL 99 100 108 110   CO2 29 25 24 21*   BUN 11 15 13 13   CREATININE 0.8 0.9 0.7 0.8   CALCIUM 8.8 8.8 7.5* 7.4*   PROT 6.6 7.2  --   --    BILITOT 1.1* 1.2*  --   --    ALKPHOS 103 119  --   --    ALT 16 21  --   --    AST 17 23  --   --        Recent Labs  Lab 08/15/17  2313 08/16/17  1039 08/17/17  0525   WBC 13.19* 13.36* 9.67   HGB 9.4* 8.2* 7.6*   HCT 30.3* 27.1* 24.2*    165 155

## 2017-08-17 NOTE — ASSESSMENT & PLAN NOTE
- was evaluated by ICU who determined patient to be appropriate for floor   - Tachycardic, WBC 13, lactic acid 4.7, hypotensive in 80s but per pt this is his baseline (8/16)  - unclear etiology as CXR clear and UA not consistent with UTI  - WBC 9.67 (8/17)  - BCx pending  - UCx pending  - continue Vanc + Cefepime

## 2017-08-17 NOTE — PROGRESS NOTES
Patient tolerating room air, ambulating with one person assist, regular diet, voiding without difficulty. Patient going home with no devices. PIV removed dressed with gauze and coban. Discharge paperwork discussed and medications reviewed. MD asked to re-order oral antibiotic to [facility] on [address]. Patient to discharge with his wife, has all belongings and awaiting transportation at this time. Will continue to monitor.

## 2017-08-17 NOTE — ASSESSMENT & PLAN NOTE
- Repeated 2D ECHO showed EF 40% with severe aortic stenosis, relatively unchanged  - Not a TAVR candidate due to RCC   - moderate AS with regards to mean gradient of 34 mmHg, despite NAMAN < 1 cm2  - IC recs of not a surgical candidate   - EF reduction from 50 % to 40 %

## 2017-08-17 NOTE — PROGRESS NOTES
[facility]  Hematology/Oncology  Progress Note    Patient Name: Lex Billy  Admission Date: 8/15/2017  Hospital Length of Stay: 1 days  Code Status: Full Code     Subjective:     HPI:  Pt is a 72 year old male with history including metastatic RCC s/p XRT currently on chemotherapy, CAD, severe AS (not TAVR candidate), HTN and DM type II who presented to the ED today after having chills at home and a temperature of 101.1. He had received his second round of chemotherapy today with Opdivo, and was released home. By approximately 7 pm, he developed the fevers and chills. His wife gave him 400 mg of Aleve and his symptoms had abated by the time they got into their car coming to the ED. The patient endorses intermittent dry cough and recently worsening MAHARAJ. However, he otherwise says he feels well and denies CP, nausea, vomiting, diarrhea, abdominal pain, skin changes, or urinary discomfort. The patient says he has a SBP that averages in the 80's when he checks it every morning for the last month or so. Despite this, he denies any symptoms of lightheadedness, dizziness, fatigue, syncope or presyncope. He was seen earlier today in cardiology clinic and was taken off of lisinopril and his Toprol XL dose was reduced to 25mg. In the ED, patient was tachycardic up to 126, BP 86/51 on arrival and similar at time of exam, normal respiratory rate, saturating well on room air. WBC 13, lactic acid 4.7. Blood cultures were sent prior to patient receiving dose of vancomycin.      Oncology History:      Mariajose is a 72 year old male with metastatic RCC, on Opdivo. Opdivo was held during his radiation therapy to left flank mass and anticipated XRT to new brain lesions.  He  tolerated radiation well without significant side effects.         CT C/A/P 5/17/17:     Right renal solid and cystic lesion compatible with patient's known renal cell carcinoma, similar to prior.  Osseous metastatic disease with  increase in size of left 12th rib and right sacral lesions. No definite new osseous lesion identified.  Interval development of a new left adrenal nodule concerning for additional metastatic spread of disease.     RECIST SUMMARY - comparison 3/8/17:  Right renal lesion: 3.5 cm, series 2 image 64 (previously 3.3 cm)  Left 12th rib: 8.1 cm, series 2 image 67 (previously 6.3 cm)  Right sacrum: 5.2 cm, series 2 image 105 (previously 4.2 cm)  Left adrenal gland: 2.2 cm, series 2 image 59 (not present on prior)     MRI Brain 7/7/17:     Two foci of enhancement identified in the left temporal and right occipital lobe concerning for metastatic disease in this patient with a history of metastatic RCC.     Oncology History:     Initially treated on Nexavar therapy  Prior treatment with Sutent discontinued with side effects.  Completed XRT to sacrum and reports no pain. Still with some residual numbness in right buttock area.  Biopsy confirmed clear cell RCC  Then placed on Torisel and now with mild progression in known areas  Changed to Votrient  Most recently on Inlyta reduced dose.        Zometa continued     Oncology Treatment Plan:   OP RCC NIVOLUMAB      Interval History: Pt states that he did not sleep well despite Ramelteon 8mg. Pt states that he feels great today. Pt's WBC 9.67 (13.36 8/6), afebrile, normotensive. Blood and urine cultures both NGTD. Pt has no medical complaints at this time.    Oncology Treatment Plan:   OP RCC NIVOLUMAB    Medications:  Continuous Infusions:   sodium chloride 0.9% 150 mL/hr at 08/17/17 0548     Scheduled Meds:   atorvastatin  40 mg Oral Daily    bethanechol  50 mg Oral TID    ceFEPime (MAXIPIME) IVPB  2 g Intravenous Q8H    clopidogrel  75 mg Oral Daily    duloxetine  30 mg Oral Daily    vancomycin (VANCOCIN) IVPB  15 mg/kg Intravenous Q12H     PRN Meds:ondansetron, zolpidem     Review of Systems   Constitutional: Negative for chills, fatigue and fever.   HENT: Negative for  congestion, rhinorrhea and sore throat.    Eyes: Negative for photophobia and visual disturbance.   Respiratory: Negative for cough and shortness of breath.    Cardiovascular: Negative for chest pain, palpitations and leg swelling.   Gastrointestinal: Positive for constipation. Negative for abdominal distention, abdominal pain, blood in stool, diarrhea, nausea and vomiting.   Endocrine: Negative for polydipsia and polyuria.   Genitourinary: Negative for dysuria and hematuria.   Musculoskeletal: Negative for arthralgias and myalgias.   Neurological: Negative for dizziness, weakness, numbness and headaches.   Psychiatric/Behavioral: Negative for confusion.     Objective:     Vital Signs (Most Recent):  Temp: 98.6 °F (37 °C) (08/17/17 0704)  Pulse: 94 (08/17/17 0711)  Resp: 16 (08/17/17 0149)  BP: 103/62 (08/17/17 0704)  SpO2: 98 % (08/17/17 0704) Vital Signs (24h Range):  Temp:  [97.5 °F (36.4 °C)-98.6 °F (37 °C)] 98.6 °F (37 °C)  Pulse:  [] 94  Resp:  [16-18] 16  SpO2:  [90 %-100 %] 98 %  BP: ()/(40-62) 103/62     Weight: 69.9 kg (154 lb)  Body mass index is 22.74 kg/m².  Body surface area is 1.84 meters squared.      Intake/Output Summary (Last 24 hours) at 08/17/17 0834  Last data filed at 08/17/17 0325   Gross per 24 hour   Intake             2225 ml   Output              325 ml   Net             1900 ml       Physical Exam    Significant Labs:     Recent Labs  Lab 08/14/17  1238 08/15/17  2313 08/16/17  1039 08/17/17  0525   * 134* 136 137   K 3.9 4.1 4.1 3.9   CL 99 100 108 110   CO2 29 25 24 21*   BUN 11 15 13 13   CREATININE 0.8 0.9 0.7 0.8   CALCIUM 8.8 8.8 7.5* 7.4*   PROT 6.6 7.2  --   --    BILITOT 1.1* 1.2*  --   --    ALKPHOS 103 119  --   --    ALT 16 21  --   --    AST 17 23  --   --        Recent Labs  Lab 08/15/17  2313 08/16/17  1039 08/17/17  0525   WBC 13.19* 13.36* 9.67   HGB 9.4* 8.2* 7.6*   HCT 30.3* 27.1* 24.2*    165 155         Assessment/Plan:     Elevated lactic  acid level    - 4.7 in ED 8/15  - 1.2 8/17  - no source of infection ID'd  - continue fluids        Chronic retention of urine    - continue bethanecol  - holding tamulosin .4 mg for now given hypotension         Chronic combined systolic and diastolic heart failure    - EF 40% as of 4 months ago  - holding metoprolol           Type 2 diabetes mellitus with diabetic polyneuropathy, without long-term current use of insulin    - SSI         Severe aortic stenosis    - Repeated 2D ECHO showed EF 40% with severe aortic stenosis, relatively unchanged  - Not a TAVR candidate due to RCC   - moderate AS with regards to mean gradient of 34 mmHg, despite NAMAN < 1 cm2  - IC recs of not a surgical candidate   - EF reduction from 50 % to 40 %        Coronary artery disease involving native coronary artery of native heart without angina pectoris    - continue ASA, plavix, statin        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    - continue axitinib 1 mg BID; not on formulary so patient may need to supply own from home          * Sepsis    - was evaluated by ICU who determined patient to be appropriate for floor   - Tachycardic, WBC 13, lactic acid 4.7, hypotensive in 80s but per pt this is his baseline (8/16)  - unclear etiology as CXR clear and UA not consistent with UTI  - WBC 9.67 (8/17)  - BCx pending  - UCx pending  - continue Vanc + Cefepime                 Chilo Olivia MD  Hematology/Oncology  [facility]        I have reviewed the notes, assessments, and/or procedures performed by the housestaff, as above.  I have personally interviewed and examined the patient at the beside, and rounded with the housestaff. I concur with her/his assessment and plan and the documentation of Lex Billy.  More than 35 mins were spent during this encounter, greater than 50% was spent in direct counseling and/or coordination of care.     Raghavendra Ruiz M.D., M.S., F.A.C.P.  Hematology/Oncology  Attending  [facility]

## 2017-08-28 NOTE — PROGRESS NOTES
Subjective:       Patient ID: Lex Billy is a 72 y.o. male.    Chief Complaint: Follow-up; Fatigue; and Hypotension    HPI     Admitted in the interval- fever post treatment- resolved  Feels well today    Radiation improved his neuro symptoms- no headaches, dizziness resolved.  Rarely feels off balance- no falls.  Radiation also improved back pain    Currently on Opdivo    MRI 7/7/17:  which did confirm metastatic disease to brain    CT C/A/P 5/17/17:  Right renal solid and cystic lesion compatible with patient's known renal cell carcinoma, similar to prior.  Osseous metastatic disease with increase in size of left 12th rib and right sacral lesions. No definite new osseous lesion identified.  Interval development of a new left adrenal nodule concerning for additional metastatic spread of disease.    RECIST SUMMARY - comparison 3/8/17:  Right renal lesion: 3.5 cm, series 2 image 64 (previously 3.3 cm)  Left 12th rib: 8.1 cm, series 2 image 67 (previously 6.3 cm)  Right sacrum: 5.2 cm, series 2 image 105 (previously 4.2 cm)  Left adrenal gland: 2.2 cm, series 2 image 59 (not present on prior)     MRI Brain 7/7/17:  Two foci of enhancement identified in the left temporal and right occipital lobe concerning for metastatic disease in this patient with a history of metastatic RCC.     Oncology History:  Initially treated on Nexavar therapy  Prior treatment with Sutent discontinued with side effects.  Completed XRT to sacrum and reports no pain. Still with some residual numbness in right buttock area.  Biopsy confirmed clear cell RCC  Then placed on Torisel and now with mild progression in known areas  Changed to Votrient  Then on Inlyta reduced dose.      Opdivo currently  Zometa continued    Review of Systems   Constitutional: Positive for activity change (better) and fatigue (better). Negative for appetite change, chills, fever and unexpected weight change.   HENT: Negative for congestion, dental problem, ear  pain, hearing loss, mouth sores, postnasal drip, rhinorrhea, sinus pressure, sneezing, sore throat, trouble swallowing and voice change.    Eyes: Negative for redness and visual disturbance (rt eye decreased prior stroke).   Respiratory: Negative for cough, chest tightness, shortness of breath and wheezing.    Cardiovascular: Negative for chest pain, palpitations and leg swelling.   Gastrointestinal: Negative for abdominal distention, abdominal pain, blood in stool, constipation, diarrhea, nausea, rectal pain and vomiting.   Genitourinary: Negative for decreased urine volume, difficulty urinating, dysuria, hematuria and urgency.        Increased urination on lasix   Musculoskeletal: Positive for arthralgias. Negative for back pain, gait problem, myalgias and neck pain.   Skin: Negative for color change, pallor, rash and wound.   Neurological: Positive for weakness. Negative for dizziness, numbness and headaches.   Hematological: Negative for adenopathy. Bruises/bleeds easily.   Psychiatric/Behavioral: Negative for dysphoric mood and suicidal ideas. The patient is not nervous/anxious.        Objective:      Physical Exam   Constitutional: He is oriented to person, place, and time. He appears well-developed and well-nourished. No distress.   Presents alone  Using walker   HENT:   Head: Normocephalic and atraumatic.   Right Ear: External ear normal.   Left Ear: External ear normal.   Nose: Nose normal.   Mouth/Throat: Oropharynx is clear and moist. No oropharyngeal exudate.   Eyes: Conjunctivae and EOM are normal. Pupils are equal, round, and reactive to light. Right eye exhibits no discharge. Left eye exhibits no discharge. No scleral icterus.   Neck: Normal range of motion. Neck supple. No tracheal deviation present. No thyromegaly present.   Cardiovascular: Normal rate, regular rhythm and intact distal pulses.  Exam reveals no gallop and no friction rub.    Murmur (known AS) heard.  Pulmonary/Chest: Effort normal  and breath sounds normal. No respiratory distress. He has no wheezes. He has no rales. He exhibits no tenderness.   Abdominal: Soft. Bowel sounds are normal. He exhibits no distension and no mass. There is no tenderness. There is no rebound and no guarding.   No organomegaly   Musculoskeletal: Normal range of motion. He exhibits no edema or tenderness.   Lymphadenopathy:     He has no cervical adenopathy.   Neurological: He is alert and oriented to person, place, and time. No cranial nerve deficit. He exhibits normal muscle tone. Coordination normal.   Skin: Skin is warm and dry. No rash noted. He is not diaphoretic. No erythema. No pallor.   Dry, no rash   Psychiatric: He has a normal mood and affect. His behavior is normal. Judgment and thought content normal.   Nursing note and vitals reviewed.    Labs- reviewed    Assessment:       1. 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 Severe aortic stenosis    3. Brain metastases        Plan:     Plan is to continue Opdivo as some evidence crosses BBB and follow closely

## 2017-08-31 NOTE — PROGRESS NOTES
8-31-17--Attempt to complete initial assessment for Outpatient Care Management; l spoke with patient, he request call in AM-will plan to call 9-6-17 in AM. Isabel PHIPPS CCM

## 2017-09-06 NOTE — PROGRESS NOTES
Attempted to contact pt to complete initial assessment. Pt states he is leaving to go to chemo and requests a call back next week.

## 2017-09-06 NOTE — PLAN OF CARE
Problem: Chemotherapy Effects (Adult)  Goal: Signs and Symptoms of Listed Potential Problems Will be Absent, Minimized or Managed (Chemotherapy Effects)  Signs and symptoms of listed potential problems will be absent, minimized or managed by discharge/transition of care (reference Chemotherapy Effects (Adult) CPG).  Outcome: Ongoing (interventions implemented as appropriate)  Pt arrived via ambulatory. Reviewed his medications, hx, allergies, and treatment plan. Notified pt of need for MD to sign the orders and then I will administer medication.  Pt verbalized that he understood. Awaiting meds and will monitor while in unit

## 2017-09-06 NOTE — PLAN OF CARE
Problem: Patient Care Overview  Goal: Plan of Care Review  Outcome: Ongoing (interventions implemented as appropriate)  Patient received Opdivo without any issues. Notified to call MD with any further concerns . Vss. No apparent distress noted.

## 2017-09-15 NOTE — PROGRESS NOTES
INTERNAL MEDICINE PROGRESS/URGENT CARE NOTE    CHIEF COMPLAINT     Chief Complaint   Patient presents with    Sinus Problem       HPI     Lex Billy is a 72 y.o. male with severe aortic stenosis, BPH with urinary retention, CHF 40%, CAD, HTN, renal cell cancer with mets to bone, and DM who presents for an urgent visit today with c/o cough and sinus congestion     He is an established pt of Dr. Rowan.     Sinus congestion- seen by ENT on 9/7/2017 for similar s/s, prescribed promethazine-codeine syrup and ceftin 250mg BID x 1 week. Still with c/o cough and chest congestion. +PND. Cough refractory to cough syrup. Wet, but not productive.   reports s/s started 9/4-9/5. No improvement.   Denies SOB and chest pain. No wheezing.     CHF- weight increased 8 lbs in <1 month. Repots weight is stable at home.     Past Medical History:  Past Medical History:   Diagnosis Date    Aortic atherosclerosis 10/20/2016    Benign non-nodular prostatic hyperplasia with lower urinary tract symptoms 3/29/2017    Chronic combined systolic and diastolic heart failure 3/29/2017    4-10-17   1 - Mildly to moderately depressed left ventricular systolic function (EF 40-45%).    2 - Normal right ventricular systolic function .    3 - Left ventricular diastolic dysfunction.    4 - Mild mitral regurgitation.    5 - Mild aortic regurgitation.    6 - Severe aortic stenosis, NAMAN = 0.72 cm2, peak velocity = 4.3 m/s, mean gradient = 43.0 mmHg.    7 - Increased central venous pressure.    Chronic retention of urine 4/3/2017    Patient does self cath at home.   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    Severe aortic stenosis 1/31/2016    --- Repeated 2D ECHO showed EF 40% with severe aortic stenosis, relatively unchanged --- Not a TAVR candidate due to RCC  --- moderate AS with regards to mean gradient of 34 mmHg, despite NAMAN < 1 cm2 --- IC recs of not a surgical candidate  --- EF reduction from 50 % to 40 %    Stroke     right eye    Type 2 diabetes mellitus with diabetic polyneuropathy, without long-term current use of insulin 10/20/2016    Type 2 diabetes mellitus with stage 2 chronic kidney disease, without long-term current use of insulin 8/6/2012       Home Medications:  Prior to Admission medications    Medication Sig Start Date End Date Taking? Authorizing Provider   aspirin (ECOTRIN) 81 MG EC tablet Take 81 mg by mouth once daily.    Historical Provider, MD   atorvastatin (LIPITOR) 40 MG tablet TAKE 1 TABLET ONE TIME DAILY  Patient taking differently: TAKE 1 TABLET BY MOUTH ONE TIME DAILY 3/7/17   Janine Valdez MD   bethanechol (URECHOLINE) 50 MG tablet Take 1 tablet (50 mg total) by mouth 3 (three) times daily. 3/21/17 3/21/18  Heath Thomas MD   blood sugar diagnostic Strp 1 each by Misc.(Non-Drug; Combo Route) route 3 (three) times daily. Free style freedom lite test strips and lancets  Patient taking differently: 1 each by Misc.(Non-Drug; Combo Route) route. Test blood sugar twice daily every other day 2/22/13   Rancho Manning MD   blood-glucose meter (TRUE METRIX AIR GLUCOSE METER) [facility] Use as directed 4/1/16   Everette Rowan MD   brimonidine 0.1% (ALPHAGAN P) 0.1 % Drop Place 1 drop into both eyes 3 (three) times daily.    Historical Provider, MD   CALCIUM CARBONATE-VITAMIN D3 ORAL Take 1 tablet by mouth every morning. Calcium carbonate 1000mg vitamin d3 1600 units per patient     Historical Provider, MD   clopidogrel (PLAVIX) 75 mg tablet Take 1 tablet (75 mg total) by mouth once daily. 10/21/16   Jatin Koch MD   DOCUSATE CALCIUM (STOOL SOFTENER ORAL) Take by mouth once as needed (for constipation).    Historical Provider, MD   duloxetine (CYMBALTA) 30 MG capsule Take 1 capsule (30 mg total) by mouth once daily. 2/20/17   Everette Rowan MD   fexofenadine (ALLEGRA) 180 MG tablet TAKE 1 TABLET ONE TIME DAILY 7/25/17   Janine Valdez MD   furosemide (LASIX) 40 MG tablet Take 1 tablet (40 mg total) by mouth once daily. 5/4/17   Everette Rowan MD   gabapentin (NEURONTIN) 300 MG capsule  7/5/17   Historical Provider, MD   lancets 28 gauge Misc 1 lancet by Misc.(Non-Drug; Combo Route) route 3 (three) times daily. 4/1/16   Everette Rowan MD   metoprolol succinate (TOPROL-XL) 25 MG 24 hr tablet Take 1 tablet (25 mg total) by mouth once daily. 8/15/17 8/15/18  Tien Jade MD   nitroGLYCERIN (NITROSTAT) 0.4 MG SL tablet Place 1 tablet (0.4 mg total) under the tongue every 5 (five) minutes as needed for Chest pain. 2/15/17 2/15/18  Tien Palmer MD   ondansetron (ZOFRAN-ODT) 4 MG TbDL Take 1 tablet (4 mg total) by mouth every 4 to 6 hours as needed. Dissolve one tablet under tongue every 4-6 hours as needed for nausea. 8/2/17   Pippa White PA-C   potassium chloride SA (K-DUR,KLOR-CON) 20 MEQ tablet Take 1 tablet (20 mEq total) by mouth once daily. 4/17/17   Jc Rowan MD   promethazine-codeine 6.25-10 mg/5 ml (PHENERGAN WITH CODEINE) 6.25-10 mg/5 mL syrup TAKE 5 MLS BY MOUTH EVERY 4 (FOUR) HOURS AS NEEDED FOR COUGH. 9/11/17 9/21/17  Janine Valdez MD   tamsulosin (FLOMAX) 0.4 mg Cp24 Take 1 capsule (0.4 mg total) by mouth once daily. 2/20/17 2/20/18  Marga Cazares NP       Review of Systems:  Review of Systems   Constitutional: Negative for chills, fatigue and fever.   HENT: Positive for congestion, postnasal drip, sinus pain and sinus pressure. Negative for sneezing and sore  "throat.    Respiratory: Positive for cough. Negative for shortness of breath and wheezing.    Cardiovascular: Negative for chest pain and palpitations.       Health Maintainence:   Immunizations:  Health Maintenance       Date Due Completion Date    Influenza Vaccine 08/01/2017 10/20/2016 (Done)    Override on 10/20/2016: Done    Foot Exam 10/20/2017 10/20/2016 (Done)    Override on 10/20/2016: Done    Hemoglobin A1c 10/20/2017 4/20/2017    Override on 10/20/2016: Done    Urine Microalbumin 02/20/2018 2/20/2017    Eye Exam 06/01/2018 6/1/2017 (Declined)    Override on 6/1/2017: Declined (Patient stated he doesn't want to schedule.)    Lipid Panel 09/05/2018 9/5/2017    Colonoscopy 06/25/2020 6/25/2010    TETANUS VACCINE 02/20/2027 2/20/2017           PHYSICAL EXAM     /64   Pulse (!) 112   Ht 5' 10" (1.778 m)   Wt 75.4 kg (166 lb 3.6 oz)   SpO2 99%   BMI 23.85 kg/m²     Physical Exam   Constitutional: He appears well-developed and well-nourished.   HENT:   Head: Normocephalic.   Right Ear: A middle ear effusion is present.   Left Ear: A middle ear effusion is present.   Nose: Mucosal edema and rhinorrhea present.   Mouth/Throat: Uvula is midline, oropharynx is clear and moist and mucous membranes are normal.   Eyes: Pupils are equal, round, and reactive to light.   Cardiovascular: Regular rhythm and intact distal pulses.    Murmur heard.  Pulmonary/Chest: Effort normal. No respiratory distress. He has no wheezes. He has rales in the left lower field. He exhibits no tenderness.   Abdominal: Soft. Bowel sounds are normal. He exhibits no distension and no mass. There is no tenderness. There is no rebound and no guarding. No hernia.       LABS     Lab Results   Component Value Date    HGBA1C 5.5 02/20/2017     CMP  Sodium   Date Value Ref Range Status   09/05/2017 140 136 - 145 mmol/L Final     Potassium   Date Value Ref Range Status   09/05/2017 4.1 3.5 - 5.1 mmol/L Final     Chloride   Date Value Ref Range " Status   09/05/2017 107 95 - 110 mmol/L Final     CO2   Date Value Ref Range Status   09/05/2017 24 23 - 29 mmol/L Final     Glucose   Date Value Ref Range Status   09/05/2017 101 70 - 110 mg/dL Final     BUN, Bld   Date Value Ref Range Status   09/05/2017 14 8 - 23 mg/dL Final     Creatinine   Date Value Ref Range Status   09/05/2017 0.8 0.5 - 1.4 mg/dL Final     Calcium   Date Value Ref Range Status   09/05/2017 9.0 8.7 - 10.5 mg/dL Final     Total Protein   Date Value Ref Range Status   09/05/2017 7.1 6.0 - 8.4 g/dL Final     Albumin   Date Value Ref Range Status   09/05/2017 2.6 (L) 3.5 - 5.2 g/dL Final     Total Bilirubin   Date Value Ref Range Status   09/05/2017 0.9 0.1 - 1.0 mg/dL Final     Comment:     For infants and newborns, interpretation of results should be based  on gestational age, weight and in agreement with clinical  observations.  Premature Infant recommended reference ranges:  Up to 24 hours.............<8.0 mg/dL  Up to 48 hours............<12.0 mg/dL  3-5 days..................<15.0 mg/dL  6-29 days.................<15.0 mg/dL       Alkaline Phosphatase   Date Value Ref Range Status   09/05/2017 96 55 - 135 U/L Final     AST   Date Value Ref Range Status   09/05/2017 13 10 - 40 U/L Final     ALT   Date Value Ref Range Status   09/05/2017 11 10 - 44 U/L Final     Anion Gap   Date Value Ref Range Status   09/05/2017 9 8 - 16 mmol/L Final     eGFR if    Date Value Ref Range Status   09/05/2017 >60.0 >60 mL/min/1.73 m^2 Final     eGFR if non    Date Value Ref Range Status   09/05/2017 >60.0 >60 mL/min/1.73 m^2 Final     Comment:     Calculation used to obtain the estimated glomerular filtration  rate (eGFR) is the CKD-EPI equation. Since race is unknown   in our information system, the eGFR values for   -American and Non--American patients are given   for each creatinine result.       Lab Results   Component Value Date    WBC 6.33 09/05/2017    HGB  10.0 (L) 09/05/2017    HCT 32.3 (L) 09/05/2017    MCV 83 09/05/2017     09/05/2017     Lab Results   Component Value Date    CHOL 103 (L) 09/05/2017    CHOL 100 (L) 05/22/2017    CHOL 125 02/20/2017     Lab Results   Component Value Date    HDL 40 09/05/2017    HDL 28 (L) 05/22/2017    HDL 36 (L) 02/20/2017     Lab Results   Component Value Date    LDLCALC 51.2 (L) 09/05/2017    LDLCALC 59.8 (L) 05/22/2017    LDLCALC 69.8 02/20/2017     Lab Results   Component Value Date    TRIG 59 09/05/2017    TRIG 61 05/22/2017    TRIG 96 02/20/2017     Lab Results   Component Value Date    CHOLHDL 38.8 09/05/2017    CHOLHDL 28.0 05/22/2017    CHOLHDL 28.8 02/20/2017     Lab Results   Component Value Date    TSH 4.455 (H) 09/05/2017       ASSESSMENT/PLAN     Lex Billy is a 72 y.o. male with  Past Medical History:   Diagnosis Date    Aortic atherosclerosis 10/20/2016    Benign non-nodular prostatic hyperplasia with lower urinary tract symptoms 3/29/2017    Chronic combined systolic and diastolic heart failure 3/29/2017    4-10-17   1 - Mildly to moderately depressed left ventricular systolic function (EF 40-45%).    2 - Normal right ventricular systolic function .    3 - Left ventricular diastolic dysfunction.    4 - Mild mitral regurgitation.    5 - Mild aortic regurgitation.    6 - Severe aortic stenosis, NAMAN = 0.72 cm2, peak velocity = 4.3 m/s, mean gradient = 43.0 mmHg.    7 - Increased central venous pressure.    Chronic retention of urine 4/3/2017    Patient does self cath at home.   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    Severe aortic stenosis 1/31/2016    --- Repeated 2D ECHO showed EF 40% with severe aortic stenosis, relatively unchanged --- Not a TAVR candidate due to RCC  --- moderate AS with regards to mean gradient of 34 mmHg, despite NAMAN < 1 cm2 --- IC recs of not a surgical candidate  --- EF reduction from 50 % to 40 %    Stroke     right eye    Type 2 diabetes mellitus with diabetic polyneuropathy, without long-term current use of insulin 10/20/2016    Type 2 diabetes mellitus with stage 2 chronic kidney disease, without long-term current use of insulin 8/6/2012     Community acquired pneumonia- will stop ceftin and start levaquin daily. Increase rest.    -     levoFLOXacin (LEVAQUIN) 750 MG tablet; Take 1 tablet (750 mg total) by mouth once daily.  Dispense: 5 tablet; Refill: 0    Cough- not relieved with phenergan-codeine. Will add tessalon pearls.   -     Discontinue: benzonatate (TESSALON) 100 MG capsule; Take 1 capsule (100 mg total) by mouth 3 (three) times daily as needed for Cough.  Dispense: 30 capsule; Refill: 0  -     benzonatate (TESSALON) 100 MG capsule; Take 1 capsule (100 mg total) by mouth 3 (three) times daily as needed for Cough.  Dispense: 30 capsule; Refill: 0  -     azelastine (ASTELIN) 137 mcg (0.1 %) nasal spray; 1 spray (137 mcg total) by Nasal route 2 (two) times daily.  Dispense: 30 mL; Refill: 0    Type 2 diabetes mellitus with diabetic polyneuropathy, without long-term current use of insulin- control CBG during infection. pvax- UTD     Chronic combined systolic and diastolic heart failure- weight stable per home readings. No lower ext edema.         Treatment failure in pt with immunocompromised state. Will change to levaquin and attempt to control cough. Discussed when to seek emergent care. Pt and wife state  understanding.       Follow up as needed     Patient education provided from [facility]. Patient was counseled on when and how to seek emergent care.       Tayla STEVEN, DANISH, FNP-c   Department of Internal Medicine - [facility]  5:43 PM

## 2017-09-18 NOTE — PROGRESS NOTES
3 attempts made to contact pt for initial assessment. Attempt to contact letter mailed via pt portal.

## 2017-09-19 NOTE — PROGRESS NOTES
Subjective:       Patient ID: Lex Billy is a 72 y.o. male.    Chief Complaint: No chief complaint on file.    72 year old male with metastatic renal cell carcinoma, currently on Opdivo.    9/7- seen by Dr. Sanchez for upper respiratory symptoms.  got steroid shot and ceftin IM  Patient with wet, non-productive cough since last Friday, seen in urgent care on Saturday.  Unable to sleep at night due to cough. He completed 5 day course of Levaquin yesterday.  Worse when he lays on right side, has to sleep reclining, can't lay all the way back without coughing.  No fever, chills, nausea, vomiting. No shortness of breath, easily walked from parking lot to office. No chest pain.        Currently on Opdivo     MRI 7/7/17:  which did confirm metastatic disease to brain     CT C/A/P 5/17/17:  Right renal solid and cystic lesion compatible with patient's known renal cell carcinoma, similar to prior.  Osseous metastatic disease with increase in size of left 12th rib and right sacral lesions. No definite new osseous lesion identified.  Interval development of a new left adrenal nodule concerning for additional metastatic spread of disease.    RECIST SUMMARY - comparison 3/8/17:  Right renal lesion: 3.5 cm, series 2 image 64 (previously 3.3 cm)  Left 12th rib: 8.1 cm, series 2 image 67 (previously 6.3 cm)  Right sacrum: 5.2 cm, series 2 image 105 (previously 4.2 cm)  Left adrenal gland: 2.2 cm, series 2 image 59 (not present on prior)     MRI Brain 7/7/17:  Two foci of enhancement identified in the left temporal and right occipital lobe concerning for metastatic disease in this patient with a history of metastatic RCC.     Oncology History:  Initially treated on Nexavar therapy  Prior treatment with Sutent discontinued with side effects.  Completed XRT to sacrum and reports no pain. Still with some residual numbness in right buttock area.  Biopsy confirmed clear cell RCC  Then placed on Torisel and now with mild  progression in known areas  Changed to Votrient  Then on Inlyta reduced dose.      Opdivo currently  Zometa continued         Review of Systems   Constitutional: Positive for fatigue and unexpected weight change (appetite has increased, weight gain but denies swelling). Negative for activity change, appetite change, chills and fever.   HENT: Negative for dental problem, ear pain, hearing loss, mouth sores, sore throat and trouble swallowing.    Eyes: Negative for visual disturbance (rt eye decreased prior stroke).   Respiratory: Positive for cough. Negative for chest tightness, shortness of breath and wheezing.    Cardiovascular: Negative for chest pain, palpitations and leg swelling.   Gastrointestinal: Negative for abdominal distention, abdominal pain, blood in stool, constipation, diarrhea, nausea, rectal pain and vomiting.   Genitourinary: Negative for decreased urine volume, difficulty urinating, dysuria, hematuria and urgency.        Increased urination on lasix   Musculoskeletal: Positive for arthralgias. Negative for back pain, gait problem, myalgias and neck pain.   Skin: Negative for color change, pallor, rash and wound.   Neurological: Negative for dizziness, weakness, numbness and headaches.   Hematological: Negative for adenopathy. Bruises/bleeds easily.   Psychiatric/Behavioral: Negative for dysphoric mood and suicidal ideas. The patient is not nervous/anxious.        Objective:      Physical Exam   Constitutional: He is oriented to person, place, and time. He appears well-developed and well-nourished. No distress.   Presents alone  ECOG 1   HENT:   Head: Normocephalic and atraumatic.   Right Ear: External ear normal.   Left Ear: External ear normal.   Nose: Nose normal.   Mouth/Throat: Oropharynx is clear and moist. No oropharyngeal exudate.   Eyes: Conjunctivae and EOM are normal. Pupils are equal, round, and reactive to light. Right eye exhibits no discharge. Left eye exhibits no discharge. No  scleral icterus.   Neck: Normal range of motion. Neck supple. No tracheal deviation present. No thyromegaly present.   Cardiovascular: Normal rate, regular rhythm and intact distal pulses.  Exam reveals no gallop and no friction rub.    Murmur (known AS) heard.  Pulmonary/Chest: Effort normal and breath sounds normal. No respiratory distress. He has no wheezes. He has no rales. He exhibits no tenderness.   Lungs clear to auscultation, no dullness to percussion   Abdominal: Soft. Bowel sounds are normal. He exhibits no distension and no mass. There is no tenderness. There is no rebound and no guarding.   No organomegaly   Musculoskeletal: Normal range of motion. He exhibits no edema or tenderness.   Lymphadenopathy:     He has no cervical adenopathy.   Neurological: He is alert and oriented to person, place, and time. No cranial nerve deficit. He exhibits normal muscle tone. Coordination normal.   Skin: Skin is warm and dry. No rash noted. He is not diaphoretic. No erythema. No pallor.   Dry, no rash   Psychiatric: He has a normal mood and affect. His behavior is normal. Judgment and thought content normal.   Nursing note and vitals reviewed.      Assessment:       1. Renal cell carcinoma of right kidney    2. Brain metastases    3. 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    4. Orthopnea    5. Chronic combined systolic and diastolic heart failure        Plan:       CXR today (laterlal and decubitus) showed pleural effusion although normal lung exam on physical exam.  Patient with normal 02 sats and no overt SOB.  Will send for Echo in setting of known CHF, patient not currently volume overloaded and taking lasix every morning.   Plan on Opdivo tomorrow and return in 2 weeks with labs for next treatment.

## 2017-09-19 NOTE — Clinical Note
Tawanda- can you schedule patient for ECHO? He is coming in jose for opdivo, didn't know if there was any possibility of getting him around that appointment or later in the week. thanks

## 2017-09-20 NOTE — PLAN OF CARE
Problem: Patient Care Overview  Goal: Plan of Care Review  Outcome: Ongoing (interventions implemented as appropriate)  1632 -- Patient tolerated treatment well.  Discharged without S/S of adverse effects.  AVS given.  Patient instructed to call provider with any questions or concerns.

## 2017-09-20 NOTE — PLAN OF CARE
Problem: Patient Care Overview  Goal: Individualization & Mutuality  Outcome: Ongoing (interventions implemented as appropriate)  150 --  Patient's labs, history, allergies, and medication reviewed.  Patient to receive Optivo.  Discussed plan of care with patient.  Patient in agreement.

## 2017-10-02 PROBLEM — I50.9 ACUTE ON CHRONIC CONGESTIVE HEART FAILURE: Status: ACTIVE | Noted: 2017-01-01

## 2017-10-02 NOTE — TELEPHONE ENCOUNTER
"Returned call to pt.   Pt stated that he has been "coughing a lot (wet/dry with no phlegm)," SOB, and bilateral swollen feet (pt denies any pain, states feet are a little red, pt denies any warmth).   Pt stated he has a f/u with Dr. Valdez tomorrow just wanted to inform Dr. Valdez of symptoms.   Informed pt would check and see if Dr. Valdez wanted to do anything prior to appt tomorrow.   Pt verbalized understanding.     Message routed.         ----- Message from Teresa Yeh sent at 10/2/2017  9:38 AM CDT -----  Contact: Pt  Pt calling stating that he is having shortness of breath, swollen feet and coughing a lot    Pt call back number [phone]    "

## 2017-10-02 NOTE — ED TRIAGE NOTES
Pt presents to ED with complaints of SOB, swelling, post nasal drip, and cough x3 days. Pt wife states pt has had post nasal drip in the beginning of September that resolved but has started again the with previously states symptoms. Wife stated they call the oncologist who referred him to the ED. Pt denies chest pain. And in NAD.

## 2017-10-02 NOTE — ED NOTES
Rounding completed on patient. Plan of care discussed and patient has no complaints or questions.. Pt is resting comfortably and is in no acute distress. Respirations are even and unlabored. Pt denies chest pain. Pt has no complaints at this time. The bed is in low, locked position with side rails upx2. Call light is in reach, and patient is oriented to call light use. Pt family states they will call nurse if they need assistance.

## 2017-10-02 NOTE — ED NOTES
Pt identifiers Lex Billy checked and correct    LOC: The patient is awake, alert, aware of environment with an appropriate affect. Oriented x3, speaking appropriately  APPEARANCE: Pt resting comfortably, in no acute distress, pt is clean and well groomed, clothing properly fastened  SKIN:The skin is warm and dry, color consistent with ethnicity, patient has normal skin turgor and moist mucus membranes, skin intact,  bruising noted bilateral upper extremities  RESPIRATORY:Airway is open and patent, respirations are spontaneous, pt has some labored breathing and complaints of SOB stating 97% on RA, no accessory muscle use noted, bilateral breath sounds clear.   CARDIAC: Normal rate and rhythm, peripheral edema noted bilateral lower extremities +2, capillary refill < 3 seconds, bilateral radial pulses 2+.  ABDOMEN: Soft, non tender, non distended. Bowel sounds present x 4 quadrants.   NEUROLOGIC: PERRLA, facial expression is symmetrical, patient moving all extremities spontaneously, normal sensation in all extremities when touched with a finger.  Follows all commands appropriately  MUSCULOSKELETAL: Patient moving all extremities spontaneously

## 2017-10-02 NOTE — CONSULTS
Called by ED regarding Lex Billy. ED would like to admit for CHF exacerbation. Patient has an significant cardiac history with worsening EF and severe AS. Patient's case discussed with in-patient Medical Oncology staff as well as patient's primary oncologist. It is felt that the patient would be better served on a Cardiology or Internal Medicine service for heart failure. Patient was on immunotherapy (Opdivo, last given 9/20/17) but will not receive any further dosages at this time. Please consult Heme/Onc Consult Service if there are any specific questions.     Kyle Randall MD (PGY-5)  Hematology/Oncology Fellow  Discussed with Medical Oncology staff and patient's primary oncologist    ATTENDING NOTE, ONCOLOGY TEAM    As above; I think he will be better served on the inpatient Medicine Service given his presentation with heart failure.

## 2017-10-02 NOTE — CONSULTS
Cardiology Consult Note   Physician Requesting Consultation: Reji Toussaint MD  Reason for Consultation: SOB    HPI:   This is a 71 y/o man with PMHx of RCC, currently being treated by Dr. Valdez, CAD s/p NSTEMI, severe AS (NAMAN = 0.68 cm2, peak velocity = 3.5 m/s, mean gradient = 30 mmHg), and an EF of 15% who presents to the ED with complaints of SOB and MAHARAJ at the request of Dr. Valdez.     On assessment today, the patient states that for the last 1-2 weeks he has had progressively worsening MAHARAJ. He also endorses LE edema, and orthopnea. States he has had issues like this before, but this is the worst it has been for some time. Denies medication and dietary indiscretion. Denies syncope.    In the ED, the patient was given Lasix 80mg IVP x 1.   ROS:    Constitution: Negative for fever or chills. Negative for weight loss or gain.   HENT: Negative for sore throat or headaches. Negative for rhinorrhea.  Eyes: Negative for blurred or double vision.   Cardiovascular: See above  Pulmonary: Negative for SOB. Negative for cough.   Gastrointestinal: Negative for abdominal pain. Negative for nausea/ vomiting. Negative for diarrhea.   : Negative for dysuria.   Neurological: Negative for focal weakness or sensory changes.  PMH:     Past Medical History:   Diagnosis Date    Aortic atherosclerosis 10/20/2016    Benign non-nodular prostatic hyperplasia with lower urinary tract symptoms 3/29/2017    Chronic combined systolic and diastolic heart failure 3/29/2017    4-10-17   1 - Mildly to moderately depressed left ventricular systolic function (EF 40-45%).    2 - Normal right ventricular systolic function .    3 - Left ventricular diastolic dysfunction.    4 - Mild mitral regurgitation.    5 - Mild aortic regurgitation.    6 - Severe aortic stenosis, NAMAN = 0.72 cm2, peak velocity = 4.3 m/s, mean gradient = 43.0 mmHg.    7 - Increased central venous pressure.    Chronic retention of urine 4/3/2017    Patient does self  cath at home.   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    Severe aortic stenosis 1/31/2016    --- Repeated 2D ECHO showed EF 40% with severe aortic stenosis, relatively unchanged --- Not a TAVR candidate due to RCC  --- moderate AS with regards to mean gradient of 34 mmHg, despite NAMAN < 1 cm2 --- IC recs of not a surgical candidate  --- EF reduction from 50 % to 40 %    Stroke     right eye    Type 2 diabetes mellitus with diabetic polyneuropathy, without long-term current use of insulin 10/20/2016    Type 2 diabetes mellitus with stage 2 chronic kidney disease, without long-term current use of insulin 8/6/2012     Past Surgical History:   Procedure Laterality Date    BACK SURGERY      cervical disc replacement    CARPAL TUNNEL RELEASE      left    CORONARY ANGIOPLASTY WITH STENT PLACEMENT  10/2015    4 stents    EYE SURGERY      laser surgery in right eye    JOINT REPLACEMENT      orthoscopic surgery on knee      ROTATOR CUFF REPAIR      right side    SINUS SURGERY      TONSILLECTOMY      torn ligament      repair. left shoulder    TOTAL KNEE ARTHROPLASTY      left side    VASECTOMY       Allergies:     Review of patient's allergies indicates:   Allergen Reactions    No known drug allergies        Medications:     No current facility-administered medications on file prior to encounter.      Current Outpatient Prescriptions on File Prior to  Encounter   Medication Sig    aspirin (ECOTRIN) 81 MG EC tablet Take 81 mg by mouth once daily.    atorvastatin (LIPITOR) 40 MG tablet TAKE 1 TABLET ONE TIME DAILY (Patient taking differently: TAKE 1 TABLET BY MOUTH ONE TIME DAILY)    azelastine (ASTELIN) 137 mcg (0.1 %) nasal spray 1 spray (137 mcg total) by Nasal route 2 (two) times daily.    bethanechol (URECHOLINE) 50 MG tablet Take 1 tablet (50 mg total) by mouth 3 (three) times daily.    blood sugar diagnostic Strp 1 each by Misc.(Non-Drug; Combo Route) route 3 (three) times daily. Free style freedom lite test strips and lancets (Patient taking differently: 1 each by Misc.(Non-Drug; Combo Route) route. Test blood sugar twice daily every other day)    blood-glucose meter (TRUE METRIX AIR GLUCOSE METER) Misc Use as directed    brimonidine 0.1% (ALPHAGAN P) 0.1 % Drop Place 1 drop into both eyes 3 (three) times daily.    CALCIUM CARBONATE-VITAMIN D3 ORAL Take 1 tablet by mouth every morning. Calcium carbonate 1000mg vitamin d3 1600 units per patient    clopidogrel (PLAVIX) 75 mg tablet Take 1 tablet (75 mg total) by mouth once daily.    duloxetine (CYMBALTA) 30 MG capsule Take 1 capsule (30 mg total) by mouth once daily.    fexofenadine (ALLEGRA) 180 MG tablet TAKE 1 TABLET ONE TIME DAILY    furosemide (LASIX) 40 MG tablet Take 1 tablet (40 mg total) by mouth once daily.    gabapentin (NEURONTIN) 300 MG capsule     lancets 28 gauge Misc 1 lancet by Misc.(Non-Drug; Combo Route) route 3 (three) times daily.    levoFLOXacin (LEVAQUIN) 750 MG tablet Take 1 tablet (750 mg total) by mouth once daily.    metoprolol succinate (TOPROL-XL) 25 MG 24 hr tablet Take 1 tablet (25 mg total) by mouth once daily.    ondansetron (ZOFRAN-ODT) 4 MG TbDL Take 1 tablet (4 mg total) by mouth every 4 to 6 hours as needed. Dissolve one tablet under tongue every 4-6 hours as needed for nausea.    potassium chloride SA (K-DUR,KLOR-CON) 20 MEQ tablet Take 1 tablet (20 mEq  total) by mouth once daily.    tamsulosin (FLOMAX) 0.4 mg Cp24 Take 1 capsule (0.4 mg total) by mouth once daily.    brompheniramine-pseudoeph-DM 2-30-10 mg/5 mL Syrp     cefUROXime (CEFTIN) 250 MG tablet Take 250 mg by mouth 2 (two) times daily with meals.    DOCUSATE CALCIUM (STOOL SOFTENER ORAL) Take by mouth once as needed (for constipation).    nitroGLYCERIN (NITROSTAT) 0.4 MG SL tablet Place 1 tablet (0.4 mg total) under the tongue every 5 (five) minutes as needed for Chest pain.         Social History:     Social History   Substance Use Topics    Smoking status: Never Smoker    Smokeless tobacco: Never Used    Alcohol use No      Comment: Heavy in past, quit about 20 years ago     Family History:     Family History   Problem Relation Age of Onset    Heart disease Father     ALS Father     Cancer Brother      esophageal    Rheum arthritis Mother     Migraines Daughter     Asthma Son     Diabetes Neg Hx     Colon polyps Neg Hx      Physical Exam:     Vitals:  Temp:  [98 °F (36.7 °C)]   Pulse:  []   Resp:  [18]   BP: ()/(50-76)   SpO2:  [95 %-98 %]  I/O's:  No intake or output data in the 24 hours ending 10/02/17 1514     Constitutional: NAD  HEENT: Sclera anicteric, PERRLA, EOMI  Neck: JVD to the mid neck  CV: tachycardic, AS murmur  Pulm: Rales in bilateral bases L > R  GI: Abdomen soft, NTND, +BS  Extremities: 1-2+ BLE pitting edema to the mid shin      Labs:       Recent Labs  Lab 10/02/17  1303      K 4.6      CO2 21*   BUN 19   CREATININE 1.1   ANIONGAP 11       Recent Labs  Lab 10/02/17  1303   AST 42*   ALT 25   ALKPHOS 123   BILITOT 1.1*   ALBUMIN 2.9*       Recent Labs  Lab 10/02/17  1303   CALCIUM 9.0       Recent Labs  Lab 10/02/17  1303   TROPONINI 0.047*   BNP 2,455*     No results for input(s): NITRITE, LEUKOCYTESUR, WBCUA, BACTERIA in the last 168 hours.    Invalid input(s): EPITHELIALCE, COGRCA    Estimated Creatinine Clearance: 62.7 mL/min (based on SCr  of 1.1 mg/dL).   Recent Labs  Lab 10/02/17  1303   WBC 5.33   HGB 11.8*   HCT 37.2*      GRAN 66.8  3.6     No results for input(s): PTT, INR in the last 168 hours.  No results for input(s): LACTATE in the last 168 hours.  Lab Results   Component Value Date    CHOL 103 (L) 09/05/2017    HDL 40 09/05/2017    LDLCALC 51.2 (L) 09/05/2017    TRIG 59 09/05/2017     Lab Results   Component Value Date    HGBA1C 5.5 02/20/2017     Lab Results   Component Value Date    TSH 4.455 (H) 09/05/2017            Imaging:   CXR: 10/2/17  Impression      Left pleural effusion         EF   Date Value Ref Range Status   09/21/2017 15 (A) 55 - 65    04/10/2017 40 (A) 55 - 65    03/29/2017 40 (A) 55 - 65    02/12/2016 50 55 - 65    10/08/2015 63 55 - 65      Echo 9/21/17  CONCLUSIONS     1 - Eccentric hypertrophy.     2 - Severely depressed left ventricular systolic function (EF 15-20%).     3 - Right ventricular enlargement with mildly to moderately depressed systolic function.     4 - Restrictive LV filling pattern, indicating markedly elevated LAP (grade 3 diastolic dysfunction).     5 - Biatrial enlargement.     6 - Mild to moderate mitral regurgitation.     7 - Mild tricuspid regurgitation.     8 - Severe aortic stenosis, NAMAN = 0.68 cm2, peak velocity = 3.5 m/s, mean gradient = 30 mmHg.     9 - Pulmonary hypertension. The estimated PA systolic pressure is 60 mmHg.     10 - Mildly elevated central venous pressure.     EKG: Sinus tachycardia with PACs, Left axis, anterior infarct, old      Assessment and Plan:   #ADHF - Combined systolic and diastolic dysfunction. Clinical evidence of volume overload. BNP grossly elevated. Warm and wet exacerbation. No evidence of cardiogenic shock at this time. Patient with severe aortic stenosis, so very preload dependent. Will need careful diuresis.  - Admit for IV diuresis, preferably Team C or J if available   - Patient given lasix 80mg IVP x 1 in ED, monitor response  - Monitor BP  closely, would avoid further diuresis if SBP < 85  - Continue current medical management at this time as there is no evidence of cardiogenic shock  - Goal diuresis roughly 2L a day  - Would check UOP tonight and if patient is not net negative at least 1.5L of fluid and BP can tolerate, would redose lasix 80mg IVP x 1  - Cardiology consult service will continue to follow the patient to assess fluid status daily and assist with diuresis    #Elevated Troponin - Related to ADHF. Not consistent with ischemia at this time  - No indication to follow troponin at this time    #Severe AS - (NAMAN = 0.68 cm2, peak velocity = 3.5 m/s, mean gradient = 30 mmHg). Not a candidate for TAVR at this time  - Monitor BP closely with diuresis    Patient discussed and examined with attending physician, Dr. Koch.      Signed:    Ricki Aleman MD  Chief Cardiology Fellow  Pager: [phone]  10/2/2017 3:14 PM

## 2017-10-02 NOTE — TELEPHONE ENCOUNTER
MD Serena Betts   Caller: Unspecified (Today,  9:58 AM)             Called back   We discussed EF from last week   EF down and now has volume overload   Opdivo will be stopped we discussed with wife   When he was seen by Pippa White last week he had no signs of overload and now does   Wife states very short of breath and significant swelling   Advised to come to ER asap

## 2017-10-02 NOTE — ED PROVIDER NOTES
Encounter Date: 10/2/2017    SCRIBE #1 NOTE: I, Nadia Esparza, am scribing for, and in the presence of,  Dr. Toussaint. I have scribed the following portions of the note - the EKG reading and the APC attestation. Other sections scribed: CXR.       History     Chief Complaint   Patient presents with    Shortness of Breath     denies CP - renal cell ca w/ cardiac  involvement.  Referred to ER by DR Mcghee . Has swelling in feet.Pt states 84/50 is his norm.     71 y/o WM with PMHx of renal cell cancer currently on chemo followed by Dr Valdez, CAD, NSTEMI, severe AS (not a TAVR candidate), HTN presents to the ED c/o SOB and LE edema x 3 days. He was instructed by Dr Valdez to present to the ED. He reports severe MAHARAJ, 3 pillow orthopnea, nonproductive cough. He was treated for pneumonia with Levaquin at least 3 months ago. He is not currently on any abx. He recently had an echo with EF 15%. He reports chronic difficulty urinating and chronic lightheadedness with standing. He denies f/c, chest pain, abdominal pain, n/v, diarrhea, dysuria. He denies tobacco, alcohol, or drug use.      The history is provided by the patient.     Review of patient's allergies indicates:   Allergen Reactions    No known drug allergies      Past Medical History:   Diagnosis Date    Aortic atherosclerosis 10/20/2016    Benign non-nodular prostatic hyperplasia with lower urinary tract symptoms 3/29/2017    Chronic combined systolic and diastolic heart failure 3/29/2017    4-10-17   1 - Mildly to moderately depressed left ventricular systolic function (EF 40-45%).    2 - Normal right ventricular systolic function .    3 - Left ventricular diastolic dysfunction.    4 - Mild mitral regurgitation.    5 - Mild aortic regurgitation.    6 - Severe aortic stenosis, NAMAN = 0.72 cm2, peak velocity = 4.3 m/s, mean gradient = 43.0 mmHg.    7 - Increased central venous pressure.    Chronic retention of urine 4/3/2017    Patient does self cath at home.   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    Severe aortic stenosis 1/31/2016    --- Repeated 2D ECHO showed EF 40% with severe aortic stenosis, relatively unchanged --- Not a TAVR candidate due to RCC  --- moderate AS with regards to mean gradient of 34 mmHg, despite NAMAN < 1 cm2 --- IC recs of not a surgical candidate  --- EF reduction from 50 % to 40 %    Stroke     right eye    Type 2 diabetes mellitus with diabetic polyneuropathy, without long-term current use of insulin 10/20/2016    Type 2 diabetes mellitus with stage 2 chronic kidney disease, without long-term current use of insulin 8/6/2012     Past Surgical History:   Procedure Laterality Date    BACK SURGERY      cervical disc replacement    CARPAL TUNNEL RELEASE      left    CORONARY ANGIOPLASTY WITH STENT PLACEMENT  10/2015    4 stents    EYE SURGERY      laser surgery in right eye    JOINT REPLACEMENT      orthoscopic surgery on knee      ROTATOR CUFF REPAIR      right side    SINUS SURGERY      TONSILLECTOMY      torn ligament      repair. left shoulder    TOTAL KNEE ARTHROPLASTY      left side    VASECTOMY       Family History   Problem Relation Age of Onset    Heart disease Father     ALS Father     Cancer Brother      esophageal    Rheum arthritis Mother     Migraines Daughter     Asthma Son     Diabetes Neg Hx     Colon polyps Neg Hx      Social History   Substance Use  Topics    Smoking status: Never Smoker    Smokeless tobacco: Never Used    Alcohol use No      Comment: Heavy in past, quit about 20 years ago     Review of Systems   Constitutional: Negative for chills and fever.   HENT: Negative for congestion, rhinorrhea and sore throat.    Eyes: Negative for photophobia and visual disturbance.   Respiratory: Positive for cough and shortness of breath.    Cardiovascular: Positive for leg swelling. Negative for chest pain.        +orthopnea   Gastrointestinal: Negative for abdominal pain, constipation, diarrhea, nausea and vomiting.   Genitourinary: Positive for difficulty urinating (chronic and unchanged). Negative for dysuria and hematuria.   Musculoskeletal: Negative for back pain, neck pain and neck stiffness.   Skin: Negative for rash and wound.   Neurological: Positive for light-headedness (chronic and unchanged). Negative for syncope, weakness, numbness and headaches.   Psychiatric/Behavioral: Negative for confusion.       Physical Exam     Initial Vitals [10/02/17 1203]   BP Pulse Resp Temp SpO2   (!) 84/50 103 18 98 °F (36.7 °C) 98 %      MAP       61.33         Physical Exam    Nursing note and vitals reviewed.  Constitutional: He appears well-developed and well-nourished. He is not diaphoretic. No distress.   Chronically ill appearing   HENT:   Head: Normocephalic and atraumatic.   Neck: Normal range of motion. Neck supple.   Cardiovascular: Normal rate, regular rhythm and intact distal pulses. Exam reveals no gallop and no friction rub.    Murmur heard.  3+ LE edema noted   Pulmonary/Chest: Breath sounds normal. He has no wheezes. He has no rhonchi. He has no rales.   +crackles in R lower lung   Abdominal: Soft. Bowel sounds are normal. There is no tenderness. There is no rebound and no guarding.   Musculoskeletal: Normal range of motion.   Neurological: He is alert and oriented to person, place, and time.   Skin: Skin is warm and dry. No rash noted. No erythema.    Psychiatric: He has a normal mood and affect.         ED Course   Procedures  Labs Reviewed   CBC W/ AUTO DIFFERENTIAL - Abnormal; Notable for the following:        Result Value    RBC 4.17 (*)     Hemoglobin 11.8 (*)     Hematocrit 37.2 (*)     MCHC 31.7 (*)     RDW 24.1 (*)     All other components within normal limits   COMPREHENSIVE METABOLIC PANEL - Abnormal; Notable for the following:     CO2 21 (*)     Albumin 2.9 (*)     Total Bilirubin 1.1 (*)     AST 42 (*)     All other components within normal limits   TROPONIN I - Abnormal; Notable for the following:     Troponin I 0.047 (*)     All other components within normal limits   B-TYPE NATRIURETIC PEPTIDE - Abnormal; Notable for the following:     BNP 2,455 (*)     All other components within normal limits   TROPONIN I - Abnormal; Notable for the following:     Troponin I 0.043 (*)     All other components within normal limits   URINALYSIS, REFLEX TO URINE CULTURE    Narrative:     Preferred Collection Type->Urine, Clean Catch   POCT GLUCOSE     Imaging Results          X-Ray Chest PA And Lateral (Final result)  Result time 10/02/17 13:56:27    Final result by Vini Styles MD (10/02/17 13:56:27)                 Impression:     Left pleural effusion      Electronically signed by: Vini Styles MD  Date:     10/02/17  Time:    13:56              Narrative:    Cardiac size is similar to previous exam.  pleural fluid blunts the left costophrenic angle.  Lungs are otherwise clear no infiltrate or vascular congestion.                              EKG Readings: (Independently Interpreted)   Sinus tachycardia with premature atrial complexes at a rate of 103 BPM. Left axis deviation. T-wave inversions in leads I, AVL, and V2-V4. There is LVH.       X-Rays:   Independently Interpreted Readings:   Chest X-Ray: Left pleural effusion and mild venous congestion.     Medical Decision Making:   History:   Old Medical Records: I decided to obtain old medical  records.  Independently Interpreted Test(s):   I have ordered and independently interpreted X-rays - see prior notes.  I have ordered and independently interpreted EKG Reading(s) - see prior notes  Clinical Tests:   Lab Tests: Ordered and Reviewed  Radiological Study: Ordered and Reviewed  Medical Tests: Ordered and Reviewed  Other:   I have discussed this case with another health care provider.       APC / Resident Notes:   73 y/o WM with PMHx of renal cell cancer currently on chemo followed by Dr Valdez, CAD, NSTEMI, severe AS (not a TAVR candidate), HTN presents to the ED c/o SOB and LE edema x 3 days. O2 sat 87-88% on RA. Placed on 2L oxygen via nasal cannula in the ED. Initial BP 84/50 - this is patient's baseline. Murmur noted. Regular rhythm. Crackles in R lung base. Abdomen soft and nontender. 3+ LE edema noted. DDx includes but is not limited to Heart failure, worsening AS, ACS, pneumonia, pneumothorax. Will get labs, CXR.     CBC with no leukocytosis or anemia. CMP shows elevated T bili at 1.1 and AST 42. Troponin elevated at 0.047. BNP elevated at 2455.     CXR shows L pleural effusion.     Cardiology was consulted - consult note in chart. Recommend IM-C admission.     Heme/Onc was consulted since patient is on chemo and followed by Dr Valdez. They feel that patient should be admitted to internal medicine service since primary problem not due to cancer. Will admit to internal medicine.        Scribe Attestation:   Scribe #1: I performed the above scribed service and the documentation accurately describes the services I performed. I attest to the accuracy of the note.  Comments: I, Dr. Reji Toussaint, personally performed the services described in this documentation. All medical record entries made by the scribe were at my direction and in my presence.  I have reviewed the chart and agree that the record reflects my personal performance and is accurate and complete. Reji Toussaint MD.  9:17 AM  10/03/2017      Attending Attestation:     Physician Attestation Statement for NP/PA:   I have conducted a face to face encounter with this patient in addition to the NP/PA, due to Medical Complexity    Other NP/PA Attestation Additions:    History of Present Illness: Oncologic pt who is an 72 y.o. male presents with severe aortic stenosis and EF 15%. CHF exacerbation. Oncology and Cards consulted in ED. Medicine admission.                   ED Course      Clinical Impression:   The primary encounter diagnosis was Acute on chronic congestive heart failure, unspecified congestive heart failure type. A diagnosis of Aortic valve stenosis, etiology of cardiac valve disease unspecified was also pertinent to this visit.    Disposition:   Disposition: Admitted  Condition: Fair  Internal Medicine                        Nadia Clements PA-C  10/02/17 1847       Reji Toussaint MD  10/03/17 0945

## 2017-10-03 PROBLEM — I50.23 ACUTE ON CHRONIC SYSTOLIC CONGESTIVE HEART FAILURE: Status: ACTIVE | Noted: 2017-01-01

## 2017-10-03 NOTE — PROGRESS NOTES
[facility]  Cardiology  Progress Note    Patient Name: Lex Billy  MRN: [id number]  Admission Date: 10/2/2017  Hospital Length of Stay: 1 days  Code Status: Full Code   Attending Physician: Sj Pozo MD   Primary Care Physician: Everette Rowan MD, MD  Expected Discharge Date: 10/5/2017  Principal Problem:Acute on chronic congestive heart failure    Subjective:     Hospital Course:   No notes on file    Interval History: NAEON. He is doing well today and is AOx4. He still reports some shortness of breath that is not at his baseline.     Review of Systems   Constitution: Positive for weight gain. Negative for chills, decreased appetite and fever.   Cardiovascular: Positive for dyspnea on exertion and leg swelling. Negative for chest pain, claudication, cyanosis, irregular heartbeat, palpitations and syncope.   Respiratory: Positive for cough and shortness of breath. Negative for snoring and wheezing.    Endocrine: Negative for polydipsia, polyphagia and polyuria.   Skin: Negative for nail changes and rash.        Warm and dry   Musculoskeletal: Negative for falls.        Bilateral scar incision site right and left shoulder   Gastrointestinal: Positive for bloating. Negative for abdominal pain, constipation, diarrhea, nausea and vomiting.   Genitourinary: Negative for dysuria and flank pain.     Objective:     Vital Signs (Most Recent):  Temp: 97.1 °F (36.2 °C) (10/03/17 1113)  Pulse: 101 (10/03/17 1113)  Resp: 18 (10/03/17 1113)  BP: 96/63 (10/03/17 1113)  SpO2: (!) 92 % (10/03/17 1113) Vital Signs (24h Range):  Temp:  [97.1 °F (36.2 °C)-98.1 °F (36.7 °C)] 97.1 °F (36.2 °C)  Pulse:  [] 101  Resp:  [16-18] 18  SpO2:  [88 %-100 %] 92 %  BP: ()/(50-79) 96/63     Weight: 72.1 kg (158 lb 15.2 oz)  Body mass index is 22.81 kg/m².     SpO2: (!) 92 %  O2 Device (Oxygen Therapy): room air      Intake/Output Summary (Last 24 hours) at 10/03/17 1114  Last data filed at 10/03/17 6858    Gross per 24 hour   Intake             1110 ml   Output              950 ml   Net              160 ml       Lines/Drains/Airways     Peripheral Intravenous Line                 Peripheral IV - Single Lumen 10/02/17 1303 Right Wrist less than 1 day                Physical Exam   Constitutional: He is oriented to person, place, and time. No distress.   Thin, cachetic    HENT:   Head: Normocephalic.   Eyes: EOM are normal. Pupils are equal, round, and reactive to light.   Neck: Normal range of motion. JVD present. No tracheal deviation present.   Cardiovascular: Normal rate and regular rhythm.    Trace edema in LLE  Pulses +1 radial, +1 DP, +1 PTA   Pulmonary/Chest: Effort normal and breath sounds normal. No stridor.   Mild craclkes bilateral bases   Abdominal: Bowel sounds are normal. He exhibits distension. There is no tenderness.   Neurological: He is alert and oriented to person, place, and time. A cranial nerve deficit is present.   Skin: Skin is warm and dry. No rash noted. He is not diaphoretic. No pallor.       Significant Labs:   CMP   Recent Labs  Lab 10/02/17  1303 10/03/17  0443    141   K 4.6 3.1*    103   CO2 21* 26    82   BUN 19 20   CREATININE 1.1 1.1   CALCIUM 9.0 8.9   PROT 7.7 6.9   ALBUMIN 2.9* 2.9*   BILITOT 1.1* 1.5*   ALKPHOS 123 120   AST 42* 25   ALT 25 21   ANIONGAP 11 12   ESTGFRAFRICA >60.0 >60.0   EGFRNONAA >60.0 >60.0    and CBC   Recent Labs  Lab 10/02/17  1303 10/03/17  0443   WBC 5.33 6.07   HGB 11.8* 11.7*   HCT 37.2* 36.4*    178       Significant Imaging: Echocardiogram:   2D echo with color flow doppler:   Results for orders placed or performed during the hospital encounter of 09/21/17   Echo doppler color flow velocity map   Result Value Ref Range    EF 15 (A) 55 - 65    Mitral Valve Regurgitation MILD TO MODERATE     Diastolic Dysfunction Yes (A)     Aortic Valve Regurgitation TRIVIAL     Aortic Valve Stenosis SEVERE (A)     Est. PA Systolic Pressure  60.13 (A)     Pericardial Effusion TRIVIAL     Mitral Valve Mobility NORMAL     Tricuspid Valve Regurgitation MILD     and EKG: Sinus Tach, LAE, LAD, RVH, premature atrial complexes    Assessment and Plan:     Brief HPI: Mr Billy is a 71 yo male with hx of CAD/NSTEMI, severe aortic stenosis,  RCC with metasasis, here for HFrEF of 15% which is a significant decline in function compared to previous assessments.    * Acute on chronic congestive heart failure    -Recommend to continue diuresis until euvolemic.   -Once euvolemic can discharge with 80mg po lasix once a day, and instruct him to take it BID if it is not effective  -Recommend starting lisonopril 2.5mg daily.   -Recommend restarting home aspirin dose  -Once he is euvolemic, recommend increasing beta blocker Toprol to 50mg daily, with goal HR >60.   -If BP > 80/50 and if not symptomatic, continue current treatment. If he does become symptomatic hypotension, would hold the ACE-I rather than Beta blocker initially.   -Goal for diuresis is about 2L /day with goal net negative 1.5 L.   -Strict I/Os, standing weights.   -Keep Mg >2, K >4        Severe aortic stenosis    - Not a TAVR candidate  - Can palliative BAV but in the background of RCC.             VTE Risk Mitigation         Ordered     enoxaparin injection 40 mg  Daily     Route:  Subcutaneous        10/02/17 1838     High Risk of VTE  Once      10/02/17 1955          Alex Jane MD   PGY1 [phone]  Cardiology  [facility]

## 2017-10-03 NOTE — PLAN OF CARE
Problem: Patient Care Overview  Goal: Plan of Care Review  Pt remained free of injuries, falls, and trauma throughout the shift. VSS. Pt denies pain. Pt being diuresed with Lasix IVP. Monitoring pt's BP. Educated pt on strict intake and output. Plan of care review with pt. Pt verbalized understanding. Will continue to monitor.

## 2017-10-03 NOTE — SUBJECTIVE & OBJECTIVE
Past Medical History:   Diagnosis Date    Aortic atherosclerosis 10/20/2016    Benign non-nodular prostatic hyperplasia with lower urinary tract symptoms 3/29/2017    Chronic combined systolic and diastolic heart failure 3/29/2017    4-10-17   1 - Mildly to moderately depressed left ventricular systolic function (EF 40-45%).    2 - Normal right ventricular systolic function .    3 - Left ventricular diastolic dysfunction.    4 - Mild mitral regurgitation.    5 - Mild aortic regurgitation.    6 - Severe aortic stenosis, NAMAN = 0.72 cm2, peak velocity = 4.3 m/s, mean gradient = 43.0 mmHg.    7 - Increased central venous pressure.    Chronic retention of urine 4/3/2017    Patient does self cath at home.   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    Severe aortic stenosis 1/31/2016    --- Repeated 2D ECHO showed EF 40% with severe aortic stenosis, relatively unchanged --- Not a TAVR candidate due to RCC  --- moderate AS with regards to mean gradient of 34 mmHg, despite NAMAN < 1 cm2 --- IC recs of not a surgical candidate  --- EF reduction from 50 % to 40 %    Stroke     right eye    Type 2 diabetes mellitus with diabetic polyneuropathy, without long-term current use of insulin 10/20/2016    Type 2 diabetes mellitus with stage 2 chronic kidney disease, without long-term current use of insulin 8/6/2012       Past Surgical History:   Procedure Laterality Date     BACK SURGERY      cervical disc replacement    CARPAL TUNNEL RELEASE      left    CORONARY ANGIOPLASTY WITH STENT PLACEMENT  10/2015    4 stents    EYE SURGERY      laser surgery in right eye    JOINT REPLACEMENT      orthoscopic surgery on knee      ROTATOR CUFF REPAIR      right side    SINUS SURGERY      TONSILLECTOMY      torn ligament      repair. left shoulder    TOTAL KNEE ARTHROPLASTY      left side    VASECTOMY         Review of patient's allergies indicates:   Allergen Reactions    No known drug allergies        No current facility-administered medications on file prior to encounter.      Current Outpatient Prescriptions on File Prior to Encounter   Medication Sig    aspirin (ECOTRIN) 81 MG EC tablet Take 81 mg by mouth once daily.    atorvastatin (LIPITOR) 40 MG tablet TAKE 1 TABLET ONE TIME DAILY (Patient taking differently: TAKE 1 TABLET BY MOUTH ONE TIME DAILY)    azelastine (ASTELIN) 137 mcg (0.1 %) nasal spray 1 spray (137 mcg total) by Nasal route 2 (two) times daily.    bethanechol (URECHOLINE) 50 MG tablet Take 1 tablet (50 mg total) by mouth 3 (three) times daily.    blood sugar diagnostic Strp 1 each by Misc.(Non-Drug; Combo Route) route 3 (three) times daily. Free style freedom lite test strips and lancets (Patient taking differently: 1 each by Misc.(Non-Drug; Combo Route) route. Test blood sugar twice daily every other day)    blood-glucose meter (TRUE METRIX AIR GLUCOSE METER) Misc Use as directed    brimonidine 0.1% (ALPHAGAN P) 0.1 % Drop Place 1 drop into both eyes 3 (three) times daily.    CALCIUM CARBONATE-VITAMIN D3 ORAL Take 1 tablet by mouth every morning. Calcium carbonate 1000mg vitamin d3 1600 units per patient    clopidogrel (PLAVIX) 75 mg tablet Take 1 tablet (75 mg total) by mouth once daily.    duloxetine (CYMBALTA) 30 MG capsule Take 1 capsule (30 mg total) by mouth once daily.    fexofenadine (ALLEGRA) 180 MG tablet TAKE 1 TABLET ONE TIME DAILY     furosemide (LASIX) 40 MG tablet Take 1 tablet (40 mg total) by mouth once daily.    gabapentin (NEURONTIN) 300 MG capsule     lancets 28 gauge Misc 1 lancet by Misc.(Non-Drug; Combo Route) route 3 (three) times daily.    levoFLOXacin (LEVAQUIN) 750 MG tablet Take 1 tablet (750 mg total) by mouth once daily.    metoprolol succinate (TOPROL-XL) 25 MG 24 hr tablet Take 1 tablet (25 mg total) by mouth once daily.    ondansetron (ZOFRAN-ODT) 4 MG TbDL Take 1 tablet (4 mg total) by mouth every 4 to 6 hours as needed. Dissolve one tablet under tongue every 4-6 hours as needed for nausea.    potassium chloride SA (K-DUR,KLOR-CON) 20 MEQ tablet Take 1 tablet (20 mEq total) by mouth once daily.    tamsulosin (FLOMAX) 0.4 mg Cp24 Take 1 capsule (0.4 mg total) by mouth once daily.    brompheniramine-pseudoeph-DM 2-30-10 mg/5 mL Syrp     cefUROXime (CEFTIN) 250 MG tablet Take 250 mg by mouth 2 (two) times daily with meals.    DOCUSATE CALCIUM (STOOL SOFTENER ORAL) Take by mouth once as needed (for constipation).    nitroGLYCERIN (NITROSTAT) 0.4 MG SL tablet Place 1 tablet (0.4 mg total) under the tongue every 5 (five) minutes as needed for Chest pain.     Family History     Problem Relation (Age of Onset)    ALS Father    Asthma Son    Cancer Brother    Heart disease Father    Migraines Daughter    Rheum arthritis Mother        Social History Main Topics    Smoking status: Never Smoker    Smokeless tobacco: Never Used    Alcohol use No      Comment: Heavy in past, quit about 20 years ago    Drug use: No    Sexual activity: Not Currently     Partners: Female     Review of Systems   Constitutional: Negative for chills, fatigue and fever.   HENT: Negative for ear discharge and rhinorrhea.    Respiratory: Positive for cough and shortness of breath. Negative for chest tightness and wheezing.    Cardiovascular: Positive for leg swelling. Negative for chest pain and palpitations.   Gastrointestinal: Negative for  abdominal distention, abdominal pain, constipation, diarrhea, nausea and vomiting.   Genitourinary: Negative for decreased urine volume, dysuria, flank pain, frequency and urgency.   Musculoskeletal: Negative for back pain, gait problem and joint swelling.   Skin: Negative for rash.   Neurological: Negative for dizziness, weakness, light-headedness and headaches.   Psychiatric/Behavioral: Negative for agitation, behavioral problems and confusion.     Objective:     Vital Signs (Most Recent):  Temp: 97.8 °F (36.6 °C) (10/02/17 1932)  Pulse: 106 (10/02/17 1950)  Resp: 18 (10/02/17 1932)  BP: 108/76 (10/02/17 1932)  SpO2: 100 % (10/02/17 1700) Vital Signs (24h Range):  Temp:  [97.8 °F (36.6 °C)-98 °F (36.7 °C)] 97.8 °F (36.6 °C)  Pulse:  [] 106  Resp:  [18] 18  SpO2:  [93 %-100 %] 100 %  BP: ()/(50-79) 108/76     Weight: 74.4 kg (164 lb)  Body mass index is 23.53 kg/m².    Physical Exam   Constitutional: He is oriented to person, place, and time. He appears well-developed and well-nourished.   Eyes: EOM are normal. Pupils are equal, round, and reactive to light.   Cardiovascular: Normal rate, regular rhythm and normal heart sounds.  Exam reveals no gallop and no friction rub.    No murmur heard.  Pulmonary/Chest: Effort normal and breath sounds normal. No respiratory distress. He has no wheezes. He has no rales. He exhibits no tenderness.   Abdominal: Soft. Bowel sounds are normal. He exhibits no distension and no mass. There is no tenderness. There is no rebound and no guarding.   Musculoskeletal: Normal range of motion. He exhibits edema (2+ to the knees ).   Neurological: He is alert and oriented to person, place, and time. He displays normal reflexes. No cranial nerve deficit. He exhibits normal muscle tone. Coordination normal.   Skin: Skin is warm and dry.        Significant Labs:   BMP:   Recent Labs  Lab 10/02/17  1303         K 4.6      CO2 21*   BUN 19   CREATININE 1.1   CALCIUM  9.0     CBC:   Recent Labs  Lab 10/02/17  1303   WBC 5.33   HGB 11.8*   HCT 37.2*        CMP:   Recent Labs  Lab 10/02/17  1303      K 4.6      CO2 21*      BUN 19   CREATININE 1.1   CALCIUM 9.0   PROT 7.7   ALBUMIN 2.9*   BILITOT 1.1*   ALKPHOS 123   AST 42*   ALT 25   ANIONGAP 11   EGFRNONAA >60.0     Cardiac Markers:   Recent Labs  Lab 10/02/17  1303   BNP 2,455*     Troponin:   Recent Labs  Lab 10/02/17  1303 10/02/17  1610   TROPONINI 0.047* 0.043*       Significant Imaging: I have reviewed and interpreted all pertinent imaging results/findings within the past 24 hours.     Echo 09/21/2017  CONCLUSIONS     1 - Eccentric hypertrophy.     2 - Severely depressed left ventricular systolic function (EF 15-20%).     3 - Right ventricular enlargement with mildly to moderately depressed systolic function.     4 - Restrictive LV filling pattern, indicating markedly elevated LAP (grade 3 diastolic dysfunction).     5 - Biatrial enlargement.     6 - Mild to moderate mitral regurgitation.     7 - Mild tricuspid regurgitation.     8 - Severe aortic stenosis, NAMAN = 0.68 cm2, peak velocity = 3.5 m/s, mean gradient = 30 mmHg.     9 - Pulmonary hypertension. The estimated PA systolic pressure is 60 mmHg.     10 - Mildly elevated central venous pressure.     EKG: sinus tahcycardia,  RBBB , LVH - no change from previous

## 2017-10-03 NOTE — PROGRESS NOTES
[facility] Hospital Medicine  Progress Note    Patient Name: Lex Billy  MRN: [id number]  Patient Class: IP- Inpatient   Admission Date: 10/2/2017  Length of Stay: 1 days  Attending Physician: Sj Pozo MD  Primary Care Provider: Everette Rowan MD, MD    [facility]ospital Medicine Team: [facility] Sj Pozo MD    Subjective:     Principal Problem:Acute on chronic systolic congestive heart failure    HPI:  71 y/o man with PMHx of RCC, currently being treated by Dr. Valdez, CAD s/p NSTEMI, severe AS (NAMAN = 0.68 cm2, peak velocity = 3.5 m/s, mean gradient = 30 mmHg), and an EF of 15% who presents with SOB, MAHARAJ,  and leg swelling x 1 week. He states his dry ludwin is 150-154. He states he has gained up to 10 lbs in the last 3 weeks. He reports orthopnea and increased cough.  He has been complaint with his lasix 40mg qd and has intermittently taken extra doses but without relief in his symptoms. He was denies any chest pain, palpations, PND, fevers, chills.     Hospital Course:  Patient admitted for treatment of acute on chronic systolic heart failure exacerbation, he was initially started on 80mg IV lasix on night of admission and is continued on lasix 80mg IV BID at this time.  Cardiology consults following for acute management.     10/3 - patient seen today, and he has no acute complaints, is on supplemental oxygen for RN found him 86% on RA.  Cardiology recs starting ace-inhibitor lisinopril @ 2.5mg daily.  Pt reports he was just taken off of this due to hypotension, discussed with cardiology and they would prefer pt resume some dosage of ace-inhibitor placed to re-initiate for tonight.     Interval History: as per hospital course    Review of Systems   Constitutional: Negative for fatigue, fever and unexpected weight change.   HENT: Negative for rhinorrhea and sore throat.    Eyes: Negative for visual disturbance.   Respiratory: Negative for cough and shortness of breath.     Cardiovascular: Negative for chest pain, palpitations and leg swelling.   Gastrointestinal: Negative for anal bleeding, constipation and diarrhea.     Objective:     Vital Signs (Most Recent):  Temp: 97.1 °F (36.2 °C) (10/03/17 1113)  Pulse: 105 (10/03/17 1500)  Resp: 20 (10/03/17 1500)  BP: 101/64 (10/03/17 1500)  SpO2: 96 % (10/03/17 1500) Vital Signs (24h Range):  Temp:  [97.1 °F (36.2 °C)-98.1 °F (36.7 °C)] 97.1 °F (36.2 °C)  Pulse:  [] 105  Resp:  [16-20] 20  SpO2:  [88 %-98 %] 96 %  BP: ()/(63-76) 101/64     Weight: 72.1 kg (158 lb 15.2 oz)  Body mass index is 22.81 kg/m².    Intake/Output Summary (Last 24 hours) at 10/03/17 1845  Last data filed at 10/03/17 0930   Gross per 24 hour   Intake             1110 ml   Output              300 ml   Net              810 ml      Physical Exam   Constitutional: He is oriented to person, place, and time. No distress.   Eyes: Conjunctivae are normal.   Neck: JVD present.   Cardiovascular: Intact distal pulses.  Exam reveals no friction rub.    No murmur heard.  regular rhythm,, 3/6 late systolic murmur of aortic stenosis, s2 heard,    Pulmonary/Chest: Effort normal and breath sounds normal. No respiratory distress. He has no wheezes. He has no rales.   Abdominal: Soft. Bowel sounds are normal. He exhibits no distension. There is no tenderness.   Musculoskeletal: He exhibits no edema.   Neurological: He is alert and oriented to person, place, and time.   Skin: Skin is warm and dry.       Significant Labs:   BMP:   Recent Labs  Lab 10/03/17  0443   GLU 82      K 3.1*      CO2 26   BUN 20   CREATININE 1.1   CALCIUM 8.9   MG 1.9     CBC:   Recent Labs  Lab 10/02/17  1303 10/03/17  0443   WBC 5.33 6.07   HGB 11.8* 11.7*   HCT 37.2* 36.4*    178     Cardiac Markers:   Recent Labs  Lab 10/02/17  1303   BNP 2,455*       Significant Imaging: no new imaging    Assessment/Plan:      * Acute on chronic systolic congestive heart failure    -Continue 80  lasix IV BID    -strict I/o's   -daily standing weights   -Goal diuresis roughly 2L a day  -continue aspirin and metoprolol.   -add lisinopril 2.5mg - restart this evening.         Severe aortic stenosis    No acute intervention at this time   Interventional has seen pt prior -  he is not a TAVR candidate with his Stage IV RCC though palliative BAV may be an option for him if he has recurrent symptomatic AS.  Continue outpt f/u           Type 2 diabetes mellitus with stage 2 chronic kidney disease, without long-term current use of insulin    POCT qac/qhs           Renal cell carcinoma of right kidney    No acute issues   Follows with Dr. Valdez   -is on Opdivo chemotherapy as outpatient.             VTE Risk Mitigation         Ordered     enoxaparin injection 40 mg  Daily     Route:  Subcutaneous        10/02/17 1838     High Risk of VTE  Once      10/02/17 1955              Sj Pozo MD  Department of Hospital Medicine   [facility]

## 2017-10-03 NOTE — ASSESSMENT & PLAN NOTE
BNP elevated. Pt up ~ 10lbs in the last 3 weeks. Apprecaite cardiology recs   -80 lasix IB BID    -strict I/o's   -daily standing weights   - Monitor BP closely, would avoid further diuresis if SBP < 85  - Goal diuresis roughly 2L a day  - will redose lasix per cardiology recs as pt is not net negative at least 1.5L of fluid and BP is > 90   -continue asa, metoprolol 25xl

## 2017-10-03 NOTE — SUBJECTIVE & OBJECTIVE
Interval History: as per hospital course    Review of Systems   Constitutional: Negative for fatigue, fever and unexpected weight change.   HENT: Negative for rhinorrhea and sore throat.    Eyes: Negative for visual disturbance.   Respiratory: Negative for cough and shortness of breath.    Cardiovascular: Negative for chest pain, palpitations and leg swelling.   Gastrointestinal: Negative for anal bleeding, constipation and diarrhea.     Objective:     Vital Signs (Most Recent):  Temp: 97.1 °F (36.2 °C) (10/03/17 1113)  Pulse: 105 (10/03/17 1500)  Resp: 20 (10/03/17 1500)  BP: 101/64 (10/03/17 1500)  SpO2: 96 % (10/03/17 1500) Vital Signs (24h Range):  Temp:  [97.1 °F (36.2 °C)-98.1 °F (36.7 °C)] 97.1 °F (36.2 °C)  Pulse:  [] 105  Resp:  [16-20] 20  SpO2:  [88 %-98 %] 96 %  BP: ()/(63-76) 101/64     Weight: 72.1 kg (158 lb 15.2 oz)  Body mass index is 22.81 kg/m².    Intake/Output Summary (Last 24 hours) at 10/03/17 1845  Last data filed at 10/03/17 0930   Gross per 24 hour   Intake             1110 ml   Output              300 ml   Net              810 ml      Physical Exam   Constitutional: He is oriented to person, place, and time. No distress.   Eyes: Conjunctivae are normal.   Neck: JVD present.   Cardiovascular: Intact distal pulses.  Exam reveals no friction rub.    No murmur heard.  regular rhythm,, 3/6 late systolic murmur of aortic stenosis, s2 heard,    Pulmonary/Chest: Effort normal and breath sounds normal. No respiratory distress. He has no wheezes. He has no rales.   Abdominal: Soft. Bowel sounds are normal. He exhibits no distension. There is no tenderness.   Musculoskeletal: He exhibits no edema.   Neurological: He is alert and oriented to person, place, and time.   Skin: Skin is warm and dry.       Significant Labs:   BMP:   Recent Labs  Lab 10/03/17  0443   GLU 82      K 3.1*      CO2 26   BUN 20   CREATININE 1.1   CALCIUM 8.9   MG 1.9     CBC:   Recent Labs  Lab  10/02/17  1303 10/03/17  0443   WBC 5.33 6.07   HGB 11.8* 11.7*   HCT 37.2* 36.4*    178     Cardiac Markers:   Recent Labs  Lab 10/02/17  1303   BNP 2,455*       Significant Imaging: no new imaging

## 2017-10-03 NOTE — SUBJECTIVE & OBJECTIVE
Interval History: NAEON. He is doing well today and is AOx4. He still reports some shortness of breath that is not at his baseline.     Review of Systems   Constitution: Positive for weight gain. Negative for chills, decreased appetite and fever.   Cardiovascular: Positive for dyspnea on exertion and leg swelling. Negative for chest pain, claudication, cyanosis, irregular heartbeat, palpitations and syncope.   Respiratory: Positive for cough and shortness of breath. Negative for snoring and wheezing.    Endocrine: Negative for polydipsia, polyphagia and polyuria.   Skin: Negative for nail changes and rash.        Warm and dry   Musculoskeletal: Negative for falls.        Bilateral scar incision site right and left shoulder   Gastrointestinal: Positive for bloating. Negative for abdominal pain, constipation, diarrhea, nausea and vomiting.   Genitourinary: Negative for dysuria and flank pain.     Objective:     Vital Signs (Most Recent):  Temp: 97.1 °F (36.2 °C) (10/03/17 1113)  Pulse: 101 (10/03/17 1113)  Resp: 18 (10/03/17 1113)  BP: 96/63 (10/03/17 1113)  SpO2: (!) 92 % (10/03/17 1113) Vital Signs (24h Range):  Temp:  [97.1 °F (36.2 °C)-98.1 °F (36.7 °C)] 97.1 °F (36.2 °C)  Pulse:  [] 101  Resp:  [16-18] 18  SpO2:  [88 %-100 %] 92 %  BP: ()/(50-79) 96/63     Weight: 72.1 kg (158 lb 15.2 oz)  Body mass index is 22.81 kg/m².     SpO2: (!) 92 %  O2 Device (Oxygen Therapy): room air      Intake/Output Summary (Last 24 hours) at 10/03/17 1114  Last data filed at 10/03/17 0930   Gross per 24 hour   Intake             1110 ml   Output              950 ml   Net              160 ml       Lines/Drains/Airways     Peripheral Intravenous Line                 Peripheral IV - Single Lumen 10/02/17 1303 Right Wrist less than 1 day                Physical Exam   Constitutional: He is oriented to person, place, and time. No distress.   Thin, cachetic    HENT:   Head: Normocephalic.   Eyes: EOM are normal. Pupils are  equal, round, and reactive to light.   Neck: Normal range of motion. JVD present. No tracheal deviation present.   Cardiovascular: Normal rate and regular rhythm.    Trace edema in LLE  Pulses +1 radial, +1 DP, +1 PTA   Pulmonary/Chest: Effort normal and breath sounds normal. No stridor.   Mild craclkes bilateral bases   Abdominal: Bowel sounds are normal. He exhibits distension. There is no tenderness.   Neurological: He is alert and oriented to person, place, and time. A cranial nerve deficit is present.   Skin: Skin is warm and dry. No rash noted. He is not diaphoretic. No pallor.       Significant Labs:   CMP   Recent Labs  Lab 10/02/17  1303 10/03/17  0443    141   K 4.6 3.1*    103   CO2 21* 26    82   BUN 19 20   CREATININE 1.1 1.1   CALCIUM 9.0 8.9   PROT 7.7 6.9   ALBUMIN 2.9* 2.9*   BILITOT 1.1* 1.5*   ALKPHOS 123 120   AST 42* 25   ALT 25 21   ANIONGAP 11 12   ESTGFRAFRICA >60.0 >60.0   EGFRNONAA >60.0 >60.0    and CBC   Recent Labs  Lab 10/02/17  1303 10/03/17  0443   WBC 5.33 6.07   HGB 11.8* 11.7*   HCT 37.2* 36.4*    178       Significant Imaging: Echocardiogram:   2D echo with color flow doppler:   Results for orders placed or performed during the hospital encounter of 09/21/17   Echo doppler color flow velocity map   Result Value Ref Range    EF 15 (A) 55 - 65    Mitral Valve Regurgitation MILD TO MODERATE     Diastolic Dysfunction Yes (A)     Aortic Valve Regurgitation TRIVIAL     Aortic Valve Stenosis SEVERE (A)     Est. PA Systolic Pressure 60.13 (A)     Pericardial Effusion TRIVIAL     Mitral Valve Mobility NORMAL     Tricuspid Valve Regurgitation MILD     and EKG: Sinus Tach, LAE, LAD, RVH, premature atrial complexes

## 2017-10-03 NOTE — MEDICAL/APP STUDENT
Progress Note  Cardiology - Team C - Consults    Time: 0806  Date: 10/3/2017  Attending: Dr. Koch    Patient Name: Lex Billy  YOB: 1945    Admit Date: 10/2/2017                     LOS: 1    Post-operative Day:      SUBJECTIVE:     Reason for Admission:  Acute on chronic congestive heart failure  73 y/o man with PMHx of RCC, currently being treated by Dr. Valdez, CAD s/p NSTEMI, severe AS (NAMAN = 0.68 cm2, peak velocity = 3.5 m/s, mean gradient = 30 mmHg), and an EF of 15% who presents with SOB, MAHARAJ,  and leg swelling x 1 week. He states his dry ludwin is 150-154. He states he has gained up to 10 lbs in the last 3 weeks. He reports orthopnea and increased cough.  He has been complaint with his lasix 40mg qd and has intermittently taken extra doses but without relief in his symptoms. He was denies any chest pain, palpations, PND, fevers, chills.     Interval history: NAEON. Patient states that the SOB & continuous cough has since resolved. Patient denies CP/Palpitations/PND. Patient is currently on RA and satting WNL.       OBJECTIVE:     Vital Signs Range (Last 24H):  Temp:  [97.5 °F (36.4 °C)-98.1 °F (36.7 °C)]   Pulse:  []   Resp:  [16-18]   BP: ()/(50-79)   SpO2:  [88 %-100 %]   Body mass index is 22.81 kg/m².  Wt Readings from Last 1 Encounters:   10/03/17 0700 72.1 kg (158 lb 15.2 oz)   10/02/17 1932 74.3 kg (163 lb 12.8 oz)   10/02/17 1203 74.4 kg (164 lb)         I & O (Last 24H):    Intake/Output Summary (Last 24 hours) at 10/03/17 0806  Last data filed at 10/03/17 0500   Gross per 24 hour   Intake              450 ml   Output              950 ml   Net             -500 ml         Lines/Drains:       Peripheral IV - Single Lumen 10/02/17 1303 Right Wrist (Active)   Site Assessment Clean;Dry;Intact 10/2/2017  7:32 PM   Line Status Flushed;Saline locked 10/2/2017  7:32 PM   Dressing Status Clean;Dry;Intact 10/2/2017  7:32 PM   Dressing Change Due 10/06/17 10/2/2017  7:32 PM    Site Change Due 10/06/17 10/2/2017  7:32 PM   Number of days: 0       Physical Exam:  Physical Exam   Constitutional: He is oriented to person, place, and time. He appears well-developed and well-nourished. No distress.   HENT:   Head: Normocephalic and atraumatic.   Eyes: EOM are normal. Pupils are equal, round, and reactive to light.   Neck: Normal range of motion. No JVD (JVP ~7-8cm) present. No thyromegaly present.   Cardiovascular: Normal rate, regular rhythm and intact distal pulses.  PMI is not displaced.  Exam reveals no gallop and no friction rub.    Murmur heard.   Systolic murmur is present with a grade of 2/6   Pulses:       Carotid pulses are 1+ on the right side, and 1+ on the left side.       Radial pulses are 1+ on the right side, and 1+ on the left side.        Dorsalis pedis pulses are 1+ on the right side, and 1+ on the left side.        Posterior tibial pulses are 1+ on the right side, and 1+ on the left side.       Pulmonary/Chest: Effort normal and breath sounds normal. No stridor. No respiratory distress. He has no wheezes. He has no rales. He exhibits no tenderness.   Abdominal: Soft. Bowel sounds are normal. He exhibits no distension. There is no tenderness. There is no guarding.   Musculoskeletal: Normal range of motion. He exhibits edema (1+ RLE; 2+ LLE). He exhibits no tenderness or deformity.   Neurological: He is alert and oriented to person, place, and time.   Skin: Skin is warm and dry. Capillary refill takes less than 2 seconds. No rash noted. He is not diaphoretic. No erythema.   Psychiatric: He has a normal mood and affect. His behavior is normal.       Diagnostic Results:  Lab Results   Component Value Date    WBC 6.07 10/03/2017    HGB 11.7 (L) 10/03/2017    HCT 36.4 (L) 10/03/2017    MCV 88 10/03/2017     10/03/2017       Recent Labs  Lab 10/03/17  0443   GLU 82      K 3.1*      CO2 26   BUN 20   CREATININE 1.1   CALCIUM 8.9   MG 1.9     Lab Results    Component Value Date    INR 1.3 (H) 03/28/2017    INR 1.1 02/11/2016    INR 1.0 10/09/2015     Lab Results   Component Value Date    HGBA1C 5.5 02/20/2017     Recent Labs      10/02/17   1248   POCTGLUCOSE  94         Scheduled Meds:   atorvastatin  40 mg Oral QHS    azelastine  1 spray Nasal BID    clopidogrel  75 mg Oral Daily    duloxetine  30 mg Oral Daily    enoxaparin  40 mg Subcutaneous Daily    furosemide  80 mg Intravenous BID    metoprolol succinate  25 mg Oral Daily    tamsulosin  0.4 mg Oral Daily     Continuous Infusions:   PRN Meds:acetaminophen, dextrose 50%, dextrose 50%, glucagon (human recombinant), glucose, glucose, ramelteon, senna-docusate 8.6-50 mg     Echo 09/21/2017  CONCLUSIONS     1 - Eccentric hypertrophy.     2 - Severely depressed left ventricular systolic function (EF 15-20%).     3 - Right ventricular enlargement with mildly to moderately depressed systolic function.     4 - Restrictive LV filling pattern, indicating markedly elevated LAP (grade 3 diastolic dysfunction).     5 - Biatrial enlargement.     6 - Mild to moderate mitral regurgitation.     7 - Mild tricuspid regurgitation.     8 - Severe aortic stenosis, NAMAN = 0.68 cm2, peak velocity = 3.5 m/s, mean gradient = 30 mmHg.     9 - Pulmonary hypertension. The estimated PA systolic pressure is 60 mmHg.     10 - Mildly elevated central venous pressure.      EKG: sinus tahcycardia,  RBBB , LVH - no change from previous     ASSESSMENT     72 y.o M w/ Acute on chronic exacerbation of CHF; resolving.    PLAN:     1) Acute on Chronic CHFwDEF (ACC Class C; NYHA Class II-III [currently])   - Continue Lasix 80mg BID in hospital; transition to 80mg PO qd as outpatient   - Continue metoprolol succinate 25mg; increase to 50mg in 2-3 days when acute decompensation resolves(when euvolemic)/as outpatient   - Add Lisinopril 2.5mg with intent to titrate to highest dose tolerated   - Consider adding Spironolactone 25mg PO daily in the  future, after increase in metoprolol is tolerated    - Indicated for CHF as adjunctive Tx    - Will increase K+ stores as K+-sparing diuretic (currently 3.1)   - Daily standing weights   - Patient education regarding daily weights; provide scale linked to [address] [employer]   - Replenish K+ (currently 3.1) via PO supplementation until K+ is b/w 3.9-4.1.   - Add ASA 81mg   - Trial of walking with PT to assess NYHA classification     2) RCC - R-kidney   - Continue f/u with Dr. Valdez    3) Severe Aortic Stenosis   - Not TAVR candidate 2/2 Stage IV RCC   - No acute interventions indicated at this time.    4) DM 2 w/ superimposed CKD 2   - POCT QAC/QHS      Signing Medical Student:  Wayne Self MS4

## 2017-10-03 NOTE — PROGRESS NOTES
Patient with multiple co-morbidities including metastatic RCC, recent Opdivo treatment + radiation. At this time, not an appropriate candidate for DMHFP.    Removed from hf list.

## 2017-10-03 NOTE — PLAN OF CARE
Problem: Patient Care Overview  Goal: Plan of Care Review  Outcome: Ongoing (interventions implemented as appropriate)  Address all questions and concerns throughout shift. Pt remained free from injury and further skin breakdown.  Pt did not require any insulin coverage today.  Pt continue on lasix bid iv.  Care plan updated w/ pt.  Pt is not a TAVR candidate, Can palliative BAV but in the background of RCC .

## 2017-10-03 NOTE — ASSESSMENT & PLAN NOTE
-Continue 80 lasix IV BID    -strict I/o's   -daily standing weights   -Goal diuresis roughly 2L a day  -continue aspirin and metoprolol.   -add lisinopril 2.5mg - restart this evening.

## 2017-10-03 NOTE — HPI
71 y/o man with PMHx of RCC, currently being treated by Dr. Valdez, CAD s/p NSTEMI, severe AS (NAMAN = 0.68 cm2, peak velocity = 3.5 m/s, mean gradient = 30 mmHg), and an EF of 15% who presents with SOB, MAHARAJ,  and leg swelling x 1 week. He states his dry ludwin is 150-154. He states he has gained up to 10 lbs in the last 3 weeks. He reports orthopnea and increased cough.  He has been complaint with his lasix 40mg qd and has intermittently taken extra doses but without relief in his symptoms. He was denies any chest pain, palpations, PND, fevers, chills.

## 2017-10-03 NOTE — PLAN OF CARE
Sent to hospital per oncologist, DR Valdez  No   Uses dme. See below  Has ride home     10/03/17 1153   Discharge Assessment   Assessment Type Discharge Planning Assessment   Confirmed/corrected address and phone number on facesheet? Yes   Assessment information obtained from? Patient;Caregiver   Expected Length of Stay (days) 3   Communicated expected length of stay with patient/caregiver yes   Prior to hospitilization cognitive status: Alert/Oriented;No Deficits   Prior to hospitalization functional status: Assistive Equipment;Independent   Current cognitive status: Alert/Oriented;No Deficits   Current Functional Status: Independent;Assistive Equipment   Lives With spouse   Able to Return to Prior Arrangements yes   Is patient able to care for self after discharge? Yes   Who are your caregiver(s) and their phone number(s)? wife will drive pt home when needed. wife number is [phone]   Patient's perception of discharge disposition home or selfcare   Readmission Within The Last 30 Days no previous admission in last 30 days   Patient currently being followed by outpatient case management? No   Patient currently receives any other outside agency services? No   Equipment Currently Used at Home cane, straight;rollator   Do you have any problems affording any of your prescribed medications? No   Is the patient taking medications as prescribed? yes   Does the patient have transportation home? Yes   Transportation Available family or friend will provide   Does the patient receive services at the [facility]mad[facility] Clinic? No  (plavix)   Discharge Plan A Home with family   Discharge Plan B Home with family   Patient/Family In Agreement With Plan yes

## 2017-10-03 NOTE — HOSPITAL COURSE
Patient admitted for treatment of acute on chronic systolic heart failure exacerbation, he was initially started on 80mg IV lasix on night of admission and is continued on lasix 80mg IV BID at this time.  Cardiology consults following for acute management.     It was recommended to continue 80mg iv lasix until patient euvolemic, his weight has reduced 7 lbs since admission on 10/02.  Cardiology recommended re-starting ace-inhibitor lisinopril @ 2.5mg daily.  Pt reports he was just taken off of this due to hypotension, discussed with cardiology and they would prefer pt resume some dosage of ace-inhibitor and this will be continued on discharge.     On 10/04 patient was seen and examined, his JVD is improved and JVP noted, but less distended, lungs remain CTA bilaterally.  He was requiring some supplemental nasal cannula oxygen for sats on room air in 88-90 range, but a repeat home oxygen assessment on day of discharge showed patient with o2 saturations >90% at rest and ambulatory.   PT/OT evalauted patient and did not recommend any further therapy services on discharge

## 2017-10-03 NOTE — HPI
Mr Billy is a 73 yo male with hx of severe aortic stenosis, CAD s/p NSTEMI 10/2015, RCC with bone vaughn, DM2 with CKD2 and nephropathy, was referred to the ED by Dr. Valdez (heme-onc) for shortness of breath.    Cardiology was consulted for CHF exacerbation in the setting of severely decreased EF 15% (previous 40%) and severe aortic stenosis.     He endorses worsening SOB, MAHARAJ, orthopnea, and increase in weight by 10 lbs (baseline dry weight is 150). He is compliant with home lasix 40mg and intermittently uses an extra dose when he isn't putting out enough fluid. He has been evaluated for TAVR in the past, and was determined that he is not a TAVR candidate, but palliative BAV may be an option.     Cardiac hx: HFrEF 15%, diastolic dysfunction, known ischemic cardiomyopathy, severe Aortic stenosis, baseline BNP 2-500s, baseline Cr 1.1. He had an NSTEMI in 10/2015  s/p LM BMS x 1, mRCA BMS x 2, and dRCA BMS x 1, patent stents [facility] 4/2017.     Dr Valdez- Heme onc  He does not have a outpatient cardiologist.

## 2017-10-03 NOTE — PROGRESS NOTES
Pt admitted to CSU via transport by stretcher. Telemetry monitor applied. O2 NC applied. Pt is not in any distress. No compliants. VSS. Assessment completed. Will continue to monitor.

## 2017-10-03 NOTE — ASSESSMENT & PLAN NOTE
-Recommend to continue diuresis until euvolemic.   -Once euvolemic can discharge with 80mg po lasix once a day, and instruct him to take it BID if it is not effective  -Recommend starting lisonopril 2.5mg daily.   -Recommend restarting home aspirin dose  -Once he is euvolemic, recommend increasing beta blocker Toprol to 50mg daily, with goal HR >60.   -If BP > 80/50 and if not symptomatic, continue current treatment. If he does become symptomatic hypotension, would hold the ACE-I rather than Beta blocker initially.   -Goal for diuresis is about 2L /day with goal net negative 1.5 L.   -Strict I/Os, standing weights.   -Keep Mg >2, K >4

## 2017-10-03 NOTE — ASSESSMENT & PLAN NOTE
No acute intervention at this time   Interventional has seen pt prior -  he is not a TAVR candidate with his Stage IV RCC though palliative BAV may be an option for him if he has recurrent symptomatic AS.  Continue outpt f/u

## 2017-10-04 PROBLEM — I50.43 ACUTE ON CHRONIC COMBINED SYSTOLIC AND DIASTOLIC CONGESTIVE HEART FAILURE: Status: ACTIVE | Noted: 2017-01-01

## 2017-10-04 PROBLEM — I50.43 ACUTE ON CHRONIC COMBINED SYSTOLIC AND DIASTOLIC CONGESTIVE HEART FAILURE: Status: RESOLVED | Noted: 2017-01-01 | Resolved: 2017-01-01

## 2017-10-04 NOTE — PHYSICIAN QUERY
"PT Name: Lex Billy  MR #: [id number]    Physician Query Form - Heart  Condition Clarification     CDS/: Juanita Montgomery RN, CCDS               Contact information:  [email or web address]  This form is a permanent document in the medical record.     Query Date: October 4, 2017    By submitting this query, we are merely seeking further clarification of documentation. Please utilize your independent clinical judgment when addressing the question(s) below.    The medical record contains the following   Indicators     Supporting Clinical Findings Location in Medical Record   X BNP Lab 10/02/17  1303   BNP 2,455*      H & P 10/02    EF     X Radiology findings CXR: 10/2/17  Impression     Left pleural effusion Cardiology consult 10/02   X Echo Results Echo 09/21/2017  Conclusions     1 - Eccentric hypertrophy.     2 - Severely depressed left ventricular systolic function (EF 15-20%).     3 - Right ventricular enlargement with mildly to moderately depressed systolic function.     4 - Restrictive LV filling pattern, indicating markedly elevated LAP (grade 3 diastolic dysfunction).     5 - Biatrial enlargement.  H & P 10/02    "Ascites" documented     X "SOB" or "MAHARAJ" documented presents with SOB, MAHARAJ H & P 10/02    "Hypoxia" documented     X Heart Failure documented Past medical history  Chronic combined systolic and diastolic heart failure    Acute on chronic congestive heart failure    ADHF - Combined systolic and diastolic dysfunction.     here for HFrEF of 15%    Acute on chronic systolic congestive heart failure H & P 10/02      H & P 10/02    Cardiology consult 10/02    Cardiology progress note 10/03    Hospital Medicine progress note 10/03   X "Edema" documented He exhibits edema (2+ to the knees ).  H & P 10/02   X Diuretics/Meds -80 lasix IB BID    -continue asa, metoprolol 25xl     -Recommend to continue diuresis until euvolemic.     -Continue 80 lasix IV BID    -continue aspirin and metoprolol.   -add " lisinopril 2.5mg - restart this evening.  H & P 10/02      Cardiology progress note 10/03    Hospital Medicine progress note 10/03    Treatment:     X Other:  BNP elevated. Pt up ~ 10lbs in the last 3 weeks.  -strict I/o's   -daily standing weights   - Monitor BP closely, would avoid further diuresis if SBP < 85  - Goal diuresis roughly 2L a day  - will redose lasix per cardiology recs as pt is not net negative at least 1.5L of fluid and BP is > 90     Severe aortic stenosis    Clinical evidence of volume overload. BNP grossly elevated. Warm and wet exacerbation.     here for HFrEF of 15% which is a significant decline in function compared to previous assessments. H & P 10/02                H & P 10/02    Cardiology consult 10/02      Cardiology progress note 10/03       Provider, please specify diagnosis or diagnoses associated with above clinical findings.                                 [  ] Acute on Chronic Systolic Heart Failure ( EF < 40)*  [ X ] Acute on Chronic Combined Systolic and Diastolic Heart Failure  [  ] Other Type of Heart Failure (please specify type): _________________________  [  ] Heart Failure Ruled Out  [  ] Other (please specify): ___________________________________  [  ] Clinically Undetermined            *American Heart Association                                                                                                          Please document in your progress notes daily for the duration of treatment until resolved and include in your discharge summary.

## 2017-10-04 NOTE — PLAN OF CARE
Problem: Occupational Therapy Goal  Goal: Occupational Therapy Goal  N/A-OT not indicated  Outcome: Outcome(s) achieved Date Met: 10/04/17  Eval completed; OT not indicated.  MINDY Lomeli  10/4/2017  [phone]

## 2017-10-04 NOTE — PLAN OF CARE
Future Appointments  Date Time Provider Department Center   10/17/2017 1:00 PM Tien Jade MD [facility] CARDIO [facility]     First avail cards f/u appt made

## 2017-10-04 NOTE — PT/OT/SLP EVAL
Occupational Therapy  Evaluation/Co-Evaluation with Physical Therapy    Lex Billy   MRN: [id number]   Admitting Diagnosis: Acute on chronic systolic congestive heart failure    OT Date of Treatment: 10/04/17   OT Start Time: 0955  OT Stop Time: 1015  OT Total Time (min): 20 min    Billable Minutes:  Evaluation 20    Diagnosis: Acute on chronic systolic congestive heart failure   Pt admitted with SBA and LE edema 2' acute on chronic CHF, severe aortic stenosis, CKD 2    Past Medical History:   Diagnosis Date    Aortic atherosclerosis 10/20/2016    Benign non-nodular prostatic hyperplasia with lower urinary tract symptoms 3/29/2017    Chronic combined systolic and diastolic heart failure 3/29/2017    4-10-17   1 - Mildly to moderately depressed left ventricular systolic function (EF 40-45%).    2 - Normal right ventricular systolic function .    3 - Left ventricular diastolic dysfunction.    4 - Mild mitral regurgitation.    5 - Mild aortic regurgitation.    6 - Severe aortic stenosis, NAMAN = 0.72 cm2, peak velocity = 4.3 m/s, mean gradient = 43.0 mmHg.    7 - Increased central venous pressure.    Chronic retention of urine 4/3/2017    Patient does self cath at home.   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    Severe aortic stenosis 1/31/2016    --- Repeated 2D ECHO showed EF 40% with severe aortic stenosis, relatively unchanged --- Not a TAVR  "candidate due to RCC  --- moderate AS with regards to mean gradient of 34 mmHg, despite NAMAN < 1 cm2 --- IC recs of not a surgical candidate  --- EF reduction from 50 % to 40 %    Stroke     right eye    Type 2 diabetes mellitus with diabetic polyneuropathy, without long-term current use of insulin 10/20/2016    Type 2 diabetes mellitus with stage 2 chronic kidney disease, without long-term current use of insulin 8/6/2012      Past Surgical History:   Procedure Laterality Date    BACK SURGERY      cervical disc replacement    CARPAL TUNNEL RELEASE      left    CORONARY ANGIOPLASTY WITH STENT PLACEMENT  10/2015    4 stents    EYE SURGERY      laser surgery in right eye    JOINT REPLACEMENT      orthoscopic surgery on knee      ROTATOR CUFF REPAIR      right side    SINUS SURGERY      TONSILLECTOMY      torn ligament      repair. left shoulder    TOTAL KNEE ARTHROPLASTY      left side    VASECTOMY         Referring physician: Dr. Michael Pozo  Date referred to OT: 10/3/2017    General Precautions: Standard,  (standard)  Orthopedic Precautions: N/A  Braces: N/A    Do you have any cultural, spiritual, Jehovah's witness conflicts, given your current situation?: no issues     Patient History:  Living Environment  Lives With:  (Pt lives with wife in 1 story house in [place] with 0 EWA; tub/shower.  Owns a shower chair but does not like to use.  Grab bar in tub.)  Equipment Currently Used at Home: shower chair, rollator, cane, straight    Prior level of function:   Bed Mobility/Transfers: needs device (Uses rollator in the house, no AD out of the house; "Outside its a clean shot, inside, there is this table and that chair")  Grooming: independent  Bathing: independent (Wife supervises t/f in and out of tub for shower)  Upper Body Dressing: independent  Lower Body Dressing: independent  Toileting: independent  IADL Comments: Pt drives, watches TV, piddles around the house, "they won't let me mow the lawn".  Pt " "does some of the cooking.     Dominant hand: right    Subjective:  Communicated with Rn prior to session.  No issues  Chief Complaint: "I'm ready to get out of here"  Patient/Family stated goals: Pt feels he will be able to manage himself with no issues    Pain/Comfort  Pain Rating 1: 0/10    Objective:       Cognitive Exam:  Oriented to: Person, Place, Time and Situation  Follows Commands/attention: Follows one-step commands  Communication: clear/fluent  Memory:  No Deficits noted  Safety awareness/insight to disability: intact  Coping skills/emotional control: Appropriate to situation    Visual/perceptual:  Intact    Physical Exam:  Postural examination/scapula alignment: Rounded shoulder and Head forward  Skin integrity: Visible skin intact  Edema: None noted     Sensation:   Intact    Upper Extremity Range of Motion:  Right Upper Extremity: A/PROM WNL  Left Upper Extremity: A/PROM WNL    Upper Extremity Strength:  Right Upper Extremity: 4/5  Left Upper Extremity: 4/5   Strength: 4/5    Fine motor coordination:   Intact    Gross motor coordination: WFL    Functional Mobility:  Bed Mobility: NT; Pt on eob       Transfers:  Sit <> Stand Assistance: Modified Independent  Sit <> Stand Assistive Device: No Assistive Device  Bed <> Chair Technique: Stand Pivot  Bed <> Chair Transfer Assistance: Modified Independent  Bed <> Chair Assistive Device: No Assistive Device    Functional Ambulation: Ambulate in room and hallway with supervision; good pacing. No LOB.    Activities of Daily Living:  Feeding Level of Assistance: Modified independent    UE Dressing Level of Assistance: Modified independent (don gown like robe')    LE Dressing Level of Assistance: Modified independent (don/doff slipper socks and slippers)       Grooming Level of Assistance:  (Mod indep based on motor/cog eval)  Toileting Where Assessed: Toilet  Toileting Level of Assistance:  (Pt urinated without assist but missed the toilet)        Balance: " "  Static Sit: GOOD+: Takes MAXIMAL challenges from all directions.    Dynamic Sit: GOOD+: Maintains balance through MAXIMAL excursions of active trunk motion  Static Stand: FAIR+: Takes MINIMAL challenges from all directions  Dynamic stand: GOOD: Needs SUPERVISION only during gait and able to self right with moderate         AM-PAC 6 CLICK ADL  How much help from another person does this patient currently need?  1 = Unable, Total/Dependent Assistance  2 = A lot, Maximum/Moderate Assistance  3 = A little, Minimum/Contact Guard/Supervision  4 = None, Modified [place]/Independent    Putting on and taking off regular lower body clothing? : 4  Bathing (including washing, rinsing, drying)?: 3  Toileting, which includes using toilet, bedpan, or urinal? : 4  Putting on and taking off regular upper body clothing?: 3  Taking care of personal grooming such as brushing teeth?: 4  Eating meals?: 4  Total Score: 22    AM-PAC Raw Score CMS "G-Code Modifier Level of Impairment Assistance   6 % Total / Unable   7 - 9 CM 80 - 100% Maximal Assist   10-14 CL 60 - 80% Moderate Assist   15 - 19 CK 40 - 60% Moderate Assist   20 - 22 CJ 20 - 40% Minimal Assist   23 CI 1-20% SBA / CGA   24 CH 0% Independent/ Mod I       Patient left on eob with all lines intact, call button in reach and son present    Assessment:  Lex Billy is a 72 y.o. male with a medical diagnosis of Acute on chronic systolic congestive heart failure.  The pt is currently at mod indep level self-care and in need of no further OT services.  Use of shower chair recommended to pt but he states he has used in past and does not like them.    Rehab identified problem list/impairments: Rehab identified problem list/impairments: impaired endurance    Rehab potential is n/a.    Activity tolerance: Good    Discharge recommendations: Discharge Facility/Level Of Care Needs: home (f/u OT not indicated at this time)     Barriers to discharge: Barriers to " Discharge: None    Equipment recommendations: none     GOALS:    Occupational Therapy Goals     Not on file          Multidisciplinary Problems (Resolved)        Problem: Occupational Therapy Goal    Goal Priority Disciplines Outcome Interventions   Occupational Therapy Goal   (Resolved)     OT, PT/OT Outcome(s) achieved    Description:  N/A-OT not indicated                    PLAN:  Discharge from OT.  Plan of Care reviewed with: patient         MINDY Chan  10/04/2017

## 2017-10-04 NOTE — DISCHARGE INSTRUCTIONS
1.  For your lasix, we have increased the dosage to 80mg by mouth daily.  If you gain more than 2-3 pounds in 1 day or feel increased shortness of breath, difficulty or shortness of breath while lying flat, increase your dosage to 80mg by mouth twice a day for 3 days.  If you do not notice a decrease in your weight by 2-3lb or symptoms persist, please contact your Primary Care doctor or cardiologist.     2. If you decide to take lasix more than once a day, please take an extra pill of your potassium supplement.     3. I have sent a Lasix Prescription both to [facility] and to your [facility] mail order pharmacies.   I have sent a Lisinopril Prescription to [facility], please have your cardiologist determine further dose strength to have this ordered through [facility].

## 2017-10-04 NOTE — DISCHARGE SUMMARY
[facility] Hospital Medicine  Discharge Summary      Patient Name: Lex Billy  MRN: [id number]  Admission Date: 10/2/2017  Hospital Length of Stay: 2 days  Discharge Date and Time:  10/04/2017 12:15 PM  Attending Physician: Sj Pozo MD   Discharging Provider: Sj Pozo MD  Primary Care Provider: Everette Rowan MD, MD  Hospital Medicine Team: [facility] MED L Sj Pozo MD    HPI:   73 y/o man with PMHx of RCC, currently being treated by Dr. Valdez, CAD s/p NSTEMI, severe AS (NAMAN = 0.68 cm2, peak velocity = 3.5 m/s, mean gradient = 30 mmHg), and an EF of 15% who presents with SOB, MAHARAJ,  and leg swelling x 1 week. He states his dry ludwin is 150-154. He states he has gained up to 10 lbs in the last 3 weeks. He reports orthopnea and increased cough.  He has been complaint with his lasix 40mg qd and has intermittently taken extra doses but without relief in his symptoms. He was denies any chest pain, palpations, PND, fevers, chills.     * No surgery found *      Indwelling Lines/Drains at time of discharge:   Lines/Drains/Airways          No matching active lines, drains, or airways        Hospital Course:   Patient admitted for treatment of acute on chronic systolic heart failure exacerbation, he was initially started on 80mg IV lasix on night of admission and is continued on lasix 80mg IV BID at this time.  Cardiology consults following for acute management.     It was recommended to continue 80mg iv lasix until patient euvolemic, his weight has reduced 7 lbs since admission on 10/02.  Cardiology recommended re-starting ace-inhibitor lisinopril @ 2.5mg daily.  Pt reports he was just taken off of this due to hypotension, discussed with cardiology and they would prefer pt resume some dosage of ace-inhibitor and this will be continued on discharge.     On 10/04 patient was seen and examined, his JVD is improved and JVP noted, but less distended, lungs remain CTA bilaterally.  He  was requiring some supplemental nasal cannula oxygen for sats on room air in 88-90 range, but a repeat home oxygen assessment on day of discharge showed patient with o2 saturations >90% at rest and ambulatory.   PT/OT evalauted patient and did not recommend any further therapy services on discharge    Of note patient stated he was not taking  cymbalta on discharge med rec and this was discontinued.     Consults:   Consults         Status Ordering Provider     Inpatient consult to Cardiology  Once     Provider:  (Not yet assigned)    Completed OKSANA WOODY     Inpatient consult to Hematology/Oncology  Once     Provider:  (Not yet assigned)    Completed OKSANA WOODY          Significant Diagnostic Studies: Labs:   BMP:   Recent Labs  Lab 10/02/17  1303 10/03/17  0443 10/04/17  0544    82 117*    141 138   K 4.6 3.1* 3.5    103 103   CO2 21* 26 25   BUN 19 20 22   CREATININE 1.1 1.1 1.2   CALCIUM 9.0 8.9 8.7   MG  --  1.9 2.1   , CMP   Recent Labs  Lab 10/02/17  1303 10/03/17  0443 10/04/17  0544    141 138   K 4.6 3.1* 3.5    103 103   CO2 21* 26 25    82 117*   BUN 19 20 22   CREATININE 1.1 1.1 1.2   CALCIUM 9.0 8.9 8.7   PROT 7.7 6.9 6.7   ALBUMIN 2.9* 2.9* 2.9*   BILITOT 1.1* 1.5* 1.2*   ALKPHOS 123 120 117   AST 42* 25 23   ALT 25 21 18   ANIONGAP 11 12 10   ESTGFRAFRICA >60.0 >60.0 >60.0   EGFRNONAA >60.0 >60.0 >60.0   , CBC   Recent Labs  Lab 10/02/17  1303 10/03/17  0443 10/04/17  0544   WBC 5.33 6.07 6.28   HGB 11.8* 11.7* 11.3*   HCT 37.2* 36.4* 35.9*    178 162    and Troponin   Recent Labs  Lab 10/02/17  1610   TROPONINI 0.043*     Cardiac Graphics: Echocardiogram:   2D echo with color flow doppler:   Results for orders placed or performed during the hospital encounter of 09/21/17   Echo doppler color flow velocity map   Result Value Ref Range    EF 15 (A) 55 - 65    Mitral Valve Regurgitation MILD TO MODERATE     Diastolic Dysfunction Yes (A)      Aortic Valve Regurgitation TRIVIAL     Aortic Valve Stenosis SEVERE (A)     Est. PA Systolic Pressure 60.13 (A)     Pericardial Effusion TRIVIAL     Mitral Valve Mobility NORMAL     Tricuspid Valve Regurgitation MILD        Pending Diagnostic Studies:     None        Final Active Diagnoses:    Diagnosis Date Noted POA    PRINCIPAL PROBLEM:  Chronic combined systolic and diastolic heart failure [I50.42] 03/29/2017 Yes     Chronic    Severe aortic stenosis [I35.0] 01/31/2016 Yes    Type 2 diabetes mellitus with stage 2 chronic kidney disease, without long-term current use of insulin [E11.22, N18.2] 08/06/2012 Yes    Renal cell carcinoma of right kidney [C64.1] 02/11/2016 Yes      Problems Resolved During this Admission:    Diagnosis Date Noted Date Resolved POA    Acute on chronic combined systolic and diastolic congestive heart failure [I50.43] 10/02/2017 10/04/2017 Yes      * Chronic combined systolic and diastolic heart failure              Acute on chronic combined systolic and diastolic congestive heart failure-resolved as of 10/4/2017    -Continue 80 lasix IV BID    -strict I/o's   -daily standing weights   -Goal diuresis roughly 2L a day  -continue aspirin and metoprolol.   -add lisinopril 2.5mg - restart this evening.             Discharged Condition: stable    Disposition: Home or Self Care    Follow Up:  Follow-up Information     Tien Jade MD. Go on 10/17/2017.    Specialty:  Cardiovascular Disease  Why:  For hospital follow-up,   Contact information:  Jose ELKINS AKIKO  [facility] [id number] [phone]                 Patient Instructions:     Diet Diabetic 2200 Calories     Diet Low Sodium, 2gm     Activity as tolerated     Call MD for:  persistent nausea and vomiting or diarrhea     Call MD for:  temperature >100.4     Call MD for:  difficulty breathing or increased cough     Call MD for:  severe persistent headache     Call MD for:  persistent dizziness, light-headedness, or visual  disturbances     Call MD for:  increased confusion or weakness       Medications:  Reconciled Home Medications:   Current Discharge Medication List      START taking these medications    Details   lisinopril (PRINIVIL,ZESTRIL) 2.5 MG tablet Take 1 tablet (2.5 mg total) by mouth once daily.  Qty: 30 tablet, Refills: 0         CONTINUE these medications which have CHANGED    Details   furosemide (LASIX) 80 MG tablet Take 1 tablet (80 mg total) by mouth once daily.  Qty: 90 tablet, Refills: 0         CONTINUE these medications which have NOT CHANGED    Details   aspirin (ECOTRIN) 81 MG EC tablet Take 81 mg by mouth once daily.      atorvastatin (LIPITOR) 40 MG tablet TAKE 1 TABLET ONE TIME DAILY  Qty: 90 tablet, Refills: 3    Associated Diagnoses: Hyperlipidemia      azelastine (ASTELIN) 137 mcg (0.1 %) nasal spray 1 spray (137 mcg total) by Nasal route 2 (two) times daily.  Qty: 30 mL, Refills: 0      bethanechol (URECHOLINE) 50 MG tablet Take 1 tablet (50 mg total) by mouth 3 (three) times daily.  Qty: 90 tablet, Refills: 11    Associated Diagnoses: Urinary retention      blood sugar diagnostic Strp 1 each by Misc.(Non-Drug; Combo Route) route 3 (three) times daily. Free style freedom lite test strips and lancets  Qty: 90 each, Refills: 3      blood-glucose meter (TRUE METRIX AIR GLUCOSE METER) Misc Use as directed  Qty: 1 each, Refills: 0      brimonidine 0.1% (ALPHAGAN P) 0.1 % Drop Place 1 drop into both eyes 3 (three) times daily.      CALCIUM CARBONATE-VITAMIN D3 ORAL Take 1 tablet by mouth every morning. Calcium carbonate 1000mg vitamin d3 1600 units per patient      clopidogrel (PLAVIX) 75 mg tablet Take 1 tablet (75 mg total) by mouth once daily.  Qty: 90 tablet, Refills: 3      fexofenadine (ALLEGRA) 180 MG tablet TAKE 1 TABLET ONE TIME DAILY  Qty: 90 tablet, Refills: 3    Associated Diagnoses: Environmental allergies      gabapentin (NEURONTIN) 300 MG capsule       lancets 28 gauge Misc 1 lancet by  Misc.(Non-Drug; Combo Route) route 3 (three) times daily.  Qty: 300 each, Refills: 3      metoprolol succinate (TOPROL-XL) 25 MG 24 hr tablet Take 1 tablet (25 mg total) by mouth once daily.  Qty: 90 tablet, Refills: 3    Associated Diagnoses: Aortic stenosis, severe; Chronic systolic heart failure      ondansetron (ZOFRAN-ODT) 4 MG TbDL Take 1 tablet (4 mg total) by mouth every 4 to 6 hours as needed. Dissolve one tablet under tongue every 4-6 hours as needed for nausea.  Qty: 30 tablet, Refills: 1    Associated Diagnoses: Nausea and vomiting, intractability of vomiting not specified, unspecified vomiting type      potassium chloride SA (K-DUR,KLOR-CON) 20 MEQ tablet Take 1 tablet (20 mEq total) by mouth once daily.  Qty: 90 tablet, Refills: 4    Associated Diagnoses: Chronic combined systolic and diastolic heart failure      tamsulosin (FLOMAX) 0.4 mg Cp24 Take 1 capsule (0.4 mg total) by mouth once daily.  Qty: 30 capsule, Refills: 11    Associated Diagnoses: Urinary retention      brompheniramine-pseudoeph-DM 2-30-10 mg/5 mL Syrp       DOCUSATE CALCIUM (STOOL SOFTENER ORAL) Take by mouth once as needed (for constipation).      nitroGLYCERIN (NITROSTAT) 0.4 MG SL tablet Place 1 tablet (0.4 mg total) under the tongue every 5 (five) minutes as needed for Chest pain.  Qty: 25 tablet, Refills: 4         STOP taking these medications       duloxetine (CYMBALTA) 30 MG capsule Comments:   Reason for Stopping:         levoFLOXacin (LEVAQUIN) 750 MG tablet Comments:   Reason for Stopping:         cefUROXime (CEFTIN) 250 MG tablet Comments:   Reason for Stopping:             Time spent on the discharge of patient: 36 minutes      Sj Pozo MD  Department of Hospital Medicine  [facility]

## 2017-10-04 NOTE — PT/OT/SLP EVAL
Physical Therapy  Evaluation/Discharge    Lex Billy   MRN: [id number]   Admitting Diagnosis: Acute on chronic systolic congestive heart failure    PT Received On: 10/04/17  PT Start Time: 1000     PT Stop Time: 1014    PT Total Time (min): 14 min       Billable Minutes:  Evaluation 14 (co-eval with OT)    Diagnosis: Acute on chronic systolic congestive heart failure  Renal cell carcinoma, currently undergoing treatment     Past Medical History:   Diagnosis Date    Aortic atherosclerosis 10/20/2016    Benign non-nodular prostatic hyperplasia with lower urinary tract symptoms 3/29/2017    Chronic combined systolic and diastolic heart failure 3/29/2017    4-10-17   1 - Mildly to moderately depressed left ventricular systolic function (EF 40-45%).    2 - Normal right ventricular systolic function .    3 - Left ventricular diastolic dysfunction.    4 - Mild mitral regurgitation.    5 - Mild aortic regurgitation.    6 - Severe aortic stenosis, NAMAN = 0.72 cm2, peak velocity = 4.3 m/s, mean gradient = 43.0 mmHg.    7 - Increased central venous pressure.    Chronic retention of urine 4/3/2017    Patient does self cath at home.   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    Severe aortic stenosis 1/31/2016    --- Repeated 2D ECHO showed EF 40% with severe aortic stenosis, relatively unchanged --- Not a TAVR candidate due to RCC  --- moderate AS with regards to  mean gradient of 34 mmHg, despite NAMAN < 1 cm2 --- IC recs of not a surgical candidate  --- EF reduction from 50 % to 40 %    Stroke     right eye    Type 2 diabetes mellitus with diabetic polyneuropathy, without long-term current use of insulin 10/20/2016    Type 2 diabetes mellitus with stage 2 chronic kidney disease, without long-term current use of insulin 8/6/2012      Past Surgical History:   Procedure Laterality Date    BACK SURGERY      cervical disc replacement    CARPAL TUNNEL RELEASE      left    CORONARY ANGIOPLASTY WITH STENT PLACEMENT  10/2015    4 stents    EYE SURGERY      laser surgery in right eye    JOINT REPLACEMENT      orthoscopic surgery on knee      ROTATOR CUFF REPAIR      right side    SINUS SURGERY      TONSILLECTOMY      torn ligament      repair. left shoulder    TOTAL KNEE ARTHROPLASTY      left side    VASECTOMY         Referring physician: Sj Pozo MD  Date referred to PT: 10/3/17    General Precautions: Standard, fall  Orthopedic Precautions: N/A   Braces: N/A       Do you have any cultural, spiritual, Druze conflicts, given your current situation?: none noted     Patient History:  Lives With: spouse  Living Arrangements: house  Transportation Available: family or friend will provide  Living Environment Comment: Pt lives with his wife in a 1SH with 0 EWA. Pt reports that PTA he used rollator for household ambulation and was (I) with community mobility. Pt reports that he was (I) with ADLs PTA, but occasionally requires assist for transfers T/F tub. Pt's wife is able to assist upon d/c.    Equipment Currently Used at Home: cane, straight, rollator, grab bar, shower chair (not using shower chair or SPC PTA)    Previous Level of Function:  Ambulation Skills: needs device (rollator for household ambulation)  Transfer Skills: independent  ADL Skills: independent    Subjective:  Communicated with RN prior to session.  Pt agreeable to therapy. Mentioned several  "times during eval that he is anxiously awaiting d/c home and is hopeful that he will be discharged today.   Chief Complaint: none noted   Patient goals: return home today    Pain/Comfort  Pain Rating 1: 0/10      Objective:   Patient found with: telemetry     Cognitive Exam:  Oriented to: Person, Place, Time and Situation    Follows Commands/attention: Follows two-step commands  Communication: clear/fluent  Safety awareness/insight to disability: intact    Physical Exam:  Postural examination/scapula alignment: Rounded shoulder    Skin integrity: Visible skin intact  Edema: None noted     Sensation:   Intact; however, pt mentioned that he feels "pressure" in B feet distal to met-heads; noted to be cold to touch; reports MDs are aware    Lower Extremity Range of Motion:  Right Lower Extremity: WFL  Left Lower Extremity: WFL    Lower Extremity Strength:  Right Lower Extremity: WFL  Left Lower Extremity: WFL    Functional Mobility:  Bed Mobility:  Supine to Sit:  (not performed)    Transfers:  Sit <> Stand Assistance: Independent  Sit <> Stand Assistive Device: No Assistive Device    Gait:   Gait Distance: ~200 ft.   Assistance 1: Supervision  Gait Assistive Device: No device  Gait Pattern: 2-point gait  Gait Deviation(s): decreased roxane, decreased step length    Balance:   Static Sit: GOOD+: Takes MAXIMAL challenges from all directions.    Dynamic Sit: GOOD+: Maintains balance through MAXIMAL excursions of active trunk motion  Static Stand: GOOD: Takes MODERATE challenges from all directions  Dynamic stand: GOOD: Needs SUPERVISION only during gait and able to self right with moderate     Therapeutic Activities and Exercises:  Pt educated on role of PT and PT POC including plan to d/c IP PT services at this time. Pt verbalized understanding.     AM-PAC 6 CLICK MOBILITY  How much help from another person does this patient currently need?   1 = Unable, Total/Dependent Assistance  2 = A lot, Maximum/Moderate " Assistance  3 = A little, Minimum/Contact Guard/Supervision  4 = None, Modified [place]/Independent    Turning over in bed (including adjusting bedclothes, sheets and blankets)?: 4  Sitting down on and standing up from a chair with arms (e.g., wheelchair, bedside commode, etc.): 4  Moving from lying on back to sitting on the side of the bed?: 4  Moving to and from a bed to a chair (including a wheelchair)?: 4  Need to walk in hospital room?: 4  Climbing 3-5 steps with a railing?: 3  Total Score: 23     AM-PAC Raw Score CMS G-Code Modifier Level of Impairment Assistance   6 % Total / Unable   7 - 9 CM 80 - 100% Maximal Assist   10 - 14 CL 60 - 80% Moderate Assist   15 - 19 CK 40 - 60% Moderate Assist   20 - 22 CJ 20 - 40% Minimal Assist   23 CI 1-20% SBA / CGA   24 CH 0% Independent/ Mod I     Patient left seated EOB with all lines intact, call button in reach and pt's son present.    Assessment:   Lex Billy is a 72 y.o. male with a medical diagnosis of Acute on chronic systolic congestive heart failure and presents with renal cell carcinoma, currently undergoing treatment. Pt was able to perform all functional mobility without physical assist or use of DME. BLE strength and ROM WFL. Ambulated without c/o SOB. Pt has no skilled IP PT needs at this time. Thus, pt is d/c from IP PT services. Please re-consult as needed.      Rehab identified problem list/impairments: Rehab identified problem list/impairments: impaired cardiopulmonary response to activity    Rehab potential is good.    Activity tolerance: Good    Discharge recommendations: Discharge Facility/Level Of Care Needs: home     Barriers to discharge: Barriers to Discharge: None    Equipment recommendations: Equipment Needed After Discharge: none     GOALS:    Physical Therapy Goals     Not on file          Multidisciplinary Problems (Resolved)        Problem: Physical Therapy Goal    Goal Priority Disciplines Outcome Goal Variances  Interventions   Physical Therapy Goal   (Resolved)     PT/OT, PT Outcome(s) achieved                     PLAN:    Patient d/c from IP PT services as he is baseline with mobility and has no acute PT needs at this time.  Plan of Care reviewed with: patient        Marga Sierraard, PT, DPT   10/4/2017  [phone]

## 2017-10-05 NOTE — PLAN OF CARE
Future Appointments  Date Time Provider Department Center   10/17/2017 1:00 PM Tien Jade MD [facility]     Pt dc yesterday but CM made appt prior to leaving.     10/05/17 1406   Final Note   Assessment Type Final Discharge Note   Discharge Disposition Home   Hospital Follow Up  Appt(s) scheduled? Yes   Discharge plans and expectations educations in teach back method with documentation complete? Yes   Right Care Referral Info   Post Acute Recommendation No Care

## 2017-10-13 NOTE — TELEPHONE ENCOUNTER
----- Message from Laura Valencia MA sent at 10/13/2017  4:18 PM CDT -----  Contact: self  Pt. is calling about being sob for the last 2 days, is now sob. Has a wt.gain of 4 lbs.,is on Lasix and also  his feet is a little swollen. Please call [phone].   pt. Last visit in Aug.2017

## 2017-10-13 NOTE — TELEPHONE ENCOUNTER
Spoke with the pt at the time of his call. Says that he has gained 4 lbs over the last two days, and is sob. Says has been sleeping on two pillows and has the head of the bed propped up also. Says no swelling of his lower  extremities. Says weight day before yesterday was 160, yesterday was 165, and today 165. Says BP today 99/71 and .  Says that he increased his lasix to 80 mg twice today on his own - med list has 80 mg daily.  Says that he stopped lisinopril 2.5 mg on 8-15-17 but it was restarted by the ED MD, and he has decreased metoprolol succinate from 50 mg to 25 mg daily due to hypotension.  Spoke with Dr. Jade - says he will call the pt.

## 2017-10-13 NOTE — TELEPHONE ENCOUNTER
"    Reason for Disposition   [1] MODERATE difficulty breathing (e.g., speaks in phrases, SOB even at rest, pulse 100-120) AND [2] NEW-onset or WORSE than normal     SOB worse than normal, per Lex.  He said he has taken one extra lasix 80 mg as he was instructed to do the last time he was in the hospital for SOB (10/04/17), with some response, but he is still SOB, has left foot swelling (with red, tight skin), and he cannot lay down and breathe; his head of bed must be raised and several pillows are used.  He has one kidney. He had gained about 5 pounds in the last few days, but no additional weight today.  He said he is waiting for a call from his cardiologist, Dr Jade, who is aware of the current situation and has said he will call (per chart note).  Recommended ED now for Lex.  He is unsure if he will go tonight; wants to wait to hear from Dr Jade.  He will go if he worsens, he said.  Message to Dr Jade and Dr Rowan, pcp. Please contact caller directly with any additional care advice.    Answer Assessment - Initial Assessment Questions  1. RESPIRATORY STATUS: "Describe your breathing?" (e.g., wheezing, shortness of breath, unable to speak, severe coughing)       Shortness of breath.    2. ONSET: "When did this breathing problem begin?"       Tuesday.    3. PATTERN "Does the difficult breathing come and go, or has it been constant since it started?"       It has been constant.    4. SEVERITY: "How bad is your breathing?" (e.g., mild, moderate, severe)     - MILD: No SOB at rest, mild SOB with walking, speaks normally in sentences, can lay down, no retractions, pulse < 100.     - MODERATE: SOB at rest, SOB with minimal exertion and prefers to sit, cannot lie down flat, speaks in phrases, mild retractions, audible wheezing, pulse 100-120.     - SEVERE: Very SOB at rest, speaks in single words, struggling to breathe, sitting hunched forward, retractions, pulse > 120       Moderate shortness of breath, " "and sometimes it requires sitting hunched forward.    5. RECURRENT SYMPTOM: "Have you had difficulty breathing before?" If so, ask: "When was the last time?" and "What happened that time?"       Yes.  Just in hospital for same problem, 10/04/17.    6. CARDIAC HISTORY: "Do you have any history of heart disease?" (e.g., heart attack, angina, bypass surgery, angioplasty)       History of heart disease and renal cell cancer to the bone.    7. LUNG HISTORY: "Do you have any history of lung disease?"  (e.g., pulmonary embolus, asthma, emphysema)      No history.    8. CAUSE: "What do you think is causing the breathing problem?"       I think it is my aortic stenosis.    9. OTHER SYMPTOMS: "Do you have any other symptoms? (e.g., dizziness, runny nose, cough, chest pain, fever)      Runny nose.  Cough at times, when I try to lay down.  No chest pain, no fever.    10. PREGNANCY: "Is there any chance you are pregnant?" "When was your last menstrual period?"        N/a    11. TRAVEL: "Have you traveled out of the country in the last month?" (e.g., travel history, exposures)        No.    Protocols used: ST BREATHING DIFFICULTY-A-AH      "

## 2017-10-14 NOTE — TELEPHONE ENCOUNTER
Discussed recent changes in Mr. Billy's symptoms.  Reports 2-3 lb increase over 2 days with increased dyspnea on exertion and orthopnea. Was feeling mild SOB at rest earlier today but has since resolved. Feeling much better this evening, no SOB, MAHARAJ near baseline.    Started lasix 80 bid (from baseline 80 daily) yesterday as instructed on discharge. Plan for 3 day course of BID then resume prior dose. Advised he can restart BID dosing if his sx mildly worsen after stopping. If his sx worsen more significantly such as continued SOB at rest, advised to present to the Ed.    Has appt with me on 10/17. Will call in with any questions or if symptoms change.

## 2017-10-15 PROBLEM — I50.43 ACUTE ON CHRONIC COMBINED SYSTOLIC AND DIASTOLIC CHF, NYHA CLASS 4: Status: ACTIVE | Noted: 2017-01-01

## 2017-10-15 PROBLEM — I50.42 CHRONIC COMBINED SYSTOLIC AND DIASTOLIC HEART FAILURE: Status: ACTIVE | Noted: 2017-01-01

## 2017-10-15 PROBLEM — I50.9 ACUTE ON CHRONIC CONGESTIVE HEART FAILURE: Status: ACTIVE | Noted: 2017-01-01

## 2017-10-15 NOTE — ASSESSMENT & PLAN NOTE
Diet controlled at home  -Resume home gabapentin 300 mg po TID for DM neuropathy  -LD SSI  -qhs, qac, & 02:00 POCT glucose checks

## 2017-10-15 NOTE — HPI
Mr. 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  Patient reports that on Wednesday night he began experiencing difficulty sleeping 2/2 to orthopnea and would wake up short of breath if he tried laying down flat.    Since then his dyspnea has worsened despite dobling his home Lasix dose; at times he reported feeling short of breath at rest.  He has also had worseing LE edema since this Wednesday that improves with LE elevation.     He does not add any salt to his foods but reports he uses barbecue sauces and canned vegitables.  He denies any chest pain, palpitations, fevers, chills, productive cough (has had a mild intermittent non-productive cough), N/V, or diarrhea.  He had an appointment with cardiology this upcoming Tuesday but decided to present to the ED this evening 2/2 to dyspnea at rest.    In the ED he was given Lasix 80 mg IV x 1 prior to admission to hospital medicine.

## 2017-10-15 NOTE — ED NOTES
Blankets provided. Call light within reach. resp even and unlabored. No distress noted, spouse at the bedside.

## 2017-10-15 NOTE — PLAN OF CARE
Problem: Patient Care Overview  Goal: Plan of Care Review  Outcome: Ongoing (interventions implemented as appropriate)  Pt free of falls/traumas/injuries. Skin remains clean, dry, and intact. Pt continued on IVP 80 mg of lasix BID. Pt re-educated on importance of measuring accurate intake and out put; pt verbalized and demonstrates understanding. Reviewed plan of care with pt; and pt verbalized understanding.  Pt VSS in no distress will continue to monitor.

## 2017-10-15 NOTE — PROGRESS NOTES
Notified Dr. Mueller that paper DNR needs staff signature. Staff will come by to sign later today.

## 2017-10-15 NOTE — PT/OT/SLP EVAL
Physical Therapy  Evaluation/Discharge    Lxe Billy   MRN: [id number]   Admitting Diagnosis: Acute on chronic combined systolic and diastolic CHF, NYHA class 4    PT Received On: 10/15/17  PT Start Time: 0929     PT Stop Time: 0937    PT Total Time (min): 8 min       Billable Minutes:  Evaluation 8 min    Diagnosis: Acute on chronic combined systolic and diastolic CHF, NYHA class 4      Past Medical History:   Diagnosis Date    Benign non-nodular prostatic hyperplasia with lower urinary tract symptoms 3/29/2017    Chronic combined systolic and diastolic heart failure 3/29/2017    4-10-17   1 - Mildly to moderately depressed left ventricular systolic function (EF 40-45%).    2 - Normal right ventricular systolic function .    3 - Left ventricular diastolic dysfunction.    4 - Mild mitral regurgitation.    5 - Mild aortic regurgitation.    6 - Severe aortic stenosis, NAMAN = 0.72 cm2, peak velocity = 4.3 m/s, mean gradient = 43.0 mmHg.    7 - Increased central venous pressure.   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    Severe aortic stenosis 1/31/2016    --- Repeated 2D ECHO showed EF 40% with severe aortic stenosis, relatively unchanged --- Not a TAVR candidate due to RCC  --- moderate AS with regards to mean gradient of 34 mmHg, despite NAMAN < 1 cm2 --- IC recs of not a surgical candidate  --- EF reduction from 50 % to 40 %    Stroke     right eye    Type 2 diabetes mellitus with diabetic polyneuropathy, without long-term current use of insulin 10/20/2016    Type 2 diabetes mellitus with stage 2  chronic kidney disease, without long-term current use of insulin 8/6/2012      Past Surgical History:   Procedure Laterality Date    BACK SURGERY      cervical disc replacement    CARPAL TUNNEL RELEASE      left    CORONARY ANGIOPLASTY WITH STENT PLACEMENT  10/2015    4 stents    EYE SURGERY      laser surgery in right eye    JOINT REPLACEMENT      orthoscopic surgery on knee      ROTATOR CUFF REPAIR      right side    SINUS SURGERY      TONSILLECTOMY      torn ligament      repair. left shoulder    TOTAL KNEE ARTHROPLASTY      left side    VASECTOMY         Referring physician: Salvador Godwin  Date referred to PT: 10/15/17    General Precautions: Standard, fall  Orthopedic Precautions:     Braces:         Do you have any cultural, spiritual, Episcopal conflicts, given your current situation?: none    Patient History:  Lives With: spouse (pt is retired and lives with his homemaker wife who will assist if needed.)  Living Arrangements: house (1 story, slab)  Living Environment Comment:  (rollator)  DME owned (not currently used): none    Previous Level of Function:  Ambulation Skills: needs device (pt states that he uses rollator when he first wakes up then ambualtes Independently later in the day. )  Transfer Skills: independent    Subjective:  Communicated with nurse prior to session.    Chief Complaint: pt had no complaints during treatment.   Patient goals:  To get better and go home.     Pain/Comfort  Pain Rating 1: 0/10  Pain Rating Post-Intervention 1: 0/10      Objective:   Patient found with: telemetry, blood pressure cuff, pulse ox (continuous) (hep lock IV)     Cognitive Exam:  Oriented to: Person, Place, Time and Situation    Follows Commands/attention: Follows multistep  commands  Communication: clear/fluent  Safety awareness/insight to disability: intact    Physical Exam:    Lower Extremity Range of Motion:  Right Lower Extremity: WFL  Left Lower Extremity: WFL    Lower Extremity  Strength:  Right Lower Extremity: WFL  Left Lower Extremity: WFL     Functional Mobility:  Bed Mobility:  Rolling/Turning to Left: Independent  Supine to Sit: Independent    Transfers:  Sit <> Stand Assistance: Independent  Sit <> Stand Assistive Device: No Assistive Device    Gait:   Gait Distance: 200ft  Assistance 1: Independent  Gait Assistive Device: No device  Gait Pattern: 2-point gait    AM-PAC 6 CLICK MOBILITY  How much help from another person does this patient currently need?   1 = Unable, Total/Dependent Assistance  2 = A lot, Maximum/Moderate Assistance  3 = A little, Minimum/Contact Guard/Supervision  4 = None, Modified [place]/Independent    Turning over in bed (including adjusting bedclothes, sheets and blankets)?: 4  Sitting down on and standing up from a chair with arms (e.g., wheelchair, bedside commode, etc.): 4  Moving from lying on back to sitting on the side of the bed?: 4  Moving to and from a bed to a chair (including a wheelchair)?: 4  Need to walk in hospital room?: 4  Climbing 3-5 steps with a railing?: 4  Total Score: 24     AM-PAC Raw Score CMS G-Code Modifier Level of Impairment Assistance   6 % Total / Unable   7 - 9 CM 80 - 100% Maximal Assist   10 - 14 CL 60 - 80% Moderate Assist   15 - 19 CK 40 - 60% Moderate Assist   20 - 22 CJ 20 - 40% Minimal Assist   23 CI 1-20% SBA / CGA   24 CH 0% Independent/ Mod I     Patient left up in chair with all lines intact and call button in reach.    Assessment:   Lex Billy is a 72 y.o. male with a medical diagnosis of Acute on chronic combined systolic and diastolic CHF, NYHA class 4 and presents with no physical deficits and does not need skilled PT. Pt is safe to ambulate on unit Independently and will be able to discharge home with no therapy or DME needs.     Rehab identified problem list/impairments: Rehab identified problem list/impairments:  (no rehab issues)    Rehab potential is excellent.    Activity tolerance:  Excellent    Discharge recommendations: Discharge Facility/Level Of Care Needs:  (no further PT will be needed.)     Barriers to discharge: Barriers to Discharge: None    Equipment recommendations: Equipment Needed After Discharge: none     GOALS:    Physical Therapy Goals     Not on file                PLAN:    Patient to be seen  (pt is being discharged from therapy. ) to address the above listed problems via    Plan of Care expires:    Plan of Care reviewed with: patient    Functional Assessment Tool Used: FIM  Score: 7  Functional Limitation: Mobility: Walking and moving around  Mobility: Walking and Moving Around Current Status ():   Mobility: Walking and Moving Around Goal Status ():   Mobility: Walking and Moving Around Discharge Status (): JOSHUA Espino, PT  10/15/2017

## 2017-10-15 NOTE — ASSESSMENT & PLAN NOTE
-Resume home aspirin 81 mg po qd  -Resume home Plavix 75 mg po qd  -NTG 0.4 mg SL PRN for chest pain

## 2017-10-15 NOTE — ASSESSMENT & PLAN NOTE
DDx for his dyspnea includes ADHF vs arrhythmia vs ACS.  All evidence points towards recurrent decompensated heart failure in the setting of dietary indiscretion.  Arrhythmia is possible, but there is no significant change compared to his previous EKG and his current tachycardia is an appropriate response to decompensation.  ACS is also possible given his h/o ACS, but troponin is only slightly elevated and more likely 2/2 to demand ischemia.  Patient weighs 74.4 kg from 72 kg at last discharge.  -Lasix 80 mg IV BID - gentle diuresis in the face of severe AVS  -Hold home Toprol 25 mg po qd  -Resume home Lisinopril 2.5 mg po qd  -Diureses to net negative 2L qd  -Strict I&O's  -Standing weights daily

## 2017-10-15 NOTE — ED PROVIDER NOTES
Encounter Date: 10/14/2017    SCRIBE #1 NOTE: I, Tiffanie Leo, am scribing for, and in the presence of,  Dr. Toussaint. I have scribed the following portions of the note - the Resident attestation and the EKG reading. Other sections scribed: CXR.       History     Chief Complaint   Patient presents with    Shortness of Breath     pt reports SOB since monday; pt reports kidney CA, last chemo tx aproxx 2 month ago; denies CP, denies NVD     71 yo male w/ hx of kidney ca treated w/ chemo, CHF w/ EF of 15%, CAD, HTN presents for evaluation of SOB for one week. Pt reports worst w/ lying down or exertion. Reports leg swelling. Pt has been taking 80mg Lasix BID but has not been urinating that much. No CP. No fevers.           Review of patient's allergies indicates:   Allergen Reactions    No known drug allergies      Past Medical History:   Diagnosis Date    Benign non-nodular prostatic hyperplasia with lower urinary tract symptoms 3/29/2017    Chronic combined systolic and diastolic heart failure 3/29/2017    4-10-17   1 - Mildly to moderately depressed left ventricular systolic function (EF 40-45%).    2 - Normal right ventricular systolic function .    3 - Left ventricular diastolic dysfunction.    4 - Mild mitral regurgitation.    5 - Mild aortic regurgitation.    6 - Severe aortic stenosis, NAMAN = 0.72 cm2, peak velocity = 4.3 m/s, mean gradient = 43.0 mmHg.    7 - Increased central venous pressure.   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    Severe aortic stenosis 1/31/2016    --- Repeated 2D ECHO showed EF  40% with severe aortic stenosis, relatively unchanged --- Not a TAVR candidate due to RCC  --- moderate AS with regards to mean gradient of 34 mmHg, despite NAMAN < 1 cm2 --- IC recs of not a surgical candidate  --- EF reduction from 50 % to 40 %    Stroke     right eye    Type 2 diabetes mellitus with diabetic polyneuropathy, without long-term current use of insulin 10/20/2016    Type 2 diabetes mellitus with stage 2 chronic kidney disease, without long-term current use of insulin 8/6/2012     Past Surgical History:   Procedure Laterality Date    BACK SURGERY      cervical disc replacement    CARPAL TUNNEL RELEASE      left    CORONARY ANGIOPLASTY WITH STENT PLACEMENT  10/2015    4 stents    EYE SURGERY      laser surgery in right eye    JOINT REPLACEMENT      orthoscopic surgery on knee      ROTATOR CUFF REPAIR      right side    SINUS SURGERY      TONSILLECTOMY      torn ligament      repair. left shoulder    TOTAL KNEE ARTHROPLASTY      left side    VASECTOMY       Family History   Problem Relation Age of Onset    Heart disease Father     ALS Father     Cancer Brother      esophageal    Rheum arthritis Mother     Migraines Daughter     Asthma Son     Diabetes Neg Hx     Colon polyps Neg Hx      Social History   Substance Use Topics    Smoking status: Never Smoker    Smokeless tobacco: Never Used    Alcohol use No      Comment: Heavy in past, quit about 20 years ago     Review of Systems   Constitutional: Negative for fever.   HENT: Negative for sore throat.    Respiratory: Positive for shortness of breath.    Cardiovascular: Positive for leg swelling. Negative for chest pain.   Gastrointestinal: Negative for nausea.   Genitourinary: Negative for dysuria.   Musculoskeletal: Negative for back pain.   Skin: Negative for rash.   Neurological: Negative for weakness.   Hematological: Does not bruise/bleed easily.       Physical Exam     Initial Vitals [10/14/17 2209]   BP Pulse Resp Temp SpO2    100/61 105 20 97.8 °F (36.6 °C) 100 %      MAP       74         Physical Exam    Nursing note and vitals reviewed.  Constitutional: He appears well-developed and well-nourished. No distress.   HENT:   Head: Normocephalic and atraumatic.   Mouth/Throat: Oropharynx is clear and moist.   Eyes: EOM are normal. Pupils are equal, round, and reactive to light.   Neck: Normal range of motion. Neck supple.   Cardiovascular: Normal rate, regular rhythm and intact distal pulses.   Murmur heard.  Pulmonary/Chest: Breath sounds normal.   Crackles in the bases bilaterally.    Abdominal: Bowel sounds are normal. He exhibits no distension. There is no tenderness. There is no rebound and no guarding.   Musculoskeletal: Normal range of motion. He exhibits edema.   Neurological: He is alert and oriented to person, place, and time. He has normal strength.   Skin: Skin is warm and dry.         ED Course   Procedures  Labs Reviewed   COMPREHENSIVE METABOLIC PANEL - Abnormal; Notable for the following:        Result Value    BUN, Bld 32 (*)     Albumin 3.1 (*)     All other components within normal limits   TROPONIN I - Abnormal; Notable for the following:     Troponin I 0.057 (*)     All other components within normal limits   CBC W/ AUTO DIFFERENTIAL - Abnormal; Notable for the following:     RBC 4.20 (*)     Hemoglobin 12.1 (*)     Hematocrit 38.5 (*)     MCHC 31.4 (*)     RDW 21.9 (*)     Lymph # 0.9 (*)     Lymph% 13.8 (*)     All other components within normal limits   B-TYPE NATRIURETIC PEPTIDE - Abnormal; Notable for the following:     BNP 2,382 (*)     All other components within normal limits   PROTIME-INR   CBC W/ AUTO DIFFERENTIAL   POCT GLUCOSE MONITORING CONTINUOUS     EKG Readings: (Independently Interpreted)   Sinus tachycardia at a rate of 106. LAD. LVH. T wave inversions in leads I, AVL, and V1-V4. ST segments normal. RBBB.        X-Rays:   Independently Interpreted Readings:   Chest X-Ray: Pulmonary vascular  congestion.      Medical Decision Making:   History:   Old Medical Records: I decided to obtain old medical records.  Independently Interpreted Test(s):   I have ordered and independently interpreted X-rays - see prior notes.  I have ordered and independently interpreted EKG Reading(s) - see prior notes  Clinical Tests:   Lab Tests: Ordered and Reviewed  Radiological Study: Ordered and Reviewed  Medical Tests: Ordered and Reviewed  Other:   I have discussed this case with another health care provider.       APC / Resident Notes:   71 yo male w/ hx of HTN, CAD, kidney ca w/ chemo treatment and CHF w/ EF 15% presents for worsening SOB for last few days. No CP. Compliant w/ meds. VSS. NAD. RRR. Crackles in the bases. Leg swelling to the edema. DDx: CHF, pneumonia, bronchitis, pneumothorax. Will evaluate w/ Trop, BNP, CBC, CMP, and INR. EKG shows NSR w/ T wave inversion in V-V4 which is seen previously.     Update 3:58 AM BNP in 2000s, Trop elevated as previous w/ CHF exacerbation. Will treat w/ Lasix IV 80mg. CXR shows prominent vasculature . No edema. Most likely CHF. Discussed w/ med who will place in Obs.   Darien Villa MD  [employer] EM PGY2  10/15/2017 3:59 AM           Scribe Attestation:   Scribe #1: I performed the above scribed service and the documentation accurately describes the services I performed. I attest to the accuracy of the note.  Comments: I, Dr. Reji Toussaint, personally performed the services described in this documentation. All medical record entries made by the scribe were at my direction and in my presence.  I have reviewed the chart and agree that the record reflects my personal performance and is accurate and complete. Reji Toussaint MD.  5:06 AM 10/15/2017      Attending Attestation:   Physician Attestation Statement for Resident:  As the supervising MD   Physician Attestation Statement: I have personally seen and examined this patient.   I agree with the above history. -: 71 yo male presenting  with SOB. Labs reveal creatinine of 1.1 at baseline. Trop 0.057. Pt denies CP and I do not feel this is due to ACS, more likely secondary to heart failure. Will diurese and place in obs with medicine.    As the supervising MD I agree with the above PE.    As the supervising MD I agree with the above treatment, course, plan, and disposition.  I have reviewed the following: old records at this facility.                    ED Course      Clinical Impression:   The primary encounter diagnosis was Acute on chronic congestive heart failure, unspecified congestive heart failure type. A diagnosis of SOB (shortness of breath) was also pertinent to this visit.                           Reji Toussaint MD  10/15/17 4320

## 2017-10-15 NOTE — SUBJECTIVE & OBJECTIVE
Past Medical History:   Diagnosis Date    Benign non-nodular prostatic hyperplasia with lower urinary tract symptoms 3/29/2017    Chronic combined systolic and diastolic heart failure 3/29/2017    4-10-17   1 - Mildly to moderately depressed left ventricular systolic function (EF 40-45%).    2 - Normal right ventricular systolic function .    3 - Left ventricular diastolic dysfunction.    4 - Mild mitral regurgitation.    5 - Mild aortic regurgitation.    6 - Severe aortic stenosis, NAMAN = 0.72 cm2, peak velocity = 4.3 m/s, mean gradient = 43.0 mmHg.    7 - Increased central venous pressure.   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    Severe aortic stenosis 1/31/2016    --- Repeated 2D ECHO showed EF 40% with severe aortic stenosis, relatively unchanged --- Not a TAVR candidate due to RCC  --- moderate AS with regards to mean gradient of 34 mmHg, despite NAMAN < 1 cm2 --- IC recs of not a surgical candidate  --- EF reduction from 50 % to 40 %    Stroke     right eye    Type 2 diabetes mellitus with diabetic polyneuropathy, without long-term current use of insulin 10/20/2016    Type 2 diabetes mellitus with stage 2 chronic kidney disease, without long-term current use of insulin 8/6/2012       Past Surgical History:   Procedure Laterality Date    BACK SURGERY      cervical disc replacement    CARPAL TUNNEL RELEASE      left    CORONARY ANGIOPLASTY WITH STENT PLACEMENT  10/2015    4 stents    EYE SURGERY      laser surgery in right eye    JOINT REPLACEMENT      orthoscopic surgery on knee      ROTATOR  CUFF REPAIR      right side    SINUS SURGERY      TONSILLECTOMY      torn ligament      repair. left shoulder    TOTAL KNEE ARTHROPLASTY      left side    VASECTOMY         Review of patient's allergies indicates:   Allergen Reactions    No known drug allergies        No current facility-administered medications on file prior to encounter.      Current Outpatient Prescriptions on File Prior to Encounter   Medication Sig    aspirin (ECOTRIN) 81 MG EC tablet Take 81 mg by mouth once daily.    atorvastatin (LIPITOR) 40 MG tablet TAKE 1 TABLET ONE TIME DAILY (Patient taking differently: TAKE 1 TABLET BY MOUTH ONE TIME DAILY)    clopidogrel (PLAVIX) 75 mg tablet TAKE 1 TABLET BY MOUTH ONCE DAILY    furosemide (LASIX) 80 MG tablet Take 1 tablet (80 mg total) by mouth once daily.    gabapentin (NEURONTIN) 300 MG capsule     lisinopril (PRINIVIL,ZESTRIL) 2.5 MG tablet Take 1 tablet (2.5 mg total) by mouth once daily.    metoprolol succinate (TOPROL-XL) 25 MG 24 hr tablet Take 1 tablet (25 mg total) by mouth once daily.    ondansetron (ZOFRAN-ODT) 4 MG TbDL Take 1 tablet (4 mg total) by mouth every 4 to 6 hours as needed. Dissolve one tablet under tongue every 4-6 hours as needed for nausea.    tamsulosin (FLOMAX) 0.4 mg Cp24 Take 1 capsule (0.4 mg total) by mouth once daily.    azelastine (ASTELIN) 137 mcg (0.1 %) nasal spray 1 spray (137 mcg total) by Nasal route 2 (two) times daily.    bethanechol (URECHOLINE) 50 MG tablet Take 1 tablet (50 mg total) by mouth 3 (three) times daily.    blood sugar diagnostic Strp 1 each by Misc.(Non-Drug; Combo Route) route 3 (three) times daily. Free style freedom lite test strips and lancets (Patient taking differently: 1 each by Misc.(Non-Drug; Combo Route) route. Test blood sugar twice daily every other day)    blood-glucose meter (TRUE METRIX AIR GLUCOSE METER) Misc Use as directed    brimonidine 0.1% (ALPHAGAN P) 0.1 % Drop Place 1 drop into both eyes 3 (three)  times daily.    brompheniramine-pseudoeph-DM 2-30-10 mg/5 mL Syrp     CALCIUM CARBONATE-VITAMIN D3 ORAL Take 1 tablet by mouth every morning. Calcium carbonate 1000mg vitamin d3 1600 units per patient    DOCUSATE CALCIUM (STOOL SOFTENER ORAL) Take by mouth once as needed (for constipation).    fexofenadine (ALLEGRA) 180 MG tablet TAKE 1 TABLET ONE TIME DAILY    lancets 28 gauge Misc 1 lancet by Misc.(Non-Drug; Combo Route) route 3 (three) times daily.    nitroGLYCERIN (NITROSTAT) 0.4 MG SL tablet Place 1 tablet (0.4 mg total) under the tongue every 5 (five) minutes as needed for Chest pain.    potassium chloride SA (K-DUR,KLOR-CON) 20 MEQ tablet Take 1 tablet (20 mEq total) by mouth once daily.     Family History     Problem Relation (Age of Onset)    ALS Father    Asthma Son    Cancer Brother    Heart disease Father    Migraines Daughter    Rheum arthritis Mother        Social History Main Topics    Smoking status: Never Smoker    Smokeless tobacco: Never Used    Alcohol use No      Comment: Heavy in past, quit about 20 years ago    Drug use: No    Sexual activity: Not Currently     Partners: Female     Review of Systems   Constitutional: Positive for fatigue and unexpected weight change. Negative for chills and fever.   HENT: Negative for ear pain, hearing loss, mouth sores and sore throat.    Eyes: Negative for photophobia, pain and visual disturbance.   Respiratory: Positive for cough and shortness of breath. Negative for chest tightness and wheezing.    Cardiovascular: Positive for leg swelling. Negative for chest pain and palpitations.   Gastrointestinal: Negative for abdominal distention, abdominal pain, anal bleeding, blood in stool, constipation, diarrhea, nausea, rectal pain and vomiting.   Endocrine: Negative for polydipsia and polyuria.   Genitourinary: Negative for dysuria and hematuria.   Musculoskeletal: Negative for arthralgias and myalgias.   Skin: Negative for rash and wound.    Neurological: Negative for dizziness, syncope and light-headedness.     Objective:     Vital Signs (Most Recent):  Temp: 98.2 °F (36.8 °C) (10/15/17 0017)  Pulse: 104 (10/15/17 0301)  Resp: (!) 26 (10/15/17 0301)  BP: 103/67 (10/15/17 0301)  SpO2: 99 % (10/15/17 0301) Vital Signs (24h Range):  Temp:  [97.8 °F (36.6 °C)-98.2 °F (36.8 °C)] 98.2 °F (36.8 °C)  Pulse:  [104-110] 104  Resp:  [20-26] 26  SpO2:  [99 %-100 %] 99 %  BP: (100-124)/(61-75) 103/67     Weight: 74.4 kg (164 lb)  Body mass index is 23.53 kg/m².    Physical Exam   Constitutional: He is oriented to person, place, and time. He appears well-developed and well-nourished. No distress.   HENT:   Head: Normocephalic and atraumatic.   Eyes: Conjunctivae and EOM are normal. Pupils are equal, round, and reactive to light.   Neck: Normal range of motion. Neck supple. Hepatojugular reflux and JVD present.   Cardiovascular: Regular rhythm and intact distal pulses.  Tachycardia present.  Exam reveals no gallop and no friction rub.    Murmur heard.   Crescendo decrescendo systolic murmur is present with a grade of 4/6   Pulmonary/Chest: Effort normal. No respiratory distress. He has no decreased breath sounds. He has no wheezes. He has no rhonchi. He has rales (Mild) in the right lower field and the left lower field. He exhibits no tenderness.   Abdominal: Soft. Bowel sounds are normal. He exhibits no distension. There is no tenderness.   Musculoskeletal: Normal range of motion. He exhibits edema (BL LE trace edema). He exhibits no tenderness or deformity.   Neurological: He is alert and oriented to person, place, and time.   Skin: Skin is warm and dry. No rash noted. He is not diaphoretic.   Scattered BL UE & LE ecchymosis   Psychiatric: He has a normal mood and affect. His behavior is normal.   Vitals reviewed.       Significant Labs:   CBC:   Recent Labs  Lab 10/15/17  0123   WBC 6.72   HGB 12.1*   HCT 38.5*        CMP:   Recent Labs  Lab  10/15/17  0037      K 3.7      CO2 23   GLU 98   BUN 32*   CREATININE 1.1   CALCIUM 9.5   PROT 7.4   ALBUMIN 3.1*   BILITOT 1.0   ALKPHOS 117   AST 28   ALT 19   ANIONGAP 13   EGFRNONAA >60.0     Cardiac Markers:   Recent Labs  Lab 10/15/17  0123   BNP 2,382*       Significant Imaging: CXR: I have reviewed all pertinent results/findings within the past 24 hours and my personal findings are:  Mild vascular congestion     EKG: Sinus tachycardia w/ rate of 106, QRS duration of 130 ms, QT prolongation w/ QTc at 502 ms, left axis deviation, BL atrial enlargement in V1, RVH, no ischemic changes compared to 10/2 EKG

## 2017-10-15 NOTE — H&P
[facility] Hospital Medicine  History & Physical    Patient Name: Lex Billy  MRN: [id number]  Admission Date: 10/14/2017  Attending Physician: Reji Toussaint MD   Primary Care Provider: Everette Rowan MD, MD    Hospital Medicine Team: [facility] HOSP MED 5 Salvador Godwin MD     Patient information was obtained from patient, spouse/SO, past medical records and ER records.     Subjective:     Principal Problem:Acute on chronic combined systolic and diastolic CHF, NYHA class 4    Chief Complaint:   Chief Complaint   Patient presents with    Shortness of Breath     pt reports SOB since monday; pt reports kidney CA, last chemo tx aproxx 2 month ago; denies CP, denies NVD        HPI: Mr. 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  Patient reports that on Wednesday night he began experiencing difficulty sleeping 2/2 to orthopnea and would wake up short of breath if he tried laying down flat.    Since then his dyspnea has worsened despite dobling his home Lasix dose; at times he reported feeling short of breath at rest.  He has also had worseing LE edema since this Wednesday that improves with LE elevation.     He does not add any salt to his foods but reports he uses barbecue sauces and canned vegitables.  He denies any chest pain, palpitations, fevers, chills, productive cough (has had a mild intermittent non-productive cough), N/V, or diarrhea.  He had an appointment with cardiology this upcoming Tuesday but decided to present to the ED this evening 2/2 to dyspnea at rest.    In the ED he was given Lasix 80 mg IV x 1 prior to admission to  \[facility]\"" medicine.    Past Medical History:   Diagnosis Date    Benign non-nodular prostatic hyperplasia with lower urinary tract symptoms 3/29/2017    Chronic combined systolic and diastolic heart failure 3/29/2017    4-10-17   1 - Mildly to moderately depressed left ventricular systolic function (EF 40-45%).    2 - Normal right ventricular systolic function .    3 - Left ventricular diastolic dysfunction.    4 - Mild mitral regurgitation.    5 - Mild aortic regurgitation.    6 - Severe aortic stenosis, NAMAN = 0.72 cm2, peak velocity = 4.3 m/s, mean gradient = 43.0 mmHg.    7 - Increased central venous pressure.   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    Severe aortic stenosis 1/31/2016    --- Repeated 2D ECHO showed EF 40% with severe aortic stenosis, relatively unchanged --- Not a TAVR candidate due to RCC  --- moderate AS with regards to mean gradient of 34 mmHg, despite NAMAN < 1 cm2 --- IC recs of not a surgical candidate  --- EF reduction from 50 % to 40 %    Stroke     right eye    Type 2 diabetes mellitus with diabetic polyneuropathy, without long-term current use of insulin 10/20/2016    Type 2 diabetes mellitus with stage 2 chronic kidney disease, without long-term current use of insulin 8/6/2012       Past Surgical History:   Procedure Laterality Date    BACK SURGERY      cervical disc replacement    CARPAL TUNNEL RELEASE      left    CORONARY ANGIOPLASTY WITH STENT PLACEMENT  10/2015    4 stents    EYE SURGERY      laser surgery in right eye    JOINT REPLACEMENT      orthoscopic surgery  on knee      ROTATOR CUFF REPAIR      right side    SINUS SURGERY      TONSILLECTOMY      torn ligament      repair. left shoulder    TOTAL KNEE ARTHROPLASTY      left side    VASECTOMY         Review of patient's allergies indicates:   Allergen Reactions    No known drug allergies        No current facility-administered medications on file prior to encounter.      Current Outpatient Prescriptions on File Prior to Encounter   Medication Sig    aspirin (ECOTRIN) 81 MG EC tablet Take 81 mg by mouth once daily.    atorvastatin (LIPITOR) 40 MG tablet TAKE 1 TABLET ONE TIME DAILY (Patient taking differently: TAKE 1 TABLET BY MOUTH ONE TIME DAILY)    clopidogrel (PLAVIX) 75 mg tablet TAKE 1 TABLET BY MOUTH ONCE DAILY    furosemide (LASIX) 80 MG tablet Take 1 tablet (80 mg total) by mouth once daily.    gabapentin (NEURONTIN) 300 MG capsule     lisinopril (PRINIVIL,ZESTRIL) 2.5 MG tablet Take 1 tablet (2.5 mg total) by mouth once daily.    metoprolol succinate (TOPROL-XL) 25 MG 24 hr tablet Take 1 tablet (25 mg total) by mouth once daily.    ondansetron (ZOFRAN-ODT) 4 MG TbDL Take 1 tablet (4 mg total) by mouth every 4 to 6 hours as needed. Dissolve one tablet under tongue every 4-6 hours as needed for nausea.    tamsulosin (FLOMAX) 0.4 mg Cp24 Take 1 capsule (0.4 mg total) by mouth once daily.    azelastine (ASTELIN) 137 mcg (0.1 %) nasal spray 1 spray (137 mcg total) by Nasal route 2 (two) times daily.    bethanechol (URECHOLINE) 50 MG tablet Take 1 tablet (50 mg total) by mouth 3 (three) times daily.    blood sugar diagnostic Strp 1 each by Misc.(Non-Drug; Combo Route) route 3 (three) times daily. Free style freedom lite test strips and lancets (Patient taking differently: 1 each by Misc.(Non-Drug; Combo Route) route. Test blood sugar twice daily every other day)    blood-glucose meter (TRUE METRIX AIR GLUCOSE METER) Misc Use as directed    brimonidine 0.1% (ALPHAGAN P) 0.1 % Drop Place 1 drop into  both eyes 3 (three) times daily.    brompheniramine-pseudoeph-DM 2-30-10 mg/5 mL Syrp     CALCIUM CARBONATE-VITAMIN D3 ORAL Take 1 tablet by mouth every morning. Calcium carbonate 1000mg vitamin d3 1600 units per patient    DOCUSATE CALCIUM (STOOL SOFTENER ORAL) Take by mouth once as needed (for constipation).    fexofenadine (ALLEGRA) 180 MG tablet TAKE 1 TABLET ONE TIME DAILY    lancets 28 gauge Misc 1 lancet by Misc.(Non-Drug; Combo Route) route 3 (three) times daily.    nitroGLYCERIN (NITROSTAT) 0.4 MG SL tablet Place 1 tablet (0.4 mg total) under the tongue every 5 (five) minutes as needed for Chest pain.    potassium chloride SA (K-DUR,KLOR-CON) 20 MEQ tablet Take 1 tablet (20 mEq total) by mouth once daily.     Family History     Problem Relation (Age of Onset)    ALS Father    Asthma Son    Cancer Brother    Heart disease Father    Migraines Daughter    Rheum arthritis Mother        Social History Main Topics    Smoking status: Never Smoker    Smokeless tobacco: Never Used    Alcohol use No      Comment: Heavy in past, quit about 20 years ago    Drug use: No    Sexual activity: Not Currently     Partners: Female     Review of Systems   Constitutional: Positive for fatigue and unexpected weight change. Negative for chills and fever.   HENT: Negative for ear pain, hearing loss, mouth sores and sore throat.    Eyes: Negative for photophobia, pain and visual disturbance.   Respiratory: Positive for cough and shortness of breath. Negative for chest tightness and wheezing.    Cardiovascular: Positive for leg swelling. Negative for chest pain and palpitations.   Gastrointestinal: Negative for abdominal distention, abdominal pain, anal bleeding, blood in stool, constipation, diarrhea, nausea, rectal pain and vomiting.   Endocrine: Negative for polydipsia and polyuria.   Genitourinary: Negative for dysuria and hematuria.   Musculoskeletal: Negative for arthralgias and myalgias.   Skin: Negative for rash  and wound.   Neurological: Negative for dizziness, syncope and light-headedness.     Objective:     Vital Signs (Most Recent):  Temp: 98.2 °F (36.8 °C) (10/15/17 0017)  Pulse: 104 (10/15/17 0301)  Resp: (!) 26 (10/15/17 0301)  BP: 103/67 (10/15/17 0301)  SpO2: 99 % (10/15/17 0301) Vital Signs (24h Range):  Temp:  [97.8 °F (36.6 °C)-98.2 °F (36.8 °C)] 98.2 °F (36.8 °C)  Pulse:  [104-110] 104  Resp:  [20-26] 26  SpO2:  [99 %-100 %] 99 %  BP: (100-124)/(61-75) 103/67     Weight: 74.4 kg (164 lb)  Body mass index is 23.53 kg/m².    Physical Exam   Constitutional: He is oriented to person, place, and time. He appears well-developed and well-nourished. No distress.   HENT:   Head: Normocephalic and atraumatic.   Eyes: Conjunctivae and EOM are normal. Pupils are equal, round, and reactive to light.   Neck: Normal range of motion. Neck supple. Hepatojugular reflux and JVD present.   Cardiovascular: Regular rhythm and intact distal pulses.  Tachycardia present.  Exam reveals no gallop and no friction rub.    Murmur heard.   Crescendo decrescendo systolic murmur is present with a grade of 4/6   Pulmonary/Chest: Effort normal. No respiratory distress. He has no decreased breath sounds. He has no wheezes. He has no rhonchi. He has rales (Mild) in the right lower field and the left lower field. He exhibits no tenderness.   Abdominal: Soft. Bowel sounds are normal. He exhibits no distension. There is no tenderness.   Musculoskeletal: Normal range of motion. He exhibits edema (BL LE trace edema). He exhibits no tenderness or deformity.   Neurological: He is alert and oriented to person, place, and time.   Skin: Skin is warm and dry. No rash noted. He is not diaphoretic.   Scattered BL UE & LE ecchymosis   Psychiatric: He has a normal mood and affect. His behavior is normal.   Vitals reviewed.       Significant Labs:   CBC:   Recent Labs  Lab 10/15/17  0123   WBC 6.72   HGB 12.1*   HCT 38.5*        CMP:   Recent Labs  Lab  10/15/17  0037      K 3.7      CO2 23   GLU 98   BUN 32*   CREATININE 1.1   CALCIUM 9.5   PROT 7.4   ALBUMIN 3.1*   BILITOT 1.0   ALKPHOS 117   AST 28   ALT 19   ANIONGAP 13   EGFRNONAA >60.0     Cardiac Markers:   Recent Labs  Lab 10/15/17  0123   BNP 2,382*       Significant Imaging: CXR: I have reviewed all pertinent results/findings within the past 24 hours and my personal findings are:  Mild vascular congestion     EKG: Sinus tachycardia w/ rate of 106, QRS duration of 130 ms, QT prolongation w/ QTc at 502 ms, left axis deviation, BL atrial enlargement in V1, RVH, no ischemic changes compared to 10/2 EKG    Assessment/Plan:     * Acute on chronic combined systolic and diastolic CHF, NYHA class 4, ACC/AHA Stage C    DDx for his dyspnea includes ADHF vs arrhythmia vs ACS.  All evidence points towards recurrent decompensated heart failure in the setting of dietary indiscretion.  Arrhythmia is possible, but there is no significant change compared to his previous EKG and his current tachycardia is an appropriate response to decompensation.  ACS is also possible given his h/o ACS, but troponin is only slightly elevated and more likely 2/2 to demand ischemia.  Patient weighs 74.4 kg from 72 kg at last discharge.  -Lasix 80 mg IV BID - gentle diuresis in the face of severe AVS  -Hold home Toprol 25 mg po qd  -Resume home Lisinopril 2.5 mg po qd  -Diureses to net negative 2L qd  -Strict I&O's  -Standing weights daily        Stage 4, Clear cell renal cell carcinoma, right w/ bony mets    -Tramadol 100 mg po q6h for pain associated with bony mets        Benign non-nodular prostatic hyperplasia with lower urinary tract symptoms    -Resume home bethanechol 50 mg po TID  -Resume home Flomax 0.4 mg po qd        Essential hypertension    -Resume home Lisinopril 2.5 mg po qd        Type 2 diabetes mellitus with stage 2 chronic kidney disease, without long-term current use of insulin    Diet controlled at  home  -Resume home gabapentin 300 mg po TID for DM neuropathy  -LD SSI  -qhs, qac, & 02:00 POCT glucose checks        Coronary artery disease involving native coronary artery of native heart without angina pectoris    -Resume home aspirin 81 mg po qd  -Resume home Plavix 75 mg po qd  -NTG 0.4 mg SL PRN for chest pain        Severe aortic stenosis    -Will diurese gently in the setting of severe AS          VTE Risk Mitigation         Ordered     enoxaparin injection 40 mg  Daily     Route:  Subcutaneous        10/15/17 0355     Medium Risk of VTE  Once      10/15/17 0355             Salvador Godwin MD  Department of Hospital Medicine   [facility]

## 2017-10-16 NOTE — PLAN OF CARE
Everette Rowan MD, MD  [address] / [place] [address]      [facility] - [place], [place] - [address]  [address] [address]  Phone: [phone] Fax: [phone]    [id number] - [place], [place] - [address]  [address]  [place] [address]  Phone: [phone] Fax: [phone]    [facility] [place], [place] - [address] [address]  Phone: [phone] Fax: [phone]      Payor: [employer] MEDICARE / Plan: [employer] MEDICARE HMO / Product Type: Capitation /        10/16/17 1030   Discharge Assessment   Assessment Type Discharge Planning Assessment   Confirmed/corrected address and phone number on facesheet? Yes   Assessment information obtained from? Patient   Expected Length of Stay (days) 1   Communicated expected length of stay with patient/caregiver yes   Prior to hospitilization cognitive status: Alert/Oriented   Prior to hospitalization functional status: Independent;Assistive Equipment   Current cognitive status: Alert/Oriented   Current Functional Status: Independent;Assistive Equipment   Facility Arrived From: (home/selfcare)   Lives With spouse   Able to Return to Prior Arrangements yes   Is patient able to care for self after discharge? Yes   Who are your caregiver(s) and their phone number(s)? (Belkys Billy, spouse, [phone])   Patient's perception of discharge disposition home or selfcare   Readmission Within The Last 30 Days previous discharge plan unsuccessful   Patient currently being followed by outpatient case management? No   Patient currently receives any other outside agency services? No   Equipment Currently Used at Home cane, straight;glucometer;rollator   Do you have any problems affording any of your prescribed medications? No   Is the patient taking medications as prescribed? yes   Does the patient have transportation home? Yes    Transportation Available family or friend will provide   Does the patient receive services at the [facility]? No   Discharge Plan A Home;Home with family   Discharge Plan B Home;Home with family;Home Health   Patient/Family In Agreement With Plan yes

## 2017-10-16 NOTE — PT/OT/SLP EVAL
Occupational Therapy  Evaluation    Lex Billy   MRN: [id number]   Admitting Diagnosis: Acute on chronic combined systolic and diastolic CHF, NYHA class 4    OT Date of Treatment: 10/16/17   OT Start Time: 0940  OT Stop Time: 0953  OT Total Time (min): 13 min    Billable Minutes:  Evaluation 13    Diagnosis: Acute on chronic combined systolic and diastolic CHF, NYHA class 4       Past Medical History:   Diagnosis Date    Benign non-nodular prostatic hyperplasia with lower urinary tract symptoms 3/29/2017    Chronic combined systolic and diastolic heart failure 3/29/2017    4-10-17   1 - Mildly to moderately depressed left ventricular systolic function (EF 40-45%).    2 - Normal right ventricular systolic function .    3 - Left ventricular diastolic dysfunction.    4 - Mild mitral regurgitation.    5 - Mild aortic regurgitation.    6 - Severe aortic stenosis, NAMAN = 0.72 cm2, peak velocity = 4.3 m/s, mean gradient = 43.0 mmHg.    7 - Increased central venous pressure.   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    Severe aortic stenosis 1/31/2016    --- Repeated 2D ECHO showed EF 40% with severe aortic stenosis, relatively unchanged --- Not a TAVR candidate due to RCC  --- moderate AS with regards to mean gradient of 34 mmHg, despite NAMAN < 1 cm2 --- IC recs of not a surgical candidate  --- EF reduction from 50 % to 40 %    Stroke     right eye    Type 2 diabetes mellitus with diabetic polyneuropathy, without long-term current use of insulin 10/20/2016    Type 2 diabetes mellitus with stage 2 chronic  kidney disease, without long-term current use of insulin 8/6/2012      Past Surgical History:   Procedure Laterality Date    BACK SURGERY      cervical disc replacement    CARPAL TUNNEL RELEASE      left    CORONARY ANGIOPLASTY WITH STENT PLACEMENT  10/2015    4 stents    EYE SURGERY      laser surgery in right eye    JOINT REPLACEMENT      orthoscopic surgery on knee      ROTATOR CUFF REPAIR      right side    SINUS SURGERY      TONSILLECTOMY      torn ligament      repair. left shoulder    TOTAL KNEE ARTHROPLASTY      left side    VASECTOMY             General Precautions: Standard, fall  Orthopedic Precautions: N/A  Braces: N/A    Do you have any cultural, spiritual, Gnosticism conflicts, given your current situation?: None     Patient History:  Living Environment  Lives With: spouse  Living Arrangements: house  Living Environment Comment: Pt lives in a one story house c 1 EWA.  Has a tub/shower combo  Equipment Currently Used at Home: rollator    Prior level of function:   Bed Mobility/Transfers: independent  Grooming: independent  Bathing: independent  Upper Body Dressing: independent  Lower Body Dressing: independent  Toileting: independent  Home Management Skills: independent  Homemaking Responsibilities: No         Subjective:  Communicated with RN prior to session.    Chief Complaint: Pt was admitted c acute on chronic systolic CHF  Patient/Family stated goals: To go home.    Pain/Comfort  Pain Rating 1: 0/10    Objective:  Patient found with: telemetry, blood pressure cuff, pulse ox (continuous)    Cognitive Exam:  Oriented to: Person, Place, Time and Situation  Follows Commands/attention: Follows multistep  commands  Communication: clear/fluent  Memory:  No Deficits noted  Safety awareness/insight to disability: intact  Coping skills/emotional control: Appropriate to situation    Visual/perceptual:  Intact    Physical Exam:  Postural examination/scapula alignment: No postural abnormalities  "identified  Skin integrity: Visible skin intact  Edema: None noted     Sensation:   Intact    Upper Extremity Range of Motion:  Right Upper Extremity: WFL  Left Upper Extremity: WFL    Upper Extremity Strength:  Right Upper Extremity: WFL  Left Upper Extremity: WFL   Strength: WFL    Fine motor coordination:   Intact    Gross motor coordination: WFL    Functional Mobility:  Bed Mobility:  Supine to Sit: Independent    Transfers:  Sit <> Stand Assistance: Independent  Sit <> Stand Assistive Device: No Assistive Device  Toilet Transfer Technique: Stand Pivot  Toilet Transfer Assistance: Independent  Toilet Transfer Assistive Device: No Assistive Device    Functional Ambulation: Pt was able to walk to bathroom I.    Activities of Daily Living:       UE Dressing Level of Assistance: Independent (Pt was dressed upon arrival.)    LE Dressing Level of Assistance: Independent (To don/doff socks.)      Balance:   Static Sit: NORMAL: No deviations seen in posture held statically  Dynamic Sit: NORMAL: No deviations seen in posture held dynamically  Static Stand: NORMAL: No deviations seen in posture held statically  Dynamic stand: NORMAL: No deviations seen in posture held dynamically        AM-PAC 6 CLICK ADL  How much help from another person does this patient currently need?  1 = Unable, Total/Dependent Assistance  2 = A lot, Maximum/Moderate Assistance  3 = A little, Minimum/Contact Guard/Supervision  4 = None, Modified Louisa/Independent    Putting on and taking off regular lower body clothing? : 4  Bathing (including washing, rinsing, drying)?: 4  Toileting, which includes using toilet, bedpan, or urinal? : 4  Putting on and taking off regular upper body clothing?: 4  Taking care of personal grooming such as brushing teeth?: 4  Eating meals?: 4  Total Score: 24    AM-PAC Raw Score CMS "G-Code Modifier Level of Impairment Assistance   6 % Total / Unable   7 - 9 CM 80 - 100% Maximal Assist   10-14 CL 60 - " 80% Moderate Assist   15 - 19 CK 40 - 60% Moderate Assist   20 - 22 CJ 20 - 40% Minimal Assist   23 CI 1-20% SBA / CGA   24 CH 0% Independent/ Mod I       Patient left supine with all lines intact, call button in reach and RN notified    Assessment:  Lex Billy is a 72 y.o. male with a medical diagnosis of Acute on chronic combined systolic and diastolic CHF, NYHA class 4 and presents with no OT needs at this time.  Pt is I in all ADL's and functional T/F's.  B UE are WFL.  Will D/C from inpatient OT.  Please re-consult PRN.    Rehab identified problem list/impairments: Rehab identified problem list/impairments:  (None)    Rehab potential is good.    Activity tolerance: Good    Discharge recommendations: Discharge Facility/Level Of Care Needs: home     Barriers to discharge: Barriers to Discharge: None    Equipment recommendations: none     GOALS:    Occupational Therapy Goals     Not on file                PLAN:  Patient to be seen  (D/C from inpatient OT) to address the above listed problems via    Plan of Care expires:    Plan of Care reviewed with: patient         MINDY Barreto  10/16/2017

## 2017-10-16 NOTE — CONSULTS
"RN consult received for "cardiac/diabetic diet, 1500 ml fluid restriction." Provided and explained handout for low Na diet. Pt already follows a low Na diet at home. DM is well-controlled with HbA1c 5.5. No DM education warranted at this time. RD to f/u.  "

## 2017-10-16 NOTE — PLAN OF CARE
10/16/17 1030   Medicare Message   Important Message from Medicare regarding Discharge Appeal Rights Given to patient/caregiver;Explained to patient/caregiver;Signed/date by patient/caregiver   Date IMM was signed 10/16/17   Time IMM was signed 1030

## 2017-10-16 NOTE — PROGRESS NOTES
Patient given discharge instructions and home medication regimen. Patient's prescriptions sent to [facility]. Patient informed of follow up appointments and given a copy of discharge instructions with follow up appointments listed. Patient's telemetry monitor discontinued. Left forearm peripheral IV discontinued. Patient discharged home per MD orders with all personal belongings.     Tete in monitor room notified of discharge.

## 2017-10-16 NOTE — PHARMACY MED REC
"Admission Medication Reconciliation - Pharmacy Consult Note    The home medication history was taken by Serena Ramos, Pharmacy Technician.  Based on information gathered and subsequent review by the clinical pharmacist, the items below may need attention.     You may go to "Admission" then "Reconcile Home Medications" tabs to review and/or act upon these items. Based on information gathered and subsequent review by the clinical pharmacist, the items below may need attention.    Potentially problematic discrepancies with current MAR  o Patient IS taking the following which was not ordered upon admit  o Azelastine 137 mg/act nasal 1 spray to each nostril twice daily  o Fexofenadine 180 mg oral daily (recommend cetirizine 10 mg oral daily as formulary alternative)  o Brimonidine 0.1% opthalmic 1 drop to both eyes TID  o Calcium/vitamin D 1 tablet oral every morning  o Potassium chloride 20 mEq oral daily  o Patient is taking a drug DIFFERENTLY than how ordered upon admit  o Patient taking gabapentin 600 mg oral BID; gabapentin 300 mg oral TID ordered    Please address this information as you see fit.  Feel free to contact us if you have any questions or require assistance.    Brian Nicholas, PharmD  Emergency Medicine Clinical Pharmacist  [phone] (2pm-midnight daily)      Patient's prior to admission medication regimen was as follows:  Prescriptions Prior to Admission   Medication Sig Dispense Refill Last Dose    aspirin (ECOTRIN) 81 MG EC tablet Take 81 mg by mouth once daily.   10/14/2017 at Unknown time    atorvastatin (LIPITOR) 40 MG tablet TAKE 1 TABLET ONE TIME DAILY (Patient taking differently: TAKE 1 TABLET BY MOUTH ONE TIME DAILY) 90 tablet 3 10/14/2017 at Unknown time    azelastine (ASTELIN) 137 mcg (0.1 %) nasal spray 1 spray (137 mcg total) by Nasal route 2 (two) times daily. 30 mL 0     bethanechol (URECHOLINE) 50 MG tablet Take 1 tablet (50 mg total) by mouth 3 (three) times daily. 90 tablet " 11 10/2/2017    blood sugar diagnostic Strp 1 each by Misc.(Non-Drug; Combo Route) route 3 (three) times daily. Free style freedom lite test strips and lancets (Patient taking differently: 1 each by Misc.(Non-Drug; Combo Route) route. Test blood sugar twice daily every other day) 90 each 3 10/1/2017    blood-glucose meter (TRUE METRIX AIR GLUCOSE METER) Misc Use as directed 1 each 0 10/1/2017    brimonidine 0.1% (ALPHAGAN P) 0.1 % Drop Place 1 drop into both eyes 3 (three) times daily.   10/2/2017    CALCIUM CARBONATE-VITAMIN D3 ORAL Take 1 tablet by mouth every morning. Calcium carbonate 1000mg vitamin d3 1600 units per patient   Past Week    clopidogrel (PLAVIX) 75 mg tablet TAKE 1 TABLET BY MOUTH ONCE DAILY 90 tablet 3 10/14/2017 at Unknown time    fexofenadine (ALLEGRA) 180 MG tablet TAKE 1 TABLET ONE TIME DAILY (Patient taking differently: TAKE 1 TABLET BY MOUTH ONE TIME DAILY) 90 tablet 3 10/1/2017    furosemide (LASIX) 80 MG tablet Take 1 tablet (80 mg total) by mouth once daily. 90 tablet 0 10/14/2017 at Unknown time    gabapentin (NEURONTIN) 300 MG capsule Take 600 mg by mouth 2 (two) times daily.    10/14/2017 at Unknown time    lancets 28 gauge Misc 1 lancet by Misc.(Non-Drug; Combo Route) route 3 (three) times daily. 300 each 3 10/1/2017    lisinopril (PRINIVIL,ZESTRIL) 2.5 MG tablet Take 1 tablet (2.5 mg total) by mouth once daily. 30 tablet 0 10/14/2017 at Unknown time    metoprolol succinate (TOPROL-XL) 25 MG 24 hr tablet Take 1 tablet (25 mg total) by mouth once daily. 90 tablet 3 10/14/2017 at Unknown time    nitroGLYCERIN (NITROSTAT) 0.4 MG SL tablet Place 1 tablet (0.4 mg total) under the tongue every 5 (five) minutes as needed for Chest pain. 25 tablet 4     ondansetron (ZOFRAN-ODT) 4 MG TbDL Take 1 tablet (4 mg total) by mouth every 4 to 6 hours as needed. Dissolve one tablet under tongue every 4-6 hours as needed for nausea. 30 tablet 1 Past Month at Unknown time    potassium  chloride SA (K-DUR,KLOR-CON) 20 MEQ tablet Take 1 tablet (20 mEq total) by mouth once daily. 90 tablet 4 10/1/2017    tamsulosin (FLOMAX) 0.4 mg Cp24 Take 1 capsule (0.4 mg total) by mouth once daily. 30 capsule 11 10/14/2017 at Unknown time         Please add appropriate    SmartPhrase below:

## 2017-10-17 NOTE — SUBJECTIVE & OBJECTIVE
Past Medical History:   Diagnosis Date    Benign non-nodular prostatic hyperplasia with lower urinary tract symptoms 3/29/2017    Chronic combined systolic and diastolic heart failure 3/29/2017    4-10-17   1 - Mildly to moderately depressed left ventricular systolic function (EF 40-45%).    2 - Normal right ventricular systolic function .    3 - Left ventricular diastolic dysfunction.    4 - Mild mitral regurgitation.    5 - Mild aortic regurgitation.    6 - Severe aortic stenosis, NAMAN = 0.72 cm2, peak velocity = 4.3 m/s, mean gradient = 43.0 mmHg.    7 - Increased central venous pressure.   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    Severe aortic stenosis 1/31/2016    --- Repeated 2D ECHO showed EF 40% with severe aortic stenosis, relatively unchanged --- Not a TAVR candidate due to RCC  --- moderate AS with regards to mean gradient of 34 mmHg, despite NAMAN < 1 cm2 --- IC recs of not a surgical candidate  --- EF reduction from 50 % to 40 %    Stroke     right eye    Type 2 diabetes mellitus with diabetic polyneuropathy, without long-term current use of insulin 10/20/2016    Type 2 diabetes mellitus with stage 2 chronic kidney disease, without long-term current use of insulin 8/6/2012       Past Surgical History:   Procedure Laterality Date    BACK SURGERY      cervical disc replacement    CARPAL TUNNEL RELEASE      left    CORONARY ANGIOPLASTY WITH STENT PLACEMENT  10/2015    4 stents    EYE SURGERY      laser surgery in right eye    JOINT REPLACEMENT      orthoscopic surgery on knee      ROTATOR  CUFF REPAIR      right side    SINUS SURGERY      TONSILLECTOMY      torn ligament      repair. left shoulder    TOTAL KNEE ARTHROPLASTY      left side    VASECTOMY         Review of patient's allergies indicates:   Allergen Reactions    No known drug allergies        No current facility-administered medications on file prior to encounter.      Current Outpatient Prescriptions on File Prior to Encounter   Medication Sig    aspirin (ECOTRIN) 81 MG EC tablet Take 81 mg by mouth once daily.    atorvastatin (LIPITOR) 40 MG tablet TAKE 1 TABLET ONE TIME DAILY (Patient taking differently: TAKE 1 TABLET BY MOUTH ONE TIME DAILY)    azelastine (ASTELIN) 137 mcg (0.1 %) nasal spray 1 spray (137 mcg total) by Nasal route 2 (two) times daily.    bethanechol (URECHOLINE) 50 MG tablet Take 1 tablet (50 mg total) by mouth 3 (three) times daily.    blood sugar diagnostic Strp 1 each by Misc.(Non-Drug; Combo Route) route 3 (three) times daily. Free style freedom lite test strips and lancets (Patient taking differently: 1 each by Misc.(Non-Drug; Combo Route) route. Test blood sugar twice daily every other day)    blood-glucose meter (TRUE METRIX AIR GLUCOSE METER) Misc Use as directed    brimonidine 0.1% (ALPHAGAN P) 0.1 % Drop Place 1 drop into both eyes 3 (three) times daily.    CALCIUM CARBONATE-VITAMIN D3 ORAL Take 1 tablet by mouth every morning. Calcium carbonate 1000mg vitamin d3 1600 units per patient    clopidogrel (PLAVIX) 75 mg tablet TAKE 1 TABLET BY MOUTH ONCE DAILY    fexofenadine (ALLEGRA) 180 MG tablet TAKE 1 TABLET ONE TIME DAILY (Patient taking differently: TAKE 1 TABLET BY MOUTH ONE TIME DAILY)    gabapentin (NEURONTIN) 300 MG capsule Take 600 mg by mouth 2 (two) times daily.     lancets 28 gauge Misc 1 lancet by Misc.(Non-Drug; Combo Route) route 3 (three) times daily.    lisinopril (PRINIVIL,ZESTRIL) 2.5 MG tablet Take 1 tablet (2.5 mg total) by mouth once daily.    metoprolol succinate  (TOPROL-XL) 25 MG 24 hr tablet Take 1 tablet (25 mg total) by mouth once daily.    nitroGLYCERIN (NITROSTAT) 0.4 MG SL tablet Place 1 tablet (0.4 mg total) under the tongue every 5 (five) minutes as needed for Chest pain.    ondansetron (ZOFRAN-ODT) 4 MG TbDL Take 1 tablet (4 mg total) by mouth every 4 to 6 hours as needed. Dissolve one tablet under tongue every 4-6 hours as needed for nausea.    potassium chloride SA (K-DUR,KLOR-CON) 20 MEQ tablet Take 1 tablet (20 mEq total) by mouth once daily.    tamsulosin (FLOMAX) 0.4 mg Cp24 Take 1 capsule (0.4 mg total) by mouth once daily.    [DISCONTINUED] furosemide (LASIX) 80 MG tablet Take 1 tablet (80 mg total) by mouth once daily.     Family History     Problem Relation (Age of Onset)    ALS Father    Asthma Son    Cancer Brother    Heart disease Father    Migraines Daughter    Rheum arthritis Mother        Social History Main Topics    Smoking status: Never Smoker    Smokeless tobacco: Never Used    Alcohol use No      Comment: Heavy in past, quit about 20 years ago    Drug use: No    Sexual activity: Not Currently     Partners: Female     Review of Systems   Constitutional: Positive for fatigue and unexpected weight change. Negative for chills and fever.   HENT: Negative for ear pain, hearing loss, mouth sores and sore throat.    Eyes: Negative for photophobia, pain and visual disturbance.   Respiratory: Positive for cough. Negative for chest tightness, shortness of breath and wheezing.    Cardiovascular: Positive for leg swelling (improved). Negative for chest pain and palpitations.   Gastrointestinal: Negative for abdominal distention, abdominal pain, constipation, diarrhea, nausea and vomiting.   Endocrine: Negative for polydipsia and polyuria.   Genitourinary: Positive for frequency (2/2 diuretics). Negative for dysuria and hematuria.   Musculoskeletal: Negative for arthralgias and myalgias.   Skin: Negative for rash and wound.   Neurological: Negative  for dizziness, syncope and light-headedness.   Psychiatric/Behavioral: Negative for confusion. The patient is not nervous/anxious.      Objective:     Vital Signs (Most Recent):  Temp: 98.4 °F (36.9 °C) (10/16/17 1500)  Pulse: 96 (10/16/17 1500)  Resp: 15 (10/16/17 1500)  BP: 94/72 (10/16/17 1500)  SpO2: 98 % (10/16/17 1500) Vital Signs (24h Range):  Temp:  [98.2 °F (36.8 °C)-98.7 °F (37.1 °C)] 98.4 °F (36.9 °C)  Pulse:  [] 96  Resp:  [12-22] 15  SpO2:  [96 %-100 %] 98 %  BP: ()/() 94/72     Weight: 72.7 kg (160 lb 4.4 oz)  Body mass index is 23 kg/m².    Physical Exam   Constitutional: He is oriented to person, place, and time. He appears well-developed and well-nourished. He is cooperative. No distress.   HENT:   Head: Normocephalic and atraumatic.   Mouth/Throat: Mucous membranes are normal.   Eyes: Conjunctivae and EOM are normal. Pupils are equal, round, and reactive to light.   Neck: Normal range of motion. Neck supple. JVD present.   Cardiovascular: Regular rhythm and intact distal pulses.  Exam reveals no gallop and no friction rub.    Murmur heard.   Crescendo decrescendo systolic (more pronounced on RSB) murmur is present with a grade of 4/6   Pulmonary/Chest: Effort normal. No respiratory distress. He has no decreased breath sounds. He has no wheezes. He has no rhonchi. He has no rales (significantly improved). He exhibits no tenderness.   Abdominal: Soft. Bowel sounds are normal. He exhibits no distension. There is no tenderness.   Musculoskeletal: Normal range of motion. He exhibits edema (BL LE edema, pitting, decrease from 2+ to trace). He exhibits no tenderness or deformity.   Neurological: He is alert and oriented to person, place, and time.   Skin: Skin is warm and dry. No rash noted. He is not diaphoretic.   Scattered BL UE & LE ecchymosis   Psychiatric: He has a normal mood and affect. His behavior is normal.   Nursing note and vitals reviewed.       Significant Labs:   CBC:      Recent Labs  Lab 10/15/17  0123 10/16/17  0950   WBC 6.72 6.90   HGB 12.1* 12.0*   HCT 38.5* 37.8*    149*     CMP:     Recent Labs  Lab 10/15/17  0037 10/16/17  0647    137   K 3.7 2.9*    102   CO2 23 23   GLU 98 81   BUN 32* 32*   CREATININE 1.1 1.0   CALCIUM 9.5 8.1*   PROT 7.4  --    ALBUMIN 3.1*  --    BILITOT 1.0  --    ALKPHOS 117  --    AST 28  --    ALT 19  --    ANIONGAP 13 12   EGFRNONAA >60.0 >60.0     Cardiac Markers:     Recent Labs  Lab 10/15/17  0123   BNP 2,382*       Significant Imaging: CXR: I have reviewed all pertinent results/findings within the past 24 hours and my personal findings are:  Mild vascular congestion     EKG: Sinus tachycardia w/ rate of 106, QRS duration of 130 ms, QT prolongation w/ QTc at 502 ms, left axis deviation, BL atrial enlargement in V1, RVH, no ischemic changes compared to 10/2 EKG

## 2017-10-17 NOTE — DISCHARGE SUMMARY
DISCHARGE SUMMARY  Hospital Medicine    Team: [facility] HOSP MED 5    Patient Name: Lex Billy  YOB: 1945    Admit Date: 10/14/2017    Discharge Date: 10/16/2017    Discharge Attending Physician: Graciela David MD     Admitting Resident: Salvador Godwin MD    Diagnoses:  Active Hospital Problems    Diagnosis  POA    *Acute on chronic combined systolic and diastolic CHF, NYHA class 4, ACC/AHA Stage C [I50.43]  Yes    Benign non-nodular prostatic hyperplasia with lower urinary tract symptoms [N40.1]  Yes    Stage 4, Clear cell renal cell carcinoma, right w/ bony mets [C64.1]  Yes    Severe aortic stenosis [I35.0]  Yes     --- Repeated 2D ECHO showed EF 40% with severe aortic stenosis, relatively unchanged  --- Not a TAVR candidate due to RCC   --- moderate AS with regards to mean gradient of 34 mmHg, despite NAMAN < 1 cm2  --- IC recs of not a surgical candidate   --- EF reduction from 50 % to 40 %      Coronary artery disease involving native coronary artery of native heart without angina pectoris [I25.10]  Yes    Essential hypertension [I10]  Yes    Type 2 diabetes mellitus with stage 2 chronic kidney disease, without long-term current use of insulin [E11.22, N18.2]  Yes      Resolved Hospital Problems    Diagnosis Date Resolved POA   No resolved problems to display.       Discharged Condition: admit problems have stabilized      HOSPITAL COURSE:      Initial Presentation:  a 73 yo gentleman w/ h/o Stage 4 clear cell RCC (R side) s/p C3 palliative Opdivo, diet controlled DM (2/2017 5.5%), CAD s/p PCI w/ KIRAN 10/2015 (dLM, mRCA, & dRCA), 5+ AVS (NAMAN = 0.68 cm2, peak velocity = 3.5 m/s, mean gradient = 30 mmHg), and ICMP w/ HFrEF (9/21 15%, Grade 3 DD) recently admitted 10/2 to 10/4 for ADHF who now presents w/ 4 d h/o progressively worsening shortness of breath and difficulty sleeping 2/2 to orthopnea, and associated lower extremities edema, for the last 3-4 days. He denies any chest pain,  palpitations, fevers, chills, productive cough (has had a mild intermittent non-productive cough), N/V, or diarrhea.  He had an appointment with cardiology this upcoming Tuesday but decided to present to the ED this evening 2/2 to dyspnea at rest.  The patient was admitted due to acute on chronic CHF.    Course of Principle Problem for Admission:  In the ED he was given Lasix 80 mg IV x 1 prior to admission to hospital medicine. On the floors he was started on Lasix 80 mg IV BID, Strict I&O's and weights were measured daily. Since he did not have an impressive urine output with lasix alone, Diuril 250 mg x1 daily was added. The patient was significantly feeling better, able to walk around and sleep comfortably. He was discharged on Torsemide 40 mg Bid and Metolazone 5 mg x3 per week, with scheduled appointment in the IM priority clinic and instructions to schedule an appointment with cardiology for f/u.    Other Medical Problems Addressed in the Hospital:        Stage 4, Clear cell renal cell carcinoma, right w/ bony mets     -Tramadol 100 mg po q6h for pain associated with bony mets       Benign non-nodular prostatic hyperplasia with lower urinary tract symptoms     -Resumed home bethanechol 50 mg po TID  -Resumed home Flomax 0.4 mg po qd       Essential hypertension     -Resumed home Lisinopril 2.5 mg po qd       Type 2 diabetes mellitus with stage 2 chronic kidney disease, without long-term current use of insulin     Diet controlled at home  -Resume home gabapentin 300 mg po TID for DM neuropathy  -LD SSI  -qhs, qac, & 02:00 POCT glucose checks       Coronary artery disease involving native coronary artery of native heart without angina pectoris     -Resumed home aspirin 81 mg po qd  -Resumed home Plavix 75 mg po qd  -NTG 0.4 mg SL PRN for chest pain       Severe aortic stenosis     -diuresed gently in the setting of severe AS     10/16:  Review of Systems   Constitutional: Positive for fatigue and unexpected  weight change. Negative for chills and fever.   HENT: Negative for ear pain, hearing loss, mouth sores and sore throat.    Eyes: Negative for photophobia, pain and visual disturbance.   Respiratory: Positive for cough. Negative for chest tightness, shortness of breath and wheezing.    Cardiovascular: Positive for leg swelling (improved). Negative for chest pain and palpitations.   Gastrointestinal: Negative for abdominal distention, abdominal pain, constipation, diarrhea, nausea and vomiting.   Endocrine: Negative for polydipsia and polyuria.   Genitourinary: Positive for frequency (2/2 diuretics). Negative for dysuria and hematuria.   Musculoskeletal: Negative for arthralgias and myalgias.   Skin: Negative for rash and wound.   Neurological: Negative for dizziness, syncope and light-headedness.   Psychiatric/Behavioral: Negative for confusion. The patient is not nervous/anxious.       Objective:      Vital Signs (Most Recent):  Temp: 98.4 °F (36.9 °C) (10/16/17 1500)  Pulse: 96 (10/16/17 1500)  Resp: 15 (10/16/17 1500)  BP: 94/72 (10/16/17 1500)  SpO2: 98 % (10/16/17 1500) Vital Signs (24h Range):  Temp:  [98.2 °F (36.8 °C)-98.7 °F (37.1 °C)] 98.4 °F (36.9 °C)  Pulse:  [] 96  Resp:  [12-22] 15  SpO2:  [96 %-100 %] 98 %  BP: ()/() 94/72      Weight: 72.7 kg (160 lb 4.4 oz)  Body mass index is 23 kg/m².     Physical Exam   Constitutional: He is oriented to person, place, and time. He appears well-developed and well-nourished. He is cooperative. No distress.   HENT:   Head: Normocephalic and atraumatic.   Mouth/Throat: Mucous membranes are normal.   Eyes: Conjunctivae and EOM are normal. Pupils are equal, round, and reactive to light.   Neck: Normal range of motion. Neck supple. JVD present.   Cardiovascular: Regular rhythm and intact distal pulses.  Exam reveals no gallop and no friction rub.    Murmur heard.   Crescendo decrescendo systolic (more pronounced on RSB) murmur is present with a grade of  4/6   Pulmonary/Chest: Effort normal. No respiratory distress. He has no decreased breath sounds. He has no wheezes. He has no rhonchi. He has no rales (significantly improved). He exhibits no tenderness.   Abdominal: Soft. Bowel sounds are normal. He exhibits no distension. There is no tenderness.   Musculoskeletal: Normal range of motion. He exhibits edema (BL LE edema, pitting, decrease from 2+ to trace). He exhibits no tenderness or deformity.   Neurological: He is alert and oriented to person, place, and time.   Skin: Skin is warm and dry. No rash noted. He is not diaphoretic.   Scattered BL UE & LE ecchymosis   Psychiatric: He has a normal mood and affect. His behavior is normal.   Nursing note and vitals reviewed.       CONSULTS:   Nutrition    Last CBC/BMP/HgbA1c (if applicable):  Recent Results (from the past 336 hour(s))   CBC auto differential    Collection Time: 10/16/17  9:50 AM   Result Value Ref Range    WBC 6.90 3.90 - 12.70 K/uL    Hemoglobin 12.0 (L) 14.0 - 18.0 g/dL    Hematocrit 37.8 (L) 40.0 - 54.0 %    Platelets 149 (L) 150 - 350 K/uL   CBC auto differential    Collection Time: 10/15/17  1:23 AM   Result Value Ref Range    WBC 6.72 3.90 - 12.70 K/uL    Hemoglobin 12.1 (L) 14.0 - 18.0 g/dL    Hematocrit 38.5 (L) 40.0 - 54.0 %    Platelets 161 150 - 350 K/uL   CBC with Automated Differential    Collection Time: 10/04/17  5:44 AM   Result Value Ref Range    WBC 6.28 3.90 - 12.70 K/uL    Hemoglobin 11.3 (L) 14.0 - 18.0 g/dL    Hematocrit 35.9 (L) 40.0 - 54.0 %    Platelets 162 150 - 350 K/uL     Recent Results (from the past 336 hour(s))   Basic metabolic panel     Collection Time: 10/16/17  6:47 AM   Result Value Ref Range    Sodium 137 136 - 145 mmol/L    Potassium 2.9 (L) 3.5 - 5.1 mmol/L    Chloride 102 95 - 110 mmol/L    CO2 23 23 - 29 mmol/L    BUN, Bld 32 (H) 8 - 23 mg/dL    Creatinine 1.0 0.5 - 1.4 mg/dL    Calcium 8.1 (L) 8.7 - 10.5 mg/dL    Anion Gap 12 8 - 16 mmol/L     Lab Results    Component Value Date    HGBA1C 5.5 02/20/2017       Pertinent/Significant Diagnostic Studies:  n/a    Disposition:  Home       Future Scheduled Appointments:  Future Appointments  Date Time Provider Department Center   10/23/2017 9:00 AM Jc Rowan MD [facility]       Follow-up Plans from This Hospitalization:  cardiology    Discharge Medication List:       Lex Billy   Home Medication Instructions HERNÁN:[id number]    Printed on:10/16/17 8522   Medication Information                      aspirin (ECOTRIN) 81 MG EC tablet  Take 81 mg by mouth once daily.             atorvastatin (LIPITOR) 40 MG tablet  TAKE 1 TABLET ONE TIME DAILY             azelastine (ASTELIN) 137 mcg (0.1 %) nasal spray  1 spray (137 mcg total) by Nasal route 2 (two) times daily.             bethanechol (URECHOLINE) 50 MG tablet  Take 1 tablet (50 mg total) by mouth 3 (three) times daily.             blood sugar diagnostic Strp  1 each by Misc.(Non-Drug; Combo Route) route 3 (three) times daily. Free style freedom lite test strips and lancets             blood-glucose meter (TRUE METRIX AIR GLUCOSE METER) Misc  Use as directed             brimonidine 0.1% (ALPHAGAN P) 0.1 % Drop  Place 1 drop into both eyes 3 (three) times daily.             CALCIUM CARBONATE-VITAMIN D3 ORAL  Take 1 tablet by mouth every morning. Calcium carbonate 1000mg vitamin d3 1600 units per patient             clopidogrel (PLAVIX) 75 mg tablet  TAKE 1 TABLET BY MOUTH ONCE DAILY             fexofenadine (ALLEGRA) 180 MG tablet  TAKE 1 TABLET ONE TIME DAILY             gabapentin (NEURONTIN) 300 MG capsule  Take 600 mg by mouth 2 (two) times daily.              lancets 28 gauge Misc  1 lancet by Misc.(Non-Drug; Combo Route) route 3 (three) times daily.             lisinopril (PRINIVIL,ZESTRIL) 2.5 MG tablet  Take 1 tablet (2.5 mg total) by mouth once daily.             metOLazone (ZAROXOLYN) 5 MG tablet  Take 1 tablet (5 mg total) by mouth every  Mon, Wed, Fri.             metoprolol succinate (TOPROL-XL) 25 MG 24 hr tablet  Take 1 tablet (25 mg total) by mouth once daily.             nitroGLYCERIN (NITROSTAT) 0.4 MG SL tablet  Place 1 tablet (0.4 mg total) under the tongue every 5 (five) minutes as needed for Chest pain.             ondansetron (ZOFRAN-ODT) 4 MG TbDL  Take 1 tablet (4 mg total) by mouth every 4 to 6 hours as needed. Dissolve one tablet under tongue every 4-6 hours as needed for nausea.             potassium chloride SA (K-DUR,KLOR-CON) 20 MEQ tablet  Take 1 tablet (20 mEq total) by mouth once daily.             tamsulosin (FLOMAX) 0.4 mg Cp24  Take 1 capsule (0.4 mg total) by mouth once daily.             torsemide (DEMADEX) 20 MG Tab  Take 2 tablets (40 mg total) by mouth 2 (two) times daily.                 Patient Instructions:  No discharge procedures on file.    Signing Physician:  Aniya Collins MD

## 2017-10-23 PROBLEM — R79.89 ELEVATED LACTIC ACID LEVEL: Status: RESOLVED | Noted: 2017-01-01 | Resolved: 2017-01-01

## 2017-10-23 PROBLEM — A41.9 SEPSIS: Status: RESOLVED | Noted: 2017-01-01 | Resolved: 2017-01-01

## 2017-10-23 PROBLEM — I50.43 ACUTE ON CHRONIC COMBINED SYSTOLIC AND DIASTOLIC CHF, NYHA CLASS 4: Status: RESOLVED | Noted: 2017-01-01 | Resolved: 2017-01-01

## 2017-10-23 PROBLEM — D72.829 LEUKOCYTOSIS: Status: RESOLVED | Noted: 2017-01-01 | Resolved: 2017-01-01

## 2017-10-23 NOTE — PATIENT INSTRUCTIONS
Fluid Pills:    Demadex (torsemide): 20 mg tablet - Take 2 tablets twice daily (9 am & 9 pm)  Potassium 20 meq: 20 meq tablet - Take 1 tablet twice daily along with Demadex (9 am & 9 pm)    Metolazone 5 mg: take 1 tablet at 6 am daily.    Call MD if SOB,  weight gain > 2-3 lbs per day and/or 5-6 lbs per week:     Increase Demadex (torsemide): Take 3 tablets twice daily (9 am & 9 pm) until you lose the weight gain.

## 2017-10-23 NOTE — PROGRESS NOTES
[facility]  New Visit Progress Note   Recent Hospital Discharge     PRESENTING HISTORY     Chief Complaint/Reason for Visit:  Follow up Hospital Discharge   Chief Complaint   Patient presents with    Hospital Follow Up     PCP: Everette Rowan MD, MD    History of Present Illness: Mr. Lex Billy is a 72 y.o. male who was recently admitted to the hospital.    Team: [facility]OSP MED 5  Admit Date: 10/14/2017  Discharge Date: 10/16/2017  Discharge Attending Physician: Graciela David MD   Admitting Resident: Salvador Godwin MD     Diagnoses:         Active Hospital Problems     Diagnosis   POA    *Acute on chronic combined systolic and diastolic CHF, NYHA class 4, ACC/AHA Stage C [I50.43]   Yes    Benign non-nodular prostatic hyperplasia with lower urinary tract symptoms [N40.1]   Yes    Stage 4, Clear cell renal cell carcinoma, right w/ bony mets [C64.1]   Yes    Severe aortic stenosis [I35.0]   Yes       --- Repeated 2D ECHO showed EF 40% with severe aortic stenosis, relatively unchanged  --- Not a TAVR candidate due to RCC   --- moderate AS with regards to mean gradient of 34 mmHg, despite NAMAN < 1 cm2  --- IC recs of not a surgical candidate   --- EF reduction from 50 % to 40 %    Coronary artery disease involving native coronary artery of native heart without angina pectoris [I25.10]   Yes    Essential hypertension [I10]   Yes    Type 2 diabetes mellitus with stage 2 chronic kidney disease, without long-term current use of insulin [E11.22, N18.2]   Yes         HOSPITAL COURSE:       Initial Presentation:  a 71 yo gentleman w/ h/o Stage 4 clear cell RCC (R side) s/p C3 palliative Opdivo, diet controlled DM (2/2017 5.5%), CAD s/p PCI w/ KIRAN 10/2015 (dLM, mRCA, & dRCA), 5+ AVS (NAMAN = 0.68 cm2, peak velocity = 3.5 m/s, mean gradient = 30 mmHg), and ICMP w/ HFrEF (9/21 15%, Grade 3 DD) recently admitted 10/2 to 10/4 for ADHF who now presents w/ 4 d h/o progressively worsening shortness of breath and  difficulty sleeping 2/2 to orthopnea, and associated lower extremities edema, for the last 3-4 days. He denies any chest pain, palpitations, fevers, chills, productive cough (has had a mild intermittent non-productive cough), N/V, or diarrhea.  He had an appointment with cardiology this upcoming Tuesday but decided to present to the ED this evening 2/2 to dyspnea at rest.  The patient was admitted due to acute on chronic CHF.     Course of Principle Problem for Admission:  In the ED he was given Lasix 80 mg IV x 1 prior to admission to hospital medicine. On the floors he was started on Lasix 80 mg IV BID, Strict I&O's and weights were measured daily. Since he did not have an impressive urine output with lasix alone, Diuril 250 mg x1 daily was added. The patient was significantly feeling better, able to walk around and sleep comfortably. He was discharged on Torsemide 40 mg Bid and Metolazone 5 mg x3 per week, with scheduled appointment in the IM priority clinic and instructions to schedule an appointment with cardiology for f/u.     Other Medical Problems Addressed in the Hospital:             Stage 4, Clear cell renal cell carcinoma, right w/ bony mets     -Tramadol 100 mg po q6h for pain associated with bony mets       Benign non-nodular prostatic hyperplasia with lower urinary tract symptoms     -Resumed home bethanechol 50 mg po TID  -Resumed home Flomax 0.4 mg po qd       Essential hypertension     -Resumed home Lisinopril 2.5 mg po qd       Type 2 diabetes mellitus with stage 2 chronic kidney disease, without long-term current use of insulin     Diet controlled at home  -Resume home gabapentin 300 mg po TID for DM neuropathy  -LD SSI  -qhs, qac, & 02:00 POCT glucose checks       Coronary artery disease involving native coronary artery of native heart without angina pectoris     -Resumed home aspirin 81 mg po qd  -Resumed home Plavix 75 mg po qd  -NTG 0.4 mg SL PRN for chest pain       Severe aortic stenosis      -diuresed gently in the setting of severe AS      Psychiatric/Behavioral: Negative for confusion. The patient is not nervous/anxious.       Objective:      Vital Signs (Most Recent):  Temp: 98.4 °F (36.9 °C) (10/16/17 1500)  Pulse: 96 (10/16/17 1500)  Resp: 15 (10/16/17 1500)  BP: 94/72 (10/16/17 1500)  SpO2: 98 % (10/16/17 1500) Vital Signs (24h Range):  Temp:  [98.2 °F (36.8 °C)-98.7 °F (37.1 °C)] 98.4 °F (36.9 °C)  Pulse:  [] 96  Resp:  [12-22] 15  SpO2:  [96 %-100 %] 98 %  BP: ()/() 94/72      Weight: 72.7 kg (160 lb 4.4 oz)  Body mass index is 23 kg/m².     Physical Exam   Constitutional: He is oriented to person, place, and time. He appears well-developed and well-nourished. He is cooperative. No distress.   HENT:   Head: Normocephalic and atraumatic.   Mouth/Throat: Mucous membranes are normal.   Eyes: Conjunctivae and EOM are normal. Pupils are equal, round, and reactive to light.   Neck: Normal range of motion. Neck supple. JVD present.   Cardiovascular: Regular rhythm and intact distal pulses.  Exam reveals no gallop and no friction rub.    Murmur heard.   Crescendo decrescendo systolic (more pronounced on RSB) murmur is present with a grade of 4/6   Pulmonary/Chest: Effort normal. No respiratory distress. He has no decreased breath sounds. He has no wheezes. He has no rhonchi. He has no rales (significantly improved). He exhibits no tenderness.   Abdominal: Soft. Bowel sounds are normal. He exhibits no distension. There is no tenderness.   Musculoskeletal: Normal range of motion. He exhibits edema (BL LE edema, pitting, decrease from 2+ to trace). He exhibits no tenderness or deformity.   Neurological: He is alert and oriented to person, place, and time.   Skin: Skin is warm and dry. No rash noted. He is not diaphoretic.   Scattered BL UE & LE ecchymosis   Psychiatric: He has a normal mood and affect. His behavior is normal.   Nursing note and  vitals reviewed.     ___________________________________________________________________    Today:  He states his BP has been running 80-87. This morning it was in the 70s.  He feels weak. He felt yesterday.  Weight at home: 10-16 149 lbs.                               10-23 154 lbs   Highest weight 161 last few days.    Review of Systems:  Review of Systems   Constitutional: Positive for malaise/fatigue. Negative for chills, fever and weight loss.   Respiratory: Negative for cough and shortness of breath.    Cardiovascular: Negative for chest pain, palpitations and leg swelling.   Genitourinary: Positive for frequency. Negative for urgency.   Musculoskeletal: Positive for falls.   Neurological: Positive for dizziness and weakness. Negative for seizures and loss of consciousness.       PAST HISTORY:     Past Medical History:   Diagnosis Date    Aortic atherosclerosis 10/20/2016    Benign non-nodular prostatic hyperplasia with lower urinary tract symptoms 3/29/2017    Brain metastases 7/10/2017    Chronic combined systolic and diastolic heart failure 3/29/2017    4-10-17   1 - Mildly to moderately depressed left ventricular systolic function (EF 40-45%).    2 - Normal right ventricular systolic function .    3 - Left ventricular diastolic dysfunction.    4 - Mild mitral regurgitation.    5 - Mild aortic regurgitation.    6 - Severe aortic stenosis, NAMAN = 0.72 cm2, peak velocity = 4.3 m/s, mean gradient = 43.0 mmHg.    7 - Increased central venous pressure.    Chronic retention of urine 4/3/2017    Patient does self cath at home.   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    Stroke     right eye    Type 2 diabetes mellitus with diabetic polyneuropathy, without long-term current use of insulin 10/20/2016    Type 2 diabetes mellitus with stage 2 chronic kidney disease, without long-term current use of insulin 8/6/2012       Past Surgical History:   Procedure Laterality Date    BACK SURGERY      cervical disc replacement    CARPAL TUNNEL RELEASE      left    CORONARY ANGIOPLASTY WITH STENT PLACEMENT  10/2015    4 stents    EYE SURGERY      laser surgery in right eye    JOINT REPLACEMENT      orthoscopic surgery on knee      ROTATOR CUFF REPAIR      right side    SINUS SURGERY      TONSILLECTOMY      torn ligament      repair. left shoulder    TOTAL KNEE ARTHROPLASTY      left side    VASECTOMY         Family History   Problem Relation Age of Onset    Heart disease Father     ALS Father     Cancer Brother      esophageal    Rheum arthritis Mother     Migraines Daughter     Asthma Son     Diabetes Neg Hx     Colon polyps Neg Hx        Social History     Social History    Marital status:      Spouse name: Belkys    Number of children: N/A    Years of education: N/A     Occupational History    Retired Not [employer]     Social History Main Topics    Smoking status: Never Smoker    Smokeless tobacco: Never Used    Alcohol use No      Comment: Heavy in past, quit about 20 years ago    Drug use: No    Sexual activity: Not Currently     Partners: Female     Other Topics Concern    None     Social History Narrative    , previous longshoreman.    Wife [place] has dm2.    2  kids, 1 son RT, 1 dtr .    Has not been able to exercise       MEDICATIONS & ALLERGIES:     Current Outpatient Prescriptions on File Prior to Visit   Medication Sig Dispense Refill    aspirin (ECOTRIN) 81 MG EC tablet Take 81 mg by mouth once daily.      atorvastatin (LIPITOR) 40 MG tablet TAKE 1 TABLET ONE TIME DAILY (Patient taking differently: TAKE 1 TABLET BY MOUTH ONE TIME DAILY) 90 tablet 3    azelastine (ASTELIN) 137 mcg (0.1 %) nasal spray 1 spray (137 mcg total) by Nasal route 2 (two) times daily. 30 mL 0    bethanechol (URECHOLINE) 50 MG tablet Take 1 tablet (50 mg total) by mouth 3 (three) times daily. 90 tablet 11    blood sugar diagnostic Strp 1 each by Misc.(Non-Drug; Combo Route) route 3 (three) times daily. Free style freedom lite test strips and lancets (Patient taking differently: 1 each by Misc.(Non-Drug; Combo Route) route. Test blood sugar twice daily every other day) 90 each 3    blood-glucose meter (TRUE METRIX AIR GLUCOSE METER) Misc Use as directed 1 each 0    brimonidine 0.1% (ALPHAGAN P) 0.1 % Drop Place 1 drop into both eyes 3 (three) times daily.      CALCIUM CARBONATE-VITAMIN D3 ORAL Take 1 tablet by mouth every morning. Calcium carbonate 1000mg vitamin d3 1600 units per patient      clopidogrel (PLAVIX) 75 mg tablet TAKE 1 TABLET BY MOUTH ONCE DAILY 90 tablet 3    fexofenadine (ALLEGRA) 180 MG tablet TAKE 1 TABLET ONE TIME DAILY (Patient taking differently: TAKE 1 TABLET BY MOUTH ONE TIME DAILY) 90 tablet 3    gabapentin (NEURONTIN) 300 MG capsule Take 600 mg by mouth 2 (two) times daily.       lancets 28 gauge Misc 1 lancet by Misc.(Non-Drug; Combo Route) route 3 (three) times daily. 300 each 3    metoprolol succinate (TOPROL-XL) 25 MG 24 hr tablet Take 1 tablet (25 mg total) by mouth once daily. 90 tablet 3    nitroGLYCERIN (NITROSTAT) 0.4 MG SL tablet Place 1 tablet (0.4 mg total) under the tongue every 5 (five) minutes as needed for Chest pain. 25 tablet  4    ondansetron (ZOFRAN-ODT) 4 MG TbDL Take 1 tablet (4 mg total) by mouth every 4 to 6 hours as needed. Dissolve one tablet under tongue every 4-6 hours as needed for nausea. 30 tablet 1    torsemide (DEMADEX) 20 MG Tab Take 2 tablets (40 mg total) by mouth 2 (two) times daily. 120 tablet 1     lisinopril (PRINIVIL,ZESTRIL) 2.5 MG tablet Take 1 tablet (2.5 mg total) by mouth once daily. 30 tablet 0     metOLazone (ZAROXOLYN) 5 MG tablet Take 1 tablet (5 mg total) by mouth every Mon, Wed, Fri. 12 tablet 2     potassium chloride SA (K-DUR,KLOR-CON) 20 MEQ tablet Take 1 tablet (20 mEq total) by mouth once daily. 90 tablet 4     tamsulosin (FLOMAX) 0.4 mg Cp24 Take 1 capsule (0.4 mg total) by mouth once daily. 30 capsule 11     No current facility-administered medications on file prior to visit.         Review of patient's allergies indicates:   Allergen Reactions    No known drug allergies        OBJECTIVE:     Vital Signs:  Vitals:    10/23/17 0854   BP: (!) 70/51   Pulse: 92     Wt Readings from Last 1 Encounters:   10/23/17 0854 71.4 kg (157 lb 6.5 oz)     Body mass index is 22.59 kg/m².     Physical Exam:  General: Well developed, thin. No distress.  HEENT: Head is normocephalic, atraumatic.  Eyes: Clear conjunctiva.  Neck: Supple, symmetrical neck; trachea midline.  Lungs: Clear to auscultation bilaterally and normal respiratory effort.  Cardiovascular: Heart with regular rate and rhythm.  Systolic murmur c/w AS  Extremities: No LE edema.   Abdomen: Abdomen is soft, non-tender non-distended with normal bowel sounds.  Skin: Skin color, texture, turgor normal. No rashes.  Musculoskeletal: Normal gait.   Neurologic: Normal strength and tone. No focal numbness or weakness.   Psychiatric: Not depressed.    Laboratory  Lab Results   Component Value Date    WBC 6.90 10/16/2017    HGB 12.0 (L) 10/16/2017    HCT 37.8 (L) 10/16/2017    MCV 89 10/16/2017     (L) 10/16/2017     BMP  Lab Results   Component  Value Date     10/16/2017    K 2.9 (L) 10/16/2017     10/16/2017    CO2 23 10/16/2017    BUN 32 (H) 10/16/2017    CREATININE 1.0 10/16/2017    CALCIUM 8.1 (L) 10/16/2017    ANIONGAP 12 10/16/2017    ESTGFRAFRICA >60.0 10/16/2017    EGFRNONAA >60.0 10/16/2017     Lab Results   Component Value Date    ALT 19 10/15/2017    AST 28 10/15/2017    ALKPHOS 117 10/15/2017    BILITOT 1.0 10/15/2017     Lab Results   Component Value Date    INR 1.2 10/15/2017    INR 1.3 (H) 03/28/2017    INR 1.1 02/11/2016     Lab Results   Component Value Date    HGBA1C 5.5 02/20/2017     TRANSITION OF CARE:     Transition of Care Visit:     I have reviewed and updated the history and problem list.  I have reconciled the medication list.  I have discussed the hospitalization and current medical issues, prognosis and plans with the patient/family.  I  spent more than 50% of time discussing the care with the patient/family.  Total Encounter in the [facility]: 60 minutes.    Medications Reconciliation:   I have reconciled the patient's home medications and discharge medications with the patient/family. I have updated all changes.  Refer to After-Visit Medication List.    ASSESSMENT & PLAN:     Severe aortic stenosis  Coronary artery disease involving native coronary artery of native heart without angina pectoris  Chronic combined systolic and diastolic heart failure  Aortic atherosclerosis  Essential hypertension  - Patient is known to me from previous hospitalization.    His EF has declined rapidly over the past 1 year.  50  To 40 to 15%  - Was not determined to be candidate for TAVR due to metastatic renal CA (which he has survived two years).  - SBP is very low currently.  Prone to pulmonary edema.    Plan:  DC Lisinopril and Flomax.             Keep Toprol XL 25 mg daily.             Fluid pills:  Metolazone 5 mg daily at 6 am.                                 Demadex 40 mg BID with KCL 20 meq BID.             Refer to  Cardiology regarding:  Palliative TAVR vs BAV.    Rx:  -     potassium chloride SA (K-DUR,KLOR-CON) 20 MEQ tablet; Take 1 tablet (20 mEq total) by mouth 2 (two) times daily.  Dispense: 120 tablet; Refill: 4  -     metOLazone (ZAROXOLYN) 5 MG tablet; Take 1 tablet (5 mg total) by mouth once daily. Take at 6 am.  Dispense: 90 tablet; Refill: 2    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  Stage 4, Clear cell renal cell carcinoma, right w/ bony mets  Brain metastases  - Has been receiving chemotherapy.    No symptoms from the cancer currently.    Type 2 diabetes mellitus with stage 2 chronic kidney disease, without long-term current use of insulin  Type 2 diabetes mellitus with diabetic polyneuropathy, without long-term current use of insulin  - Resolved. A1c now 5.5.  Not on meds.    Benign non-nodular prostatic hyperplasia with lower urinary tract symptoms  Chronic retention of urine  - He does self cath PRN.    Instructions for the patient:  Fluid Pills:    Demadex (torsemide): 20 mg tablet - Take 2 tablets twice daily (9 am & 9 pm)  Potassium 20 meq: 20 meq tablet - Take 1 tablet twice daily along with Demadex (9 am & 9 pm)    Metolazone 5 mg: take 1 tablet at 6 am daily.    Call MD if SOB,  weight gain > 2-3 lbs per day and/or 5-6 lbs per week:     Increase Demadex (torsemide): Take 3 tablets twice daily (9 am & 9 pm) until you lose the weight gain.    Scheduled Follow-up :  Future Appointments  Date Time Provider Department Center   10/24/2017 10:30 AM Jatin Koch MD [facility] CARDIO [facility]   11/27/2017 9:40 AM Everette Rowan MD [facility] IM [place]       After Visit Medication List :     Medication List          Accurate as of 10/23/17  9:47 AM. If you have any questions, ask your nurse or doctor.               CHANGE how you take these medications    atorvastatin 40 MG tablet  Commonly known as:  LIPITOR  TAKE 1 TABLET ONE TIME DAILY  What changed:  See the new instructions.     blood sugar diagnostic Strp  1 each by  Misc.(Non-Drug; Combo Route) route 3 (three) times daily. Free style freedom lite test strips and lancets  What changed:  · when to take this  · additional instructions     fexofenadine 180 MG tablet  Commonly known as:  ALLEGRA  TAKE 1 TABLET ONE TIME DAILY  What changed:  See the new instructions.     metOLazone 5 MG tablet  Commonly known as:  ZAROXOLYN  Take 1 tablet (5 mg total) by mouth once daily. Take at 6 am.  What changed:  · when to take this  · additional instructions  Changed by:  Jc Rowan MD     potassium chloride SA 20 MEQ tablet  Commonly known as:  K-DUR,KLOR-CON  Take 1 tablet (20 mEq total) by mouth 2 (two) times daily.  What changed:  when to take this  Changed by:  Jc Rowan MD        CONTINUE taking these medications    aspirin 81 MG EC tablet  Commonly known as:  ECOTRIN     azelastine 137 mcg (0.1 %) nasal spray  Commonly known as:  ASTELIN  1 spray (137 mcg total) by Nasal route 2 (two) times daily.     bethanechol 50 MG tablet  Commonly known as:  URECHOLINE  Take 1 tablet (50 mg total) by mouth 3 (three) times daily.     blood-glucose meter Misc  Commonly known as:  TRUE METRIX AIR GLUCOSE METER  Use as directed     brimonidine 0.1% 0.1 % Drop  Commonly known as:  ALPHAGAN P     CALCIUM CARBONATE-VITAMIN D3 ORAL     clopidogrel 75 mg tablet  Commonly known as:  PLAVIX  TAKE 1 TABLET BY MOUTH ONCE DAILY     gabapentin 300 MG capsule  Commonly known as:  NEURONTIN     lancets 28 gauge Misc  1 lancet by Misc.(Non-Drug; Combo Route) route 3 (three) times daily.     metoprolol succinate 25 MG 24 hr tablet  Commonly known as:  TOPROL-XL  Take 1 tablet (25 mg total) by mouth once daily.     nitroGLYCERIN 0.4 MG SL tablet  Commonly known as:  NITROSTAT  Place 1 tablet (0.4 mg total) under the tongue every 5 (five) minutes as needed for Chest pain.     ondansetron 4 MG Tbdl  Commonly known as:  ZOFRAN-ODT  Take 1 tablet (4 mg total) by mouth every 4 to 6 hours as needed. Dissolve one  tablet under tongue every 4-6 hours as needed for nausea.     torsemide 20 MG Tab  Commonly known as:  DEMADEX  Take 2 tablets (40 mg total) by mouth 2 (two) times daily.        STOP taking these medications    lisinopril 2.5 MG tablet  Commonly known as:  PRINIVIL,ZESTRIL  Stopped by:  Jc Rowan MD     tamsulosin 0.4 mg Cp24  Commonly known as:  FLOMAX  Stopped by:  Jc Rowan MD           Where to Get Your Medications      These medications were sent to [facility]/pharmacy #[id number] - [place] - [address]  [address] [place] [address]    Phone:  [phone]   · metOLazone 5 MG tablet  · potassium chloride SA 20 MEQ tablet         Signing Physician:  Jc Rowan MD

## 2017-10-23 NOTE — Clinical Note
[facility] Visit (Post Discharge Follow-up) Today:  - Our clinic physicians and nurses plan to follow the patient up for any medical issues in the [facility] for 30 days post discharge.   Dx: Severe AS. SBP now 70-90.  Plan:  DC Lisinopril and Flomax.            Keep Toprol XL 25 mg daily.            Fluid pills:  Metolazone 5 mg daily at 6 am.                                Demadex 40 mg BID with KCL 20 meq BID.            Refer to Cardiology regarding:  Palliative TAVR vs BAV.  Future Appointments: 10/24/2017 10:30 AM   Jatin Koch MD      [facility] CARDIO    [facility] 11/27/2017 9:40 AM    Everette Rowan MD               [facility]        [place]

## 2017-10-24 PROBLEM — I25.5 CARDIOMYOPATHY, ISCHEMIC: Status: ACTIVE | Noted: 2017-01-01

## 2017-10-24 NOTE — PROGRESS NOTES
Subjective:    Patient ID:  Lex Billy is a 72 y.o. male who presents for follow-up of Acute on chronic combined systolic and diastolic CHF, NYHA c (hospital discharge 10/16/17)      HPI     The patient is a 72 year old male with metastatic renal cell ca, CAD ICM[ EF 15-20%]post NSTEMI/multiple BMSs 2017, severe AS was dicharged 10/16 with CHF exacerbation[ BNP 2382]. Since discharge his weight has been stable and has no edema. He denies SOB but is very weak and has unsteady gait. End of life measures was discussed and in he presence of his wife and son he wished for no futile  Care.      CONCLUSIONS     1 - Eccentric hypertrophy.     2 - Severely depressed left ventricular systolic function (EF 15-20%).     3 - Right ventricular enlargement with mildly to moderately depressed systolic function.     4 - Restrictive LV filling pattern, indicating markedly elevated LAP (grade 3 diastolic dysfunction).     5 - Biatrial enlargement.     6 - Mild to moderate mitral regurgitation.     7 - Mild tricuspid regurgitation.     8 - Severe aortic stenosis, NAMAN = 0.68 cm2, peak velocity = 3.5 m/s, mean gradient = 30 mmHg.     9 - Pulmonary hypertension. The estimated PA systolic pressure is 60 mmHg.     10 - Mildly elevated central venous pressure.             This document has been electronically    SIGNED BY: Vini Shoemaker MD On: 09/21/2017 14:01  Lab Results   Component Value Date     10/16/2017    K 2.9 (L) 10/16/2017     10/16/2017    CO2 23 10/16/2017    BUN 32 (H) 10/16/2017    CREATININE 1.0 10/16/2017    GLU 81 10/16/2017    HGBA1C 5.5 02/20/2017    MG 2.3 10/16/2017    AST 28 10/15/2017    ALT 19 10/15/2017    ALBUMIN 3.1 (L) 10/15/2017    PROT 7.4 10/15/2017    BILITOT 1.0 10/15/2017    WBC 6.90 10/16/2017    HGB 12.0 (L) 10/16/2017    HCT 37.8 (L) 10/16/2017    HCT 35 (L) 03/28/2017    MCV 89 10/16/2017     (L) 10/16/2017    INR 1.2 10/15/2017    PSA 2.5 02/20/2017    TSH 4.455 (H)  09/05/2017         Lab Results   Component Value Date    CHOL 103 (L) 09/05/2017    HDL 40 09/05/2017    TRIG 59 09/05/2017       Lab Results   Component Value Date    LDLCALC 51.2 (L) 09/05/2017       Past Medical History:   Diagnosis Date    Aortic atherosclerosis 10/20/2016    Benign non-nodular prostatic hyperplasia with lower urinary tract symptoms 3/29/2017    Brain metastases 7/10/2017    Chronic combined systolic and diastolic heart failure 3/29/2017    4-10-17   1 - Mildly to moderately depressed left ventricular systolic function (EF 40-45%).    2 - Normal right ventricular systolic function .    3 - Left ventricular diastolic dysfunction.    4 - Mild mitral regurgitation.    5 - Mild aortic regurgitation.    6 - Severe aortic stenosis, NAMAN = 0.72 cm2, peak velocity = 4.3 m/s, mean gradient = 43.0 mmHg.    7 - Increased central venous pressure.    Chronic retention of urine 4/3/2017    Patient does self cath at home.   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    Stroke     right eye    Type 2 diabetes mellitus with diabetic polyneuropathy, without long-term current use of insulin 10/20/2016    Type 2 diabetes mellitus with stage 2 chronic kidney disease, without long-term current use of  insulin 8/6/2012       Current Outpatient Prescriptions:     aspirin (ECOTRIN) 81 MG EC tablet, Take 81 mg by mouth once daily., Disp: , Rfl:     atorvastatin (LIPITOR) 40 MG tablet, TAKE 1 TABLET ONE TIME DAILY (Patient taking differently: TAKE 1 TABLET BY MOUTH ONE TIME DAILY), Disp: 90 tablet, Rfl: 3    azelastine (ASTELIN) 137 mcg (0.1 %) nasal spray, 1 spray (137 mcg total) by Nasal route 2 (two) times daily., Disp: 30 mL, Rfl: 0    bethanechol (URECHOLINE) 50 MG tablet, Take 1 tablet (50 mg total) by mouth 3 (three) times daily., Disp: 90 tablet, Rfl: 11    blood sugar diagnostic Strp, 1 each by Misc.(Non-Drug; Combo Route) route 3 (three) times daily. Free style freedom lite test strips and lancets (Patient taking differently: 1 each by Misc.(Non-Drug; Combo Route) route. Test blood sugar twice daily every other day), Disp: 90 each, Rfl: 3    blood-glucose meter (TRUE METRIX AIR GLUCOSE METER) Misc, Use as directed, Disp: 1 each, Rfl: 0    brimonidine 0.1% (ALPHAGAN P) 0.1 % Drop, Place 1 drop into both eyes 3 (three) times daily., Disp: , Rfl:     CALCIUM CARBONATE-VITAMIN D3 ORAL, Take 1 tablet by mouth every morning. Calcium carbonate 1000mg vitamin d3 1600 units per patient, Disp: , Rfl:     clopidogrel (PLAVIX) 75 mg tablet, TAKE 1 TABLET BY MOUTH ONCE DAILY, Disp: 90 tablet, Rfl: 3    fexofenadine (ALLEGRA) 180 MG tablet, TAKE 1 TABLET ONE TIME DAILY (Patient taking differently: TAKE 1 TABLET BY MOUTH ONE TIME DAILY), Disp: 90 tablet, Rfl: 3    gabapentin (NEURONTIN) 300 MG capsule, Take 600 mg by mouth 2 (two) times daily. , Disp: , Rfl:     lancets 28 gauge Misc, 1 lancet by Misc.(Non-Drug; Combo Route) route 3 (three) times daily., Disp: 300 each, Rfl: 3    metOLazone (ZAROXOLYN) 5 MG tablet, Take 1 tablet (5 mg total) by mouth once daily. Take at 6 am., Disp: 90 tablet, Rfl: 2    metoprolol succinate (TOPROL-XL) 25 MG 24 hr tablet, Take 1 tablet (25 mg total) by mouth once daily.,  Disp: 90 tablet, Rfl: 3    nitroGLYCERIN (NITROSTAT) 0.4 MG SL tablet, Place 1 tablet (0.4 mg total) under the tongue every 5 (five) minutes as needed for Chest pain., Disp: 25 tablet, Rfl: 4    ondansetron (ZOFRAN-ODT) 4 MG TbDL, Take 1 tablet (4 mg total) by mouth every 4 to 6 hours as needed. Dissolve one tablet under tongue every 4-6 hours as needed for nausea., Disp: 30 tablet, Rfl: 1    potassium chloride SA (K-DUR,KLOR-CON) 20 MEQ tablet, Take 1 tablet (20 mEq total) by mouth 2 (two) times daily., Disp: 120 tablet, Rfl: 4    torsemide (DEMADEX) 20 MG Tab, Take 2 tablets (40 mg total) by mouth 2 (two) times daily., Disp: 120 tablet, Rfl: 1        Review of Systems   Constitution: Positive for weakness, malaise/fatigue and weight loss. Negative for decreased appetite, diaphoresis, fever and weight gain.   HENT: Negative for congestion, ear discharge, ear pain and nosebleeds.    Eyes: Negative for blurred vision, double vision and visual disturbance.   Cardiovascular: Positive for dyspnea on exertion. Negative for chest pain, claudication, cyanosis, irregular heartbeat, leg swelling, near-syncope, orthopnea, palpitations, paroxysmal nocturnal dyspnea and syncope.   Respiratory: Negative for cough, hemoptysis, shortness of breath, sleep disturbances due to breathing, snoring, sputum production and wheezing.    Endocrine: Negative for polydipsia, polyphagia and polyuria.   Hematologic/Lymphatic: Negative for adenopathy and bleeding problem. Does not bruise/bleed easily.   Skin: Negative for color change, nail changes, poor wound healing and rash.   Musculoskeletal: Negative for muscle cramps and muscle weakness.   Gastrointestinal: Negative for abdominal pain, anorexia, change in bowel habit, hematochezia, nausea and vomiting.   Genitourinary: Negative for dysuria, frequency and hematuria.   Neurological: Positive for excessive daytime sleepiness. Negative for brief paralysis, difficulty with concentration,  "dizziness, focal weakness, headaches, light-headedness, seizures and vertigo.   Psychiatric/Behavioral: Negative for altered mental status and depression.   Allergic/Immunologic: Negative for persistent infections.        Objective:BP (!) 89/55 (BP Location: Left arm, Patient Position: Sitting, BP Method: Large (Automatic))   Pulse 94   Ht 5' 10" (1.778 m)   Wt 70.8 kg (156 lb 1.4 oz)   BMI 22.40 kg/m²           Physical Exam   Constitutional: He is oriented to person, place, and time. He appears well-developed and well-nourished.   Thin chronic ill   HENT:   Head: Normocephalic.   Right Ear: External ear normal.   Left Ear: External ear normal.   Nose: Nose normal.   Inspection of lips, teeth and gums normal   Eyes: EOM are normal. Pupils are equal, round, and reactive to light. No scleral icterus.   Neck: Normal range of motion. Neck supple. No JVD present. No tracheal deviation present. No thyromegaly present.   Cardiovascular: Normal rate, regular rhythm and intact distal pulses.  Exam reveals no gallop and no friction rub.    Murmur heard.  High-pitched blowing early systolic murmur is present with a grade of 2/6  radiating to the neck  Pulmonary/Chest: Effort normal and breath sounds normal.   Abdominal: Bowel sounds are normal. He exhibits no distension. There is no hepatosplenomegaly. There is no tenderness. There is no guarding.   Musculoskeletal: Normal range of motion. He exhibits no edema or tenderness.   Lymphadenopathy:   Palpation of neck and groin lymph nodes normal   Neurological: He is alert and oriented to person, place, and time. No cranial nerve deficit. He exhibits normal muscle tone. Coordination normal.   Skin: Skin is dry.   Palpation of skin normal   Psychiatric: His behavior is normal. Judgment and thought content normal.         Assessment:       1. Acute on chronic combined systolic and diastolic CHF, NYHA class 4, ACC/AHA Stage C    2. Cardiomyopathy, ischemic    3. Severe aortic " stenosis    4. Aortic atherosclerosis    5. Brain metastases    6. Type 2 diabetes mellitus with diabetic polyneuropathy, without long-term current use of insulin    7. Coronary artery disease involving native heart without angina pectoris, unspecified vessel or lesion type         Plan:       Lex was seen today for acute on chronic combined systolic and diastolic chf, nyha c.    Diagnoses and all orders for this visit:    Acute on chronic combined systolic and diastolic CHF, NYHA class 4, ACC/AHA Stage C  -     Basic metabolic panel; Future; Expected date: 10/24/2017  -     Basic metabolic panel; Future; Expected date: 11/07/2017    Cardiomyopathy, ischemic    Severe aortic stenosis    Aortic atherosclerosis    Brain metastases    Type 2 diabetes mellitus with diabetic polyneuropathy, without long-term current use of insulin    Coronary artery disease involving native heart without angina pectoris, unspecified vessel or lesion type

## 2017-11-07 NOTE — TELEPHONE ENCOUNTER
notified of panic K+=2.3 & CO2=41.Pt reports that he did take the metolazone and he has lost 7 lbs,wt today = 149 lbs.His b/p has been 105/52,87/58,91/56,95/61 instructed as ordered to stop taking the metolazone and increased the KCHL 20 meq from BID to TID, take three times a day with meals.Scheduled to have a BMP @ 11:30 AM on 11/14/17 before his scheduled appt with  at 1:30 PM.Pt reports understanding of these instructions.

## 2017-11-07 NOTE — TELEPHONE ENCOUNTER
----- Message from Lucie Conrad sent at 11/7/2017  9:59 AM CST -----  Contact: Lab  Critical value on this pt, Dr. Jatin Koch, Transferred to you.  Thanks!!

## 2017-11-14 NOTE — PROGRESS NOTES
Subjective:    Patient ID:  Lex Billy is a 72 y.o. male who presents for follow-up of Acute on chronic combined systolic and diastolic CHF, NYHA c      HPI   The patient is a 72 year old male with metastatic renal cell ca, CAD ICM[ EF 15-20%]post NSTEMI/multiple BMSs 2017, severe AS was dicharged 10/16 following an admit for CHF exacerbation[ BNP 2382]. On his last clinic visit his weight was up 7# and metolazone was added daily. His follow up lab however noted a increase in his creatinine and decrease in K+. The metolazone was held and KCl increased. Follow up lab today is pending. While he has gained 4# he has no edema . His wife reports improved appetite.He reports insomnia.    Lab Results   Component Value Date     (L) 11/07/2017    K 2.3 (LL) 11/07/2017    CL 81 (L) 11/07/2017    CO2 41 (HH) 11/07/2017    BUN 74 (H) 11/07/2017    CREATININE 1.7 (H) 11/07/2017     (H) 11/07/2017    HGBA1C 5.5 02/20/2017    MG 2.3 10/16/2017    AST 28 10/15/2017    ALT 19 10/15/2017    ALBUMIN 3.1 (L) 10/15/2017    PROT 7.4 10/15/2017    BILITOT 1.0 10/15/2017    WBC 6.90 10/16/2017    HGB 12.0 (L) 10/16/2017    HCT 37.8 (L) 10/16/2017    HCT 35 (L) 03/28/2017    MCV 89 10/16/2017     (L) 10/16/2017    INR 1.2 10/15/2017    PSA 2.5 02/20/2017    TSH 4.455 (H) 09/05/2017         Lab Results   Component Value Date    CHOL 103 (L) 09/05/2017    HDL 40 09/05/2017    TRIG 59 09/05/2017       Lab Results   Component Value Date    LDLCALC 51.2 (L) 09/05/2017       Past Medical History:   Diagnosis Date    Aortic atherosclerosis 10/20/2016    Benign non-nodular prostatic hyperplasia with lower urinary tract symptoms 3/29/2017    Brain metastases 7/10/2017    Chronic combined systolic and diastolic heart failure 3/29/2017    4-10-17   1 - Mildly to moderately depressed left ventricular systolic function (EF 40-45%).    2 - Normal right ventricular systolic function .    3 - Left ventricular diastolic  dysfunction.    4 - Mild mitral regurgitation.    5 - Mild aortic regurgitation.    6 - Severe aortic stenosis, NAMAN = 0.72 cm2, peak velocity = 4.3 m/s, mean gradient = 43.0 mmHg.    7 - Increased central venous pressure.    Chronic retention of urine 4/3/2017    Patient does self cath at home.   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    Stroke     right eye    Type 2 diabetes mellitus with diabetic polyneuropathy, without long-term current use of insulin 10/20/2016    Type 2 diabetes mellitus with stage 2 chronic kidney disease, without long-term current use of insulin 8/6/2012       Current Outpatient Prescriptions:     aspirin (ECOTRIN) 81 MG EC tablet, Take 81 mg by mouth once daily., Disp: , Rfl:     atorvastatin (LIPITOR) 40 MG tablet, TAKE 1 TABLET ONE TIME DAILY (Patient taking differently: TAKE 1 TABLET BY MOUTH ONE TIME DAILY), Disp: 90 tablet, Rfl: 3    bethanechol (URECHOLINE) 50 MG tablet, Take 1 tablet (50 mg total) by mouth 3 (three) times daily., Disp: 90 tablet, Rfl: 11    brimonidine 0.1% (ALPHAGAN P) 0.1 % Drop, Place 1 drop into both eyes 3 (three) times daily., Disp: , Rfl:     CALCIUM CARBONATE-VITAMIN D3 ORAL, Take 1 tablet by mouth every morning. Calcium carbonate 1000mg  "vitamin d3 1600 units per patient, Disp: , Rfl:     clopidogrel (PLAVIX) 75 mg tablet, TAKE 1 TABLET BY MOUTH ONCE DAILY, Disp: 90 tablet, Rfl: 3    fexofenadine (ALLEGRA) 180 MG tablet, TAKE 1 TABLET ONE TIME DAILY (Patient taking differently: TAKE 1 TABLET BY MOUTH ONE TIME DAILY), Disp: 90 tablet, Rfl: 3    gabapentin (NEURONTIN) 300 MG capsule, Take 600 mg by mouth 2 (two) times daily. , Disp: , Rfl:     metoprolol succinate (TOPROL-XL) 25 MG 24 hr tablet, Take 1 tablet (25 mg total) by mouth once daily., Disp: 90 tablet, Rfl: 3    nitroGLYCERIN (NITROSTAT) 0.4 MG SL tablet, Place 1 tablet (0.4 mg total) under the tongue every 5 (five) minutes as needed for Chest pain., Disp: 25 tablet, Rfl: 4    ondansetron (ZOFRAN-ODT) 4 MG TbDL, Take 1 tablet (4 mg total) by mouth every 4 to 6 hours as needed. Dissolve one tablet under tongue every 4-6 hours as needed for nausea., Disp: 30 tablet, Rfl: 1    potassium chloride SA (K-DUR,KLOR-CON) 20 MEQ tablet, Take 20mEq KCl three time a day with food, Disp: 120 tablet, Rfl: 4    torsemide (DEMADEX) 20 MG Tab, Take 2 tablets (40 mg total) by mouth 2 (two) times daily., Disp: 120 tablet, Rfl: 1    zolpidem (AMBIEN) 5 MG Tab, One tab at bedtime as needed for sleep, Disp: 30 tablet, Rfl: 3        Review of Systems   Constitution: Positive for weakness and weight gain.        Objective:BP (!) 92/59 (BP Location: Left arm, Patient Position: Sitting, BP Method: Large (Automatic))   Pulse 91   Ht 5' 10" (1.778 m)   Wt 72.8 kg (160 lb 7.9 oz)   BMI 23.03 kg/m²           Physical Exam   Constitutional: He is oriented to person, place, and time. He appears well-developed and well-nourished.   HENT:   Head: Normocephalic.   Right Ear: External ear normal.   Left Ear: External ear normal.   Nose: Nose normal.   Inspection of lips, teeth and gums normal   Eyes: EOM are normal. Pupils are equal, round, and reactive to light. No scleral icterus.   Neck: Normal range of motion. " Neck supple. No JVD present. No tracheal deviation present. No thyromegaly present.   Cardiovascular: Normal rate, regular rhythm and intact distal pulses.  Exam reveals no gallop and no friction rub.    Murmur heard.  High-pitched harsh early systolic murmur is present with a grade of 2/6  at the upper right sternal border, upper left sternal border radiating to the neck  Pulmonary/Chest: Effort normal and breath sounds normal.           Abdominal: Bowel sounds are normal. He exhibits no distension. There is no hepatosplenomegaly. There is no tenderness. There is no guarding.   Musculoskeletal: Normal range of motion. He exhibits no edema or tenderness.   Lymphadenopathy:   Palpation of neck and groin lymph nodes normal   Neurological: He is alert and oriented to person, place, and time. No cranial nerve deficit. He exhibits normal muscle tone. Coordination normal.   Skin: Skin is dry.   Palpation of skin normal   Psychiatric: His behavior is normal. Judgment and thought content normal.         Assessment:       1. Acute on chronic combined systolic and diastolic CHF, NYHA class 4, ACC/AHA Stage C    2. Nonrheumatic aortic valve stenosis    3. Chronic kidney disease, stage II (mild)    4. Brain metastases    5. Malignant neoplasm of kidney, unspecified laterality    6. 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    7. Stage 4, Clear cell renal cell carcinoma, right w/ bony mets    8. Type 2 diabetes mellitus with diabetic polyneuropathy, without long-term current use of insulin    9. Insomnia, unspecified type         Plan:       Lex was seen today for acute on chronic combined systolic and diastolic chf, nyha c.    Diagnoses and all orders for this visit:    Acute on chronic combined systolic and diastolic CHF, NYHA class 4, ACC/AHA Stage C  -     Comprehensive metabolic panel; Future; Expected date: 11/28/2017    Nonrheumatic aortic valve stenosis    Chronic kidney disease, stage II (mild)  -     Comprehensive  metabolic panel; Future; Expected date: for next clinic visit 6 weks    Brain metastases    Malignant neoplasm of kidney, unspecified laterality    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    Stage 4, Clear cell renal cell carcinoma, right w/ bony mets    Type 2 diabetes mellitus with diabetic polyneuropathy, without long-term current use of insulin  -     Comprehensive metabolic panel; Future; Expected date: 11/28/2017    Insomnia, unspecified type  -     zolpidem (AMBIEN) 5 MG Tab; One tab at bedtime as needed for sleep

## 2017-11-14 NOTE — TELEPHONE ENCOUNTER
----- Message from Korin Bay MA sent at 11/14/2017 12:42 PM CST -----  Contact: reymundo  Please call Reymundo from lab he need to talk to you about the patient lab. Thank you.

## 2017-11-27 NOTE — PROGRESS NOTES
Subjective:       Patient ID: Lex Billy is a 72 y.o. male.    Chief Complaint: Follow-up    HPI     72-year-old male here for follow-up.    HLD - Patient is currently on Lipitor 40 mg.  He last lipid panel was   Cholesterol   Date Value Ref Range Status   09/05/2017 103 (L) 120 - 199 mg/dL Final     Comment:     The National Cholesterol Education Program (NCEP) has set the  following guidelines (reference ranges) for Cholesterol:  Optimal.....................<200 mg/dL  Borderline High.............200-239 mg/dL  High........................> or = 240 mg/dL       Triglycerides   Date Value Ref Range Status   09/05/2017 59 30 - 150 mg/dL Final     Comment:     The National Cholesterol Education Program (NCEP) has set the  following guidelines (reference values) for triglycerides:  Normal......................<150 mg/dL  Borderline High.............150-199 mg/dL  High........................200-499 mg/dL       HDL   Date Value Ref Range Status   09/05/2017 40 40 - 75 mg/dL Final     Comment:     The National Cholesterol Education Program (NCEP) has set the  following guidelines (reference values) for HDL Cholesterol:  Low...............<40 mg/dL  Optimal...........>60 mg/dL       LDL Cholesterol   Date Value Ref Range Status   09/05/2017 51.2 (L) 63.0 - 159.0 mg/dL Final     Comment:     The National Cholesterol Education Program (NCEP) has set the  following guidelines (reference values) for LDL Cholesterol:  Optimal.......................<130 mg/dL  Borderline High...............130-159 mg/dL  High..........................160-189 mg/dL  Very High.....................>190 mg/dL     .  Side effects of the medication: none.    HTN -  Patient's co morbidities include: HLD. Patient is currently on Toprol-XL 25 mg. He does check his BP at home, and it runs like it is here (70s - 80s/50s). Side effects of medications note: none. Denies headaches, blurred vision, chest pain, shortness of breath, nausea.  His blood  pressure has been this way for months.    For the last couple of months, he goes to sleep that he gets night sweats.  He has renal cell carcinoma with mets.    Diabetes -   Medications: no medications (ACEi/ARB, statin)  BG Range: am ; night about 130  Patient does check his feet on a regular basis.  He does not have numbness and tingling of his feet.   His last A1c's were   Hemoglobin A1C   Date Value Ref Range Status   02/20/2017 5.5 4.5 - 6.2 % Final     Comment:     According to ADA guidelines, hemoglobin A1C <7.0% represents  optimal control in non-pregnant diabetic patients.  Different  metrics may apply to specific populations.   Standards of Medical Care in Diabetes - 2016.  For the purpose of screening for the presence of diabetes:  <5.7%     Consistent with the absence of diabetes  5.7-6.4%  Consistent with increasing risk for diabetes   (prediabetes)  >or=6.5%  Consistent with diabetes  Currently no consensus exists for use of hemoglobin A1C  for diagnosis of diabetes for children.     10/20/2016 5.7 4.5 - 6.2 % Final     Comment:     According to ADA guidelines, hemoglobin A1C <7.0% represents  optimal control in non-pregnant diabetic patients.  Different  metrics may apply to specific populations.   Standards of Medical Care in Diabetes - 2016.  For the purpose of screening for the presence of diabetes:  <5.7%     Consistent with the absence of diabetes  5.7-6.4%  Consistent with increasing risk for diabetes   (prediabetes)  >or=6.5%  Consistent with diabetes  Currently no consensus exists for use of hemoglobin A1C  for diagnosis of diabetes for children.     02/01/2016 6.3 (H) 4.5 - 6.2 % Final   .   Urine microalbumin to creatinine ratio: needs.  Patient does have CKD.  Complications present include: - retinopathy, - neuropathy, + nephropathy, - stroke, + heart disease, and - peripheral vascular disease.    Review of Systems    Objective:      Physical Exam   Constitutional: He is oriented to  person, place, and time. He appears well-developed and well-nourished.   HENT:   Head: Normocephalic and atraumatic.   Mouth/Throat: No oropharyngeal exudate.   Eyes: EOM are normal. Pupils are equal, round, and reactive to light. Right eye exhibits no discharge. Left eye exhibits no discharge. No scleral icterus.   Neck: Normal range of motion. Neck supple. No tracheal deviation present. No thyromegaly present.   Cardiovascular: Normal rate, regular rhythm and normal heart sounds.  Exam reveals no gallop and no friction rub.    No murmur heard.  Pulmonary/Chest: Effort normal and breath sounds normal. No respiratory distress. He has no wheezes. He has no rales. He exhibits no tenderness.   Abdominal: Soft. Bowel sounds are normal. He exhibits no distension and no mass. There is no tenderness. There is no rebound and no guarding.   Musculoskeletal: Normal range of motion. He exhibits no edema or tenderness.   Neurological: He is alert and oriented to person, place, and time.   Skin: Skin is warm and dry. No rash noted. No erythema. No pallor.   Psychiatric: He has a normal mood and affect. His behavior is normal.   Vitals reviewed.      Assessment:       1. Essential hypertension    2. Hyperlipidemia, unspecified hyperlipidemia type    3. Stage 4, Clear cell renal cell carcinoma, right w/ bony mets    4. 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    5. Type 2 diabetes mellitus with stage 2 chronic kidney disease, without long-term current use of insulin        Plan:       1.  Continue Toprol-XL 25 mrem.  2.  Continue Lipitor 40 mrem.  3/4.  Refer back to oncology.  The night sweats patient complains of likely caused by this.  5.  Currently diet and exercise controlled.

## 2017-11-28 NOTE — TELEPHONE ENCOUNTER
"Returned call to pt.   Pt stated he saw his PCP yesterday and complained of night sweats---per PCP was referred back to oncology as believes cancer is causing.   Pt stated last couple of months he has had drenching wet night sweats and he "can't sleep at night."   Pt denies any fevers.   Pt also complains of SOB that comes and goes.   Informed pt would fwd message to Dr. Valdez and f/u.   Pt verbalized understanding.     Message fwd to Dr. Valdez.       ----- Message from Teresa Yeh sent at 11/28/2017 10:51 AM CST -----  Contact: Pt  Pt calling stating that he has been having terrible night sweats    Pt call back number [phone]    "

## 2017-12-01 NOTE — TELEPHONE ENCOUNTER
----- Message from Sergio Gonsales sent at 12/1/2017 12:23 PM CST -----  Contact: Pt   Refill on Ondansetron 4mg     Pharmacy Contact::[phone]

## 2017-12-04 NOTE — TELEPHONE ENCOUNTER
",         LOV:11/14/17.The said that he went to see his PCP on 11/27/17 and he c/o night sweats and he was referred to ,his Oncologist but has yet to hear from her.He wanted to see what you thought could be causing the night sweats.Pt stated last couple of months he has had drenching wet night sweats and he "can't sleep at night." Pt denies any fevers.Pt also complains of SOB that comes and goes.Please advise.Thanks,Therese   .   "

## 2017-12-04 NOTE — TELEPHONE ENCOUNTER
----- Message from Korin Bay MA sent at 12/4/2017 11:14 AM CST -----  Contact: patient called  Josette please call the patient at home [phone] he need to talk to you about his night sweats. Last visit was on 11-  f/u/ on 1-16-18. Thank you.

## 2017-12-05 NOTE — TELEPHONE ENCOUNTER
Called pt and informed him of time and date given by MD to come in.   Pt verbalized understanding of appt time of 1:30 tomorrow to see MD.

## 2017-12-05 NOTE — TELEPHONE ENCOUNTER
Instructed pt as ordered by .The pt said that he did try to contact  today again today ,he will give her a call again tomorrow.

## 2017-12-05 NOTE — TELEPHONE ENCOUNTER
Message fwd to MD high priority.         ----- Message from Teresa Yeh sent at 12/5/2017  9:25 AM CST -----  Contact: Pt  Pt calling stating that he has night sweats and can't sleep at night    Pt call back number [phone]

## 2017-12-07 NOTE — PROGRESS NOTES
Distress Screening Results: Psychosocial Distress screening score of Distress Score: 0 noted and reviewed. No intervention indicated.     Returns for follow-up  Last seen in 9/2017  Cancelled 10/4/17 appointments due to hospitalization and at that time our service was contacted regarding safety of further immunotherapy with his cardiac issues   Immunotherapy cessation was recommended    He was recently seen by cardiology and described several weeks of drenching night sweats. No fevers documented. Denies infectious complaints.  He has overall felt poorly. Notes new pains that are generalized and off and on.  Reports eating well- weight fluctuates with volume status.  Performance status has declined with more fatigue and physical limitation.    We had an honest discussion regarding prognosis- he is no longer able to safely receive immunotherapy or targeted therapy in light of his performance status and cardiac status.  We can re-assess current disease state and see if stable. However, unclear that any cardiac interventions can be done and his cancer in past has made interventions a concern.  We will see if insurance will approve a PET as CT scans not wise with his renal clearance    We discussed differential of night sweats- infection, inflammatory, tumor related, adrenal related  Will place on low dose steroid and await imaging result    25 minutes discussing above

## 2017-12-07 NOTE — TELEPHONE ENCOUNTER
Pt communicated with via [facility].       ----- Message from Janine Valdez MD sent at 12/7/2017 11:29 AM CST -----  Will you let him know Prednisone required prior auth but sent

## 2017-12-11 NOTE — TELEPHONE ENCOUNTER
Spoke with the patient and he states he his getting Sx clearance he was unsure if he needs a Pre-op and needed clarification for the form he has to be completed,. Advise patient bring the forms to the office. He understood and termed the call

## 2017-12-11 NOTE — TELEPHONE ENCOUNTER
Radha Kaur, RN   Caller: patient (Today,  8:19 AM)             Please call pt at [phone] if any questions. Refill request for Torsemide 20 mg x 90 day supply called into [facility] at [phone]. Last seen Dr Koch 11/14/17. Out of meds     Thank you

## 2017-12-18 NOTE — PROGRESS NOTES
Subjective:       Patient ID: Lex Billy is a 72 y.o. male.    Chief Complaint: Follow-up    HPI     Returns for follow-up and after PET  Stopped bethenchol yesterday - attributes sweats to this as timing fits and states last night no symptoms  Cataract surgery right eye tomorrow  Overall his status remains unchanged  He has been dealing with off and on volume overload and limitations in activity    PET 12/15/17:  There is physiologic intracranial, head, and neck activity. There is a left-sided pleural effusion. There is physiologic cardiac activity. There is a right-sided pleural effusion. There are coronary calcifications. Liver, gallbladder, biliary tree, spleen, stomach, pancreas, adrenal glands, and duodenum show nothing unusual. Kidneys enhance and excrete normally. There is physiologic liver, spleen, GI and  activity. There is a large right sacral bone metastasis SUV max 8.86. There is right hip girdle muscle activation. Lungs are clear.   Impression      Right sacral metastasis.     MRI 7/7/17:  Two foci of enhancement identified in the left temporal and right occipital lobe concerning for metastatic disease in this patient with a history of metastatic RCC.    This is a 72 year old male with metastatic RCC to bone and brain      Oncology History:  Initial treatment with Sutent discontinued with side effects.  This was followed by Nexavar  Completed XRT to sacrum  Biopsy confirmed clear cell RCC  Then placed on Torisel   Changed to Votrient with progression  Followed by [facility]  8/17 completed XRt to occipital lesion (temporal reviewed with radiology and felt less likely to be a met) and left posterior rib mass  Opdivo initiated- stopped with cardiac issues and hospitalizations (see prior note)    Review of Systems   Constitutional: Positive for activity change (better), diaphoresis and fatigue (better). Negative for appetite change, chills, fever and unexpected weight change.   HENT: Negative for  congestion, dental problem, ear pain, hearing loss, mouth sores, postnasal drip, rhinorrhea, sinus pressure, sneezing, sore throat, trouble swallowing and voice change.    Eyes: Negative for redness and visual disturbance (rt eye decreased prior stroke).   Respiratory: Negative for cough, chest tightness, shortness of breath and wheezing.    Cardiovascular: Negative for chest pain, palpitations and leg swelling.   Gastrointestinal: Negative for abdominal distention, abdominal pain, blood in stool, constipation, diarrhea, nausea, rectal pain and vomiting.   Genitourinary: Negative for decreased urine volume, difficulty urinating, dysuria, hematuria and urgency.        Increased urination on lasix   Musculoskeletal: Negative for arthralgias, back pain, gait problem, myalgias and neck pain.   Skin: Negative for color change, pallor, rash and wound.   Neurological: Positive for weakness. Negative for dizziness, numbness and headaches.   Hematological: Negative for adenopathy. Bruises/bleeds easily.   Psychiatric/Behavioral: Negative for dysphoric mood and suicidal ideas. The patient is not nervous/anxious.        Objective:      Physical Exam   Constitutional: He is oriented to person, place, and time. He appears well-developed and well-nourished. No distress.   Presents with his wife   HENT:   Head: Normocephalic and atraumatic.   Right Ear: External ear normal.   Left Ear: External ear normal.   Nose: Nose normal.   Mouth/Throat: Oropharynx is clear and moist. No oropharyngeal exudate.   Eyes: Conjunctivae and EOM are normal. Pupils are equal, round, and reactive to light. Right eye exhibits no discharge. Left eye exhibits no discharge. No scleral icterus.   Neck: Normal range of motion. Neck supple. No tracheal deviation present. No thyromegaly present.   Cardiovascular: Normal rate, regular rhythm and intact distal pulses.  Exam reveals no gallop and no friction rub.    Murmur (known AS) heard.  Pulmonary/Chest:  Effort normal. No respiratory distress. He has no wheezes. He has no rales. He exhibits no tenderness.   decr breath sounds bases l > r   Abdominal: Soft. Bowel sounds are normal. He exhibits no distension and no mass. There is no tenderness. There is no rebound and no guarding.   No organomegaly   Musculoskeletal: Normal range of motion. He exhibits no edema or tenderness.   Lymphadenopathy:     He has no cervical adenopathy.   Neurological: He is alert and oriented to person, place, and time. No cranial nerve deficit. He exhibits normal muscle tone. Coordination normal.   Skin: Skin is warm and dry. No rash noted. He is not diaphoretic. No erythema. No pallor.   Dry, no rash   Psychiatric: He has a normal mood and affect. His behavior is normal. Judgment and thought content normal.   Nursing note and vitals reviewed.    Imaging- reviewed  Assessment:       1. 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 Brain metastases    3. Severe aortic stenosis        Plan:     1-2. PET reveals no new disease and continued response in bone areas  We will proceed with brain MRI  If this is negative his symptoms are related to cardiac issues and we will have to re-discuss  3. Managed by cardiology        Distress Screening Results: Psychosocial Distress screening score of Distress Score: 6 noted and reviewed. No intervention indicated.   Stress about medical conditions.

## 2017-12-18 NOTE — PROGRESS NOTES
Patient, Lex Billy (MRN #[id number]), presented with a recent Platelet count less than 150 K/uL consistent with the definition of thrombocytopenia (ICD10 - D69.6).    Platelets   Date Value Ref Range Status   10/16/2017 149 (L) 150 - 350 K/uL Final     The patient's thrombocytopenia was monitored, evaluated, addressed and/or treated. This addendum to the medical record is made on 12/18/2017.

## 2017-12-19 PROBLEM — E87.6 HYPOKALEMIA: Status: ACTIVE | Noted: 2017-01-01

## 2017-12-19 PROBLEM — R79.89 ELEVATED TROPONIN: Status: ACTIVE | Noted: 2017-01-01

## 2017-12-19 PROBLEM — I48.92 ATRIAL FLUTTER WITH RAPID VENTRICULAR RESPONSE: Status: ACTIVE | Noted: 2017-01-01

## 2017-12-19 NOTE — ED PROVIDER NOTES
Encounter Date: 12/19/2017    SCRIBE #1 NOTE: I, Savanah Victor, am scribing for, and in the presence of,  Dr. Marroqiun. I have scribed the following portions of the note - the APC attestation and the EKG reading.       History     Chief Complaint   Patient presents with    Irregular Heart Beat     Sent from eye center for eval. He was scgeduled to to have procedure done. Staff noted his heart rate irregular. Pt denies complaints.     72-year-old male with medical history of CHF w/ EF15%, diabetes, CAD, aortic stenosis and  right renal cell carcinoma with bone and brain mets presents to ED from eye clinic for irregular heartbeat.  Patient was seen by heme/onc doctor yesterday and was cleared for cataract surgery.  Today while patient was prepping for surgery patient he was found to be in A. flutter at rate of >110. cardiology was contacted and told patient to report to the ED for further evaluation.  Past visits have shown that patient is consistently tachycardic ranging around 110 bpm. Patient endorses shortness of breath that is at baseline and bilateral leg swelling.  Patient denies chest pain, fever, chills, nausea, vomiting, weakness, syncope, acute vision problems, headache, diaphoresis, arm pain, jaw pain.           Review of patient's allergies indicates:   Allergen Reactions    No known drug allergies      Past Medical History:   Diagnosis Date    Aortic atherosclerosis 10/20/2016    Benign non-nodular prostatic hyperplasia with lower urinary tract symptoms 3/29/2017    Brain metastases 7/10/2017    Chronic combined systolic and diastolic heart failure 3/29/2017    4-10-17   1 - Mildly to moderately depressed left ventricular systolic function (EF 40-45%).    2 - Normal right ventricular systolic function .    3 - Left ventricular diastolic dysfunction.    4 - Mild mitral regurgitation.    5 - Mild aortic regurgitation.    6 - Severe aortic stenosis, NAMAN = 0.72 cm2, peak velocity = 4.3 m/s, mean  gradient = 43.0 mmHg.    7 - Increased central venous pressure.    Chronic retention of urine 4/3/2017    Patient does self cath at home.   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    Stroke     right eye    Type 2 diabetes mellitus with diabetic polyneuropathy, without long-term current use of insulin 10/20/2016    Type 2 diabetes mellitus with stage 2 chronic kidney disease, without long-term current use of insulin 8/6/2012     Past Surgical History:   Procedure Laterality Date    BACK SURGERY      cervical disc replacement    CARPAL TUNNEL RELEASE      left    CORONARY ANGIOPLASTY WITH STENT PLACEMENT  10/2015    4 stents    EYE SURGERY      laser surgery in right eye    JOINT REPLACEMENT      orthoscopic surgery on knee      ROTATOR CUFF REPAIR      right side    SINUS SURGERY      TONSILLECTOMY      torn ligament      repair. left shoulder    TOTAL KNEE ARTHROPLASTY      left side    VASECTOMY       Family History   Problem Relation Age of Onset    Heart disease Father     ALS Father     Cancer Brother      esophageal    Rheum arthritis Mother     Migraines Daughter     Asthma Son     Diabetes Neg Hx     Colon polyps Neg Hx      Social History   Substance Use Topics    Smoking  status: Never Smoker    Smokeless tobacco: Never Used    Alcohol use No      Comment: Heavy in past, quit about 20 years ago     Review of Systems   Constitutional: Negative for chills, diaphoresis, fatigue and fever.   HENT: Negative for congestion and sore throat.    Eyes: Negative for visual disturbance.   Respiratory: Positive for shortness of breath (stable). Negative for cough.    Cardiovascular: Positive for leg swelling. Negative for chest pain.   Gastrointestinal: Negative for abdominal pain, nausea and vomiting.   Genitourinary: Negative for dysuria, flank pain and hematuria.   Musculoskeletal: Negative for back pain and neck pain.   Skin: Negative for rash.   Neurological: Negative for syncope, weakness, light-headedness and headaches.   Psychiatric/Behavioral: The patient is not nervous/anxious.        Physical Exam     Initial Vitals [12/19/17 0947]   BP Pulse Resp Temp SpO2   110/74 110 16 98.2 °F (36.8 °C) 99 %      MAP       86         Physical Exam    Vitals reviewed.  Constitutional: He appears well-developed and well-nourished. He is not diaphoretic. No distress.   HENT:   Head: Normocephalic and atraumatic.   Nose: Nose normal.   Mouth/Throat: Oropharynx is clear and moist. No oropharyngeal exudate.   Eyes: Conjunctivae and EOM are normal. Pupils are equal, round, and reactive to light.   Neck: Normal range of motion. Neck supple. No thyromegaly present. No JVD present.   Cardiovascular: Intact distal pulses. Tachycardia present.    Murmur heard.  3+ pitting edema   Pulmonary/Chest: No respiratory distress. He has no wheezes. He exhibits no tenderness.   Abdominal: Soft. Bowel sounds are normal. He exhibits no distension. There is no tenderness. There is no rebound.   Musculoskeletal: Normal range of motion. He exhibits no tenderness.   Lymphadenopathy:     He has no cervical adenopathy.   Neurological: He is alert and oriented to person, place, and time. He has normal strength. No cranial  nerve deficit or sensory deficit.   Skin: Skin is warm and dry. Capillary refill takes less than 2 seconds. No rash noted.   Psychiatric: He has a normal mood and affect.         ED Course   Procedures  Labs Reviewed   B-TYPE NATRIURETIC PEPTIDE - Abnormal; Notable for the following:        Result Value    BNP 3,504 (*)     All other components within normal limits   CBC W/ AUTO DIFFERENTIAL - Abnormal; Notable for the following:     RBC 3.80 (*)     Hemoglobin 11.8 (*)     Hematocrit 34.6 (*)     MCH 31.1 (*)     RDW 21.0 (*)     Lymph # 0.6 (*)     Gran% 84.0 (*)     Lymph% 8.1 (*)     All other components within normal limits   COMPREHENSIVE METABOLIC PANEL - Abnormal; Notable for the following:     Potassium 2.2 (*)     BUN, Bld 48 (*)     Creatinine 1.5 (*)     Calcium 8.4 (*)     Total Protein 5.9 (*)     Albumin 2.7 (*)     Total Bilirubin 1.7 (*)     AST 43 (*)     eGFR if  53.0 (*)     eGFR if non  45.9 (*)     All other components within normal limits   PROTIME-INR - Abnormal; Notable for the following:     Prothrombin Time 14.5 (*)     INR 1.4 (*)     All other components within normal limits   URINALYSIS, REFLEX TO URINE CULTURE - Abnormal; Notable for the following:     Occult Blood UA 1+ (*)     All other components within normal limits    Narrative:     Preferred Collection Type->Urine, Clean Catch   TROPONIN I - Abnormal; Notable for the following:     Troponin I 0.320 (*)     All other components within normal limits   TROPONIN I - Abnormal; Notable for the following:     Troponin I 0.371 (*)     All other components within normal limits   URINALYSIS MICROSCOPIC - Abnormal; Notable for the following:     Hyaline Casts, UA 4 (*)     All other components within normal limits    Narrative:     Preferred Collection Type->Urine, Clean Catch   BASIC METABOLIC PANEL - Abnormal; Notable for the following:     Sodium 133 (*)     Potassium 3.1 (*)     Chloride 90 (*)      Glucose 156 (*)     BUN, Bld 50 (*)     Creatinine 1.7 (*)     Calcium 11.1 (*)     eGFR if  45.6 (*)     eGFR if non  39.4 (*)     All other components within normal limits   ISTAT PROCEDURE - Abnormal; Notable for the following:     POC BUN 41 (*)     POC Creatinine 1.6 (*)     POC Potassium 2.0 (*)     POC Ionized Calcium 0.98 (*)     POC Hematocrit 34 (*)     All other components within normal limits   MAGNESIUM   PHOSPHORUS   MAGNESIUM    Narrative:     Magnesium and Phosphorus add on per Magy Larson PA-C    12/19/2017  12:11    PHOSPHORUS    Narrative:     Magnesium and Phosphorus add on per Magy Larson PA-C    12/19/2017  12:11    MAGNESIUM     EKG Readings: (Independently Interpreted)   EKG: A Flutter with variable AV block at 114bpm, no EWA's or STD's, non-specific twave pattern, no STEMI       Imaging Results          X-Ray Chest PA And Lateral (Final result)  Result time 12/19/17 11:14:53    Final result by Vini Styles MD (12/19/17 11:14:53)                 Impression:     See above      Electronically signed by: Vini Styles MD  Date:     12/19/17  Time:    11:14              Narrative:    Cardiac size is normal.  Lungs are clear.  No infiltrate is seen.                                 Medical Decision Making:   Independently Interpreted Test(s):   I have ordered and independently interpreted EKG Reading(s) - see prior notes  Clinical Tests:   Medical Tests: Ordered and Reviewed       APC / Resident Notes:   72-year-old male with medical history of CHF w/ EF15%, diabetes, CAD, aortic stenosis and  right renal cell carcinoma with bone and brain mets presents to ED from eye clinic for irregular heartbeat.    DDX includes but is not limited to ACS, PE, arrhythmia, CHF, electrolyte abnormality, pleural effusion. Will get cardiac labs, UA, CXR, CTA and reassess. EKG shows aflutter at rate of 107 with 1st degree AV block. istat Potassium was 2, will add  on Mg and Phos and give oral potassium chloride 60 mEq and IV potassium 10 mEq. Calcium 8.4, will give IV calcium gluconate 1g. BUN 41, Cr 1.6. Will cancel CTA, least likely PE as cause of arrhythmia. arrhythmia most likely caused by hypokalemia and hypocalcemia. Troponin .320. BNP 3504, will give IV lasix 80mg. WILL get 2nd troponin and admit to inpatient for hypokalemia, hypocalcemia and aflutter with RVR.     I have discussed and reviewed with my supervising physician.       Scribe Attestation:   Scribe #1: I performed the above scribed service and the documentation accurately describes the services I performed. I attest to the accuracy of the note.    Attending Attestation:     Physician Attestation Statement for NP/PA:   I discussed this assessment and plan of this patient with the NP/PA, but I did not personally examine the patient. The face to face encounter was performed by the NP/PA.                  ED Course      Clinical Impression:   The primary encounter diagnosis was Atrial flutter with rapid ventricular response. Diagnoses of Palpitations, Irregular heart beat, Hypokalemia, Hypocalcemia, and A-fib were also pertinent to this visit.    Disposition:   Disposition: Admitted  Condition: Stable                        Magy Larson PA-C  12/19/17 [id number]

## 2017-12-19 NOTE — ED NOTES
Patient identifiers verified and correct for Lex Billy.    LOC: The patient is awake, alert and aware of environment with an appropriate affect, the patient is oriented x 3 and speaking appropriately.  APPEARANCE: Patient resting comfortably and in no acute distress, patient is clean and well groomed, patient's clothing is properly fastened.  SKIN: The skin is warm and dry, color consistent with ethnicity, patient has normal skin turgor and moist mucus membranes, skin intact, no breakdown or bruising noted.  MUSCULOSKELETAL: Patient moving all extremities spontaneously, no obvious deformities noted.  RESPIRATORY: Airway is open and patent, respirations are spontaneous, patient has a normal effort and rate, no accessory muscle use noted, clear bilateral breath sounds noted through out the chest  CARDIAC: Patient has a irregular rate rhythm,  periphreal edema noted, capillary refill < 3 seconds.  ABDOMEN: Soft and non tender to palpation, no distention noted, normoactive bowel sounds present in all four quadrants.  NEUROLOGIC: , eyes open spontaneously, behavior appropriate to situation, follows commands, facial expression symmetrical, bilateral hand grasp equal and even, purposeful motor response noted, normal sensation in all extremities when touched with a finger.

## 2017-12-19 NOTE — ED NOTES
Patient sitting up in bed eating, no signs of distress is noted. Will continue to monitor the patient.

## 2017-12-20 PROBLEM — N17.9 ACUTE RENAL FAILURE SUPERIMPOSED ON STAGE 3 CHRONIC KIDNEY DISEASE: Status: ACTIVE | Noted: 2017-01-01

## 2017-12-20 PROBLEM — R57.0 CARDIOGENIC SHOCK: Status: ACTIVE | Noted: 2017-01-01

## 2017-12-20 PROBLEM — R79.89 ELEVATED LFTS: Status: ACTIVE | Noted: 2017-01-01

## 2017-12-20 PROBLEM — N18.30 ACUTE RENAL FAILURE SUPERIMPOSED ON STAGE 3 CHRONIC KIDNEY DISEASE: Status: ACTIVE | Noted: 2017-01-01

## 2017-12-20 NOTE — NURSING
"Patient c/o CP, describes feels like a pressure to mid-sternum rates at 7/10; O2 2LNC started. Also "upset stomach", spitting up clear secretions. Voices decrease in chest pain with O2 2LNC on, 3/10 rate. Notified taz RN staff. Patient denies SOB or other c/o.  "

## 2017-12-20 NOTE — HPI
72 year old male with h/o Renal cell cancer stage 4 s/p C3 palliative optidivo, Cardiomyopathy (EF 15 %), Severe AS (NAMAN 0.68, PV, 3.5, MG =30, grade 3 DDx, mild-moderate MR), CAD (S/p BMS dLM, mRCA x 2, dRCA x 2) who is being admitted to hospital medicine after being found to have tachycardia, concerning on EKG for Aflutter before his cataract surgery today. He report taking metoprolol for sinus tachycardia, denies any hx of Afib or Aflutter. He complains of couple of weeks of SOB, orthopnea, MAHARAJ, no palpitations. He also complains of b/l LE swelling. His most recent PET reveals no new disease and continued response in bone areas. MRI 7/2017-Two foci of enhancement identified in the left temporal and right occipital lobe concerning for metastatic disease in this patient with a history of metastatic RCC. Chart review showed recent admission for CHF exacerbation requiring IV lasix. Patient was treated for hypokalemia in ED, he got a dose of IV lasix. Troponemia noted as well, HR was not better after IV metoprolol. Cardiology was consulted for management of new Aflutter.

## 2017-12-20 NOTE — PLAN OF CARE
Problem: Occupational Therapy Goal  Goal: Occupational Therapy Goal  Goals to be met by: 1/10/17     Patient will increase functional independence with ADLs by performing:    UE Dressing with Stand-by Assistance.  LE Dressing with Stand-by Assistance.  Grooming while standing at sink with Contact Guard Assistance.  Toileting from toilet with Supervision for hygiene and clothing management.   Supine to sit with Modified [place].  Stand pivot transfers with Contact Guard Assistance.  Toilet transfer to toilet with Contact Guard Assistance.    Outcome: Ongoing (interventions implemented as appropriate)  OT eval completed. The above goals are established to improve functional (I) and mobility.    MINDY Molina  12/20/2017  Pager: [phone]

## 2017-12-20 NOTE — ASSESSMENT & PLAN NOTE
- Acute on chronic, NYHA class IV  - recent echocardiogram with color flow doppler done 3 months ago - will not repeat as unlikely to   - Lasix IV given by now with worsening Cr

## 2017-12-20 NOTE — PROGRESS NOTES
[facility]  Cardiology  Progress Note    Patient Name: Lex Billy  MRN: [id number]  Admission Date: 12/19/2017  Hospital Length of Stay: 1 days  Code Status: Full Code   Attending Physician: Alexey Adams MD   Primary Care Physician: Everette Rowan MD  Expected Discharge Date: 12/22/2017  Principal Problem:Atrial flutter with rapid ventricular response    Subjective:     Hospital Course:   No notes on file    Interval History: had CP pain over night, BS this morning 41    Review of Systems   Constitution: Negative for fever.   Cardiovascular: Positive for chest pain. Negative for leg swelling and palpitations.   Respiratory: Positive for shortness of breath. Negative for cough.    Gastrointestinal: Positive for nausea.   Neurological: Positive for dizziness.     Objective:     Vital Signs (Most Recent):  Temp: 97.9 °F (36.6 °C) (12/20/17 1215)  Pulse: 94 (12/20/17 1300)  Resp: 18 (12/20/17 1300)  BP: 103/71 (12/20/17 1300)  SpO2: 98 % (12/20/17 1300) Vital Signs (24h Range):  Temp:  [96 °F (35.6 °C)-97.9 °F (36.6 °C)] 97.9 °F (36.6 °C)  Pulse:  [] 94  Resp:  [14-22] 18  SpO2:  [96 %-99 %] 98 %  BP: ()/(58-84) 103/71     Weight: 74.2 kg (163 lb 9.3 oz)  Body mass index is 23.47 kg/m².     SpO2: 98 %  O2 Device (Oxygen Therapy): nasal cannula      Intake/Output Summary (Last 24 hours) at 12/20/17 1353  Last data filed at 12/20/17 0500   Gross per 24 hour   Intake              360 ml   Output              500 ml   Net             -140 ml       Lines/Drains/Airways     Peripheral Intravenous Line                 Peripheral IV - Single Lumen 12/19/17 1022 Left Hand 1 day         Peripheral IV - Single Lumen 12/19/17 1128 Left Antecubital 1 day                Physical Exam   Constitutional: He is oriented to person, place, and time. He appears well-developed and well-nourished. He appears distressed.   Tachycardic 140s   HENT:   Head: Normocephalic and atraumatic.   Eyes:   dilated  right pupil, constricted left pupil.   Neck: Normal range of motion. Neck supple. JVD present.   Cardiovascular: Normal rate and regular rhythm.    Murmur heard.  Pulmonary/Chest: Effort normal and breath sounds normal.   Musculoskeletal: He exhibits edema.   Neurological: He is alert and oriented to person, place, and time.   Skin: He is diaphoretic.   Cold extremities        Significant Labs:   CMP   Recent Labs  Lab 12/19/17  1034 12/19/17  1315 12/20/17  0709    133* 134*   K 2.2* 3.1* 5.4*   CL 97 90* 92*   CO2 27 27 16*   GLU 93 156* 51*   BUN 48* 50* 68*   CREATININE 1.5* 1.7* 2.9*   CALCIUM 8.4* 11.1* 10.0   PROT 5.9*  --  7.1   ALBUMIN 2.7*  --  3.1*   BILITOT 1.7*  --  2.7*   ALKPHOS 95  --  117   AST 43*  --  242*   ALT 29  --  141*   ANIONGAP 13 16 26*   ESTGFRAFRICA 53.0* 45.6* 23.9*   EGFRNONAA 45.9* 39.4* 20.7*   , CBC   Recent Labs  Lab 12/19/17  1034 12/20/17  0709   WBC 7.49 11.85   HGB 11.8* 13.2*   HCT 34.6* 40.1    204   , INR   Recent Labs  Lab 12/19/17  1034   INR 1.4*    and Troponin   Recent Labs  Lab 12/20/17  0709 12/20/17  0952 12/20/17  1122   TROPONINI 0.530* 0.535* 0.513*       Significant Imaging: EKG: Atrial flutter    Assessment and Plan:       * Atrial flutter with rapid ventricular response    -New diagnosis-Unsure when started because patient is unable to tell about palpitations  - S/P Digoxin load with 500 mcg followed in 8 hrs by 250 mcg x 2 doses(8 hrs interval) for rate control  - switched toprol XL 25 mg po QD        Acute on chronic combined systolic and diastolic CHF, NYHA class 4, ACC/AHA Stage C      Acute on chronic  combined    - CCU admission for possible cardiogenic shock.  - EP consulted for Cardioversion, started heparin gtt and amiodarone gtt.  - risk and benefit from the cardioversion and anticoagulation was fully explain to the pt and his wife.                VTE Risk Mitigation         Ordered     heparin 25,000 units in dextrose 5% 250 mL (100  units/mL) infusion  Continuous     Route:  Intravenous        12/20/17 1056     Place CORTES hose  Until discontinued      12/19/17 2238     Place sequential compression device  Until discontinued      12/19/17 2238     Medium Risk of VTE  Once      12/19/17 1228     Place CORTES hose  Until discontinued      12/19/17 1228          [facility] Lillian Bacon MD  Cardiology  [facility]

## 2017-12-20 NOTE — PROGRESS NOTES
[facility] Hospital Medicine  Progress Note    Patient Name: Lex Billy  MRN: [id number]  Patient Class: IP- Inpatient   Admission Date: 12/19/2017  Length of Stay: 1 days  Attending Physician: Sandoval Greene MD  Primary Care Provider: Everette Rowan MD    [facility] Medicine Team: [facility] A Sandoval Greene MD    Subjective:     Principal Problem:Atrial flutter with rapid ventricular response    HPI:  This is a 72 year old male with h/o Renal cell cancer stage 4 s/p C3 palliative optidivo, Cardiomyopathy (EF 15 %), Severe AS (NAMAN 0.68, PV, 3.5, MG =30, grade 3 DDx, mild-moderate MR), CAD (S/p BMS dLM, mRCA x 2, dRCA x 2) who is being admitted to hospital medicine after being found to have tachycardia, concerning on EKG for Aflutter before his cataract surgery today. He report taking metoprolol for sinus tachycardia, denies any hx of Afib or Aflutter. He complains of couple of weeks of SOB, orthopnea, MAHARAJ, no palpitations. He also complains of b/l LE swelling. His most recent PET reveals no new disease and continued response in bone areas. MRI 7/2017-Two foci of enhancement identified in the left temporal and right occipital lobe concerning for metastatic disease in this patient with a history of metastatic RCC. Chart review showed recent admission for CHF exacerbation requiring IV lasix. Patient was treated for hypokalemia in ED, he got a dose of IV lasix. Troponemia noted as well, HR was not better after IV metoprolol. Cardiology was consulted for management of new Aflutter.     Hospital Course:  12/19/2017: admitted to hospital medicine     12/20/2017: Patient with chest pain overnight, still in AFlutter reviewed in Telemetry. Troponin continue elevation at 0.5. Patient complaining of nausea and vomiting unable to tolerate medication. Report CP at 6/10, after nitro tab given with improvement. Patient overall looking worst since admission. Lab work came back is concerning for HERNANDEZ, elevated  LFTs, blood pressure is stable. Cause discussed with cardiology, they concern for cardiogenic shock or potential leading to shock. They are moving him to CCU for evaluation of potential Cardioversion.       Interval History: worsen overnight, chest pain today, nausea and vomiting    Review of Systems   Constitutional: Positive for fatigue. Negative for fever.   HENT: Negative for congestion, ear discharge, facial swelling, sinus pressure and sore throat.    Eyes: Negative for photophobia, pain and visual disturbance.   Respiratory: Positive for shortness of breath. Negative for cough, chest tightness and wheezing.    Cardiovascular: Positive for chest pain and leg swelling. Negative for palpitations.   Gastrointestinal: Positive for nausea and vomiting. Negative for abdominal distention, abdominal pain and diarrhea.   Endocrine: Negative for polyphagia and polyuria.   Genitourinary: Negative for decreased urine volume, dysuria and frequency.   Musculoskeletal: Negative for myalgias and neck pain.   Skin: Negative.  Negative for rash and wound.   Neurological: Positive for weakness. Negative for light-headedness and numbness.   Hematological: Negative for adenopathy. Does not bruise/bleed easily.   Psychiatric/Behavioral: Negative for behavioral problems and confusion. The patient is not nervous/anxious.      Objective:     Vital Signs (Most Recent):  Temp: 97.9 °F (36.6 °C) (12/20/17 0736)  Pulse: (!) 121 (12/20/17 1100)  Resp: 20 (12/20/17 0736)  BP: 117/78 (12/20/17 0736)  SpO2: 99 % (12/20/17 0736) Vital Signs (24h Range):  Temp:  [96 °F (35.6 °C)-97.9 °F (36.6 °C)] 97.9 °F (36.6 °C)  Pulse:  [106-128] 121  Resp:  [16-20] 20  SpO2:  [97 %-99 %] 99 %  BP: ()/(58-78) 117/78     Weight: 74.2 kg (163 lb 9.3 oz)  Body mass index is 23.47 kg/m².    Intake/Output Summary (Last 24 hours) at 12/20/17 1138  Last data filed at 12/20/17 0500   Gross per 24 hour   Intake              360 ml   Output              500 ml    Net             -140 ml      Physical Exam   Constitutional: He is oriented to person, place, and time. He appears distressed.   HENT:   Head: Normocephalic.   Mouth/Throat: Oropharynx is clear and moist. No oropharyngeal exudate.   Eyes: Scleral icterus is present.   Cardiovascular: Intact distal pulses.  Exam reveals no friction rub.    Murmur heard.  Irregularly irregular   Pulmonary/Chest: Effort normal. He has rales.   Abdominal: Soft. Bowel sounds are normal. He exhibits no distension. There is no tenderness.   Musculoskeletal: Normal range of motion. He exhibits edema. He exhibits no tenderness.   Neurological: He is alert and oriented to person, place, and time. Coordination normal.   Skin: Skin is warm. No rash noted. No erythema.   Psychiatric: He has a normal mood and affect. His behavior is normal. Thought content normal.       MELD-Na score: 26 at 12/20/2017  7:09 AM  MELD score: 24 at 12/20/2017  7:09 AM  Calculated from:  Serum Creatinine: 2.9 mg/dL at 12/20/2017  7:09 AM  Serum Sodium: 134 mmol/L at 12/20/2017  7:09 AM  Total Bilirubin: 2.7 mg/dL at 12/20/2017  7:09 AM  INR(ratio): 1.4 at 12/19/2017 10:34 AM  Age: 72 years    Significant Labs:  CBC:    Recent Labs  Lab 12/19/17  1032 12/19/17  1034 12/20/17  0709   WBC  --  7.49 11.85   HGB  --  11.8* 13.2*   HCT 34* 34.6* 40.1   PLT  --  183 204     CMP:    Recent Labs  Lab 12/19/17  1034 12/19/17  1315 12/20/17  0709    133* 134*   K 2.2* 3.1* 5.4*   CL 97 90* 92*   CO2 27 27 16*   GLU 93 156* 51*   BUN 48* 50* 68*   CREATININE 1.5* 1.7* 2.9*   CALCIUM 8.4* 11.1* 10.0   PROT 5.9*  --  7.1   ALBUMIN 2.7*  --  3.1*   BILITOT 1.7*  --  2.7*   ALKPHOS 95  --  117   AST 43*  --  242*   ALT 29  --  141*   ANIONGAP 13 16 26*   EGFRNONAA 45.9* 39.4* 20.7*     PTINR:    Recent Labs  Lab 12/19/17  1034   INR 1.4*         Assessment/Plan:      * Atrial flutter with rapid ventricular response    - Chart reviewed showed this is new episode of Aflutter,  no note of flutter yesterday when he saw his oncologist  - attempted was made for rate control with Digoxin and beta blocker,but patient is still in flutter now with decompesation concerning for cardiogenic shock  - discussed with cardiology team they care moving pt to CCU, possible plan for cardioversion pending discussion with EP  - case discussed with his Oncologist, who thinks that patient is stable for anticoagulation             Cardiogenic shock    - Worsen overnight, patient with HERNANDEZ, elevation in liver enzyme, lactic acidosis,   - discussed with cardiology team, they are concern for cardiogenic shock due to aflutter and hx of aortic stenssis  - transfer to CCU at this time for higher level of care  - appreciate cardiology assistance         Elevated troponin    - due to demand from Fluid overload and aflutter  - trend troponin Q6hr  - telemetry  - ASA, BB  - pt report CP 12/20, controlled with morphine and nitro        Acute on chronic combined systolic and diastolic CHF, NYHA class 4, ACC/AHA Stage C    - Acute on chronic, NYHA class IV  - recent echocardiogram with color flow doppler done 3 months ago - will not repeat as unlikely to   - Lasix IV given by now with worsening Cr          Acute renal failure superimposed on stage 3 chronic kidney disease    - suspect due to cardiogenic shock          Severe aortic stenosis    - IV diuresis           Elevated LFTs    - due to cardiogenic show            Hypokalemia    - hx of low in the past, on oral supplement at home  - now hyperkalemia from his new HERNANDEZ  - stop K supplements, He did get lasix today to help with K            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    - Renal cell cancer stage 4 s/p C3 palliative optidivo  - outpatient follow up          Type 2 diabetes mellitus with diabetic polyneuropathy, without long-term current use of insulin    - SSI for now  - 1x episode of hypoglycemia due to poor oral intake, no insulins were given  since admission  - hypoglycemia resolved with D50        Coronary artery disease involving native heart without angina pectoris    - ASA, BB          Essential hypertension    - previous medication ACEI place on hold due to hypotension  - will monitor            VTE Risk Mitigation         Ordered     heparin 25,000 units in dextrose 5% 250 mL (100 units/mL) infusion  Continuous     Route:  Intravenous        12/20/17 1056     Place CORTES hose  Until discontinued      12/19/17 2238     Place sequential compression device  Until discontinued      12/19/17 2238     Medium Risk of VTE  Once      12/19/17 1228     Place CORTES hose  Until discontinued      12/19/17 1228              Sandoval Greene MD  Department of Hospital Medicine   [facility]

## 2017-12-20 NOTE — PROGRESS NOTES
Informed Dr. Greene with OLGA LIDIA, Pt nauseated and unable to take PO meds at this time. IV lasix is scheduled but labs are pending.  Orders received to give PRN Zofran. hold all PO meds until nausea subsides and hold IV lasix until labs result. Will continue to monitor pt

## 2017-12-20 NOTE — PLAN OF CARE
Problem: Diabetes, Type 2 (Adult)  Goal: Signs and Symptoms of Listed Potential Problems Will be Absent, Minimized or Managed (Diabetes, Type 2)  Signs and symptoms of listed potential problems will be absent, minimized or managed by discharge/transition of care (reference Diabetes, Type 2 (Adult) CPG).   Outcome: Ongoing (interventions implemented as appropriate)   12/19/17 9354   Diabetes, Type 2   Problems Assessed (Type 2 Diabetes) hyperglycemia;hypoglycemia   Problems Present (Type 2 Diabetes) situational response

## 2017-12-20 NOTE — PLAN OF CARE
Admitted w chest pain,aflutter. Pt sees Dr. Koch in Cardiology here at [facility]  Lives w wife, Belkys  PCP Dr marshall alejandro  Uses dme: cane,w/c , rollator  Dc plan: home w family or with hh     12/20/17 0872   Discharge Assessment   Assessment Type Discharge Planning Assessment   Confirmed/corrected address and phone number on facesheet? Yes   Expected Length of Stay (days) 3   Communicated expected length of stay with patient/caregiver yes   Prior to hospitilization cognitive status: Alert/Oriented   Prior to hospitalization functional status: Independent   Current cognitive status: Alert/Oriented   Current Functional Status: Independent   Lives With spouse   Is patient able to care for self after discharge? Yes   Who are your caregiver(s) and their phone number(s)? belkys spouse and daughter [phone] or [phone]   Readmission Within The Last 30 Days no previous admission in last 30 days   Patient currently being followed by outpatient case management? No   Patient currently receives any other outside agency services? No   Equipment Currently Used at Home rollator;cane, straight;wheelchair   Do you have any problems affording any of your prescribed medications? No   Is the patient taking medications as prescribed? yes   Does the patient have transportation home? Yes   Transportation Available family or friend will provide   Does the patient receive services at the [facility]? No  (plavix)   Discharge Plan A Home with family   Discharge Plan B Home with family;Home Health   Patient/Family In Agreement With Plan yes   Does the patient have transportation to healthcare appointments? Yes

## 2017-12-20 NOTE — SUBJECTIVE & OBJECTIVE
Past Medical History:   Diagnosis Date    Aortic atherosclerosis 10/20/2016    Benign non-nodular prostatic hyperplasia with lower urinary tract symptoms 3/29/2017    Brain metastases 7/10/2017    Chronic combined systolic and diastolic heart failure 3/29/2017    4-10-17   1 - Mildly to moderately depressed left ventricular systolic function (EF 40-45%).    2 - Normal right ventricular systolic function .    3 - Left ventricular diastolic dysfunction.    4 - Mild mitral regurgitation.    5 - Mild aortic regurgitation.    6 - Severe aortic stenosis, NAMAN = 0.72 cm2, peak velocity = 4.3 m/s, mean gradient = 43.0 mmHg.    7 - Increased central venous pressure.    Chronic retention of urine 4/3/2017    Patient does self cath at home.   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    Stroke     right eye    Type 2 diabetes mellitus with diabetic polyneuropathy, without long-term current use of insulin 10/20/2016    Type 2 diabetes mellitus with stage 2 chronic kidney disease, without long-term current use of insulin 8/6/2012       Past Surgical History:   Procedure Laterality Date    BACK SURGERY      cervical disc replacement    CARPAL TUNNEL RELEASE      left    CORONARY ANGIOPLASTY WITH STENT PLACEMENT   10/2015    4 stents    EYE SURGERY      laser surgery in right eye    JOINT REPLACEMENT      orthoscopic surgery on knee      ROTATOR CUFF REPAIR      right side    SINUS SURGERY      TONSILLECTOMY      torn ligament      repair. left shoulder    TOTAL KNEE ARTHROPLASTY      left side    VASECTOMY         Review of patient's allergies indicates:   Allergen Reactions    No known drug allergies        No current facility-administered medications on file prior to encounter.      Current Outpatient Prescriptions on File Prior to Encounter   Medication Sig    aspirin (ECOTRIN) 81 MG EC tablet Take 81 mg by mouth once daily.    atorvastatin (LIPITOR) 40 MG tablet TAKE 1 TABLET ONE TIME DAILY (Patient taking differently: TAKE 1 TABLET BY MOUTH ONE TIME DAILY)    brimonidine 0.1% (ALPHAGAN P) 0.1 % Drop Place 1 drop into both eyes 3 (three) times daily.    CALCIUM CARBONATE-VITAMIN D3 ORAL Take 1 tablet by mouth every morning. Calcium carbonate 1000mg vitamin d3 1600 units per patient    clopidogrel (PLAVIX) 75 mg tablet TAKE 1 TABLET BY MOUTH ONCE DAILY    fexofenadine (ALLEGRA) 180 MG tablet TAKE 1 TABLET ONE TIME DAILY (Patient taking differently: TAKE 1 TABLET BY MOUTH ONE TIME DAILY)    gabapentin (NEURONTIN) 300 MG capsule Take 600 mg by mouth 2 (two) times daily.     metoprolol succinate (TOPROL-XL) 25 MG 24 hr tablet Take 1 tablet (25 mg total) by mouth once daily.    potassium chloride SA (K-DUR,KLOR-CON) 20 MEQ tablet Take 20mEq KCl three time a day with food    torsemide (DEMADEX) 20 MG Tab Take 2 tablets (40 mg total) by mouth 2 (two) times daily.    nitroGLYCERIN (NITROSTAT) 0.4 MG SL tablet Place 1 tablet (0.4 mg total) under the tongue every 5 (five) minutes as needed for Chest pain.    ondansetron (ZOFRAN-ODT) 4 MG TbDL Take 1 tablet (4 mg total) by mouth every 4 to 6 hours as needed. Dissolve one tablet under tongue every 4-6 hours as needed for nausea.    zolpidem (AMBIEN) 5 MG Tab  One tab at bedtime as needed for sleep    [DISCONTINUED] bethanechol (URECHOLINE) 50 MG tablet Take 1 tablet (50 mg total) by mouth 3 (three) times daily.     Family History     Problem Relation (Age of Onset)    ALS Father    Asthma Son    Cancer Brother    Heart disease Father    Migraines Daughter    Rheum arthritis Mother        Social History Main Topics    Smoking status: Never Smoker    Smokeless tobacco: Never Used    Alcohol use No      Comment: Heavy in past, quit about 20 years ago    Drug use: No    Sexual activity: Not Currently     Partners: Female     Review of Systems   Constitutional: Negative for activity change, chills, fatigue and fever.   HENT: Negative for congestion, ear discharge, facial swelling, sinus pressure and sore throat.    Eyes: Negative for photophobia, pain and visual disturbance.   Respiratory: Positive for shortness of breath. Negative for cough, chest tightness and wheezing.    Cardiovascular: Positive for leg swelling. Negative for chest pain and palpitations.   Gastrointestinal: Negative for abdominal distention, abdominal pain, diarrhea, nausea and vomiting.   Endocrine: Negative for polyphagia and polyuria.   Genitourinary: Negative for decreased urine volume, dysuria and frequency.   Musculoskeletal: Negative for arthralgias, myalgias and neck pain.   Skin: Negative.  Negative for rash and wound.   Neurological: Negative for weakness, light-headedness and numbness.   Hematological: Negative for adenopathy. Does not bruise/bleed easily.   Psychiatric/Behavioral: Negative for behavioral problems and confusion. The patient is not nervous/anxious.      Objective:     Vital Signs (Most Recent):  Temp: 97.8 °F (36.6 °C) (12/19/17 1926)  Pulse: (!) 115 (12/19/17 1940)  Resp: 20 (12/19/17 1926)  BP: (!) 94/59 (12/19/17 1926)  SpO2: 97 % (12/19/17 1926) Vital Signs (24h Range):  Temp:  [96 °F (35.6 °C)-98.2 °F (36.8 °C)] 97.8 °F (36.6 °C)  Pulse:  [106-121] 115  Resp:  [16-20]  20  SpO2:  [97 %-99 %] 97 %  BP: ()/(59-74) 94/59     Weight: 74.4 kg (164 lb 0.4 oz)  Body mass index is 23.53 kg/m².    Physical Exam   Constitutional: He is oriented to person, place, and time. He appears well-developed and well-nourished. No distress.   HENT:   Head: Normocephalic.   Mouth/Throat: Oropharynx is clear and moist. No oropharyngeal exudate.   Eyes: Conjunctivae are normal. No scleral icterus.   Neck: Normal range of motion. Neck supple.   Cardiovascular: Intact distal pulses.  Exam reveals no friction rub.    Murmur heard.  Irregularly irregular   Pulmonary/Chest: Effort normal. He has rales.   Abdominal: Soft. Bowel sounds are normal. He exhibits no distension. There is no tenderness.   Musculoskeletal: Normal range of motion. He exhibits edema. He exhibits no tenderness.   Lymphadenopathy:     He has no cervical adenopathy.   Neurological: He is alert and oriented to person, place, and time. Coordination normal.   Skin: Skin is warm. No rash noted. No erythema.   Psychiatric: He has a normal mood and affect. His behavior is normal. Thought content normal.           Significant Labs:   CBC:   Recent Labs  Lab 12/19/17  1032 12/19/17  1034   WBC  --  7.49   HGB  --  11.8*   HCT 34* 34.6*   PLT  --  183     CMP:   Recent Labs  Lab 12/19/17  1034 12/19/17  1315    133*   K 2.2* 3.1*   CL 97 90*   CO2 27 27   GLU 93 156*   BUN 48* 50*   CREATININE 1.5* 1.7*   CALCIUM 8.4* 11.1*   PROT 5.9*  --    ALBUMIN 2.7*  --    BILITOT 1.7*  --    ALKPHOS 95  --    AST 43*  --    ALT 29  --    ANIONGAP 13 16   EGFRNONAA 45.9* 39.4*     Troponin:   Recent Labs  Lab 12/19/17  1034 12/19/17  1315 12/19/17  1813   TROPONINI 0.320* 0.371* 0.392*       Significant Imaging: I have reviewed all pertinent imaging results/findings within the past 24 hours.

## 2017-12-20 NOTE — ASSESSMENT & PLAN NOTE
-New diagnosis-Unsure when started because patient is unable to tell about palpitations  -will recommend Digoxin load with 500 mcg followed in 8 hrs by 250 mcg x 2 doses(8 hrs interval) for rate control  -CHADVASC score of 4-Unable to anticoagulate due to brain metastasis  -Continue toprol XL 25 mg po QD

## 2017-12-20 NOTE — HPI
This is a 72 year old male with h/o Renal cell cancer stage 4 s/p C3 palliative optidivo, Cardiomyopathy (EF 15 %), Severe AS (NAMAN 0.68, PV, 3.5, MG =30, grade 3 DDx, mild-moderate MR), CAD (S/p BMS dLM, mRCA x 2, dRCA x 2) who is being admitted to hospital medicine after being found to have tachycardia, concerning on EKG for Aflutter before his cataract surgery today. He report taking metoprolol for sinus tachycardia, denies any hx of Afib or Aflutter. He complains of couple of weeks of SOB, orthopnea, MAHARAJ, no palpitations. He also complains of b/l LE swelling. His most recent PET reveals no new disease and continued response in bone areas. MRI 7/2017-Two foci of enhancement identified in the left temporal and right occipital lobe concerning for metastatic disease in this patient with a history of metastatic RCC. Chart review showed recent admission for CHF exacerbation requiring IV lasix. Patient was treated for hypokalemia in ED, he got a dose of IV lasix. Troponemia noted as well, HR was not better after IV metoprolol. Cardiology was consulted for management of new Aflutter.

## 2017-12-20 NOTE — CARE UPDATE
Acceptance note:    HPI  72 year old male with h/o Renal cell cancer stage 4 s/p C3 palliative optidivo, Cardiomyopathy (EF 15 %), Severe AS (NAMAN 0.68, PV, 3.5, MG =30, grade 3 DDx, mild-moderate MR), CAD (S/p BMS dLM, mRCA x 2, dRCA x 2) who is being admitted to hospital medicine after being found to have tachycardia, concerning on EKG for Aflutter before his cataract surgery today. He report taking metoprolol for sinus tachycardia, denies any hx of Afib or Aflutter. He complains of couple of weeks of SOB, orthopnea, MAHARAJ, no palpitations. He also complains of b/l LE swelling. His most recent PET reveals no new disease and continued response in bone areas. MRI 7/2017-Two foci of enhancement identified in the left temporal and right occipital lobe concerning for metastatic disease in this patient with a history of metastatic RCC. Chart review showed recent admission for CHF exacerbation requiring IV lasix. Patient was treated for hypokalemia in ED, he got a dose of IV lasix. Troponemia noted as well, HR was not better after IV metoprolol. Cardiology was consulted for management of new Aflutter.    Hospital Course:  12/19/2017: admitted to hospital medicine   12/20/2017: Patient with chest pain overnight, still in AFlutter reviewed in Telemetry. Troponin continue elevation at 0.5. Patient complaining of nausea and vomiting unable to tolerate medication. Report CP at 6/10, after nitro tab given with improvement. Patient overall looking worst since admission. Lab work came back is concerning for HERNANDEZ, elevated LFTs, blood pressure is stable. Cause discussed with cardiology, they concern for cardiogenic shock or potential leading to shock. Moved to CCU for evaluation of potential Cardioversion.     Aflutter  Cardiogenic shock vs septic shock  * EP evaluated pt, HR in the 60s, no cardioversion at this time.  * R Trialysis line placed, given worsening Cr, family open to dialysis if needed.  Lasix and amio gtt started. May  need dobutamine, cont to monitor. Repeat labs ordered, LA 9.8 > 9.9.  Infectious work up ordered, started on broad spectrum abx.      CCU to assume care. Discussed with fellow and staff, Dr. Adams.    Lin Bass, PGY2  CCU

## 2017-12-20 NOTE — ASSESSMENT & PLAN NOTE
- Worsen overnight, patient with HERNANDEZ, elevation in liver enzyme, lactic acidosis,   - discussed with cardiology team, they are concern for cardiogenic shock due to aflutter and hx of aortic stenssis  - transfer to CCU at this time for higher level of care  - appreciate cardiology assistance

## 2017-12-20 NOTE — CARE UPDATE
Arrived to room 940A, [facility], requested to assess patient. Troponin levels trending, EKG completed, GI cocktail ordered, recommend giving morphine IVP per MD order. Vitals stable, will continue to monitor.

## 2017-12-20 NOTE — ASSESSMENT & PLAN NOTE
- hx of low in the past, on oral supplement at home  - now hyperkalemia from his new HERNANDEZ  - stop K supplements, He did get lasix today to help with K

## 2017-12-20 NOTE — SUBJECTIVE & OBJECTIVE
Interval History: worsen overnight, chest pain today, nausea and vomiting    Review of Systems   Constitutional: Positive for fatigue. Negative for fever.   HENT: Negative for congestion, ear discharge, facial swelling, sinus pressure and sore throat.    Eyes: Negative for photophobia, pain and visual disturbance.   Respiratory: Positive for shortness of breath. Negative for cough, chest tightness and wheezing.    Cardiovascular: Positive for chest pain and leg swelling. Negative for palpitations.   Gastrointestinal: Positive for nausea and vomiting. Negative for abdominal distention, abdominal pain and diarrhea.   Endocrine: Negative for polyphagia and polyuria.   Genitourinary: Negative for decreased urine volume, dysuria and frequency.   Musculoskeletal: Negative for myalgias and neck pain.   Skin: Negative.  Negative for rash and wound.   Neurological: Positive for weakness. Negative for light-headedness and numbness.   Hematological: Negative for adenopathy. Does not bruise/bleed easily.   Psychiatric/Behavioral: Negative for behavioral problems and confusion. The patient is not nervous/anxious.      Objective:     Vital Signs (Most Recent):  Temp: 97.9 °F (36.6 °C) (12/20/17 0736)  Pulse: (!) 121 (12/20/17 1100)  Resp: 20 (12/20/17 0736)  BP: 117/78 (12/20/17 0736)  SpO2: 99 % (12/20/17 0736) Vital Signs (24h Range):  Temp:  [96 °F (35.6 °C)-97.9 °F (36.6 °C)] 97.9 °F (36.6 °C)  Pulse:  [106-128] 121  Resp:  [16-20] 20  SpO2:  [97 %-99 %] 99 %  BP: ()/(58-78) 117/78     Weight: 74.2 kg (163 lb 9.3 oz)  Body mass index is 23.47 kg/m².    Intake/Output Summary (Last 24 hours) at 12/20/17 1138  Last data filed at 12/20/17 0500   Gross per 24 hour   Intake              360 ml   Output              500 ml   Net             -140 ml      Physical Exam   Constitutional: He is oriented to person, place, and time. He appears distressed.   HENT:   Head: Normocephalic.   Mouth/Throat: Oropharynx is clear and moist.  No oropharyngeal exudate.   Eyes: Scleral icterus is present.   Cardiovascular: Intact distal pulses.  Exam reveals no friction rub.    Murmur heard.  Irregularly irregular   Pulmonary/Chest: Effort normal. He has rales.   Abdominal: Soft. Bowel sounds are normal. He exhibits no distension. There is no tenderness.   Musculoskeletal: Normal range of motion. He exhibits edema. He exhibits no tenderness.   Neurological: He is alert and oriented to person, place, and time. Coordination normal.   Skin: Skin is warm. No rash noted. No erythema.   Psychiatric: He has a normal mood and affect. His behavior is normal. Thought content normal.       MELD-Na score: 26 at 12/20/2017  7:09 AM  MELD score: 24 at 12/20/2017  7:09 AM  Calculated from:  Serum Creatinine: 2.9 mg/dL at 12/20/2017  7:09 AM  Serum Sodium: 134 mmol/L at 12/20/2017  7:09 AM  Total Bilirubin: 2.7 mg/dL at 12/20/2017  7:09 AM  INR(ratio): 1.4 at 12/19/2017 10:34 AM  Age: 72 years    Significant Labs:  CBC:    Recent Labs  Lab 12/19/17  1032 12/19/17  1034 12/20/17  0709   WBC  --  7.49 11.85   HGB  --  11.8* 13.2*   HCT 34* 34.6* 40.1   PLT  --  183 204     CMP:    Recent Labs  Lab 12/19/17  1034 12/19/17  1315 12/20/17  0709    133* 134*   K 2.2* 3.1* 5.4*   CL 97 90* 92*   CO2 27 27 16*   GLU 93 156* 51*   BUN 48* 50* 68*   CREATININE 1.5* 1.7* 2.9*   CALCIUM 8.4* 11.1* 10.0   PROT 5.9*  --  7.1   ALBUMIN 2.7*  --  3.1*   BILITOT 1.7*  --  2.7*   ALKPHOS 95  --  117   AST 43*  --  242*   ALT 29  --  141*   ANIONGAP 13 16 26*   EGFRNONAA 45.9* 39.4* 20.7*     PTINR:    Recent Labs  Lab 12/19/17  1034   INR 1.4*

## 2017-12-20 NOTE — CARE UPDATE
RN Proactive Rounding Note  Time of Visit: 1045    Admit Date: 2017  LOS: 1  Code Status: Full Code   Date of Visit: 2017  : 1945  Age: 72 y.o.  Sex: male  Bed: 6070/6070 A:   MRN: [id number]  Was the patient discharged from an ICU this admission? No  Was the patient discharged from a PACU within last 24 hours?  No  Did the patient receive conscious sedation/general anesthesia in last 24 hours? No  Was the patient in the ED within the past 24 hours? Yes  Was the patient started on NIPPV within the past 24 hours? No  Attending Physician: Alexey Adams MD  Primary Service: [facility] MED A      ASSESSMENT:     Modified Early Warning Score (MEWS): 2  Abnormal Vital Signs: Heart Rate 121, Chest pain  Clinical Issues: Circulatory     INTERVENTIONS/ RECOMMENDATIONS:     Discussed plan of care with charge nurse Lulu and MD Greene with hospital medicine. Pt continues to have chest pain and has received SL nitro PRN. Dr. Greene evaluating patient for possible transfer to ICU for cardioversion and closer monitoring. Rapid response RN also recommending transfer to ICU.     PHYSICIAN ESCALATION:     Yes/No Yes.    Orders received and case discussed with Dr. Greene    Disposition: Remain in 940A    FOLLOW-UP/CONTINGENCY:       Call back the Rapid Response Nurse at [phone]  for additional questions or concerns

## 2017-12-20 NOTE — ASSESSMENT & PLAN NOTE
Acute on chronic  combined    -NYHA class IV  Recommendations  -lasix  80 Mg IV BID  -echocardiogram with color flow doppler done 3 months ago-will not repeat as unlikely to   -beta blocker-continue low dose toprol XL   -ace-inhibitor discontinued previously due to low BP  -strict ins and outs  -daily weights  -fluid restriction  -CHF education

## 2017-12-20 NOTE — PT/OT/SLP DISCHARGE
Occupational Therapy Discharge Summary    Lex Billy  MRN: [id number]   Principal Problem: Atrial flutter with rapid ventricular response      Patient Discharged from acute Occupational Therapy on 12/20/17.  Please refer to prior OT note dated 12/20/17 for functional status.    Assessment:      Patient was discharged unexpectedly.  Information required to complete an accurate discharge summary is unknown.  Refer to therapy initial evaluation and last progress note for initial and most recent functional status and goal achievement.  Recommendations made may be found in medical record.    Objective:     GOALS:    Occupational Therapy Goals        Problem: Occupational Therapy Goal    Goal Priority Disciplines Outcome Interventions   Occupational Therapy Goal     OT, PT/OT Ongoing (interventions implemented as appropriate)    Description:  Goals to be met by: 1/10/17     Patient will increase functional independence with ADLs by performing:    UE Dressing with Stand-by Assistance.  LE Dressing with Stand-by Assistance.  Grooming while standing at sink with Contact Guard Assistance.  Toileting from toilet with Supervision for hygiene and clothing management.   Supine to sit with Modified [place].  Stand pivot transfers with Contact Guard Assistance.  Toilet transfer to toilet with Contact Guard Assistance.                      Reasons for Discontinuation of Therapy Services  Transfer to alternate level of care.      Plan:     Patient Discharged to: Rapid response called; pt t/f to critical care; reconsult therapy when appropriate.    MINDY Molina  12/20/2017  Pager: [phone]

## 2017-12-20 NOTE — NURSING
Notified IM-A MD Ubaldo Bolaños patient states having chest pains when laying down with some relief when sitting EKG and Troponins ordered with GI cocktail will continue to monitor and notify MD of lab results.

## 2017-12-20 NOTE — SIGNIFICANT EVENT
Mr Billy's heart rate remained uncontrolled overnight despite metoprolol and digoxin. He now has signs and symptoms of cardiogenic shock due to his arrhythmia, severe LV dysfunction and severe AS. I am recommending urgent transfer to the CCU for urgent dccv. I discussed the risks of cerebral hemorrhage as well as embolic stroke with the patient and his wife, and they are willing to accept these risks given the critical nature of his current medical condition. I discussed his plan with his oncologist Dr Valdez as well as Dr JOHN Tripp and Dr Adams.

## 2017-12-20 NOTE — ASSESSMENT & PLAN NOTE
- Chart reviewed showed this is new episode of Aflutter, no note of flutter yesterday when he saw his oncologist  - attempted was made for rate control with Digoxin and beta blocker,but patient is still in flutter now with decompesation concerning for cardiogenic shock  - discussed with cardiology team they care moving pt to CCU, possible plan for cardioversion pending discussion with EP  - case discussed with his Oncologist, who thinks that patient is stable for anticoagulation

## 2017-12-20 NOTE — ASSESSMENT & PLAN NOTE
- due to demand from Fluid overload and aflutter  - trend troponin Q6hr  - telemetry  - ASA, BB  - pt report CP 12/20, controlled with morphine and nitro

## 2017-12-20 NOTE — HOSPITAL COURSE
12/19/2017: admitted to hospital medicine     12/20/2017: Patient with chest pain overnight, still in AFlutter reviewed in Telemetry. Troponin continue elevation at 0.5. Patient complaining of nausea and vomiting unable to tolerate medication. Report CP at 6/10, after nitro tab given with improvement. Patient overall looking worst since admission. Lab work came back is concerning for HERNANDEZ, elevated LFTs, blood pressure is stable. Cause discussed with cardiology, they concern for cardiogenic shock or potential leading to shock. They are moving him to CCU for evaluation of potential Cardioversion.

## 2017-12-20 NOTE — ASSESSMENT & PLAN NOTE
- Acute on chronic, NYHA class IV  - recent echocardiogram with color flow doppler done 3 months ago - will not repeat as unlikely to   - Appreciate cardiology assistance, lasix  80 Mg IV BID  - beta blocker-continue low dose toprol XL   - ace-inhibitor discontinued previously due to low BP  - strict ins and outs  - daily weights  - fluid restriction

## 2017-12-20 NOTE — ANESTHESIA PREPROCEDURE EVALUATION
[facility]  Anesthesia Pre-Operative Evaluation         Patient Name: Lex Billy  YOB: 1945  MRN: [id number]    SUBJECTIVE:     Pre-operative evaluation for Procedure(s) (LRB):  Cardioversion (N/A)     12/20/2017    Lex Billy is a 72 y.o. male w/ a significant PMHx of Renal cell cancer stage 4 s/p C3 palliative optidivo, Cardiomyopathy (EF 15 %), Severe AS (NAMAN 0.68, PV, 3.5, MG =30, grade 3 DDx, mild-moderate MR), PA pressure of 60, CAD (S/p BMS dLM, mRCA x 2, dRCA x 2), and recent hospital admission for CHF exacerbation who was admitted to hospital medicine after being found aflutter on EKG prior to his planned cataract surgery. He specifically denied a history of afib or flutter. Over the last few weeks he has experienced severe SOB, orthopnea, MAHARAJ, and bilateral lower extremity swelling. Overnight his heart rate was uncontrolled despite metoprolol and digoxin. He began developing signs and symptoms of cardiogenic shock and was transferred to CCU. He now presents for the above stated procedure.     LDA:        Peripheral IV - Single Lumen 12/19/17 1022 Left Hand (Active)   Site Assessment Clean;Dry;Intact;No redness;No swelling 12/20/2017 12:00 PM   Line Status Infusing 12/20/2017 12:00 PM   Dressing Status Clean;Dry;Intact 12/20/2017 12:00 PM   Dressing Intervention Dressing reinforced 12/20/2017 12:00 PM   Dressing Change Due 12/23/17 12/20/2017 12:00 PM   Site Change Due 12/23/17 12/20/2017 12:00 PM   Reason Not Rotated Not due 12/20/2017 12:00 PM   Number of days: 1            Peripheral IV - Single Lumen 12/19/17 1128 Left Antecubital (Active)   Site Assessment Clean;Dry;Intact;No redness 12/20/2017 12:00 PM   Line Status Infusing 12/20/2017 12:00 PM   Dressing Status Clean;Dry;Intact 12/20/2017 12:00 PM   Dressing Intervention Dressing reinforced 12/20/2017 12:00 PM   Dressing Change Due 12/23/17 12/20/2017 12:00 PM   Site Change Due 12/23/17 12/20/2017  12:00 PM   Reason Not Rotated Not due 12/20/2017 12:00 PM   Number of days: 1       Prev airway: None documented.    Drips:    amiodarone 1 mg/min (12/20/17 1205)    heparin (porcine) in D5W 17 Units/kg/hr (12/20/17 1204)       Patient Active Problem List   Diagnosis    Essential hypertension    Type 2 diabetes mellitus with stage 2 chronic kidney disease, without long-term current use of insulin    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    Coronary artery disease involving native heart without angina pectoris    Severe aortic stenosis    Stage 4, Clear cell renal cell carcinoma, right w/ bony mets    Aortic atherosclerosis    Type 2 diabetes mellitus with diabetic polyneuropathy, without long-term current use of insulin    Chronic combined systolic and diastolic heart failure    Benign non-nodular prostatic hyperplasia with lower urinary tract symptoms    Chronic retention of urine    Brain metastases    Acute on chronic combined systolic and diastolic CHF, NYHA class 4, ACC/AHA Stage C    Cardiomyopathy, ischemic    Atrial flutter with rapid ventricular response    Elevated troponin    Hypokalemia    Cardiogenic shock    Acute renal failure superimposed on stage 3 chronic kidney disease    Elevated LFTs       Review of patient's allergies indicates:   Allergen Reactions    No known drug allergies        Current Inpatient Medications:   aspirin  81 mg Oral Daily    atorvastatin  40 mg Oral Daily    clopidogrel  75 mg Oral Daily    digoxin  0.25 mg Oral Q8H    furosemide  80 mg Intravenous BID    gabapentin  600 mg Oral BID    metoprolol succinate  25 mg Oral Daily       No current facility-administered medications on file prior to encounter.      Current Outpatient Prescriptions on File Prior to Encounter   Medication Sig Dispense Refill    aspirin (ECOTRIN) 81 MG EC tablet Take 81 mg by mouth once daily.      atorvastatin (LIPITOR) 40 MG tablet TAKE 1 TABLET ONE TIME DAILY (Patient  taking differently: TAKE 1 TABLET BY MOUTH ONE TIME DAILY) 90 tablet 3    brimonidine 0.1% (ALPHAGAN P) 0.1 % Drop Place 1 drop into both eyes 3 (three) times daily.      CALCIUM CARBONATE-VITAMIN D3 ORAL Take 1 tablet by mouth every morning. Calcium carbonate 1000mg vitamin d3 1600 units per patient      clopidogrel (PLAVIX) 75 mg tablet TAKE 1 TABLET BY MOUTH ONCE DAILY 90 tablet 3    fexofenadine (ALLEGRA) 180 MG tablet TAKE 1 TABLET ONE TIME DAILY (Patient taking differently: TAKE 1 TABLET BY MOUTH ONE TIME DAILY) 90 tablet 3    gabapentin (NEURONTIN) 300 MG capsule Take 600 mg by mouth 2 (two) times daily.       metoprolol succinate (TOPROL-XL) 25 MG 24 hr tablet Take 1 tablet (25 mg total) by mouth once daily. 90 tablet 3    potassium chloride SA (K-DUR,KLOR-CON) 20 MEQ tablet Take 20mEq KCl three time a day with food 120 tablet 4    torsemide (DEMADEX) 20 MG Tab Take 2 tablets (40 mg total) by mouth 2 (two) times daily. 360 tablet 3    nitroGLYCERIN (NITROSTAT) 0.4 MG SL tablet Place 1 tablet (0.4 mg total) under the tongue every 5 (five) minutes as needed for Chest pain. 25 tablet 4    ondansetron (ZOFRAN-ODT) 4 MG TbDL Take 1 tablet (4 mg total) by mouth every 4 to 6 hours as needed. Dissolve one tablet under tongue every 4-6 hours as needed for nausea. 30 tablet 1    zolpidem (AMBIEN) 5 MG Tab One tab at bedtime as needed for sleep 30 tablet 3       Past Surgical History:   Procedure Laterality Date    BACK SURGERY      cervical disc replacement    CARPAL TUNNEL RELEASE      left    CORONARY ANGIOPLASTY WITH STENT PLACEMENT  10/2015    4 stents    EYE SURGERY      laser surgery in right eye    JOINT REPLACEMENT      orthoscopic surgery on knee      ROTATOR CUFF REPAIR      right side    SINUS SURGERY      TONSILLECTOMY      torn ligament      repair. left shoulder    TOTAL KNEE ARTHROPLASTY      left side    VASECTOMY         Social History     Social History    Marital status:       Spouse name: Belkys    Number of children: N/A    Years of education: N/A     Occupational History    Retired Not [employer]     Social History Main Topics    Smoking status: Never Smoker    Smokeless tobacco: Never Used    Alcohol use No      Comment: Heavy in past, quit about 20 years ago    Drug use: No    Sexual activity: Not Currently     Partners: Female     Other Topics Concern    Not on file     Social History Narrative    , previous longshoreman.    Wife OK has dm2.    2 kids, 1 son RT, 1 dtr .    Has not been able to exercise       OBJECTIVE:     Vital Signs Range (Last 24H):  Temp:  [35.6 °C (96 °F)-36.6 °C (97.9 °F)]   Pulse:  [106-131]   Resp:  [14-22]   BP: ()/(58-84)   SpO2:  [96 %-99 %]       CBC:   Recent Labs      17   1034  17   0709   WBC  7.49  11.85   RBC  3.80*  4.31*   HGB  11.8*  13.2*   HCT  34.6*  40.1   PLT  183  204   MCV  91  93   MCH  31.1*  30.6   MCHC  34.1  32.9       CMP:   Recent Labs      17   1034  17   1315  17   0709   NA  137  133*  134*   K  2.2*  3.1*  5.4*   CL  97  90*  92*   CO2  27  27  16*   BUN  48*  50*  68*   CREATININE  1.5*  1.7*  2.9*   GLU  93  156*  51*   MG  1.6  2.2  2.5   PHOS  3.8   --   7.5*   CALCIUM  8.4*  11.1*  10.0   ALBUMIN  2.7*   --   3.1*   PROT  5.9*   --   7.1   ALKPHOS  95   --   117   ALT  29   --   141*   AST  43*   --   242*   BILITOT  1.7*   --   2.7*       INR:  Recent Labs      17   1034   INR  1.4*       Diagnostic Studies: No relevant studies.    EK/20/17    Test Reason : R07.9  Vent. Rate : 124 BPM     Atrial Rate : 300 BPM     P-R Int : 000 ms          QRS Dur : 132 ms      QT Int : 354 ms       P-R-T Axes : 000 -58 101 degrees     QTc Int : 508 ms    Atrial flutter with variable A-V block  Left anterior fascicular block  Right bundle branch block  LVH with repolarization abnormality  Abnormal ECG  When compared with ECG of  19-DEC-2017 21:44,  No significant change was found  Confirmed by Vini Shoemaker MD ([id number]) on 12/20/2017 12:05:23 PM    2D ECHO:  Results for orders placed or performed during the hospital encounter of 09/21/17   Echo doppler color flow velocity map   Result Value Ref Range    EF 15 (A) 55 - 65    Mitral Valve Regurgitation MILD TO MODERATE     Diastolic Dysfunction Yes (A)     Aortic Valve Regurgitation TRIVIAL     Aortic Valve Stenosis SEVERE (A)     Est. PA Systolic Pressure 60.13 (A)     Pericardial Effusion TRIVIAL     Mitral Valve Mobility NORMAL     Tricuspid Valve Regurgitation MILD          ASSESSMENT/PLAN:         Anesthesia Evaluation    I have reviewed the Patient Summary Reports.    I have reviewed the Nursing Notes.      Review of Systems  Anesthesia Hx:  History of prior surgery of interest to airway management or planning: Denies Family Hx of Anesthesia complications.   Denies Personal Hx of Anesthesia complications.   Hematology/Oncology:         -- Anemia: Hematology Comments: Recent hgb of 11.8 Oncology Comments: Renal cell cancer stage 4 s/p C3 palliative optidivo     EENT/Dental:EENT/Dental Normal   Cardiovascular:   Exercise tolerance: poor Hypertension Valvular problems/Murmurs, AS, MR Past MI CAD  Dysrhythmias  CHF PND hyperlipidemia ECG has been reviewed. EF 15  Severe AS mod MR  PA pressure 50   Pulmonary:   Shortness of breath    Renal/:   Chronic Renal Disease    Hepatic/GI:  Hepatic/GI Normal    Musculoskeletal:  Musculoskeletal Normal    Neurological:   Neuromuscular Disease, Brain mets   Endocrine:   Diabetes, type 2    Dermatological:  Skin Normal    Psych:  Psychiatric Normal           Physical Exam  General:  Malnutrition    Airway/Jaw/Neck:  Airway Findings: Mouth Opening: Normal Tongue: Normal  General Airway Assessment: Adult  Mallampati: II  TM Distance: Normal, at least 6 cm  Jaw/Neck Findings:  Neck ROM: Normal ROM  Neck Findings: Normal    Eyes/Ears/Nose:  EYES/EARS/NOSE  FINDINGS: Normal   Dental:  Dental Findings: Periodontal disease, Mild   Chest/Lungs:  Chest/Lungs Findings: Clear to auscultation, Normal Respiratory Rate     Heart/Vascular:  Heart Findings: Rate: Normal  Rhythm: Regular Rhythm  Heart Murmur  Systolic Heart Murmur Description: Radiates to Carotids     Abdomen:  Abdomen Findings: Normal    Musculoskeletal:  Musculoskeletal Findings: Normal   Skin:  Skin Findings: Normal    Mental Status:  Mental Status Findings:  Cooperative, Alert and Oriented         Anesthesia Plan  Type of Anesthesia, risks & benefits discussed:  Anesthesia Type:  general, MAC  Patient's Preference:   Intra-op Monitoring Plan: standard ASA monitors  Intra-op Monitoring Plan Comments:   Post Op Pain Control Plan: multimodal analgesia and per primary service following discharge from PACU  Post Op Pain Control Plan Comments:   Induction:    Beta Blocker:  Patient is on a Beta-Blocker and has received one dose within the past 24 hours (No further documentation required).       Informed Consent: Patient understands risks and agrees with Anesthesia plan.  Questions answered. Anesthesia consent signed with patient.  ASA Score: 4     Day of Surgery Review of History & Physical: I have interviewed and examined the patient. I have reviewed the patient's H&P dated:  There are no significant changes.  H&P update referred to the surgeon.         Ready For Surgery From Anesthesia Perspective.

## 2017-12-20 NOTE — CONSULTS
[facility]  Cardiology  Consult Note    Patient Name: Lex Billy  MRN: [id number]  Admission Date: 12/19/2017  Hospital Length of Stay: 0 days  Code Status: Full Code   Attending Provider: Kameron Marroquin MD   Consulting Provider: Eliecer Freitas MD  Primary Care Physician: Everette Rowan MD  Principal Problem:<principal problem not specified>    Patient information was obtained from patient, past medical records and ER records.     Inpatient consult to Cardiology  Consult performed by: ELIECER FREITAS  Consult ordered by: SUKHDEV LANDRY  Reason for consult: CHF        Subjective:     Chief Complaint:  SOB     HPI:   72 year old male with h/o Renal cell cancer stage 4 s/p C3 palliative optidivo, Cardiomyopathy (EF 15 %),Severe AS (NAMAN 0.68, PV, 3.5, MG =30, grade 3 DDx, mild-moderate MR), CAD (S/p BMS dLM, mRCA x 2, dRCA x 2) who presented to ED after he was noted to be tachycardic and in atrial flutter prior to his cataract surgery today.He complains of couple of weeks of SOB, orthopnea, MAHARAJ, NYHA class III symptoms, No chest pain, no palpitations.,He also complains of b/l LE swelling.His most recent PET reveals no new disease and continued response in bone areas.    MRI 7/2017-Two foci of enhancement identified in the left temporal and right occipital lobe concerning for metastatic disease in this patient with a history of metastatic RCC.    Past Medical History:   Diagnosis Date    Aortic atherosclerosis 10/20/2016    Benign non-nodular prostatic hyperplasia with lower urinary tract symptoms 3/29/2017    Brain metastases 7/10/2017    Chronic combined systolic and diastolic heart failure 3/29/2017    4-10-17   1 - Mildly to moderately depressed left ventricular systolic function (EF 40-45%).    2 - Normal right ventricular systolic function .    3 - Left ventricular diastolic dysfunction.    4 - Mild mitral regurgitation.    5 - Mild aortic regurgitation.    6 - Severe aortic stenosis,  NAMAN = 0.72 cm2, peak velocity = 4.3 m/s, mean gradient = 43.0 mmHg.    7 - Increased central venous pressure.    Chronic retention of urine 4/3/2017    Patient does self cath at home.   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    Stroke     right eye    Type 2 diabetes mellitus with diabetic polyneuropathy, without long-term current use of insulin 10/20/2016    Type 2 diabetes mellitus with stage 2 chronic kidney disease, without long-term current use of insulin 8/6/2012       Past Surgical History:   Procedure Laterality Date    BACK SURGERY      cervical disc replacement    CARPAL TUNNEL RELEASE      left    CORONARY ANGIOPLASTY WITH STENT PLACEMENT  10/2015    4 stents    EYE SURGERY      laser surgery in right eye    JOINT REPLACEMENT      orthoscopic surgery on knee      ROTATOR CUFF REPAIR      right side    SINUS SURGERY      TONSILLECTOMY      torn ligament      repair. left shoulder    TOTAL KNEE ARTHROPLASTY      left side    VASECTOMY         Review of patient's allergies indicates:   Allergen Reactions    No known drug allergies        No current facility-administered medications on file prior to encounter.      Current Outpatient Prescriptions on File Prior to Encounter    Medication Sig    aspirin (ECOTRIN) 81 MG EC tablet Take 81 mg by mouth once daily.    atorvastatin (LIPITOR) 40 MG tablet TAKE 1 TABLET ONE TIME DAILY (Patient taking differently: TAKE 1 TABLET BY MOUTH ONE TIME DAILY)    brimonidine 0.1% (ALPHAGAN P) 0.1 % Drop Place 1 drop into both eyes 3 (three) times daily.    CALCIUM CARBONATE-VITAMIN D3 ORAL Take 1 tablet by mouth every morning. Calcium carbonate 1000mg vitamin d3 1600 units per patient    clopidogrel (PLAVIX) 75 mg tablet TAKE 1 TABLET BY MOUTH ONCE DAILY    fexofenadine (ALLEGRA) 180 MG tablet TAKE 1 TABLET ONE TIME DAILY (Patient taking differently: TAKE 1 TABLET BY MOUTH ONE TIME DAILY)    gabapentin (NEURONTIN) 300 MG capsule Take 600 mg by mouth 2 (two) times daily.     metoprolol succinate (TOPROL-XL) 25 MG 24 hr tablet Take 1 tablet (25 mg total) by mouth once daily.    nitroGLYCERIN (NITROSTAT) 0.4 MG SL tablet Place 1 tablet (0.4 mg total) under the tongue every 5 (five) minutes as needed for Chest pain.    ondansetron (ZOFRAN-ODT) 4 MG TbDL Take 1 tablet (4 mg total) by mouth every 4 to 6 hours as needed. Dissolve one tablet under tongue every 4-6 hours as needed for nausea.    potassium chloride SA (K-DUR,KLOR-CON) 20 MEQ tablet Take 20mEq KCl three time a day with food    torsemide (DEMADEX) 20 MG Tab Take 2 tablets (40 mg total) by mouth 2 (two) times daily.    zolpidem (AMBIEN) 5 MG Tab One tab at bedtime as needed for sleep    [DISCONTINUED] bethanechol (URECHOLINE) 50 MG tablet Take 1 tablet (50 mg total) by mouth 3 (three) times daily.     Family History     Problem Relation (Age of Onset)    ALS Father    Asthma Son    Cancer Brother    Heart disease Father    Migraines Daughter    Rheum arthritis Mother        Social History Main Topics    Smoking status: Never Smoker    Smokeless tobacco: Never Used    Alcohol use No      Comment: Heavy in past, quit about 20 years ago    Drug use: No    Sexual activity: Not  Currently     Partners: Female     Review of Systems   All other systems reviewed and are negative.    Objective:     Vital Signs (Most Recent):  Temp: 96 °F (35.6 °C) (12/19/17 1543)  Pulse: (!) 121 (12/19/17 1701)  Resp: 18 (12/19/17 1701)  BP: 107/67 (12/19/17 1701)  SpO2: 97 % (12/19/17 1701) Vital Signs (24h Range):  Temp:  [96 °F (35.6 °C)-98.2 °F (36.8 °C)] 96 °F (35.6 °C)  Pulse:  [106-121] 121  Resp:  [16-20] 18  SpO2:  [97 %-99 %] 97 %  BP: (107-110)/(65-74) 107/67     Weight: 75.8 kg (167 lb)  Body mass index is 23.96 kg/m².    SpO2: 97 %  O2 Device (Oxygen Therapy): room air      Intake/Output Summary (Last 24 hours) at 12/19/17 1803  Last data filed at 12/19/17 1738   Gross per 24 hour   Intake                0 ml   Output              900 ml   Net             -900 ml       Lines/Drains/Airways     Peripheral Intravenous Line                 Peripheral IV - Single Lumen 12/19/17 1022 Left Hand less than 1 day         Peripheral IV - Single Lumen 12/19/17 1128 Left Antecubital less than 1 day                Physical Exam  General: alert, awake and oriented x 3  Eyes:PERRL.   Neck:+ JVD   Lungs:  clear to auscultation bilaterally and normal respiratory effort  Cardiovascular: Heart: Irregular rate and rhythm, S1, S2 normal  Chest Wall: no tenderness.   Extremities: no cyanosis 3+ edema. Cool extremities  Pulses: 1+ and symmetric.      Significant Labs:   CMP   Recent Labs  Lab 12/19/17  1034 12/19/17  1315    133*   K 2.2* 3.1*   CL 97 90*   CO2 27 27   GLU 93 156*   BUN 48* 50*   CREATININE 1.5* 1.7*   CALCIUM 8.4* 11.1*   PROT 5.9*  --    ALBUMIN 2.7*  --    BILITOT 1.7*  --    ALKPHOS 95  --    AST 43*  --    ALT 29  --    ANIONGAP 13 16   ESTGFRAFRICA 53.0* 45.6*   EGFRNONAA 45.9* 39.4*    and CBC   Recent Labs  Lab 12/19/17  1034   WBC 7.49   HGB 11.8*   HCT 34.6*          Significant Imaging: Echocardiogram:   2D echo with color flow doppler:   Results for orders placed or performed  during the hospital encounter of 09/21/17   Echo doppler color flow velocity map   Result Value Ref Range    EF 15 (A) 55 - 65    Mitral Valve Regurgitation MILD TO MODERATE     Diastolic Dysfunction Yes (A)     Aortic Valve Regurgitation TRIVIAL     Aortic Valve Stenosis SEVERE (A)     Est. PA Systolic Pressure 60.13 (A)     Pericardial Effusion TRIVIAL     Mitral Valve Mobility NORMAL     Tricuspid Valve Regurgitation MILD      EKG-Atrial flutter with RVR    Assessment and Plan:     Atrial flutter with rapid ventricular response    -New diagnosis-Unsure when started because patient is unable to tell about palpitations  -will recommend Digoxin load with 500 mcg followed in 8 hrs by 250 mcg x 2 doses(8 hrs interval) for rate control  -CHADVASC score of 4-Unable to anticoagulate due to brain metastasis  -Continue toprol XL 25 mg po QD        Acute on chronic combined systolic and diastolic CHF, NYHA class 4, ACC/AHA Stage C      Acute on chronic  combined    -NYHA class IV  Recommendations  -lasix  80 Mg IV BID  -echocardiogram with color flow doppler done 3 months ago-will not repeat as unlikely to   -beta blocker-continue low dose toprol XL   -ace-inhibitor discontinued previously due to low BP  -strict ins and outs  -daily weights  -fluid restriction  -CHF education              VTE Risk Mitigation         Ordered     Medium Risk of VTE  Once      12/19/17 1228     Place CORTES hose  Until discontinued      12/19/17 1228          Thank you for your consult. I will follow-up with patient. Please contact us if you have any additional questions.    Lucas Freitas MD  Cardiology   [facility]

## 2017-12-20 NOTE — PROCEDURES
"Lex Billy is a 72 y.o. male patient.    Temp: 97.9 °F (36.6 °C) (12/20/17 1215)  Pulse: 92 (12/20/17 1415)  Resp: 18 (12/20/17 1415)  BP: 103/67 (12/20/17 1415)  SpO2: 97 % (12/20/17 1415)  Weight: 74 kg (163 lb 2.3 oz) (12/20/17 1407)  Height: 5' 10" (177.8 cm) (12/20/17 1407)    PICC  Date/Time: 12/20/2017 4:36 PM  Performed by: ANA MARIA COLLINS.  Pre-operative Diagnosis: cardiogenic shock  Post-operative diagnosis: cardiogenic shock  Consent Done: Yes  Time out: Immediately prior to procedure a time out was called to verify the correct patient, procedure, equipment, support staff and site/side marked as required  Indications: med administration, hemodialysis, vascular access and hemodynamic monitoring  Anesthesia: local infiltration  Local anesthetic: lidocaine 2% without epinephrine  Anesthetic Total (mL): 5  Preparation: skin prepped with ChloraPrep  Skin prep agent dried: skin prep agent completely dried prior to procedure  Sterile barriers: all five maximum sterile barriers used - cap, mask, sterile gown, sterile gloves, and large sterile sheet  Hand hygiene: hand hygiene performed prior to central venous catheter insertion  Location details: right internal jugular  Catheter type: triple lumen  Catheter size: 8 Fr  Catheter Length: 16cm    Ultrasound guidance: yes  Vessel Caliber: large and patent, compressibility normal  Needle advanced into vessel with real time Ultrasound guidance.  Guidewire confirmed in vessel.  Sterile sheath used.  Manometry: esophageal manometry  Number of attempts: 1  Post-procedure: blood return through all ports, chlorhexidine patch, line sutured and sterile dressing applied  Estimated blood loss (mL): 3    Complications: none  Comments: Xray pending and will be confirmed trialysis line by primary CCU team.  All risks and reasons explained to family at bedside.  No complications.        Ana Maria Collins  12/20/2017    "

## 2017-12-20 NOTE — ASSESSMENT & PLAN NOTE
Acute on chronic  combined    - CCU admission for possible cardiogenic shock.  - EP consulted for Cardioversion, started heparin gtt and amiodarone gtt.  - risk and benefit from the cardioversion and anticoagulation was fully explain to the pt and his wife.

## 2017-12-20 NOTE — ASSESSMENT & PLAN NOTE
- Chart reviewed showed this is new episode of Aflutter, no note of flutter yesterday when he saw his oncologist  - Pt has hx of sinus tachycardia for which he takes metoprolol?  - Cardiology consulted given the new etiology and he did not response to BB given in ED  - Per cardiology recommendation, start Digoxin load with 500 mcg followed in 8 hrs by 250 mcg x 2 doses(8 hrs interval) for rate control  - CHADVASC score of 4-Unable to anticoagulate and cardioversion due to brain metastasis  - Continue toprol XL 25 mg po QD  - continue telemetry  - continue IV diuresis with lasix

## 2017-12-20 NOTE — ASSESSMENT & PLAN NOTE
- due to demand from Fluid overload and aflutter  - monitor for chest pain  - trend troponin Q6hr  - telemetry  - ASA, BB

## 2017-12-20 NOTE — SIGNIFICANT EVENT
Rapid Response Nurse Note     Rapid Response Metrics:     Admit Date: 2017  LOS: 1  Code Status: Full Code   Date of Consult: 2017  : 1945  Age: 72 y.o.  Weight:   Wt Readings from Last 1 Encounters:   17 74.2 kg (163 lb 9.3 oz)     Race:    Sex: male  Bed: 6070/6070 A:   MRN: [id number]  Time Rapid Response Team page Received: 1130  Time Rapid Response Team at Bedside: 1133  Time Rapid Response Team left Bedside: 1209  Was the patient discharged from an ICU this admission? No  Was the patient discharged from a PACU within last 24 hours? No  Did the patient receive conscious sedation/general anesthesia within last 24 hours? No  Was the patient in the ED within the past 24 hours? Yes  Was the patient started on NIPPV within the past 24 hours? No  Did this progress into an ARC or CPA:   Attending Physician: Alexey Adams MD  Primary Service: [facility] MED A  Consult Requested By: Alexey Adams MD   Rapid Response Indication(s): persistent chest pain  Disposition: transfer to SICU    SITUATION:     Reason for Call:   Rapid response call to evaluate patient. Persistent chest pain and aflutter RVR.    BACKGROUND:     Why is the patient in the hospital?: atrial flutter with RVR  What changed?: Persistent chest pain    ASSESSMENT:     What did you find: Vital signs per flow sheet. Upon arrival to rapid response call patient AAOx3 complaining of 6/10 non radiating chest pain. Primary team aware that patient is continuing to require sublingual nitroglycerin tabs to alleviate chest pain. Rhythm atrial flutter with RVR. Team ordered to transfer pt to ICU for evaluation of cardiogenic shock.    RECOMMENDATIONS:     We recommend: Transfer to ICU    FOLLOW-UP/CONTINGENCY PLAN:     Patient needs a second visit at : Does not need a second visit, pt now in ICU.

## 2017-12-20 NOTE — PROGRESS NOTES
Call into pts room with pt c/o chest pain. Sublingual nitro administered. Lab called with critical value of lactic= 9.8. Rapid response team initiated. IV morphine administered. Transferred to ICU

## 2017-12-20 NOTE — H&P
[facility] Hospital Medicine  History & Physical    Patient Name: Lex Billy  MRN: [id number]  Admission Date: 12/19/2017  Attending Physician: Sandoval Greene MD   Primary Care Provider: Everette Rowan MD    [facility] Medicine Team: [facility] MED A Sandoval Greene MD     Patient information was obtained from patient, spouse/SO, past medical records and ER records.     Subjective:     Principal Problem:Atrial flutter with rapid ventricular response    Chief Complaint:   Chief Complaint   Patient presents with    Irregular Heart Beat     Sent from eye [place] for eval. He was scgeduled to to have procedure done. Staff noted his heart rate irregular. Pt denies complaints.        HPI: This is a 72 year old male with h/o Renal cell cancer stage 4 s/p C3 palliative optidivo, Cardiomyopathy (EF 15 %), Severe AS (NAMAN 0.68, PV, 3.5, MG =30, grade 3 DDx, mild-moderate MR), CAD (S/p BMS dLM, mRCA x 2, dRCA x 2) who is being admitted to hospital medicine after being found to have tachycardia, concerning on EKG for Aflutter before his cataract surgery today. He report taking metoprolol for sinus tachycardia, denies any hx of Afib or Aflutter. He complains of couple of weeks of SOB, orthopnea, MAHARAJ, no palpitations. He also complains of b/l LE swelling. His most recent PET reveals no new disease and continued response in bone areas. MRI 7/2017-Two foci of enhancement identified in the left temporal and right occipital lobe concerning for metastatic disease in this patient with a history of metastatic RCC. Chart review showed recent admission for CHF exacerbation requiring IV lasix. Patient was treated for hypokalemia in ED, he got a dose of IV lasix. Troponemia noted as well, HR was not better after IV metoprolol. Cardiology was consulted for management of new Aflutter.     Past Medical History:   Diagnosis Date    Aortic atherosclerosis 10/20/2016    Benign non-nodular prostatic hyperplasia with lower  urinary tract symptoms 3/29/2017    Brain metastases 7/10/2017    Chronic combined systolic and diastolic heart failure 3/29/2017    4-10-17   1 - Mildly to moderately depressed left ventricular systolic function (EF 40-45%).    2 - Normal right ventricular systolic function .    3 - Left ventricular diastolic dysfunction.    4 - Mild mitral regurgitation.    5 - Mild aortic regurgitation.    6 - Severe aortic stenosis, NAMAN = 0.72 cm2, peak velocity = 4.3 m/s, mean gradient = 43.0 mmHg.    7 - Increased central venous pressure.    Chronic retention of urine 4/3/2017    Patient does self cath at home.   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    Stroke     right eye    Type 2 diabetes mellitus with diabetic polyneuropathy, without long-term current use of insulin 10/20/2016    Type 2 diabetes mellitus with stage 2 chronic kidney disease, without long-term current use of insulin 8/6/2012       Past Surgical History:   Procedure Laterality Date    BACK SURGERY      cervical disc replacement    CARPAL TUNNEL RELEASE      left    CORONARY ANGIOPLASTY WITH STENT PLACEMENT  10/2015    4 stents    EYE SURGERY      laser surgery in right eye    JOINT REPLACEMENT      orthoscopic surgery on knee       ROTATOR CUFF REPAIR      right side    SINUS SURGERY      TONSILLECTOMY      torn ligament      repair. left shoulder    TOTAL KNEE ARTHROPLASTY      left side    VASECTOMY         Review of patient's allergies indicates:   Allergen Reactions    No known drug allergies        No current facility-administered medications on file prior to encounter.      Current Outpatient Prescriptions on File Prior to Encounter   Medication Sig    aspirin (ECOTRIN) 81 MG EC tablet Take 81 mg by mouth once daily.    atorvastatin (LIPITOR) 40 MG tablet TAKE 1 TABLET ONE TIME DAILY (Patient taking differently: TAKE 1 TABLET BY MOUTH ONE TIME DAILY)    brimonidine 0.1% (ALPHAGAN P) 0.1 % Drop Place 1 drop into both eyes 3 (three) times daily.    CALCIUM CARBONATE-VITAMIN D3 ORAL Take 1 tablet by mouth every morning. Calcium carbonate 1000mg vitamin d3 1600 units per patient    clopidogrel (PLAVIX) 75 mg tablet TAKE 1 TABLET BY MOUTH ONCE DAILY    fexofenadine (ALLEGRA) 180 MG tablet TAKE 1 TABLET ONE TIME DAILY (Patient taking differently: TAKE 1 TABLET BY MOUTH ONE TIME DAILY)    gabapentin (NEURONTIN) 300 MG capsule Take 600 mg by mouth 2 (two) times daily.     metoprolol succinate (TOPROL-XL) 25 MG 24 hr tablet Take 1 tablet (25 mg total) by mouth once daily.    potassium chloride SA (K-DUR,KLOR-CON) 20 MEQ tablet Take 20mEq KCl three time a day with food    torsemide (DEMADEX) 20 MG Tab Take 2 tablets (40 mg total) by mouth 2 (two) times daily.    nitroGLYCERIN (NITROSTAT) 0.4 MG SL tablet Place 1 tablet (0.4 mg total) under the tongue every 5 (five) minutes as needed for Chest pain.    ondansetron (ZOFRAN-ODT) 4 MG TbDL Take 1 tablet (4 mg total) by mouth every 4 to 6 hours as needed. Dissolve one tablet under tongue every 4-6 hours as needed for nausea.    zolpidem (AMBIEN) 5 MG Tab One tab at bedtime as needed for sleep    [DISCONTINUED] bethanechol (URECHOLINE) 50 MG tablet Take 1 tablet (50 mg total) by  mouth 3 (three) times daily.     Family History     Problem Relation (Age of Onset)    ALS Father    Asthma Son    Cancer Brother    Heart disease Father    Migraines Daughter    Rheum arthritis Mother        Social History Main Topics    Smoking status: Never Smoker    Smokeless tobacco: Never Used    Alcohol use No      Comment: Heavy in past, quit about 20 years ago    Drug use: No    Sexual activity: Not Currently     Partners: Female     Review of Systems   Constitutional: Negative for activity change, chills, fatigue and fever.   HENT: Negative for congestion, ear discharge, facial swelling, sinus pressure and sore throat.    Eyes: Negative for photophobia, pain and visual disturbance.   Respiratory: Positive for shortness of breath. Negative for cough, chest tightness and wheezing.    Cardiovascular: Positive for leg swelling. Negative for chest pain and palpitations.   Gastrointestinal: Negative for abdominal distention, abdominal pain, diarrhea, nausea and vomiting.   Endocrine: Negative for polyphagia and polyuria.   Genitourinary: Negative for decreased urine volume, dysuria and frequency.   Musculoskeletal: Negative for arthralgias, myalgias and neck pain.   Skin: Negative.  Negative for rash and wound.   Neurological: Negative for weakness, light-headedness and numbness.   Hematological: Negative for adenopathy. Does not bruise/bleed easily.   Psychiatric/Behavioral: Negative for behavioral problems and confusion. The patient is not nervous/anxious.      Objective:     Vital Signs (Most Recent):  Temp: 97.8 °F (36.6 °C) (12/19/17 1926)  Pulse: (!) 115 (12/19/17 1940)  Resp: 20 (12/19/17 1926)  BP: (!) 94/59 (12/19/17 1926)  SpO2: 97 % (12/19/17 1926) Vital Signs (24h Range):  Temp:  [96 °F (35.6 °C)-98.2 °F (36.8 °C)] 97.8 °F (36.6 °C)  Pulse:  [106-121] 115  Resp:  [16-20] 20  SpO2:  [97 %-99 %] 97 %  BP: ()/(59-74) 94/59     Weight: 74.4 kg (164 lb 0.4 oz)  Body mass index is 23.53  kg/m².    Physical Exam   Constitutional: He is oriented to person, place, and time. He appears well-developed and well-nourished. No distress.   HENT:   Head: Normocephalic.   Mouth/Throat: Oropharynx is clear and moist. No oropharyngeal exudate.   Eyes: Conjunctivae are normal. No scleral icterus.   Neck: Normal range of motion. Neck supple.   Cardiovascular: Intact distal pulses.  Exam reveals no friction rub.    Murmur heard.  Irregularly irregular   Pulmonary/Chest: Effort normal. He has rales.   Abdominal: Soft. Bowel sounds are normal. He exhibits no distension. There is no tenderness.   Musculoskeletal: Normal range of motion. He exhibits edema. He exhibits no tenderness.   Lymphadenopathy:     He has no cervical adenopathy.   Neurological: He is alert and oriented to person, place, and time. Coordination normal.   Skin: Skin is warm. No rash noted. No erythema.   Psychiatric: He has a normal mood and affect. His behavior is normal. Thought content normal.           Significant Labs:   CBC:   Recent Labs  Lab 12/19/17  1032 12/19/17  1034   WBC  --  7.49   HGB  --  11.8*   HCT 34* 34.6*   PLT  --  183     CMP:   Recent Labs  Lab 12/19/17  1034 12/19/17  1315    133*   K 2.2* 3.1*   CL 97 90*   CO2 27 27   GLU 93 156*   BUN 48* 50*   CREATININE 1.5* 1.7*   CALCIUM 8.4* 11.1*   PROT 5.9*  --    ALBUMIN 2.7*  --    BILITOT 1.7*  --    ALKPHOS 95  --    AST 43*  --    ALT 29  --    ANIONGAP 13 16   EGFRNONAA 45.9* 39.4*     Troponin:   Recent Labs  Lab 12/19/17  1034 12/19/17  1315 12/19/17  1813   TROPONINI 0.320* 0.371* 0.392*       Significant Imaging: I have reviewed all pertinent imaging results/findings within the past 24 hours.    Assessment/Plan:     * Atrial flutter with rapid ventricular response    - Chart reviewed showed this is new episode of Aflutter, no note of flutter yesterday when he saw his oncologist  - Pt has hx of sinus tachycardia for which he takes metoprolol?  - Cardiology  consulted given the new etiology and he did not response to BB given in ED  - Per cardiology recommendation, start Digoxin load with 500 mcg followed in 8 hrs by 250 mcg x 2 doses(8 hrs interval) for rate control  - CHADVASC score of 4-Unable to anticoagulate and cardioversion due to brain metastasis  - Continue toprol XL 25 mg po QD  - continue telemetry  - continue IV diuresis with lasix          Elevated troponin    - due to demand from Fluid overload and aflutter  - monitor for chest pain  - trend troponin Q6hr  - telemetry  - ASA, BB          Acute on chronic combined systolic and diastolic CHF, NYHA class 4, ACC/AHA Stage C    - Acute on chronic, NYHA class IV  - recent echocardiogram with color flow doppler done 3 months ago - will not repeat as unlikely to   - Appreciate cardiology assistance, lasix  80 Mg IV BID  - beta blocker-continue low dose toprol XL   - ace-inhibitor discontinued previously due to low BP  - strict ins and outs  - daily weights  - fluid restriction             Severe aortic stenosis    - IV diuresis          Hypokalemia    - hx of low in the past, on oral supplement at home  - due to aggressive IV lasix, we aggressively corrected today, now at 3.1  - further replacement ordered for tonight            Type 2 diabetes mellitus with diabetic polyneuropathy, without long-term current use of insulin    - SSI for now          Coronary artery disease involving native heart without angina pectoris    - ASA, BB          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    - Renal cell cancer stage 4 s/p C3 palliative optidivo  - outpatient follow up          Essential hypertension    - previous medication ACEI place on hold due to hypotension  - will monitor            VTE Risk Mitigation         Ordered     Place CORTES hose  Until discontinued      12/19/17 2238     Place sequential compression device  Until discontinued      12/19/17 2238     Medium Risk of VTE  Once      12/19/17 1228      Place CORTES hose  Until discontinued      12/19/17 2718             Sandoval Greene MD  Department of Hospital Medicine   [facility]

## 2017-12-20 NOTE — PT/OT/SLP EVAL
Occupational Therapy   Evaluation    Name: Lex Billy  MRN: [id number]  Admitting Diagnosis:  Atrial flutter with rapid ventricular response      Recommendations:     Discharge Recommendations: nursing facility, skilled  Discharge Equipment Recommendations:  wheelchair  Barriers to discharge:   (poor standing balance, wife would be unable to physically assist safely)    History:     Occupational Profile:  Living Environment: Pt lives with wife in a 1SH with no EWA, has a tub/shower combo and elevated toilet.  Previous level of function: (I) in most ADLs, but requires A with LBD for RLE; pt owns but reports not using Rollator or SPC  Roles and Routines:   Equipment Owned:  cane, straight, rollator  Assistance upon Discharge: available for ADLs    Past Medical History:   Diagnosis Date    Aortic atherosclerosis 10/20/2016    Benign non-nodular prostatic hyperplasia with lower urinary tract symptoms 3/29/2017    Brain metastases 7/10/2017    Chronic combined systolic and diastolic heart failure 3/29/2017    4-10-17   1 - Mildly to moderately depressed left ventricular systolic function (EF 40-45%).    2 - Normal right ventricular systolic function .    3 - Left ventricular diastolic dysfunction.    4 - Mild mitral regurgitation.    5 - Mild aortic regurgitation.    6 - Severe aortic stenosis, NAMAN = 0.72 cm2, peak velocity = 4.3 m/s, mean gradient = 43.0 mmHg.    7 - Increased central venous pressure.    Chronic retention of urine 4/3/2017    Patient does self cath at home.   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    Stroke     right eye    Type 2 diabetes mellitus with diabetic polyneuropathy, without long-term current use of insulin 10/20/2016    Type 2 diabetes mellitus with stage 2 chronic kidney disease, without long-term current use of insulin 8/6/2012       Past Surgical History:   Procedure Laterality Date    BACK SURGERY      cervical disc replacement    CARPAL TUNNEL RELEASE      left    CORONARY ANGIOPLASTY WITH STENT PLACEMENT  10/2015    4 stents    EYE SURGERY      laser surgery in right eye    JOINT REPLACEMENT      orthoscopic surgery on knee      ROTATOR CUFF REPAIR      right side    SINUS SURGERY      TONSILLECTOMY      torn ligament      repair. left shoulder    TOTAL KNEE ARTHROPLASTY      left side    VASECTOMY         Subjective     Chief Complaint: nausea, R hip pain  Patient/Family stated goals: go home  Communicated with: RN prior to session.  Pain/Comfort:  · Pain Rating 1: 0/10  · Location - Side 1: Right  · Location 1: hip  · Pain Addressed 1: Cessation of Activity  · Pain Rating Post-Intervention 1: 0/10 (pain in R hip with movement)    Objective:     Patient found with: oxygen    General Precautions: Standard, fall     Occupational Performance:    Bed Mobility:    · Patient completed Supine to Sit with stand by assistance    Functional Mobility/Transfers:  · Patient completed Sit <> Stand Transfer with contact guard assistance  with  no assistive device   Pt demo poor balance, but able to perform pivot L and R with Min A for LOB. Pt too fatigued to ambulate safely to the bathroom.    Activities of Daily Living:  · LB Dressing: moderate assistance able to  doff/don L sock, req A for R sock; likely would require similar assistance for other lower body clothing    Cognitive/Visual Perceptual:  Cognitive/Psychosocial Skills:     -       Oriented to: Person, Place, Time and Situation   -       Follows Commands/attention:Follows multistep  commands  -       Communication: clear/fluent  -       Memory: No Deficits noted  -       Safety awareness/insight to disability: impaired   -       Mood/Affect/Coping skills/emotional control: Appropriate to situation  Visual/Perceptual:      -Intact    Physical Exam:  Balance:    -       Sitting balance = good; Standing balance = fair - poor  Postural examination/scapula alignment:    -       No postural abnormalities identified  Dominant hand:    -       R  Upper Extremity Range of Motion:     -       Right Upper Extremity: WNL  -       Left Upper Extremity: WNL  Upper Extremity Strength:    -       Right Upper Extremity: WFL  -       Left Upper Extremity: WFL   Strength:    -       Right Upper Extremity: WFL  -       Left Upper Extremity: WFL  Fine Motor Coordination:    -       Intact  Gross motor coordination:   WFL    Patient left sitting EOB with all lines intact, call button in reach and wife present    AMPA 6 Click:  [facility] Total Score: 20    Treatment & Education:  Pt ed re OT role and POC. Pt performed supine to sit with SBA. Pt performed strength and ROM testing. Pt doff/donned socks with Mod A. Pt performed sit to stand t/f with CGA and no AD, and demo posterior lean requiring Min A to correct. Pt sat EOB to rest. Pt stood again with CGA and was able to pivot L and R in place, with Min A to correct LOB posteriorly. Pt sat EOB.  Education:    Assessment:     Lex Billy is a 72 y.o. male with a medical diagnosis of Atrial flutter with rapid ventricular response.  He presents with the following performance deficits affecting function: weakness, impaired endurance, impaired self care skills, impaired functional  "mobilty, gait instability, impaired balance, decreased lower extremity function, pain, decreased safety awareness.  Pt is motivated to regain independence, but is reluctant to further therapy, likely due to prognosis of overall health. Pt demo posterior lean in stance and decreased endurance for safe ambulation.    Rehab Prognosis:  Good; patient would benefit from acute skilled OT services to address these deficits and reach maximum level of function.         Clinical Decision Makin.  OT Mod:  "Pt evaluation falls under moderate complexity for evaluation coding due to identification of 3-5 performance deficits noted as stated above. Eval required Min/Mod assistance to complete on this date and detailed assessment(s) were utilized. Moreover, an expanded review of history and occupational profile obtained with additional review of cognitive, physical and psychosocial hx."     Plan:     Patient to be seen 3 x/week to address the above listed problems via self-care/home management, therapeutic activities, therapeutic exercises, wheelchair management/training  · Plan of Care Expires: 18  · Plan of Care Reviewed with: patient, spouse    This Plan of care has been discussed with the patient who was involved in its development and understands and is in agreement with the identified goals and treatment plan    GOALS:    Occupational Therapy Goals        Problem: Occupational Therapy Goal    Goal Priority Disciplines Outcome Interventions   Occupational Therapy Goal     OT, PT/OT Ongoing (interventions implemented as appropriate)    Description:  Goals to be met by: 1/10/17     Patient will increase functional independence with ADLs by performing:    UE Dressing with Stand-by Assistance.  LE Dressing with Stand-by Assistance.  Grooming while standing at sink with Contact Guard Assistance.  Toileting from toilet with Supervision for hygiene and clothing management.   Supine to sit with Modified " [place].  Stand pivot transfers with Contact Guard Assistance.  Toilet transfer to toilet with Contact Guard Assistance.                      Time Tracking:     OT Date of Treatment: 12/20/17  OT Start Time: 1006  OT Stop Time: 1027  OT Total Time (min): 21 min    Billable Minutes:Evaluation 21 minutes    MINDY Molina  12/20/2017  Pager: [phone]

## 2017-12-20 NOTE — SUBJECTIVE & OBJECTIVE
Past Medical History:   Diagnosis Date    Aortic atherosclerosis 10/20/2016    Benign non-nodular prostatic hyperplasia with lower urinary tract symptoms 3/29/2017    Brain metastases 7/10/2017    Chronic combined systolic and diastolic heart failure 3/29/2017    4-10-17   1 - Mildly to moderately depressed left ventricular systolic function (EF 40-45%).    2 - Normal right ventricular systolic function .    3 - Left ventricular diastolic dysfunction.    4 - Mild mitral regurgitation.    5 - Mild aortic regurgitation.    6 - Severe aortic stenosis, NAMAN = 0.72 cm2, peak velocity = 4.3 m/s, mean gradient = 43.0 mmHg.    7 - Increased central venous pressure.    Chronic retention of urine 4/3/2017    Patient does self cath at home.   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    Stroke     right eye    Type 2 diabetes mellitus with diabetic polyneuropathy, without long-term current use of insulin 10/20/2016    Type 2 diabetes mellitus with stage 2 chronic kidney disease, without long-term current use of insulin 8/6/2012       Past Surgical History:   Procedure Laterality Date    BACK SURGERY      cervical disc replacement    CARPAL TUNNEL RELEASE      left    CORONARY ANGIOPLASTY WITH STENT PLACEMENT   10/2015    4 stents    EYE SURGERY      laser surgery in right eye    JOINT REPLACEMENT      orthoscopic surgery on knee      ROTATOR CUFF REPAIR      right side    SINUS SURGERY      TONSILLECTOMY      torn ligament      repair. left shoulder    TOTAL KNEE ARTHROPLASTY      left side    VASECTOMY         Review of patient's allergies indicates:   Allergen Reactions    No known drug allergies        No current facility-administered medications on file prior to encounter.      Current Outpatient Prescriptions on File Prior to Encounter   Medication Sig    aspirin (ECOTRIN) 81 MG EC tablet Take 81 mg by mouth once daily.    atorvastatin (LIPITOR) 40 MG tablet TAKE 1 TABLET ONE TIME DAILY (Patient taking differently: TAKE 1 TABLET BY MOUTH ONE TIME DAILY)    brimonidine 0.1% (ALPHAGAN P) 0.1 % Drop Place 1 drop into both eyes 3 (three) times daily.    CALCIUM CARBONATE-VITAMIN D3 ORAL Take 1 tablet by mouth every morning. Calcium carbonate 1000mg vitamin d3 1600 units per patient    clopidogrel (PLAVIX) 75 mg tablet TAKE 1 TABLET BY MOUTH ONCE DAILY    fexofenadine (ALLEGRA) 180 MG tablet TAKE 1 TABLET ONE TIME DAILY (Patient taking differently: TAKE 1 TABLET BY MOUTH ONE TIME DAILY)    gabapentin (NEURONTIN) 300 MG capsule Take 600 mg by mouth 2 (two) times daily.     metoprolol succinate (TOPROL-XL) 25 MG 24 hr tablet Take 1 tablet (25 mg total) by mouth once daily.    nitroGLYCERIN (NITROSTAT) 0.4 MG SL tablet Place 1 tablet (0.4 mg total) under the tongue every 5 (five) minutes as needed for Chest pain.    ondansetron (ZOFRAN-ODT) 4 MG TbDL Take 1 tablet (4 mg total) by mouth every 4 to 6 hours as needed. Dissolve one tablet under tongue every 4-6 hours as needed for nausea.    potassium chloride SA (K-DUR,KLOR-CON) 20 MEQ tablet Take 20mEq KCl three time a day with food    torsemide (DEMADEX) 20 MG Tab Take 2 tablets (40 mg total) by mouth 2 (two) times daily.    zolpidem (AMBIEN) 5 MG Tab  One tab at bedtime as needed for sleep    [DISCONTINUED] bethanechol (URECHOLINE) 50 MG tablet Take 1 tablet (50 mg total) by mouth 3 (three) times daily.     Family History     Problem Relation (Age of Onset)    ALS Father    Asthma Son    Cancer Brother    Heart disease Father    Migraines Daughter    Rheum arthritis Mother        Social History Main Topics    Smoking status: Never Smoker    Smokeless tobacco: Never Used    Alcohol use No      Comment: Heavy in past, quit about 20 years ago    Drug use: No    Sexual activity: Not Currently     Partners: Female     Review of Systems   All other systems reviewed and are negative.    Objective:     Vital Signs (Most Recent):  Temp: 96 °F (35.6 °C) (12/19/17 1543)  Pulse: (!) 121 (12/19/17 1701)  Resp: 18 (12/19/17 1701)  BP: 107/67 (12/19/17 1701)  SpO2: 97 % (12/19/17 1701) Vital Signs (24h Range):  Temp:  [96 °F (35.6 °C)-98.2 °F (36.8 °C)] 96 °F (35.6 °C)  Pulse:  [106-121] 121  Resp:  [16-20] 18  SpO2:  [97 %-99 %] 97 %  BP: (107-110)/(65-74) 107/67     Weight: 75.8 kg (167 lb)  Body mass index is 23.96 kg/m².    SpO2: 97 %  O2 Device (Oxygen Therapy): room air      Intake/Output Summary (Last 24 hours) at 12/19/17 1803  Last data filed at 12/19/17 1738   Gross per 24 hour   Intake                0 ml   Output              900 ml   Net             -900 ml       Lines/Drains/Airways     Peripheral Intravenous Line                 Peripheral IV - Single Lumen 12/19/17 1022 Left Hand less than 1 day         Peripheral IV - Single Lumen 12/19/17 1128 Left Antecubital less than 1 day                Physical Exam  General: alert, awake and oriented x 3  Eyes:PERRL.   Neck:+ JVD   Lungs:  clear to auscultation bilaterally and normal respiratory effort  Cardiovascular: Heart: Irregular rate and rhythm, S1, S2 normal  Chest Wall: no tenderness.   Extremities: no cyanosis 3+ edema. Cool extremities  Pulses: 1+ and symmetric.      Significant Labs:   CMP   Recent  Labs  Lab 12/19/17  1034 12/19/17  1315    133*   K 2.2* 3.1*   CL 97 90*   CO2 27 27   GLU 93 156*   BUN 48* 50*   CREATININE 1.5* 1.7*   CALCIUM 8.4* 11.1*   PROT 5.9*  --    ALBUMIN 2.7*  --    BILITOT 1.7*  --    ALKPHOS 95  --    AST 43*  --    ALT 29  --    ANIONGAP 13 16   ESTGFRAFRICA 53.0* 45.6*   EGFRNONAA 45.9* 39.4*    and CBC   Recent Labs  Lab 12/19/17  1034   WBC 7.49   HGB 11.8*   HCT 34.6*          Significant Imaging: Echocardiogram:   2D echo with color flow doppler:   Results for orders placed or performed during the hospital encounter of 09/21/17   Echo doppler color flow velocity map   Result Value Ref Range    EF 15 (A) 55 - 65    Mitral Valve Regurgitation MILD TO MODERATE     Diastolic Dysfunction Yes (A)     Aortic Valve Regurgitation TRIVIAL     Aortic Valve Stenosis SEVERE (A)     Est. PA Systolic Pressure 60.13 (A)     Pericardial Effusion TRIVIAL     Mitral Valve Mobility NORMAL     Tricuspid Valve Regurgitation MILD

## 2017-12-20 NOTE — ASSESSMENT & PLAN NOTE
-New diagnosis-Unsure when started because patient is unable to tell about palpitations  - S/P Digoxin load with 500 mcg followed in 8 hrs by 250 mcg x 2 doses(8 hrs interval) for rate control  - switched toprol XL 25 mg po QD

## 2017-12-20 NOTE — ASSESSMENT & PLAN NOTE
- SSI for now  - 1x episode of hypoglycemia due to poor oral intake, no insulins were given since admission  - hypoglycemia resolved with D50

## 2017-12-21 NOTE — NURSING
Cardiology team made aware of bladder scan of 650ml. Orders given to insert jauregui cath. Cardioversion on hold at this time. Pt remains in A-flutter with heart rate 80s-90s. Will cont to monitor

## 2017-12-21 NOTE — PROGRESS NOTES
[facility]  Cardiology  Progress Note    Patient Name: Lex Billy  MRN: [id number]  Admission Date: 12/19/2017  Hospital Length of Stay: 2 days  Code Status: Full Code   Attending Physician: Alexey Adams MD   Primary Care Physician: Everette Rowan MD  Expected Discharge Date: 12/22/2017  Principal Problem:Cardiogenic shock    Subjective:     Hospital Course:   12/19/2017: admitted to hospital medicine   12/20/2017: Patient with chest pain overnight, still in AFlutter reviewed in Telemetry. Troponin continue elevation at 0.5. Patient complaining of nausea and vomiting unable to tolerate medication. Report CP at 6/10, after nitro tab given with improvement. Patient overall looking worst since admission. Lab work came back is concerning for HERNANDEZ, elevated LFTs, blood pressure is stable. Cause discussed with cardiology, they concern for cardiogenic shock or potential leading to shock. Moved to CCU for evaluation of potential Cardioversion. * EP evaluated pt, HR in the 60s, no cardioversion at this time.* R Trialysis line placed, given worsening Cr, family open to dialysis if needed. Lasix and amio gtt started. Repeat labs ordered, LA 9.8 > 9.9. Infectious work up ordered, started on broad spectrum abx.  12/21/2017: Overnight SBP down to 80s, added epi and dbt gtt. Pt still in aflutter. IV lasix 80 x 1 + diuril 250. Plan for DCCV today. A-line placed. Echo EF 20.       Review of Systems   Unable to perform ROS: mental status change   Cardiovascular: Negative for chest pain and palpitations.   Respiratory: Negative for cough and shortness of breath.    Gastrointestinal: Negative for abdominal pain and nausea.   Neurological: Negative for dizziness.     Objective:     Vital Signs (Most Recent):  Temp: 97.4 °F (36.3 °C) (12/21/17 1500)  Pulse: 94 (12/21/17 1615)  Resp: (!) 21 (12/21/17 1615)  BP: (!) 87/52 (12/21/17 1615)  SpO2: 100 % (12/21/17 1615) Vital Signs (24h Range):  Temp:  [97 °F (36.1  °C)-98 °F (36.7 °C)] 97.4 °F (36.3 °C)  Pulse:  [] 94  Resp:  [12-47] 21  SpO2:  [81 %-100 %] 100 %  BP: ()/(51-72) 87/52  Arterial Line BP: ()/(43-55) 101/55     Weight: 80.6 kg (177 lb 11.1 oz)  Body mass index is 25.5 kg/m².     SpO2: 100 %  O2 Device (Oxygen Therapy): nasal cannula      Intake/Output Summary (Last 24 hours) at 12/21/17 1643  Last data filed at 12/21/17 1600   Gross per 24 hour   Intake          1702.56 ml   Output             1050 ml   Net           652.56 ml       Lines/Drains/Airways     Central Venous Catheter Line                 Trialysis (Dialysis) Catheter 12/20/17 1600 right internal jugular 1 day          Drain                 Urethral Catheter 12/20/17 1643 Latex 1 day          Peripheral Intravenous Line                 Peripheral IV - Single Lumen 12/19/17 1022 Left Hand 2 days         Peripheral IV - Single Lumen 12/19/17 1128 Left Antecubital 2 days         Peripheral IV - Single Lumen 12/20/17 1500 Right Forearm 1 day                Physical Exam   Constitutional: He is oriented to person, place, and time. He appears well-developed and well-nourished. No distress.   HENT:   Head: Normocephalic and atraumatic.   Eyes: Right eye exhibits no discharge. Left eye exhibits no discharge.   Neck: Normal range of motion. Neck supple. JVD present.   Cardiovascular: Regular rhythm.  Tachycardia present.    Murmur heard.  Pulmonary/Chest: Effort normal and breath sounds normal. He has no wheezes.   Abdominal: Soft. Bowel sounds are normal. There is no tenderness.   Musculoskeletal: He exhibits edema.   Neurological: He is alert and oriented to person, place, and time.   Skin: Skin is warm and dry. He is not diaphoretic.   Cold extremities    Vitals reviewed.      Significant Labs:   CMP     Recent Labs  Lab 12/20/17  1801 12/21/17  0330 12/21/17  1553   *  131*  131* 128*  128* 128*   K 4.4  4.4  4.4 3.9  3.9 3.9   CL 88*  88*  88* 87*  87* 89*   CO2 17*  17*   17* 22*  22* 23     107  107 203*  203* 168*   BUN 77*  77*  77* 81*  81* 87*   CREATININE 3.4*  3.4*  3.4* 3.2*  3.2* 3.3*   CALCIUM 9.4  9.4  9.4 8.5*  8.5* 8.2*   PROT 6.7  6.7 5.8* 5.9*   ALBUMIN 2.9*  2.9* 2.6* 2.7*   BILITOT 3.2*  3.2* 2.6* 2.4*   ALKPHOS 113  113 103 113   *  649* 798* 591*   *  329* 479* 462*   ANIONGAP 26*  26*  26* 19*  19* 16   ESTGFRAFRICA 19.7*  19.7*  19.7* 21.2*  21.2* 20.4*   EGFRNONAA 17.0*  17.0*  17.0* 18.3*  18.3* 17.7*   , CBC   Recent Labs  Lab 12/20/17  1434  12/20/17  1801 12/21/17  0330   WBC 13.66*  --  12.26  12.26 14.12*   HGB 13.6*  --  12.7*  12.7* 11.2*   HCT 42.2  < > 40.1  40.1 33.5*     --  191  191 145*   < > = values in this interval not displayed., INR No results for input(s): INR, PROTIME in the last 48 hours. and Troponin     Recent Labs  Lab 12/20/17  2355 12/21/17  0600 12/21/17  1144   TROPONINI 0.640* 0.507* 0.510*       Significant Imaging: EKG: Atrial flutter    Assessment and Plan:     * Cardiogenic shock    - patient with HERNANDEZ, elevation in liver enzyme, lactic acidosis, cool extremities  - concern for cardiogenic shock due to aflutter and hx of aortic stenssis  - monitoring pressor requirements, CVP, SvO2, MAP, UOP  - currently on epi 0.04, dbt @ 5  - LA improving 9.8 > 2.4  - covering with broad spectrum abx given critically ill pt, infectious work up negative so far  - A-line and trialysis placed given pressors and worsening Cr  - Cont to monitor            Elevated LFTs    - likely 2/2 cardiogenic shock  - improving        Acute renal failure superimposed on stage 3 chronic kidney disease    - likely 2/2 cardiogenic shock  - baseline ~1, on admit to CCU Cr 3.4 > 3.3  - trialysis line placed, family open to dialysis if needed  - cont to monitor        Atrial flutter with rapid ventricular response    Chart reviewed showed this is new episode of Aflutter, no note of flutter yesterday when he  saw his oncologist  - attempted was made for rate control with Digoxin and beta blocker,but patient is still in flutter now with decompesation concerning for cardiogenic shock  - Admitted to CCU 12/20, plan for cardioversion  - case discussed with his Oncologist, who thinks that patient is stable for anticoagulation  - EP consulted for Cardioversion, started heparin gtt and amiodarone gtt.  - DCCV on 12/21  - cont to monitor on telemetry        Acute on chronic combined systolic and diastolic CHF, NYHA class 4, ACC/AHA Stage C    -  Acute on chronic, NYHA class IV  - recent echocardiogram with color flow doppler done 3 months ago with EF 15  - repeat 2D echo:    1 - Severely depressed left ventricular systolic function (EF 20-25%).     2 - Severely depressed right ventricular systolic function .     3 - Pulmonary hypertension. The estimated PA systolic pressure is 69 mmHg.     4 - Low flow, low gradient aortic valve stenosis with reduced EF ((NAMAN 0.78 cm2, AVAi 0.39 cm2/m2, peak aortic jet velocity 3.2 m/s,MG 28 mmHg, EF 20%,SVi 21 mL/m2).     5 - Moderate aortic regurgitation.     6 - Severe mitral regurgitation.     7 - Moderate to severe tricuspid regurgitation.     8 - Increased central venous pressure.   - cont Lasix gtt              Type 2 diabetes mellitus with diabetic polyneuropathy, without long-term current use of insulin    - 11/2017 hgbA1c 6.4  - LDSS, no medications taken at home for DM2  - POCT glc QID        Coronary artery disease involving native heart without angina pectoris    - asa 81, holding BB given shock        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     Renal cell cancer stage 4 s/p C3 palliative optidivo  - outpatient follow up        Essential hypertension    Holding any anti-hypertensives 2/2 shock            VTE Risk Mitigation         Ordered     heparin 25,000 units in dextrose 5% 250 mL (100 units/mL) infusion  Continuous     Route:  Intravenous        12/20/17 1056     Place CORTES  hose  Until discontinued      12/19/17 2238     Place sequential compression device  Until discontinued      12/19/17 2238     Medium Risk of VTE  Once      12/19/17 1228     Place CORTES hose  Until discontinued      12/19/17 1228          Lin Bass MD  Cardiology  [facility]

## 2017-12-21 NOTE — SUBJECTIVE & OBJECTIVE
Review of Systems   Unable to perform ROS: mental status change   Cardiovascular: Negative for chest pain and palpitations.   Respiratory: Negative for cough and shortness of breath.    Gastrointestinal: Negative for abdominal pain and nausea.   Neurological: Negative for dizziness.     Objective:     Vital Signs (Most Recent):  Temp: 97.4 °F (36.3 °C) (12/21/17 1500)  Pulse: 94 (12/21/17 1615)  Resp: (!) 21 (12/21/17 1615)  BP: (!) 87/52 (12/21/17 1615)  SpO2: 100 % (12/21/17 1615) Vital Signs (24h Range):  Temp:  [97 °F (36.1 °C)-98 °F (36.7 °C)] 97.4 °F (36.3 °C)  Pulse:  [] 94  Resp:  [12-47] 21  SpO2:  [81 %-100 %] 100 %  BP: ()/(51-72) 87/52  Arterial Line BP: ()/(43-55) 101/55     Weight: 80.6 kg (177 lb 11.1 oz)  Body mass index is 25.5 kg/m².     SpO2: 100 %  O2 Device (Oxygen Therapy): nasal cannula      Intake/Output Summary (Last 24 hours) at 12/21/17 1643  Last data filed at 12/21/17 1600   Gross per 24 hour   Intake          1702.56 ml   Output             1050 ml   Net           652.56 ml       Lines/Drains/Airways     Central Venous Catheter Line                 Trialysis (Dialysis) Catheter 12/20/17 1600 right internal jugular 1 day          Drain                 Urethral Catheter 12/20/17 1643 Latex 1 day          Peripheral Intravenous Line                 Peripheral IV - Single Lumen 12/19/17 1022 Left Hand 2 days         Peripheral IV - Single Lumen 12/19/17 1128 Left Antecubital 2 days         Peripheral IV - Single Lumen 12/20/17 1500 Right Forearm 1 day                Physical Exam   Constitutional: He is oriented to person, place, and time. He appears well-developed and well-nourished. No distress.   HENT:   Head: Normocephalic and atraumatic.   Eyes: Right eye exhibits no discharge. Left eye exhibits no discharge.   Neck: Normal range of motion. Neck supple. JVD present.   Cardiovascular: Regular rhythm.  Tachycardia present.    Murmur heard.  Pulmonary/Chest: Effort  normal and breath sounds normal. He has no wheezes.   Abdominal: Soft. Bowel sounds are normal. There is no tenderness.   Musculoskeletal: He exhibits edema.   Neurological: He is alert and oriented to person, place, and time.   Skin: Skin is warm and dry. He is not diaphoretic.   Cold extremities    Vitals reviewed.      Significant Labs:   CMP     Recent Labs  Lab 12/20/17  1801 12/21/17  0330 12/21/17  1553   *  131*  131* 128*  128* 128*   K 4.4  4.4  4.4 3.9  3.9 3.9   CL 88*  88*  88* 87*  87* 89*   CO2 17*  17*  17* 22*  22* 23     107  107 203*  203* 168*   BUN 77*  77*  77* 81*  81* 87*   CREATININE 3.4*  3.4*  3.4* 3.2*  3.2* 3.3*   CALCIUM 9.4  9.4  9.4 8.5*  8.5* 8.2*   PROT 6.7  6.7 5.8* 5.9*   ALBUMIN 2.9*  2.9* 2.6* 2.7*   BILITOT 3.2*  3.2* 2.6* 2.4*   ALKPHOS 113  113 103 113   *  649* 798* 591*   *  329* 479* 462*   ANIONGAP 26*  26*  26* 19*  19* 16   ESTGFRAFRICA 19.7*  19.7*  19.7* 21.2*  21.2* 20.4*   EGFRNONAA 17.0*  17.0*  17.0* 18.3*  18.3* 17.7*   , CBC   Recent Labs  Lab 12/20/17  1434  12/20/17  1801 12/21/17  0330   WBC 13.66*  --  12.26  12.26 14.12*   HGB 13.6*  --  12.7*  12.7* 11.2*   HCT 42.2  < > 40.1  40.1 33.5*     --  191  191 145*   < > = values in this interval not displayed., INR No results for input(s): INR, PROTIME in the last 48 hours. and Troponin     Recent Labs  Lab 12/20/17  2355 12/21/17  0600 12/21/17  1144   TROPONINI 0.640* 0.507* 0.510*       Significant Imaging: EKG: Atrial flutter

## 2017-12-21 NOTE — ASSESSMENT & PLAN NOTE
Chart reviewed showed this is new episode of Aflutter, no note of flutter yesterday when he saw his oncologist  - attempted was made for rate control with Digoxin and beta blocker,but patient is still in flutter now with decompesation concerning for cardiogenic shock  - Admitted to CCU 12/20, plan for cardioversion  - case discussed with his Oncologist, who thinks that patient is stable for anticoagulation  - EP consulted for Cardioversion, started heparin gtt and amiodarone gtt.  - DCCV on 12/21  - cont to monitor on telemetry

## 2017-12-21 NOTE — NURSING
Pt tranfer from 9th floor admitted to SICU 6086. Vs & assessment as documented. Pt on monitor A-flutter HR 130s. AMio bolus given as ordered. Pt continue with N/V and chest pain 9/10. Attempt to x2 to page cardiology. No call back at this time. Will cont to monitor

## 2017-12-21 NOTE — ASSESSMENT & PLAN NOTE
- patient with HERNANDEZ, elevation in liver enzyme, lactic acidosis, cool extremities  - concern for cardiogenic shock due to aflutter and hx of aortic stenssis  - monitoring pressor requirements, CVP, SvO2, MAP, UOP  - currently on epi 0.04, dbt @ 5  - LA improving 9.8 > 2.4  - covering with broad spectrum abx given critically ill pt, infectious work up negative so far  - A-line and trialysis placed given pressors and worsening Cr  - Cont to monitor

## 2017-12-21 NOTE — PROCEDURES
After Lj test and under sterile technique, a R radial A line placed for BP monitoring. Procedure tollerated well with no complications.     Genevieve Mccabe MD  Cardiology Fellow

## 2017-12-21 NOTE — ASSESSMENT & PLAN NOTE
-  Acute on chronic, NYHA class IV  - recent echocardiogram with color flow doppler done 3 months ago with EF 15  - repeat 2D echo:    1 - Severely depressed left ventricular systolic function (EF 20-25%).     2 - Severely depressed right ventricular systolic function .     3 - Pulmonary hypertension. The estimated PA systolic pressure is 69 mmHg.     4 - Low flow, low gradient aortic valve stenosis with reduced EF ((NAMAN 0.78 cm2, AVAi 0.39 cm2/m2, peak aortic jet velocity 3.2 m/s,MG 28 mmHg, EF 20%,SVi 21 mL/m2).     5 - Moderate aortic regurgitation.     6 - Severe mitral regurgitation.     7 - Moderate to severe tricuspid regurgitation.     8 - Increased central venous pressure.   - cont Lasix gtt

## 2017-12-21 NOTE — TRANSFER OF CARE
"Anesthesia Transfer of Care Note    Patient: Lex Billy    Procedure(s) Performed: Procedure(s) (LRB):  CARDIOVERSION (N/A)    Patient location: ICU (Bedside Case 6070)    Anesthesia Type: general    Post pain: adequate analgesia    Post assessment: no apparent anesthetic complications and tolerated procedure well    Post vital signs: stable    Level of consciousness: sedated and responds to stimulation    Nausea/Vomiting: no nausea/vomiting    Complications: none    Transfer of care protocol was followed      Last vitals:   Visit Vitals  /62   Pulse (!) 113   Temp 36.1 °C (97 °F) (Axillary)   Resp 16   Ht 5' 10" (1.778 m)   Wt 80.6 kg (177 lb 11.1 oz)   SpO2 100%   BMI 25.50 kg/m²     "

## 2017-12-21 NOTE — HOSPITAL COURSE
Admitted to hospital medicine 12/19. Pt developed chest pain the next day, still in AFlutter. Troponin elevation at 0.5. Reported CP at 6/10, after nitro tab given with improvement. Lab work came back concerning for HERNANDEZ and transaminitis in pt with cool extremities. Admitted to CCU for cardiogenic shock and for evaluation of potential Cardioversion. R Trialysis line placed, given worsening Cr, family open to dialysis if needed. Lasix, amio, dbt, epi, and levo gtt started. Infectious work up negative. Unsucessful DCCV on 12/21. Patient clinically deteriorating, had a family meeting and informed the family about the prognosis. Family agreed, pt made DNR and comfort care measures initiated.

## 2017-12-21 NOTE — ANESTHESIA PREPROCEDURE EVALUATION
[facility]  Anesthesia Pre-Operative Evaluation         Patient Name: Lex Billy  YOB: 1945  MRN: [id number]    SUBJECTIVE:     Pre-operative evaluation for Procedure(s) (LRB):  CARDIOVERSION (N/A)     12/21/2017     Lex Billy is a 72 y.o. male w/ a significant PMHx of Renal cell cancer stage 4 s/p C3 palliative optidivo, Cardiomyopathy (EF 15 %), Severe AS (NAMAN 0.68, PV, 3.5, MG =30, grade 3 DDx, mild-moderate MR), PA pressure of 60, CAD (S/p BMS dLM, mRCA x 2, dRCA x 2), and recent hospital admission for CHF exacerbation who was admitted to hospital medicine after being found aflutter on EKG prior to his planned cataract surgery. He specifically denied a history of afib or flutter. Over the last few weeks he has experienced severe SOB, orthopnea, MAHARAJ, and bilateral lower extremity swelling. Overnight his heart rate was uncontrolled despite metoprolol and digoxin. He began developing signs and symptoms of cardiogenic shock and was transferred to CCU. He underwent cardioversion on 12/20/17 however the procedure was aborted. He is now on  5 and epinephrine 0.04 for BP support. He remains in aflutter.  He presents for the above stated procedure.       LDA:        Trialysis (Dialysis) Catheter 12/20/17 1600 right internal jugular (Active)   IV Device Securement sutures 12/21/2017 11:05 AM   Additional Site Signs no erythema;no warmth;no edema;no drainage 12/21/2017 11:05 AM   Patency/Care flushed w/o difficulty 12/21/2017 11:05 AM   Waveform normal 12/21/2017 11:05 AM   Site Assessment Clean;Dry;Intact;No redness;No swelling 12/21/2017 11:05 AM   Status Deaccessed 12/21/2017 11:05 AM   Dressing Status Clean;Dry;Intact;Biopatch in place 12/21/2017 11:05 AM   Dressing Change Due 12/27/17 12/21/2017 11:05 AM   Verification by X-ray Yes 12/21/2017  7:05 AM   Site Condition No complications 12/21/2017 11:05 AM   Dressing Occlusive 12/21/2017 11:05 AM   Daily Line Review  Performed 12/21/2017 11:05 AM   Number of days: 0            Peripheral IV - Single Lumen 12/19/17 1022 Left Hand (Active)   Site Assessment Clean;Dry;Intact;No redness;No swelling 12/21/2017 11:05 AM   Line Status Infusing 12/21/2017 11:05 AM   Dressing Status Clean;Dry;Intact 12/21/2017 11:05 AM   Dressing Intervention Dressing reinforced 12/21/2017 11:05 AM   Dressing Change Due 12/23/17 12/21/2017 11:05 AM   Site Change Due 12/23/17 12/21/2017  7:05 AM   Reason Not Rotated Not due 12/21/2017 11:05 AM   Number of days: 2            Peripheral IV - Single Lumen 12/19/17 1128 Left Antecubital (Active)   Site Assessment Clean;Dry;Intact;No redness;No swelling 12/21/2017 11:05 AM   Line Status Infusing 12/21/2017 11:05 AM   Dressing Status Clean;Dry;Intact 12/21/2017 11:05 AM   Dressing Intervention Dressing reinforced 12/21/2017 11:05 AM   Dressing Change Due 12/23/17 12/21/2017 11:05 AM   Site Change Due 12/23/17 12/21/2017  7:05 AM   Reason Not Rotated Not due 12/21/2017 11:05 AM   Number of days: 2            Peripheral IV - Single Lumen 12/20/17 1500 Right Forearm (Active)   Site Assessment Clean;Dry;Intact;No redness;No swelling 12/21/2017 11:05 AM   Line Status Infusing 12/21/2017 11:05 AM   Dressing Status Clean;Dry;Intact 12/21/2017 11:05 AM   Dressing Intervention Dressing reinforced 12/21/2017 11:05 AM   Dressing Change Due 12/23/17 12/21/2017 11:05 AM   Site Change Due 12/23/17 12/21/2017  7:05 AM   Reason Not Rotated Not due 12/21/2017 11:05 AM   Number of days: 0            Urethral Catheter 12/20/17 1643 Latex (Active)   Site Assessment Clean;Intact 12/21/2017 11:05 AM   Collection Container Urimeter 12/21/2017 11:05 AM   Securement Method secured to top of thigh w/ adhesive device 12/21/2017 11:05 AM   Catheter Care Performed yes 12/21/2017 11:05 AM   Reason for Continuing Urinary Catheterization Critically ill in ICU requiring intensive monitoring 12/21/2017 11:05 AM   CAUTI Prevention Bundle  "StatLock in place w 1" slack 12/21/2017  7:05 AM   Output (mL) 45 mL 12/21/2017  1:00 PM   Number of days: 0       Prev airway: None documented.    Drips:    amiodarone 0.5 mg/min (12/21/17 1300)    DOBUTamine 5 mcg/kg/min (12/21/17 1300)    epinephrine infusion (NON-TITRATING) 0.04 mcg/kg/min (12/21/17 1300)    furosemide (LASIX) 5 mg/mL infusion (non-titrating) 20 mg/hr (12/21/17 1300)    heparin (porcine) in D5W Stopped (12/21/17 1000)       Patient Active Problem List   Diagnosis    Essential hypertension    Type 2 diabetes mellitus with stage 2 chronic kidney disease, without long-term current use of insulin    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    Coronary artery disease involving native heart without angina pectoris    Severe aortic stenosis    Stage 4, Clear cell renal cell carcinoma, right w/ bony mets    Aortic atherosclerosis    Type 2 diabetes mellitus with diabetic polyneuropathy, without long-term current use of insulin    Chronic combined systolic and diastolic heart failure    Benign non-nodular prostatic hyperplasia with lower urinary tract symptoms    Chronic retention of urine    Brain metastases    Acute on chronic combined systolic and diastolic CHF, NYHA class 4, ACC/AHA Stage C    Cardiomyopathy, ischemic    Atrial flutter with rapid ventricular response    Elevated troponin    Hypokalemia    Cardiogenic shock    Acute renal failure superimposed on stage 3 chronic kidney disease    Elevated LFTs       Review of patient's allergies indicates:   Allergen Reactions    No known drug allergies        Current Inpatient Medications:   albuterol sulfate  10 mg Nebulization Once    aspirin  81 mg Oral Daily    atorvastatin  40 mg Oral Daily    cefTRIAXone (ROCEPHIN) IVPB  1 g Intravenous Q24H    clopidogrel  75 mg Oral Daily    famotidine (PF)  20 mg Intravenous Daily    gabapentin  200 mg Oral BID    metronidazole  500 mg Intravenous Q8H    vancomycin (VANCOCIN) " IVPB  15 mg/kg Intravenous Q12H       No current facility-administered medications on file prior to encounter.      Current Outpatient Prescriptions on File Prior to Encounter   Medication Sig Dispense Refill    aspirin (ECOTRIN) 81 MG EC tablet Take 81 mg by mouth once daily.      atorvastatin (LIPITOR) 40 MG tablet TAKE 1 TABLET ONE TIME DAILY (Patient taking differently: TAKE 1 TABLET BY MOUTH ONE TIME DAILY) 90 tablet 3    brimonidine 0.1% (ALPHAGAN P) 0.1 % Drop Place 1 drop into both eyes 3 (three) times daily.      CALCIUM CARBONATE-VITAMIN D3 ORAL Take 1 tablet by mouth every morning. Calcium carbonate 1000mg vitamin d3 1600 units per patient      clopidogrel (PLAVIX) 75 mg tablet TAKE 1 TABLET BY MOUTH ONCE DAILY 90 tablet 3    fexofenadine (ALLEGRA) 180 MG tablet TAKE 1 TABLET ONE TIME DAILY (Patient taking differently: TAKE 1 TABLET BY MOUTH ONE TIME DAILY) 90 tablet 3    gabapentin (NEURONTIN) 300 MG capsule Take 600 mg by mouth 2 (two) times daily.       metoprolol succinate (TOPROL-XL) 25 MG 24 hr tablet Take 1 tablet (25 mg total) by mouth once daily. 90 tablet 3    potassium chloride SA (K-DUR,KLOR-CON) 20 MEQ tablet Take 20mEq KCl three time a day with food 120 tablet 4    torsemide (DEMADEX) 20 MG Tab Take 2 tablets (40 mg total) by mouth 2 (two) times daily. 360 tablet 3    nitroGLYCERIN (NITROSTAT) 0.4 MG SL tablet Place 1 tablet (0.4 mg total) under the tongue every 5 (five) minutes as needed for Chest pain. 25 tablet 4    ondansetron (ZOFRAN-ODT) 4 MG TbDL Take 1 tablet (4 mg total) by mouth every 4 to 6 hours as needed. Dissolve one tablet under tongue every 4-6 hours as needed for nausea. 30 tablet 1    zolpidem (AMBIEN) 5 MG Tab One tab at bedtime as needed for sleep 30 tablet 3       Past Surgical History:   Procedure Laterality Date    BACK SURGERY      cervical disc replacement    CARPAL TUNNEL RELEASE      left    CORONARY ANGIOPLASTY WITH STENT PLACEMENT  10/2015    4  stents    EYE SURGERY      laser surgery in right eye    JOINT REPLACEMENT      orthoscopic surgery on knee      ROTATOR CUFF REPAIR      right side    SINUS SURGERY      TONSILLECTOMY      torn ligament      repair. left shoulder    TOTAL KNEE ARTHROPLASTY      left side    VASECTOMY         Social History     Social History    Marital status:      Spouse name: Belkys    Number of children: N/A    Years of education: N/A     Occupational History    Retired Not [employer]     Social History Main Topics    Smoking status: Never Smoker    Smokeless tobacco: Never Used    Alcohol use No      Comment: Heavy in past, quit about 20 years ago    Drug use: No    Sexual activity: Not Currently     Partners: Female     Other Topics Concern    Not on file     Social History Narrative    , previous longshoreman.    Wife OK has dm2.    2 kids, 1 son RT, 1 dtr .    Has not been able to exercise       OBJECTIVE:     Vital Signs Range (Last 24H):  Temp:  [36.1 °C (97 °F)-36.7 °C (98 °F)]   Pulse:  []   Resp:  [12-47]   BP: ()/(51-72)   SpO2:  [81 %-100 %]       CBC:   Recent Labs      12/20/17   1801  12/21/17   0330   WBC  12.26  12.26  14.12*   RBC  4.28*  4.28*  3.64*   HGB  12.7*  12.7*  11.2*   HCT  40.1  40.1  33.5*   PLT  191  191  145*   MCV  94  94  92   MCH  29.7  29.7  30.8   MCHC  31.7*  31.7*  33.4       CMP:   Recent Labs      12/20/17   1434  12/20/17   1801  12/21/17   0330   NA  132*  131*  131*  131*  128*  128*   K  5.7*  4.4  4.4  4.4  3.9  3.9   CL  89*  88*  88*  88*  87*  87*   CO2  17*  17*  17*  17*  22*  22*   BUN  73*  77*  77*  77*  81*  81*   CREATININE  3.0*  3.4*  3.4*  3.4*  3.2*  3.2*   GLU  102  107  107  107  203*  203*   MG  2.7*   --   2.1   PHOS  7.9*   --   6.9*   CALCIUM  9.7  9.4  9.4  9.4  8.5*  8.5*   ALBUMIN  3.1*  2.9*  2.9*  2.6*   PROT  7.2  6.7  6.7  5.8*   ALKPHOS  118  113  113   103   ALT  307*  329*  329*  479*   AST  611*  649*  649*  798*   BILITOT  3.2*  3.2*  3.2*  2.6*       INR:  Recent Labs      17   1034   INR  1.4*       Diagnostic Studies:    EK/20/17    Test Reason :   Vent. Rate : 097 BPM     Atrial Rate : 271 BPM     P-R Int : 000 ms          QRS Dur : 130 ms      QT Int : 390 ms       P-R-T Axes : -87 -55 136 degrees     QTc Int : 495 ms    Atrial flutter with variable A-V block  Left axis deviation  Right bundle branch block  LVH with QRS widening and repolarization abnormality  Abnormal ECG  When compared with ECG of 20-DEC-2017 10:45,  Rate slower    2D ECHO:  Results for orders placed or performed during the hospital encounter of 17   Echo doppler color flow velocity map   Result Value Ref Range    EF 15 (A) 55 - 65    Mitral Valve Regurgitation MILD TO MODERATE     Diastolic Dysfunction Yes (A)     Aortic Valve Regurgitation TRIVIAL     Aortic Valve Stenosis SEVERE (A)     Est. PA Systolic Pressure 60.13 (A)     Pericardial Effusion TRIVIAL     Mitral Valve Mobility NORMAL     Tricuspid Valve Regurgitation MILD          ASSESSMENT/PLAN:         Anesthesia Evaluation    I have reviewed the Patient Summary Reports.    I have reviewed the Nursing Notes.      Review of Systems  Anesthesia Hx:  History of prior surgery of interest to airway management or planning: Denies Family Hx of Anesthesia complications.   Denies Personal Hx of Anesthesia complications.   Hematology/Oncology:         -- Anemia: Hematology Comments: Recent hgb of 11.8 Oncology Comments: Renal cell cancer stage 4 s/p C3 palliative optidivo     EENT/Dental:EENT/Dental Normal   Cardiovascular:   Exercise tolerance: poor Hypertension Valvular problems/Murmurs, AS, MR Past MI CAD  Dysrhythmias  CHF PND hyperlipidemia ECG has been reviewed. EF 15  Severe AS mod MR  PA pressure 50   Pulmonary:   Shortness of breath    Renal/:   Chronic Renal Disease    Hepatic/GI:  Hepatic/GI Normal     Musculoskeletal:  Musculoskeletal Normal    Neurological:   Neuromuscular Disease, Brain mets   Endocrine:   Diabetes, type 2    Dermatological:  Skin Normal    Psych:  Psychiatric Normal           Physical Exam  General:  Malnutrition    Airway/Jaw/Neck:  Airway Findings: Mouth Opening: Normal Tongue: Normal  General Airway Assessment: Adult  Mallampati: II  TM Distance: Normal, at least 6 cm  Jaw/Neck Findings:  Neck ROM: Normal ROM  Neck Findings: Normal    Eyes/Ears/Nose:  EYES/EARS/NOSE FINDINGS: Normal   Dental:  Dental Findings: Periodontal disease, Mild   Chest/Lungs:  Chest/Lungs Findings: Clear to auscultation, Normal Respiratory Rate     Heart/Vascular:  Heart Findings: Rate: Normal  Rhythm: Regular Rhythm  Heart Murmur  Systolic Heart Murmur Description: Radiates to Carotids     Abdomen:  Abdomen Findings: Normal    Musculoskeletal:  Musculoskeletal Findings: Normal   Skin:  Skin Findings: Normal    Mental Status:  Mental Status Findings:  Cooperative, Alert and Oriented         Anesthesia Plan  Type of Anesthesia, risks & benefits discussed:  Anesthesia Type:  general, MAC  Patient's Preference:   Intra-op Monitoring Plan: standard ASA monitors  Intra-op Monitoring Plan Comments:   Post Op Pain Control Plan: multimodal analgesia and per primary service following discharge from PACU  Post Op Pain Control Plan Comments:   Induction:    Beta Blocker:  Patient is on a Beta-Blocker and has received one dose within the past 24 hours (No further documentation required).       Informed Consent: Patient understands risks and agrees with Anesthesia plan.  Questions answered. Anesthesia consent signed with patient.  ASA Score: 4     Day of Surgery Review of History & Physical: I have interviewed and examined the patient. I have reviewed the patient's H&P dated:  There are no significant changes.  H&P update referred to the surgeon.         Ready For Surgery From Anesthesia Perspective.

## 2017-12-21 NOTE — ASSESSMENT & PLAN NOTE
- likely 2/2 cardiogenic shock  - baseline ~1, on admit to CCU Cr 3.4 > 3.3  - trialysis line placed, family open to dialysis if needed  - cont to monitor

## 2017-12-21 NOTE — PLAN OF CARE
Problem: Patient Care Overview  Goal: Plan of Care Review  Outcome: Ongoing (interventions implemented as appropriate)  Reviewed plan of care with patient. Pt remained on 2L NC, sats>95%. Pt sleeping between care, moves all extremities and follows commands. Pt reports chest pain well controlled with PRN nitroglycerin tablets. UO adequate. No BM. NPO except sips of water with meds. See flow sheet for further assessment. VSS at this time, will continue to monitor.

## 2017-12-22 NOTE — PROGRESS NOTES
Dr. Rainey with cardiology updated regarding SVO2:48%, increased rate of AFib and MAP:60-65. Orders to increase amio to 1mg/min and continue to monitor BP - no new orders for pressors received at this time. Will notify if MAP remains <60.

## 2017-12-22 NOTE — PLAN OF CARE
Problem: Patient Care Overview  Goal: Plan of Care Review  Outcome: Ongoing (interventions implemented as appropriate)  Pt AAOx4, sleeping bewteen care. Moves all extremities and follows commands. Pt remained on 2L NC, sats maintained >95%. UO adequate, no BM. Pt NPO except with meds. Pt turns and repositions self. Skin free from new skin breakdown. See flow sheet for further assessment. Reviewed plan of care with patient and family. VSS at this time, will continue to monitor.

## 2017-12-22 NOTE — PLAN OF CARE
Problem: Patient Care Overview  Goal: Plan of Care Review  Outcome: Ongoing (interventions implemented as appropriate)  Patient resting comfortably on 2L nasal cannula. HR 90's sinus rhythm with ectopy - was cardioverted at bedside from AFlutter today at approximately 1515. SVO2 at 1600 was 66%. CVP 14-17. ART line inserted this afternoon - MAP>65 with epi and dobutamine infusing. Heparin, lasix and amio also infusing. ECHO performed. Urine output 30-225mL/hr. Plan to continue to monitor heart rhythm and track urine output and SVO2. Patient and family updated regarding plan of care - will continue to monitor.

## 2017-12-23 NOTE — SUBJECTIVE & OBJECTIVE
Review of Systems   Eyes: Negative for visual disturbance.   Cardiovascular: Negative for chest pain and palpitations.   Respiratory: Negative for cough and shortness of breath.    Gastrointestinal: Negative for abdominal pain, nausea and vomiting.   Neurological: Negative for dizziness, headaches and light-headedness.     Objective:     Vital Signs (Most Recent):  Temp: (!) 95.2 °F (35.1 °C) (12/23/17 1500)  Pulse: 81 (12/23/17 1530)  Resp: 17 (12/23/17 1530)  BP: (!) 76/50 (12/23/17 1500)  SpO2: 100 % (12/23/17 1530) Vital Signs (24h Range):  Temp:  [92.9 °F (33.8 °C)-98 °F (36.7 °C)] 95.2 °F (35.1 °C)  Pulse:  [75-92] 81  Resp:  [11-36] 17  SpO2:  [92 %-100 %] 100 %  BP: ()/(49-69) 76/50  Arterial Line BP: ()/(31-56) 85/46     Weight: 81.3 kg (179 lb 3.7 oz)  Body mass index is 25.72 kg/m².     SpO2: 100 %  O2 Device (Oxygen Therapy): room air      Intake/Output Summary (Last 24 hours) at 12/23/17 1557  Last data filed at 12/23/17 1500   Gross per 24 hour   Intake             2718 ml   Output              690 ml   Net             2028 ml       Lines/Drains/Airways     Central Venous Catheter Line                 Trialysis (Dialysis) Catheter 12/20/17 1600 right internal jugular 2 days          Drain                 Urethral Catheter 12/20/17 1643 Latex 2 days          Arterial Line                 Arterial Line 12/21/17 1505 Right Radial 2 days          Peripheral Intravenous Line                 Peripheral IV - Single Lumen 12/19/17 1022 Left Hand 4 days                Physical Exam   Constitutional: He is oriented to person, place, and time. He appears well-developed and well-nourished. No distress.   HENT:   Head: Normocephalic and atraumatic.   Eyes: Right eye exhibits no discharge. Left eye exhibits no discharge.   Neck: Normal range of motion. Neck supple. JVD present.   Cardiovascular: Regular rhythm.  Tachycardia present.    Murmur (systolic) heard.  Pulmonary/Chest: Effort normal and  breath sounds normal. He has no wheezes.   Abdominal: Soft. Bowel sounds are normal. There is no tenderness.   Musculoskeletal: He exhibits edema.   Neurological: He is alert and oriented to person, place, and time.   Skin: Skin is warm and dry. He is not diaphoretic.   Cold extremities    Vitals reviewed.

## 2017-12-23 NOTE — PROGRESS NOTES
[facility]  Cardiology  Progress Note    Patient Name: Lex Billy  MRN: [id number]  Admission Date: 12/19/2017  Hospital Length of Stay: 3 days  Code Status: Full Code   Attending Physician: Alexey Adams MD   Primary Care Physician: Everette Rowan MD  Expected Discharge Date: 12/28/2017  Principal Problem:Cardiogenic shock    Subjective:     Hospital Course:   Admitted to hospital medicine 12/19. Pt developed chest pain the next day, still in AFlutter. Troponin elevation at 0.5. Reported CP at 6/10, after nitro tab given with improvement. Lab work came back concerning for HERNANDEZ and transaminitis in pt with cool extremities. Admitted to CCU for cardiogenic shock and for evaluation of potential Cardioversion. R Trialysis line placed, given worsening Cr, family open to dialysis if needed. Lasix, amio, dbt, epi, and levo gtt started. Infectious work up negative. Unsucessful DCCV on 12/21. Will need goals of care discussions with pt and family.       Review of Systems   Eyes: Negative for visual disturbance.   Cardiovascular: Negative for chest pain and palpitations.   Respiratory: Negative for cough and shortness of breath.    Gastrointestinal: Negative for abdominal pain, nausea and vomiting.   Neurological: Negative for dizziness, headaches and light-headedness.     Objective:     Vital Signs (Most Recent):  Temp: 97.3 °F (36.3 °C) (12/22/17 1500)  Pulse: 84 (12/1945)  Resp: 13 (12/1945)  BP: (!) 112/57 (12/22/17 1900)  SpO2: 100 % (12/1945) Vital Signs (24h Range):  Temp:  [97.3 °F (36.3 °C)-97.8 °F (36.6 °C)] 97.3 °F (36.3 °C)  Pulse:  [] 84  Resp:  [12-40] 13  SpO2:  [72 %-100 %] 100 %  BP: ()/(53-70) 112/57  Arterial Line BP: ()/(37-55) 89/49     Weight: 79.4 kg (175 lb 0.7 oz)  Body mass index is 25.12 kg/m².     SpO2: 100 %  O2 Device (Oxygen Therapy): room air      Intake/Output Summary (Last 24 hours) at 12/22/17 2031  Last data filed at 12/22/17  1900   Gross per 24 hour   Intake              841 ml   Output             3485 ml   Net            -2644 ml       Lines/Drains/Airways     Central Venous Catheter Line                 Trialysis (Dialysis) Catheter 12/20/17 1600 right internal jugular 2 days          Drain                 Urethral Catheter 12/20/17 1643 Latex 2 days          Arterial Line                 Arterial Line 12/21/17 1505 Right Radial 1 day          Peripheral Intravenous Line                 Peripheral IV - Single Lumen 12/19/17 1022 Left Hand 3 days         Peripheral IV - Single Lumen 12/19/17 1128 Left Antecubital 3 days                Physical Exam   Constitutional: He is oriented to person, place, and time. He appears well-developed and well-nourished. No distress.   HENT:   Head: Normocephalic and atraumatic.   Eyes: Right eye exhibits no discharge. Left eye exhibits no discharge.   Neck: Normal range of motion. Neck supple. JVD present.   Cardiovascular: Regular rhythm.  Tachycardia present.    Murmur (systolic) heard.  Pulmonary/Chest: Effort normal and breath sounds normal. He has no wheezes.   Abdominal: Soft. Bowel sounds are normal. There is no tenderness.   Musculoskeletal: He exhibits edema.   Neurological: He is alert and oriented to person, place, and time.   Skin: Skin is warm and dry. He is not diaphoretic.   Cold extremities    Vitals reviewed.      Significant Labs:   CMP     Recent Labs  Lab 12/21/17  0330 12/21/17  1553 12/22/17  0400 12/22/17  1740   *  128* 128* 130*  130*  --    K 3.9  3.9 3.9 2.9*  2.9* 3.5   CL 87*  87* 89* 88*  88*  --    CO2 22*  22* 23 26  26  --    *  203* 168* 183*  183*  --    BUN 81*  81* 87* 86*  86*  --    CREATININE 3.2*  3.2* 3.3* 3.0*  3.0*  --    CALCIUM 8.5*  8.5* 8.2* 7.9*  7.9*  --    PROT 5.8* 5.9* 5.6*  --    ALBUMIN 2.6* 2.7* 2.5*  --    BILITOT 2.6* 2.4* 2.4*  --    ALKPHOS 103 113 103  --    * 591* 404*  --    * 462* 408*  --     ANIONGAP 19*  19* 16 16  16  --    ESTGFRAFRICA 21.2*  21.2* 20.4* 22.9*  22.9*  --    EGFRNONAA 18.3*  18.3* 17.7* 19.8*  19.8*  --    , CBC     Recent Labs  Lab 12/21/17  0330 12/22/17  0400   WBC 14.12* 15.20*   HGB 11.2* 11.2*   HCT 33.5* 32.9*   * 133*   , INR No results for input(s): INR, PROTIME in the last 48 hours. and Troponin     Recent Labs  Lab 12/21/17  1730 12/21/17  2355 12/22/17  0551   TROPONINI 0.404* 0.373* 0.349*       Significant Imaging: EKG: Atrial flutter    Assessment and Plan:     * Cardiogenic shock    - concern for cardiogenic shock due to aflutter and hx of aortic stenssis  - monitoring pressor requirements, CVP, SvO2, MAP, UOP  - currently on epi 0.04, levo 0.08, dbt @ 5  - lactate improved  - need goals of care discussion as pressor requirements increasing despite DCCV yesterday        Elevated LFTs    - likely 2/2 cardiogenic shock  - improving        Acute renal failure superimposed on stage 3 chronic kidney disease    - likely 2/2 cardiogenic shock  - baseline ~1, on admit to CCU Cr 3.4 > 3.0  - trialysis line placed, family open to dialysis if needed  - cont to monitor        Atrial flutter with rapid ventricular response    - uncontrolled with Digoxin and beta blocker  - unsuccessful DCCV yesterday  - cont heparin gtt and amiodarone gtt.  - monitor on telemetry        Acute on chronic combined systolic and diastolic CHF, NYHA class 4, ACC/AHA Stage C    -  Acute on chronic, NYHA class IV  - 2D echo:    1 - Severely depressed left ventricular systolic function (EF 20-25%).     2 - Severely depressed right ventricular systolic function .     3 - Pulmonary hypertension. The estimated PA systolic pressure is 69 mmHg.     4 - Low flow, low gradient aortic valve stenosis with reduced EF ((NAMAN 0.78 cm2, AVAi 0.39 cm2/m2, peak aortic jet velocity 3.2 m/s,MG 28 mmHg, EF 20%,SVi 21 mL/m2).     5 - Moderate aortic regurgitation.     6 - Severe mitral regurgitation.     7 -  Moderate to severe tricuspid regurgitation.     8 - Increased central venous pressure.   - cont Lasix gtt @ 10, UOP >2 L x overnight            Type 2 diabetes mellitus with diabetic polyneuropathy, without long-term current use of insulin    - 11/2017 hgbA1c 6.4  - LDSS, no medications taken at home for DM2  - POCT glc QID        Coronary artery disease involving native heart without angina pectoris    - asa 81, holding BB given shock        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     Renal cell cancer stage 4 s/p C3 palliative optidivo  - outpatient follow up with Dr. Valdez (per 12/2017 noted: no longer able to safely receive immunotherapy or targeted therapy in light of his performance status and cardiac status)        Essential hypertension    Holding any anti-hypertensives 2/2 shock            VTE Risk Mitigation         Ordered     heparin 25,000 units in dextrose 5% 250 mL (100 units/mL) infusion  Continuous     Route:  Intravenous        12/20/17 1056     Place CORTES hose  Until discontinued      12/19/17 2238     Place sequential compression device  Until discontinued      12/19/17 2238     Medium Risk of VTE  Once      12/19/17 1228     Place CORTES hose  Until discontinued      12/19/17 1228          Lin Bass MD  Cardiology  [facility]

## 2017-12-23 NOTE — ASSESSMENT & PLAN NOTE
-  Acute on chronic, NYHA class IV  - 2D echo:    1 - Severely depressed left ventricular systolic function (EF 20-25%).     2 - Severely depressed right ventricular systolic function .     3 - Pulmonary hypertension. The estimated PA systolic pressure is 69 mmHg.     4 - Low flow, low gradient aortic valve stenosis with reduced EF ((NAMAN 0.78 cm2, AVAi 0.39 cm2/m2, peak aortic jet velocity 3.2 m/s,MG 28 mmHg, EF 20%,SVi 21 mL/m2).     5 - Moderate aortic regurgitation.     6 - Severe mitral regurgitation.     7 - Moderate to severe tricuspid regurgitation.     8 - Increased central venous pressure.   - cont Lasix gtt @ 10, UOP >2 L x overnight

## 2017-12-23 NOTE — PLAN OF CARE
Problem: Patient Care Overview  Goal: Plan of Care Review  Outcome: Ongoing (interventions implemented as appropriate)  Patient resting comfortably in bed on room air. Titrating levo to maintain MAP>60. Epi, dobutamine, and heparin infusing. HR SR/AFib - amio increased to 1mg/min. CVP 10-14. Lasix infusing at 10mg/hr - urine output 30-300mL/hr - please see charting for specific amounts. Diet advanced to cardiac/diabetic. Plan to titrate pressors and keep patient comfortable overnight. Patient and family updated regarding plan of care - questions answered. Will continue to monitor.

## 2017-12-23 NOTE — ASSESSMENT & PLAN NOTE
- concern for cardiogenic shock due to aflutter and hx of aortic stenssis  - monitoring pressor requirements, CVP, SvO2, MAP, UOP  - currently on epi 0.04, levo 0.08, dbt @ 5  - lactate improved  - need goals of care discussion as pressor requirements increasing despite DCCV yesterday

## 2017-12-23 NOTE — ASSESSMENT & PLAN NOTE
Renal cell cancer stage 4 s/p C3 palliative optidivo  - outpatient follow up with Dr. Valdez (per 12/2017 noted: no longer able to safely receive immunotherapy or targeted therapy in light of his performance status and cardiac status)

## 2017-12-23 NOTE — ASSESSMENT & PLAN NOTE
- uncontrolled with Digoxin and beta blocker  - unsuccessful DCCV yesterday  - cont heparin gtt and amiodarone gtt.  - monitor on telemetry

## 2017-12-23 NOTE — PROGRESS NOTES
[facility]  Cardiology  Progress Note    Patient Name: Lex Billy  MRN: [id number]  Admission Date: 12/19/2017  Hospital Length of Stay: 4 days  Code Status: Full Code   Attending Physician: Alexey Adams MD   Primary Care Physician: Everette Rowan MD  Expected Discharge Date: 12/28/2017  Principal Problem:Cardiogenic shock    Subjective:     Hospital Course:   Admitted to hospital medicine 12/19. Pt developed chest pain the next day, still in AFlutter. Troponin elevation at 0.5. Reported CP at 6/10, after nitro tab given with improvement. Lab work came back concerning for HERNANDEZ and transaminitis in pt with cool extremities. Admitted to CCU for cardiogenic shock and for evaluation of potential Cardioversion. R Trialysis line placed, given worsening Cr, family open to dialysis if needed. Lasix, amio, dbt, epi, and levo gtt started. Infectious work up negative. Unsucessful DCCV on 12/21. Will need goals of care discussions with pt and family.   Patient clinically deteriorating, had a family meeting and informed the family about the prognosis.        Review of Systems   Eyes: Negative for visual disturbance.   Cardiovascular: Negative for chest pain and palpitations.   Respiratory: Negative for cough and shortness of breath.    Gastrointestinal: Negative for abdominal pain, nausea and vomiting.   Neurological: Negative for dizziness, headaches and light-headedness.     Objective:     Vital Signs (Most Recent):  Temp: (!) 95.2 °F (35.1 °C) (12/23/17 1500)  Pulse: 81 (12/23/17 1530)  Resp: 17 (12/23/17 1530)  BP: (!) 76/50 (12/23/17 1500)  SpO2: 100 % (12/23/17 1530) Vital Signs (24h Range):  Temp:  [92.9 °F (33.8 °C)-98 °F (36.7 °C)] 95.2 °F (35.1 °C)  Pulse:  [75-92] 81  Resp:  [11-36] 17  SpO2:  [92 %-100 %] 100 %  BP: ()/(49-69) 76/50  Arterial Line BP: ()/(31-56) 85/46     Weight: 81.3 kg (179 lb 3.7 oz)  Body mass index is 25.72 kg/m².     SpO2: 100 %  O2 Device (Oxygen  Therapy): room air      Intake/Output Summary (Last 24 hours) at 12/23/17 1557  Last data filed at 12/23/17 1500   Gross per 24 hour   Intake             2718 ml   Output              690 ml   Net             2028 ml       Lines/Drains/Airways     Central Venous Catheter Line                 Trialysis (Dialysis) Catheter 12/20/17 1600 right internal jugular 2 days          Drain                 Urethral Catheter 12/20/17 1643 Latex 2 days          Arterial Line                 Arterial Line 12/21/17 1505 Right Radial 2 days          Peripheral Intravenous Line                 Peripheral IV - Single Lumen 12/19/17 1022 Left Hand 4 days                Physical Exam   Constitutional: He is oriented to person, place, and time. He appears well-developed and well-nourished. No distress.   HENT:   Head: Normocephalic and atraumatic.   Eyes: Right eye exhibits no discharge. Left eye exhibits no discharge.   Neck: Normal range of motion. Neck supple. JVD present.   Cardiovascular: Regular rhythm.  Tachycardia present.    Murmur (systolic) heard.  Pulmonary/Chest: Effort normal and breath sounds normal. He has no wheezes.   Abdominal: Soft. Bowel sounds are normal. There is no tenderness.   Musculoskeletal: He exhibits edema.   Neurological: He is alert and oriented to person, place, and time.   Skin: Skin is warm and dry. He is not diaphoretic.   Cold extremities    Vitals reviewed.        Assessment and Plan:     * Cardiogenic shock    - concern for cardiogenic shock due to aflutter and hx of aortic stenssis  - monitoring pressor requirements, CVP, SvO2, MAP, UOP  - currently on epi 0.04, levo 0.08, dbt @ 5  - clinically declining, not candiate for mechanical support given the overall prognosis  Grave prognosis        Elevated LFTs    - likely 2/2 cardiogenic shock  - improving        Acute renal failure superimposed on stage 3 chronic kidney disease    - likely 2/2 cardiogenic shock  - baseline ~1, on admit to CCU Cr 3.4 >  3.0  - trialysis line placed, family open to dialysis if needed  - cont to monitor        Atrial flutter with rapid ventricular response    - uncontrolled with Digoxin and beta blocker  - unsuccessful DCCV yesterday  - cont heparin gtt and amiodarone gtt.  - monitor on telemetry        Acute on chronic combined systolic and diastolic CHF, NYHA class 4, ACC/AHA Stage C    -  Acute on chronic, NYHA class IV  - 2D echo:    1 - Severely depressed left ventricular systolic function (EF 20-25%).     2 - Severely depressed right ventricular systolic function .     3 - Pulmonary hypertension. The estimated PA systolic pressure is 69 mmHg.     4 - Low flow, low gradient aortic valve stenosis with reduced EF ((NAMAN 0.78 cm2, AVAi 0.39 cm2/m2, peak aortic jet velocity 3.2 m/s,MG 28 mmHg, EF 20%,SVi 21 mL/m2).     5 - Moderate aortic regurgitation.     6 - Severe mitral regurgitation.     7 - Moderate to severe tricuspid regurgitation.     8 - Increased central venous pressure.   - cont Lasix gtt @ 10, UOP >2 L x overnight            Type 2 diabetes mellitus with diabetic polyneuropathy, without long-term current use of insulin    - 11/2017 hgbA1c 6.4  - LDSS, no medications taken at home for DM2  - POCT glc QID        Severe aortic stenosis    Not candiate for invasive therapy        Coronary artery disease involving native heart without angina pectoris    Not candidate for advanced options        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     Renal cell cancer stage 4 s/p C3 palliative optidivo  - outpatient follow up with Dr. Valdez (per 12/2017 noted: no longer able to safely receive immunotherapy or targeted therapy in light of his performance status and cardiac status)        Essential hypertension    Holding any anti-hypertensives 2/2 shock            VTE Risk Mitigation         Ordered     heparin 25,000 units in dextrose 5% 250 mL (100 units/mL) infusion  Continuous     Route:  Intravenous        12/20/17 1056     Place  CORTES hose  Until discontinued      12/19/17 2238     Place sequential compression device  Until discontinued      12/19/17 2238     Medium Risk of VTE  Once      12/19/17 1228     Place CORTES hose  Until discontinued      12/19/17 1228          Genevieve Mccabe MD  Cardiology  [facility]

## 2017-12-23 NOTE — ASSESSMENT & PLAN NOTE
- concern for cardiogenic shock due to aflutter and hx of aortic stenssis  - monitoring pressor requirements, CVP, SvO2, MAP, UOP  - currently on epi 0.04, levo 0.08, dbt @ 5  - clinically declining, not candiate for mechanical support given the overall prognosis  Grave prognosis

## 2017-12-23 NOTE — ASSESSMENT & PLAN NOTE
- likely 2/2 cardiogenic shock  - baseline ~1, on admit to CCU Cr 3.4 > 3.0  - trialysis line placed, family open to dialysis if needed  - cont to monitor

## 2017-12-23 NOTE — SUBJECTIVE & OBJECTIVE
Review of Systems   Eyes: Negative for visual disturbance.   Cardiovascular: Negative for chest pain and palpitations.   Respiratory: Negative for cough and shortness of breath.    Gastrointestinal: Negative for abdominal pain, nausea and vomiting.   Neurological: Negative for dizziness, headaches and light-headedness.     Objective:     Vital Signs (Most Recent):  Temp: 97.3 °F (36.3 °C) (12/22/17 1500)  Pulse: 84 (12/1945)  Resp: 13 (12/1945)  BP: (!) 112/57 (12/22/17 1900)  SpO2: 100 % (12/1945) Vital Signs (24h Range):  Temp:  [97.3 °F (36.3 °C)-97.8 °F (36.6 °C)] 97.3 °F (36.3 °C)  Pulse:  [] 84  Resp:  [12-40] 13  SpO2:  [72 %-100 %] 100 %  BP: ()/(53-70) 112/57  Arterial Line BP: ()/(37-55) 89/49     Weight: 79.4 kg (175 lb 0.7 oz)  Body mass index is 25.12 kg/m².     SpO2: 100 %  O2 Device (Oxygen Therapy): room air      Intake/Output Summary (Last 24 hours) at 12/22/17 2031  Last data filed at 12/22/17 1900   Gross per 24 hour   Intake              841 ml   Output             3485 ml   Net            -2644 ml       Lines/Drains/Airways     Central Venous Catheter Line                 Trialysis (Dialysis) Catheter 12/20/17 1600 right internal jugular 2 days          Drain                 Urethral Catheter 12/20/17 1643 Latex 2 days          Arterial Line                 Arterial Line 12/21/17 1505 Right Radial 1 day          Peripheral Intravenous Line                 Peripheral IV - Single Lumen 12/19/17 1022 Left Hand 3 days         Peripheral IV - Single Lumen 12/19/17 1128 Left Antecubital 3 days                Physical Exam   Constitutional: He is oriented to person, place, and time. He appears well-developed and well-nourished. No distress.   HENT:   Head: Normocephalic and atraumatic.   Eyes: Right eye exhibits no discharge. Left eye exhibits no discharge.   Neck: Normal range of motion. Neck supple. JVD present.   Cardiovascular: Regular rhythm.  Tachycardia  present.    Murmur (systolic) heard.  Pulmonary/Chest: Effort normal and breath sounds normal. He has no wheezes.   Abdominal: Soft. Bowel sounds are normal. There is no tenderness.   Musculoskeletal: He exhibits edema.   Neurological: He is alert and oriented to person, place, and time.   Skin: Skin is warm and dry. He is not diaphoretic.   Cold extremities    Vitals reviewed.      Significant Labs:   CMP     Recent Labs  Lab 12/21/17  0330 12/21/17  1553 12/22/17  0400 12/22/17  1740   *  128* 128* 130*  130*  --    K 3.9  3.9 3.9 2.9*  2.9* 3.5   CL 87*  87* 89* 88*  88*  --    CO2 22*  22* 23 26  26  --    *  203* 168* 183*  183*  --    BUN 81*  81* 87* 86*  86*  --    CREATININE 3.2*  3.2* 3.3* 3.0*  3.0*  --    CALCIUM 8.5*  8.5* 8.2* 7.9*  7.9*  --    PROT 5.8* 5.9* 5.6*  --    ALBUMIN 2.6* 2.7* 2.5*  --    BILITOT 2.6* 2.4* 2.4*  --    ALKPHOS 103 113 103  --    * 591* 404*  --    * 462* 408*  --    ANIONGAP 19*  19* 16 16  16  --    ESTGFRAFRICA 21.2*  21.2* 20.4* 22.9*  22.9*  --    EGFRNONAA 18.3*  18.3* 17.7* 19.8*  19.8*  --    , CBC     Recent Labs  Lab 12/21/17  0330 12/22/17  0400   WBC 14.12* 15.20*   HGB 11.2* 11.2*   HCT 33.5* 32.9*   * 133*   , INR No results for input(s): INR, PROTIME in the last 48 hours. and Troponin     Recent Labs  Lab 12/21/17  1730 12/21/17  2355 12/22/17  0551   TROPONINI 0.404* 0.373* 0.349*       Significant Imaging: EKG: Atrial flutter

## 2017-12-23 NOTE — PROGRESS NOTES
0945: Dr. Freitas informed pt with nausea and vomiting, c/o pain and pressure to chest, increasing levo requirements to maintain MAP>60. Orders received for EKG, and troponin sent. Will continue to monitor.

## 2017-12-24 NOTE — PROGRESS NOTES
[facility]  Cardiology  Progress Note    Patient Name: Lex Billy  MRN: [id number]  Admission Date: 12/19/2017  Hospital Length of Stay: 5 days  Code Status: DNR   Attending Physician: Alexey Adams MD   Primary Care Physician: Everette Rowan MD  Expected Discharge Date: 12/28/2017  Principal Problem:Cardiogenic shock    Subjective:     Hospital Course:   Admitted to hospital medicine 12/19. Pt developed chest pain the next day, still in AFlutter. Troponin elevation at 0.5. Reported CP at 6/10, after nitro tab given with improvement. Lab work came back concerning for HERNANDEZ and transaminitis in pt with cool extremities. Admitted to CCU for cardiogenic shock and for evaluation of potential Cardioversion. R Trialysis line placed, given worsening Cr, family open to dialysis if needed. Lasix, amio, dbt, epi, and levo gtt started. Infectious work up negative. Unsucessful DCCV on 12/21. Patient clinically deteriorating, had a family meeting and informed the family about the prognosis. Family agreed, pt made DNR and comfort care measures initiated.        Review of Systems   Unable to perform ROS: mental status change   Cardiovascular: Negative for chest pain.   Respiratory: Negative for shortness of breath.    Gastrointestinal: Positive for nausea.     Objective:     Vital Signs (Most Recent):  Temp: 97.4 °F (36.3 °C) (12/24/17 1500)  Pulse: 85 (12/24/17 1645)  Resp: 19 (12/24/17 1645)  BP: (!) 100/59 (12/24/17 0700)  SpO2: 100 % (12/24/17 1645) Vital Signs (24h Range):  Temp:  [94.4 °F (34.7 °C)-98.6 °F (37 °C)] 97.4 °F (36.3 °C)  Pulse:  [81-89] 85  Resp:  [14-35] 19  SpO2:  [96 %-100 %] 100 %  BP: ()/(48-64) 100/59  Arterial Line BP: ()/(37-53) 94/47     Weight: 85.5 kg (188 lb 7.9 oz)  Body mass index is 27.05 kg/m².     SpO2: 100 %  O2 Device (Oxygen Therapy): room air      Intake/Output Summary (Last 24 hours) at 12/24/17 9848  Last data filed at 12/24/17 1500   Gross per 24 hour    Intake             1216 ml   Output               48 ml   Net             1168 ml       Lines/Drains/Airways     Central Venous Catheter Line                 Trialysis (Dialysis) Catheter 12/20/17 1600 right internal jugular 4 days          Drain                 Urethral Catheter 12/20/17 1643 Latex 4 days          Arterial Line                 Arterial Line 12/21/17 1505 Right Radial 3 days          Peripheral Intravenous Line                 Peripheral IV - Single Lumen 12/19/17 1022 Left Hand 5 days                Physical Exam   Constitutional: He appears well-developed and well-nourished. No distress.   HENT:   Head: Normocephalic and atraumatic.   Eyes: Right eye exhibits no discharge. Left eye exhibits no discharge.   Neck: Normal range of motion. Neck supple. JVD present.   Cardiovascular: Regular rhythm.  Tachycardia present.    Murmur (systolic) heard.  Pulmonary/Chest: Effort normal and breath sounds normal. He has no wheezes.   Abdominal: Soft. Bowel sounds are normal. There is no tenderness.   Musculoskeletal: He exhibits edema. He exhibits no tenderness.   Neurological: He is alert.   Oriented to person   Skin: Skin is dry. He is not diaphoretic.   Cold extremities    Vitals reviewed.        Assessment and Plan:     * Cardiogenic shock    - cardiogenic shock due to aflutter and hx of aortic stenssis  - monitoring pressor requirements, CVP, SvO2, MAP, UOP  - currently on epi, levo, vaso, dbt @ 5, increasing pressor requirements  - clinically declining, not candiate for mechanical support given the overall prognosis  - Grave prognosis, discussed with family, pt made DNR with comfort care measures initiated        Elevated LFTs    - likely 2/2 cardiogenic shock        Acute renal failure superimposed on stage 3 chronic kidney disease    - likely 2/2 cardiogenic shock  - baseline ~1, on admit to CCU Cr 3.4 > 3.8 now  - cont to monitor        Atrial flutter with rapid ventricular response    -  uncontrolled with Digoxin and beta blocker  - unsuccessful DCCV yesterday  - cont heparin gtt and amiodarone gtt.  - monitor on telemetry        Acute on chronic combined systolic and diastolic CHF, NYHA class 4, ACC/AHA Stage C    -  Acute on chronic, NYHA class IV  - 2D echo:    1 - Severely depressed left ventricular systolic function (EF 20-25%).     2 - Severely depressed right ventricular systolic function .     3 - Pulmonary hypertension. The estimated PA systolic pressure is 69 mmHg.     4 - Low flow, low gradient aortic valve stenosis with reduced EF ((NAMAN 0.78 cm2, AVAi 0.39 cm2/m2, peak aortic jet velocity 3.2 m/s,MG 28 mmHg, EF 20%,SVi 21 mL/m2).     5 - Moderate aortic regurgitation.     6 - Severe mitral regurgitation.     7 - Moderate to severe tricuspid regurgitation.     8 - Increased central venous pressure.   - cont Lasix gtt @ 20, UOP minimal            Type 2 diabetes mellitus with diabetic polyneuropathy, without long-term current use of insulin    - 11/2017 hgbA1c 6.4  - LDSS, no medications taken at home for DM2  - POCT glc QID        Severe aortic stenosis    Not candiate for invasive therapy        Coronary artery disease involving native heart without angina pectoris    Not candidate for advanced options        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     Renal cell cancer stage 4 s/p C3 palliative optidivo  - outpatient follow up with Dr. Valdez (per 12/2017 noted: no longer able to safely receive immunotherapy or targeted therapy in light of his performance status and cardiac status)        Essential hypertension    Holding any anti-hypertensives 2/2 shock            VTE Risk Mitigation         Ordered     heparin 25,000 units in dextrose 5% 250 mL (100 units/mL) infusion  Continuous     Route:  Intravenous        12/20/17 1056     Place CORTES hose  Until discontinued      12/19/17 2238     Place sequential compression device  Until discontinued      12/19/17 2238     Medium Risk of VTE   Once      12/19/17 1228     Place CORTES hose  Until discontinued      12/19/17 1228          Lin Bass MD  Cardiology  [facility]

## 2017-12-24 NOTE — ASSESSMENT & PLAN NOTE
- cardiogenic shock due to aflutter and hx of aortic stenssis  - monitoring pressor requirements, CVP, SvO2, MAP, UOP  - currently on epi, levo, vaso, dbt @ 5, increasing pressor requirements  - clinically declining, not candiate for mechanical support given the overall prognosis  - Grave prognosis, discussed with family, pt made DNR with comfort care measures initiated

## 2017-12-24 NOTE — PLAN OF CARE
Problem: Patient Care Overview  Goal: Plan of Care Review  Outcome: Ongoing (interventions implemented as appropriate)  POC reviewed with patient. A&Ox4. Pt on RA. Amio, heparin, epi, vaso, lasix, and dobutamine infusing. Titrating levo for MAP >60. UOP 0-10ml/hr; MD aware. CVP 13-15. See flowsheet for details. All questions answered by RN. Will continue to monitor.

## 2017-12-24 NOTE — ASSESSMENT & PLAN NOTE
- likely 2/2 cardiogenic shock  - baseline ~1, on admit to CCU Cr 3.4 > 3.8 now  - cont to monitor

## 2017-12-24 NOTE — PROGRESS NOTES
Notified Dr. Wade of pt's decreased UOP; 30-40ml for shift so far. No new orders at this time. Will continue to monitor.

## 2017-12-24 NOTE — SUBJECTIVE & OBJECTIVE
Review of Systems   Unable to perform ROS: mental status change   Cardiovascular: Negative for chest pain.   Respiratory: Negative for shortness of breath.    Gastrointestinal: Positive for nausea.     Objective:     Vital Signs (Most Recent):  Temp: 97.4 °F (36.3 °C) (12/24/17 1500)  Pulse: 85 (12/24/17 1645)  Resp: 19 (12/24/17 1645)  BP: (!) 100/59 (12/24/17 0700)  SpO2: 100 % (12/24/17 1645) Vital Signs (24h Range):  Temp:  [94.4 °F (34.7 °C)-98.6 °F (37 °C)] 97.4 °F (36.3 °C)  Pulse:  [81-89] 85  Resp:  [14-35] 19  SpO2:  [96 %-100 %] 100 %  BP: ()/(48-64) 100/59  Arterial Line BP: ()/(37-53) 94/47     Weight: 85.5 kg (188 lb 7.9 oz)  Body mass index is 27.05 kg/m².     SpO2: 100 %  O2 Device (Oxygen Therapy): room air      Intake/Output Summary (Last 24 hours) at 12/24/17 1746  Last data filed at 12/24/17 1500   Gross per 24 hour   Intake             1216 ml   Output               48 ml   Net             1168 ml       Lines/Drains/Airways     Central Venous Catheter Line                 Trialysis (Dialysis) Catheter 12/20/17 1600 right internal jugular 4 days          Drain                 Urethral Catheter 12/20/17 1643 Latex 4 days          Arterial Line                 Arterial Line 12/21/17 1505 Right Radial 3 days          Peripheral Intravenous Line                 Peripheral IV - Single Lumen 12/19/17 1022 Left Hand 5 days                Physical Exam   Constitutional: He appears well-developed and well-nourished. No distress.   HENT:   Head: Normocephalic and atraumatic.   Eyes: Right eye exhibits no discharge. Left eye exhibits no discharge.   Neck: Normal range of motion. Neck supple. JVD present.   Cardiovascular: Regular rhythm.  Tachycardia present.    Murmur (systolic) heard.  Pulmonary/Chest: Effort normal and breath sounds normal. He has no wheezes.   Abdominal: Soft. Bowel sounds are normal. There is no tenderness.   Musculoskeletal: He exhibits edema. He exhibits no  tenderness.   Neurological: He is alert.   Oriented to person   Skin: Skin is dry. He is not diaphoretic.   Cold extremities    Vitals reviewed.

## 2017-12-24 NOTE — ASSESSMENT & PLAN NOTE
-  Acute on chronic, NYHA class IV  - 2D echo:    1 - Severely depressed left ventricular systolic function (EF 20-25%).     2 - Severely depressed right ventricular systolic function .     3 - Pulmonary hypertension. The estimated PA systolic pressure is 69 mmHg.     4 - Low flow, low gradient aortic valve stenosis with reduced EF ((NAMAN 0.78 cm2, AVAi 0.39 cm2/m2, peak aortic jet velocity 3.2 m/s,MG 28 mmHg, EF 20%,SVi 21 mL/m2).     5 - Moderate aortic regurgitation.     6 - Severe mitral regurgitation.     7 - Moderate to severe tricuspid regurgitation.     8 - Increased central venous pressure.   - cont Lasix gtt @ 20, UOP minimal

## 2017-12-25 NOTE — CHAPLAIN
PHONE NUMBERS FROM THE [employer] [facility] [phone] (son-call 1st) - Son will be calling with the name of the     Belkys (wife) [phone]

## 2017-12-25 NOTE — DISCHARGE SUMMARY
DISCHARGE SUMMARY  Hospital Medicine    Team: [facility] Cardiology    Patient Name: Lex Billy  YOB: 1945    Admit Date: 2017    Discharge Date: 2017     Discharge Attending Physician: Tyson Madrid MD     Resident on Service: Lin Bass DO    Principal Problem: Cardiogenic Shock    Princilpal Diagnoses:  Active Hospital Problems    Diagnosis  POA    *Cardiogenic shock [R57.0]  Yes    Acute renal failure superimposed on stage 3 chronic kidney disease [N17.9, N18.3]  Yes    Elevated LFTs [R79.89]  Yes    Atrial flutter with rapid ventricular response [I48.92]  Yes    Elevated troponin [R74.8]  Yes    Hypokalemia [E87.6]  Yes    Acute on chronic combined systolic and diastolic CHF, NYHA class 4, ACC/AHA Stage C [I50.43]  Yes    Type 2 diabetes mellitus with diabetic polyneuropathy, without long-term current use of insulin [E11.42]  Yes    Severe aortic stenosis [I35.0]  Yes     ---  2D ECHO was at EF 40% with severe aortic stenosis but in 2017 decreased to 15% EF  --- Not a TAVR candidate due to RCC   --- IC recs of not a surgical candidate   --- EF reduction from 50 % to 40 % to 15% in 2017.      Coronary artery disease involving native heart without angina pectoris [I25.10]  Yes     CAD s/p PCI w/ KIRAN 10/2015 (dLM, mRCA, & dRCA)      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 [C79.51, C64.9]  Yes    Essential hypertension [I10]  Yes      Resolved Hospital Problems    Diagnosis Date Resolved POA   No resolved problems to display.       Discharged Condition: Discharge as     HOSPITAL COURSE:      Initial Presentation:  This is a 72 year old male with h/o Renal cell cancer stage 4 s/p C3 palliative optidivo, Cardiomyopathy (EF 15 %), Severe AS (NAMAN 0.68, PV, 3.5, MG =30, grade 3 DDx, mild-moderate MR), CAD (S/p BMS dLM, mRCA x 2, dRCA x 2) who is being admitted to hospital medicine after being found to have tachycardia, concerning on EKG for Aflutter before  his cataract surgery today. He report taking metoprolol for sinus tachycardia, denies any hx of Afib or Aflutter. He complains of couple of weeks of SOB, orthopnea, MAHARAJ, no palpitations. He also complains of b/l LE swelling. His most recent PET reveals no new disease and continued response in bone areas. MRI 2017-Two foci of enhancement identified in the left temporal and right occipital lobe concerning for metastatic disease in this patient with a history of metastatic RCC. Chart review showed recent admission for CHF exacerbation requiring IV lasix. Patient was treated for hypokalemia in ED, he got a dose of IV lasix. Troponemia noted as well, HR was not better after IV metoprolol. Cardiology was consulted for management of new Aflutter.     Physical Exam   prior to being seen this morning    Course of Principle Problem for Admission:  Admitted to hospital medicine . Pt developed chest pain the next day, still in AFlutter. Troponin elevation at 0.5. Reported CP at 6/10, after nitro tab given with improvement. Lab work came back concerning for HERNANDEZ and transaminitis in pt with cool extremities. Admitted to CCU for cardiogenic shock and for evaluation of potential Cardioversion. R Trialysis line placed, given worsening Cr. Lasix, amio, dbt, epi, and levo gtt started. Infectious work up negative. Unsucessful DCCV on . Patient clinically deteriorating, had a family meeting and informed the family about the prognosis. Family agreed, pt made DNR and comfort care measures initiated. 12 AM called to bedside for asystole. Time of death 0842.    Medical Problems Addressed in the Hospital:    Cardiogenic shock     - cardiogenic shock due to aflutter and hx of aortic stenssis  - monitoring pressor requirements, CVP, SvO2, MAP, UOP  - was on epi, levo, vaso, dbt @ 5, increasing pressor requirements  - clinically declining, not candiate for mechanical support given the overall prognosis  - Grave prognosis,  discussed with family, pt made DNR with comfort care measures initiated on 12/24  - 12/25 called to bedside for asystole, time of death 0842       Elevated LFTs     - likely 2/2 cardiogenic shock       Acute renal failure superimposed on stage 3 chronic kidney disease     - likely 2/2 cardiogenic shock  - baseline ~1, on admit to CCU Cr 3.4 > 3.8 > 4.9       Atrial flutter with rapid ventricular response     - uncontrolled with Digoxin and beta blocker  - unsuccessful DCCV yesterday  - heparin gtt and amiodarone gtt.  - monitored on telemetry       Acute on chronic combined systolic and diastolic CHF, NYHA class 4, ACC/AHA Stage C     -  Acute on chronic, NYHA class IV  - 2D echo:    1 - Severely depressed left ventricular systolic function (EF 20-25%).     2 - Severely depressed right ventricular systolic function .     3 - Pulmonary hypertension. The estimated PA systolic pressure is 69 mmHg.     4 - Low flow, low gradient aortic valve stenosis with reduced EF ((NAMAN 0.78 cm2, AVAi 0.39 cm2/m2, peak aortic jet velocity 3.2 m/s,MG 28 mmHg, EF 20%,SVi 21 mL/m2).     5 - Moderate aortic regurgitation.     6 - Severe mitral regurgitation.     7 - Moderate to severe tricuspid regurgitation.     8 - Increased central venous pressure.   - Lasix gtt @ 20, UOP minimal, worsened          Type 2 diabetes mellitus with diabetic polyneuropathy, without long-term current use of insulin     - 11/2017 hgbA1c 6.4  - LDSS, no medications taken at home for DM2  - POCT glc QID       Severe aortic stenosis     Not candiate for invasive therapy       Coronary artery disease involving native heart without angina pectoris     Not candidate for advanced options       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      Renal cell cancer stage 4 s/p C3 palliative optidivo  - outpatient follow up with Dr. Vladez (per 12/2017 noted: no longer able to safely receive immunotherapy or targeted therapy in light of his performance status and cardiac  status)       Essential hypertension     Held any anti-hypertensives 2/2 shock     CONSULTS: cardiology    Last CBC/BMP/HgbA1c (if applicable):  Recent Results (from the past 336 hour(s))   CBC auto differential    Collection Time: 12/25/17  3:30 AM   Result Value Ref Range    WBC 24.31 (H) 3.90 - 12.70 K/uL    Hemoglobin 11.0 (L) 14.0 - 18.0 g/dL    Hematocrit 31.3 (L) 40.0 - 54.0 %    Platelets 114 (L) 150 - 350 K/uL   CBC auto differential    Collection Time: 12/24/17  3:30 AM   Result Value Ref Range    WBC 17.98 (H) 3.90 - 12.70 K/uL    Hemoglobin 11.2 (L) 14.0 - 18.0 g/dL    Hematocrit 32.0 (L) 40.0 - 54.0 %    Platelets 126 (L) 150 - 350 K/uL   CBC auto differential    Collection Time: 12/23/17 11:32 AM   Result Value Ref Range    WBC 13.89 (H) 3.90 - 12.70 K/uL    Hemoglobin 11.3 (L) 14.0 - 18.0 g/dL    Hematocrit 31.9 (L) 40.0 - 54.0 %    Platelets 136 (L) 150 - 350 K/uL     Recent Results (from the past 336 hour(s))   Basic metabolic panel    Collection Time: 12/25/17  3:30 AM   Result Value Ref Range    Sodium 115 (LL) 136 - 145 mmol/L    Potassium 5.5 (H) 3.5 - 5.1 mmol/L    Chloride 77 (L) 95 - 110 mmol/L    CO2 14 (L) 23 - 29 mmol/L    BUN, Bld 97 (H) 8 - 23 mg/dL    Creatinine 4.9 (H) 0.5 - 1.4 mg/dL    Calcium 7.0 (L) 8.7 - 10.5 mg/dL    Anion Gap 24 (H) 8 - 16 mmol/L   Basic metabolic panel    Collection Time: 12/24/17  3:30 AM   Result Value Ref Range    Sodium 119 (LL) 136 - 145 mmol/L    Potassium 5.1 3.5 - 5.1 mmol/L    Chloride 80 (L) 95 - 110 mmol/L    CO2 14 (L) 23 - 29 mmol/L    BUN, Bld 83 (H) 8 - 23 mg/dL    Creatinine 3.8 (H) 0.5 - 1.4 mg/dL    Calcium 7.5 (L) 8.7 - 10.5 mg/dL    Anion Gap 25 (H) 8 - 16 mmol/L   Basic metabolic panel    Collection Time: 12/23/17  9:50 AM   Result Value Ref Range    Sodium 121 (L) 136 - 145 mmol/L    Potassium 4.0 3.5 - 5.1 mmol/L    Chloride 83 (L) 95 - 110 mmol/L    CO2 20 (L) 23 - 29 mmol/L    BUN, Bld 82 (H) 8 - 23 mg/dL    Creatinine 3.1 (H) 0.5 - 1.4  mg/dL    Calcium 7.6 (L) 8.7 - 10.5 mg/dL    Anion Gap 18 (H) 8 - 16 mmol/L     Lab Results   Component Value Date    HGBA1C 6.4 (H) 2017       Disposition:        Discharge Medication List:  n/a        Signing Physician:  Lin Bass MD

## 2017-12-25 NOTE — SIGNIFICANT EVENT
Called by nursing to present at bedside as patient was actively expiring.      Patient did not respond to verbal or tactile stimulation. There was no pulse and no inspiratory movements. No heart sounds or breath sounds on auscultation of the precordium/chest. Pupils fixed and unresponsive to light.        Offered condolences to patient's wife. Offered opportunity to ask questions.  called to bedside.    Time of death: 0842    Lin Bass MD

## 2017-12-27 NOTE — ANESTHESIA POSTPROCEDURE EVALUATION
"Anesthesia Post Evaluation    Patient: Lex Billy    Procedure(s) Performed: Procedure(s) (LRB):  CARDIOVERSION (N/A)    Final Anesthesia Type: general  Patient location during evaluation: ICU  Patient participation: Yes- Able to Participate  Level of consciousness: awake and alert  Post-procedure vital signs: reviewed and stable  Pain management: adequate  Airway patency: patent  PONV status at discharge: No PONV  Anesthetic complications: no      Cardiovascular status: hypotensive and stable  Respiratory status: unassisted, spontaneous ventilation and room air  Hydration status: euvolemic    Comments: Late entry. Patient seen at bedside in ICU post procedure.  Patient had drips up titrated after cardioversion.         Visit Vitals  BP (!) 100/59   Pulse (!) 56   Temp 36.4 °C (97.6 °F) (Axillary)   Resp 15   Ht 5' 10" (1.778 m)   Wt 85.5 kg (188 lb 7.9 oz)   SpO2  96%   BMI 27.05 kg/m²       Pain/Chester Score: No Data Recorded      "

## 2018-01-05 ENCOUNTER — TELEPHONE (OUTPATIENT)
Dept: INTERNAL MEDICINE | Facility: CLINIC | Age: 73
End: 2018-01-05

## 2018-01-05 NOTE — TELEPHONE ENCOUNTER
----- Message from Jacinto Sparks sent at 12/29/2017 12:10 PM CST -----  Contact: reji/chucky/ [phone]  She said it is urgent that you sign the death certificate and send it back to her.    It was sent via [employer].    Thank you

## 2018-01-05 NOTE — TELEPHONE ENCOUNTER
Please advise that I do not have either a physical death certificate or an electronic one on [facility] to sign.

## 2018-07-18 NOTE — PROGRESS NOTES
Seen and examined patient in the PM. SVO2 has improved from 40 to 60 with addition of . In addition I added epinephrine for BP support as his SBP dropped into the 80's. Patient is still in aflutter and his HR is 93. Lasix at 20 mg/hr. I have given a bolus of 80 mg IV push of lasix in addition to giving a one time order of 250 mg IV of diuril. UOP is minimal 25 cc/hr. Cool extremities noted. Noted hyaline casts on UA    Will continue current management,  Warner Wade MD, PGY-4      normal...

## 2018-12-29 NOTE — PROGRESS NOTES
Dr. Mendez notified that patient 's, takes metoprolol at home but on hold d/t decreased BP. EKG done. Patient in NAD, states he can feel his heart racing, reports same SOB from previous. BP 98/62, MAP 71. Will continue to monitor.    need for outpatient follow-up/return to ED if symptoms worsen, persist or questions arise

## 2021-09-13 NOTE — ASSESSMENT & PLAN NOTE
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 Continue axitinib.   - Pt stated he has not been taking his axitinib due to running out of prescription and not being able to afford refill  - Will defer to oncologist for further treatment         Home

## 2022-07-21 NOTE — PLAN OF CARE
Problem: Physical Therapy Goal  Goal: Physical Therapy Goal  Outcome: Outcome(s) achieved Date Met: 10/04/17  PT evaluation complete. No goals established as pt is baseline with mobility and has no acute PT needs at this time. D/C from PT services.    Marga García, PT, DPT   10/4/2017  [phone]         abdominal pain